# Patient Record
Sex: FEMALE | Race: WHITE | Employment: OTHER | URBAN - NONMETROPOLITAN AREA
[De-identification: names, ages, dates, MRNs, and addresses within clinical notes are randomized per-mention and may not be internally consistent; named-entity substitution may affect disease eponyms.]

---

## 2017-01-03 ENCOUNTER — OFFICE VISIT (OUTPATIENT)
Dept: PRIMARY CARE CLINIC | Age: 62
End: 2017-01-03
Payer: COMMERCIAL

## 2017-01-03 VITALS
OXYGEN SATURATION: 98 % | HEART RATE: 98 BPM | SYSTOLIC BLOOD PRESSURE: 122 MMHG | WEIGHT: 170 LBS | HEIGHT: 65 IN | BODY MASS INDEX: 28.32 KG/M2 | DIASTOLIC BLOOD PRESSURE: 68 MMHG | RESPIRATION RATE: 20 BRPM

## 2017-01-03 DIAGNOSIS — G62.9 PERIPHERAL POLYNEUROPATHY: ICD-10-CM

## 2017-01-03 DIAGNOSIS — I10 ESSENTIAL HYPERTENSION: Primary | ICD-10-CM

## 2017-01-03 PROCEDURE — 99214 OFFICE O/P EST MOD 30 MIN: CPT | Performed by: INTERNAL MEDICINE

## 2017-01-03 RX ORDER — AMITRIPTYLINE HYDROCHLORIDE 10 MG/1
10 TABLET, FILM COATED ORAL NIGHTLY PRN
Qty: 30 TABLET | Refills: 3 | Status: SHIPPED | OUTPATIENT
Start: 2017-01-03 | End: 2017-05-31 | Stop reason: SDUPTHER

## 2017-01-03 RX ORDER — ROPINIROLE 0.5 MG/1
0.5 TABLET, FILM COATED ORAL 2 TIMES DAILY
Qty: 60 TABLET | Refills: 3 | Status: SHIPPED | OUTPATIENT
Start: 2017-01-03 | End: 2017-08-01 | Stop reason: SDUPTHER

## 2017-01-03 ASSESSMENT — ENCOUNTER SYMPTOMS
CONSTIPATION: 0
SHORTNESS OF BREATH: 0
BACK PAIN: 0
NAUSEA: 0
DIARRHEA: 0
VOMITING: 0
COUGH: 0

## 2017-01-12 DIAGNOSIS — Z76.0 MEDICATION REFILL: ICD-10-CM

## 2017-01-12 RX ORDER — ALPRAZOLAM 0.5 MG/1
0.5 TABLET ORAL 3 TIMES DAILY PRN
Qty: 90 TABLET | Refills: 2 | OUTPATIENT
Start: 2017-01-12 | End: 2017-04-10 | Stop reason: SDUPTHER

## 2017-01-12 RX ORDER — ZOLPIDEM TARTRATE 12.5 MG/1
12.5 TABLET, FILM COATED, EXTENDED RELEASE ORAL NIGHTLY PRN
Qty: 30 TABLET | Refills: 2 | OUTPATIENT
Start: 2017-01-12 | End: 2017-04-10 | Stop reason: SDUPTHER

## 2017-01-31 ENCOUNTER — HOSPITAL ENCOUNTER (OUTPATIENT)
Dept: WOMENS IMAGING | Age: 62
Discharge: HOME | End: 2017-01-31
Payer: COMMERCIAL

## 2017-01-31 DIAGNOSIS — Z12.31 ENCOUNTER FOR SCREENING MAMMOGRAM FOR BREAST CANCER: ICD-10-CM

## 2017-01-31 PROCEDURE — 77063 BREAST TOMOSYNTHESIS BI: CPT

## 2017-04-10 ENCOUNTER — OFFICE VISIT (OUTPATIENT)
Dept: PRIMARY CARE CLINIC | Age: 62
End: 2017-04-10
Payer: COMMERCIAL

## 2017-04-10 VITALS
WEIGHT: 170 LBS | BODY MASS INDEX: 28.32 KG/M2 | DIASTOLIC BLOOD PRESSURE: 76 MMHG | OXYGEN SATURATION: 98 % | SYSTOLIC BLOOD PRESSURE: 122 MMHG | HEIGHT: 65 IN | RESPIRATION RATE: 20 BRPM | HEART RATE: 89 BPM

## 2017-04-10 DIAGNOSIS — Z76.0 MEDICATION REFILL: ICD-10-CM

## 2017-04-10 DIAGNOSIS — I10 ESSENTIAL HYPERTENSION: Primary | ICD-10-CM

## 2017-04-10 DIAGNOSIS — E11.9 TYPE 2 DIABETES MELLITUS WITHOUT COMPLICATION, WITHOUT LONG-TERM CURRENT USE OF INSULIN (HCC): ICD-10-CM

## 2017-04-10 DIAGNOSIS — G25.81 RESTLESS LEGS SYNDROME (RLS): ICD-10-CM

## 2017-04-10 LAB — HBA1C MFR BLD: 8.6 %

## 2017-04-10 PROCEDURE — 99214 OFFICE O/P EST MOD 30 MIN: CPT | Performed by: INTERNAL MEDICINE

## 2017-04-10 PROCEDURE — 83036 HEMOGLOBIN GLYCOSYLATED A1C: CPT | Performed by: INTERNAL MEDICINE

## 2017-04-10 RX ORDER — ALPRAZOLAM 0.5 MG/1
0.5 TABLET ORAL 3 TIMES DAILY PRN
Qty: 90 TABLET | Refills: 2 | Status: SHIPPED | OUTPATIENT
Start: 2017-04-10 | End: 2017-07-13 | Stop reason: SDUPTHER

## 2017-04-10 RX ORDER — GLIMEPIRIDE 2 MG/1
2 TABLET ORAL
Qty: 30 TABLET | Refills: 3 | Status: SHIPPED | OUTPATIENT
Start: 2017-04-10 | End: 2017-08-10 | Stop reason: SDUPTHER

## 2017-04-10 RX ORDER — ZOLPIDEM TARTRATE 12.5 MG/1
12.5 TABLET, FILM COATED, EXTENDED RELEASE ORAL NIGHTLY PRN
Qty: 30 TABLET | Refills: 2 | Status: SHIPPED | OUTPATIENT
Start: 2017-04-10 | End: 2017-07-13 | Stop reason: SDUPTHER

## 2017-04-10 ASSESSMENT — ENCOUNTER SYMPTOMS
SHORTNESS OF BREATH: 0
VOMITING: 0
COUGH: 0
CONSTIPATION: 0
BACK PAIN: 0
DIARRHEA: 0
NAUSEA: 0

## 2017-04-24 ENCOUNTER — TELEPHONE (OUTPATIENT)
Dept: PRIMARY CARE CLINIC | Age: 62
End: 2017-04-24

## 2017-04-24 RX ORDER — AMOXICILLIN AND CLAVULANATE POTASSIUM 875; 125 MG/1; MG/1
1 TABLET, FILM COATED ORAL 2 TIMES DAILY
Qty: 20 TABLET | Refills: 0 | Status: SHIPPED | OUTPATIENT
Start: 2017-04-24 | End: 2017-05-04

## 2017-04-26 ENCOUNTER — TELEPHONE (OUTPATIENT)
Dept: PRIMARY CARE CLINIC | Age: 62
End: 2017-04-26

## 2017-04-26 RX ORDER — PROMETHAZINE HYDROCHLORIDE 25 MG/1
25 TABLET ORAL EVERY 6 HOURS PRN
Qty: 30 TABLET | Refills: 2 | Status: SHIPPED | OUTPATIENT
Start: 2017-04-26 | End: 2017-05-03

## 2017-05-31 RX ORDER — AMITRIPTYLINE HYDROCHLORIDE 10 MG/1
10 TABLET, FILM COATED ORAL NIGHTLY PRN
Qty: 30 TABLET | Refills: 3 | Status: SHIPPED | OUTPATIENT
Start: 2017-05-31 | End: 2017-10-02 | Stop reason: SDUPTHER

## 2017-05-31 RX ORDER — MONTELUKAST SODIUM 4 MG/1
TABLET, CHEWABLE ORAL
Qty: 60 TABLET | Refills: 11 | Status: SHIPPED | OUTPATIENT
Start: 2017-05-31 | End: 2018-07-06 | Stop reason: SDUPTHER

## 2017-06-22 ENCOUNTER — TELEPHONE (OUTPATIENT)
Dept: PRIMARY CARE CLINIC | Age: 62
End: 2017-06-22

## 2017-06-22 DIAGNOSIS — N39.0 URINARY TRACT INFECTION, SITE UNSPECIFIED: Primary | ICD-10-CM

## 2017-06-22 RX ORDER — PHENAZOPYRIDINE HYDROCHLORIDE 200 MG/1
200 TABLET, FILM COATED ORAL 3 TIMES DAILY PRN
Qty: 9 TABLET | Refills: 0 | Status: SHIPPED | OUTPATIENT
Start: 2017-06-22 | End: 2017-06-25

## 2017-06-22 RX ORDER — LEVOFLOXACIN 500 MG/1
500 TABLET, FILM COATED ORAL DAILY
Qty: 3 TABLET | Refills: 0 | Status: SHIPPED | OUTPATIENT
Start: 2017-06-22 | End: 2017-06-25

## 2017-07-13 DIAGNOSIS — Z76.0 MEDICATION REFILL: ICD-10-CM

## 2017-07-13 RX ORDER — ZOLPIDEM TARTRATE 12.5 MG/1
12.5 TABLET, FILM COATED, EXTENDED RELEASE ORAL NIGHTLY PRN
Qty: 30 TABLET | Refills: 2 | Status: SHIPPED | OUTPATIENT
Start: 2017-07-13 | End: 2017-08-10 | Stop reason: ALTCHOICE

## 2017-07-13 RX ORDER — ALPRAZOLAM 0.5 MG/1
0.5 TABLET ORAL 3 TIMES DAILY PRN
Qty: 90 TABLET | Refills: 2 | Status: CANCELLED | OUTPATIENT
Start: 2017-07-13

## 2017-07-13 RX ORDER — ZOLPIDEM TARTRATE 12.5 MG/1
12.5 TABLET, FILM COATED, EXTENDED RELEASE ORAL NIGHTLY PRN
Qty: 30 TABLET | Refills: 2 | Status: CANCELLED | OUTPATIENT
Start: 2017-07-13

## 2017-07-13 RX ORDER — ALPRAZOLAM 0.5 MG/1
0.5 TABLET ORAL 3 TIMES DAILY PRN
Qty: 90 TABLET | Refills: 2 | Status: SHIPPED | OUTPATIENT
Start: 2017-07-13 | End: 2017-11-28 | Stop reason: SDUPTHER

## 2017-08-10 ENCOUNTER — OFFICE VISIT (OUTPATIENT)
Dept: PRIMARY CARE CLINIC | Age: 62
End: 2017-08-10
Payer: COMMERCIAL

## 2017-08-10 VITALS
DIASTOLIC BLOOD PRESSURE: 78 MMHG | HEIGHT: 65 IN | WEIGHT: 170 LBS | SYSTOLIC BLOOD PRESSURE: 124 MMHG | TEMPERATURE: 97.6 F | HEART RATE: 95 BPM | OXYGEN SATURATION: 98 % | BODY MASS INDEX: 28.32 KG/M2

## 2017-08-10 DIAGNOSIS — E11.9 TYPE 2 DIABETES MELLITUS WITHOUT COMPLICATION, WITHOUT LONG-TERM CURRENT USE OF INSULIN (HCC): ICD-10-CM

## 2017-08-10 DIAGNOSIS — E78.00 HYPERCHOLESTEREMIA: ICD-10-CM

## 2017-08-10 DIAGNOSIS — F51.01 PRIMARY INSOMNIA: ICD-10-CM

## 2017-08-10 DIAGNOSIS — I10 ESSENTIAL HYPERTENSION: Primary | ICD-10-CM

## 2017-08-10 DIAGNOSIS — G25.81 RESTLESS LEGS SYNDROME (RLS): ICD-10-CM

## 2017-08-10 PROCEDURE — 99214 OFFICE O/P EST MOD 30 MIN: CPT | Performed by: INTERNAL MEDICINE

## 2017-08-10 RX ORDER — ROPINIROLE 0.5 MG/1
TABLET, FILM COATED ORAL
Qty: 60 TABLET | Refills: 5 | Status: SHIPPED | OUTPATIENT
Start: 2017-08-10 | End: 2018-03-21 | Stop reason: SDUPTHER

## 2017-08-10 RX ORDER — ZOLPIDEM TARTRATE 10 MG/1
10 TABLET ORAL NIGHTLY PRN
Qty: 30 TABLET | Refills: 3 | Status: SHIPPED | OUTPATIENT
Start: 2017-08-10 | End: 2017-12-08 | Stop reason: SDUPTHER

## 2017-08-10 RX ORDER — GLIMEPIRIDE 2 MG/1
2 TABLET ORAL
Qty: 30 TABLET | Refills: 3 | Status: SHIPPED | OUTPATIENT
Start: 2017-08-10 | End: 2018-01-10 | Stop reason: SDUPTHER

## 2017-08-10 ASSESSMENT — ENCOUNTER SYMPTOMS
VOMITING: 0
NAUSEA: 0
SHORTNESS OF BREATH: 0
COUGH: 0
BACK PAIN: 0
CONSTIPATION: 0
DIARRHEA: 0

## 2017-08-11 DIAGNOSIS — F51.01 PRIMARY INSOMNIA: ICD-10-CM

## 2017-08-11 DIAGNOSIS — G25.81 RESTLESS LEGS SYNDROME (RLS): ICD-10-CM

## 2017-08-11 DIAGNOSIS — E78.00 HYPERCHOLESTEREMIA: ICD-10-CM

## 2017-08-11 DIAGNOSIS — E11.9 TYPE 2 DIABETES MELLITUS WITHOUT COMPLICATION, WITHOUT LONG-TERM CURRENT USE OF INSULIN (HCC): ICD-10-CM

## 2017-08-11 DIAGNOSIS — I10 ESSENTIAL HYPERTENSION: ICD-10-CM

## 2017-08-11 LAB
ALBUMIN SERPL-MCNC: 4.1 G/DL (ref 3.5–5.2)
ALP BLD-CCNC: 115 U/L (ref 35–104)
ALT SERPL-CCNC: 25 U/L (ref 5–33)
ANION GAP SERPL CALCULATED.3IONS-SCNC: 18 MMOL/L (ref 7–19)
AST SERPL-CCNC: 30 U/L (ref 5–32)
BILIRUB SERPL-MCNC: 0.4 MG/DL (ref 0.2–1.2)
BUN BLDV-MCNC: 7 MG/DL (ref 8–23)
CALCIUM SERPL-MCNC: 9.6 MG/DL (ref 8.8–10.2)
CHLORIDE BLD-SCNC: 98 MMOL/L (ref 98–111)
CHOLESTEROL, TOTAL: 259 MG/DL (ref 160–199)
CO2: 25 MMOL/L (ref 22–29)
CREAT SERPL-MCNC: 0.6 MG/DL (ref 0.5–0.9)
GFR NON-AFRICAN AMERICAN: >60
GLUCOSE BLD-MCNC: 200 MG/DL (ref 74–109)
HBA1C MFR BLD: 9.2 %
HCT VFR BLD CALC: 36.7 % (ref 37–47)
HDLC SERPL-MCNC: 65 MG/DL (ref 65–121)
HEMOGLOBIN: 11.6 G/DL (ref 12–16)
LDL CHOLESTEROL CALCULATED: 150 MG/DL
MCH RBC QN AUTO: 28 PG (ref 27–31)
MCHC RBC AUTO-ENTMCNC: 31.6 G/DL (ref 33–37)
MCV RBC AUTO: 88.6 FL (ref 81–99)
PDW BLD-RTO: 13.8 % (ref 11.5–14.5)
PLATELET # BLD: 349 K/UL (ref 130–400)
PMV BLD AUTO: 10.9 FL (ref 9.4–12.3)
POTASSIUM SERPL-SCNC: 3.8 MMOL/L (ref 3.5–5)
RBC # BLD: 4.14 M/UL (ref 4.2–5.4)
SODIUM BLD-SCNC: 141 MMOL/L (ref 136–145)
TOTAL PROTEIN: 7.6 G/DL (ref 6.6–8.7)
TRIGL SERPL-MCNC: 221 MG/DL (ref 150–199)
WBC # BLD: 6.1 K/UL (ref 4.8–10.8)

## 2017-08-21 ENCOUNTER — TELEPHONE (OUTPATIENT)
Dept: PRIMARY CARE CLINIC | Age: 62
End: 2017-08-21

## 2017-08-21 DIAGNOSIS — R73.09 HEMOGLOBIN A1C 8.0% OR GREATER: Primary | ICD-10-CM

## 2017-09-07 ENCOUNTER — TELEPHONE (OUTPATIENT)
Dept: PRIMARY CARE CLINIC | Age: 62
End: 2017-09-07

## 2017-09-28 ENCOUNTER — HOSPITAL ENCOUNTER (OUTPATIENT)
Dept: GENERAL RADIOLOGY | Age: 62
Discharge: HOME | End: 2017-09-28
Payer: COMMERCIAL

## 2017-09-28 ENCOUNTER — OFFICE VISIT (OUTPATIENT)
Dept: PRIMARY CARE CLINIC | Age: 62
End: 2017-09-28
Payer: COMMERCIAL

## 2017-09-28 ENCOUNTER — TELEPHONE (OUTPATIENT)
Dept: PRIMARY CARE CLINIC | Age: 62
End: 2017-09-28

## 2017-09-28 VITALS
HEIGHT: 65 IN | HEART RATE: 86 BPM | SYSTOLIC BLOOD PRESSURE: 112 MMHG | WEIGHT: 167 LBS | OXYGEN SATURATION: 98 % | DIASTOLIC BLOOD PRESSURE: 68 MMHG | TEMPERATURE: 98 F | BODY MASS INDEX: 27.82 KG/M2 | RESPIRATION RATE: 18 BRPM

## 2017-09-28 DIAGNOSIS — R10.13 EPIGASTRIC PAIN: ICD-10-CM

## 2017-09-28 DIAGNOSIS — R19.7 DIARRHEA, UNSPECIFIED TYPE: ICD-10-CM

## 2017-09-28 DIAGNOSIS — R11.0 NAUSEA: ICD-10-CM

## 2017-09-28 DIAGNOSIS — R73.9 HYPERGLYCEMIA: ICD-10-CM

## 2017-09-28 DIAGNOSIS — K29.00 OTHER ACUTE GASTRITIS, PRESENCE OF BLEEDING UNSPECIFIED: ICD-10-CM

## 2017-09-28 LAB
ALBUMIN SERPL-MCNC: 4.3 G/DL (ref 3.5–5.2)
ALP BLD-CCNC: 114 U/L (ref 35–104)
ALT SERPL-CCNC: 30 U/L (ref 5–33)
AMYLASE: 66 U/L (ref 28–100)
ANION GAP SERPL CALCULATED.3IONS-SCNC: 23 MMOL/L (ref 7–19)
AST SERPL-CCNC: 29 U/L (ref 5–32)
BILIRUB SERPL-MCNC: <0.2 MG/DL (ref 0.2–1.2)
BUN BLDV-MCNC: 11 MG/DL (ref 8–23)
CALCIUM SERPL-MCNC: 9.7 MG/DL (ref 8.8–10.2)
CHLORIDE BLD-SCNC: 99 MMOL/L (ref 98–111)
CO2: 21 MMOL/L (ref 22–29)
CREAT SERPL-MCNC: 0.9 MG/DL (ref 0.5–0.9)
GFR NON-AFRICAN AMERICAN: >60
GLUCOSE BLD-MCNC: 306 MG/DL (ref 74–109)
HCT VFR BLD CALC: 37.4 % (ref 37–47)
HEMOGLOBIN: 11.6 G/DL (ref 12–16)
LIPASE: 28 U/L (ref 13–60)
MCH RBC QN AUTO: 27.6 PG (ref 27–31)
MCHC RBC AUTO-ENTMCNC: 31 G/DL (ref 33–37)
MCV RBC AUTO: 89 FL (ref 81–99)
PDW BLD-RTO: 13.5 % (ref 11.5–14.5)
PLATELET # BLD: 305 K/UL (ref 130–400)
PMV BLD AUTO: 10.2 FL (ref 9.4–12.3)
POTASSIUM SERPL-SCNC: 4.4 MMOL/L (ref 3.5–5)
RBC # BLD: 4.2 M/UL (ref 4.2–5.4)
SODIUM BLD-SCNC: 143 MMOL/L (ref 136–145)
TOTAL PROTEIN: 7.3 G/DL (ref 6.6–8.7)
WBC # BLD: 6.4 K/UL (ref 4.8–10.8)

## 2017-09-28 PROCEDURE — 74000 XR ABDOMEN LIMITED (KUB): CPT

## 2017-09-28 PROCEDURE — 99214 OFFICE O/P EST MOD 30 MIN: CPT | Performed by: NURSE PRACTITIONER

## 2017-09-28 RX ORDER — PANTOPRAZOLE SODIUM 40 MG/1
40 TABLET, DELAYED RELEASE ORAL DAILY
Qty: 30 TABLET | Refills: 3 | Status: SHIPPED | OUTPATIENT
Start: 2017-09-28 | End: 2017-12-11

## 2017-09-28 RX ORDER — PEN NEEDLE, DIABETIC 31 GX5/16"
NEEDLE, DISPOSABLE MISCELLANEOUS
Qty: 100 EACH | Refills: 0 | Status: SHIPPED | OUTPATIENT
Start: 2017-09-28 | End: 2018-07-18 | Stop reason: SDUPTHER

## 2017-09-28 RX ORDER — SUCRALFATE 1 G/1
1 TABLET ORAL 4 TIMES DAILY
Qty: 120 TABLET | Refills: 3 | Status: SHIPPED | OUTPATIENT
Start: 2017-09-28 | End: 2018-09-28

## 2017-09-28 RX ORDER — ONDANSETRON 4 MG/1
4 TABLET, FILM COATED ORAL EVERY 8 HOURS PRN
Qty: 30 TABLET | Refills: 0 | Status: SHIPPED | OUTPATIENT
Start: 2017-09-28 | End: 2017-10-08

## 2017-09-28 NOTE — PATIENT INSTRUCTIONS
Stop Jardiance. Begin Victoza 0.6mg daily x 7 days then increase to 1.2 daily for the rest of the month. Start protonix 40mg. Take in the morning on an empty stomach with water 2 -3 hours before food. Take carafate 30 min before meals with water. Make sure to separate protonix and carafate by 2 hours. Eat a bland diet. Avoid soda, acidic foods, spicy foods. Follow up in one month. I will refer you to DM educator on the 2nd floor.

## 2017-09-28 NOTE — MR AVS SNAPSHOT
After Visit Summary             Donna Urena   2017 9:15 AM   Office Visit    Description:  Female : 1955   Provider:  FLOR Allen   Department:  [address] [place] [address] and Future Appointments         Below is a list of your follow-up and future appointments. This may not be a complete list as you may have made appointments directly with providers that we are not aware of or your providers may have made some for you. Please call your providers to confirm appointments. It is important to keep your appointments. Please bring your current insurance card, photo ID, co-pay, and all medication bottles to your appointment. If self-pay, payment is expected at the time of service. Your To-Do List     Future Appointments Provider Department Dept Phone    2017 1:15 PM FLOR Allen [facility] [phone]    Please arrive 15 minutes prior to appointment, bring photo ID and insurance card. 2017 9:00 AM Deshaun Burgess DO [facility] [phone]    Please arrive 15 minutes prior to appointment, bring photo ID and insurance card. Future Orders Complete By Expires    Amylase [LAB48 Custom]  2017    CBC [TVV754 Custom]  2017    Comprehensive Metabolic Panel [HTB35 Custom]  2017    H. Pylori Antigen, Stool [CUZ589 Custom]  2017    Lipase [LAB99 Custom]  2017    XR ABDOMEN (KUB) (SINGLE AP VIEW) [51889 Custom]  2017    Follow-Up    Return in about 1 month (around 10/28/2017).          Information from Your Visit        Department     Name Address Phone Fax    [facility] [address] [facility] [phone]      You Were Seen for:         Comments    DM (diabetes mellitus), secondary uncontrolled ([facility]   [967853]         Vital Signs Instructions: Take 1 tablet by mouth every 8 hours as needed for Nausea or Vomiting   Quantity:  30 tablet   Refills:  0   Started by:  FLOR Mckinnon       pantoprazole 40 MG tablet   Commonly known as:  PROTONIX   Instructions: Take 1 tablet by mouth daily Take on an empty stomach with water 2 hours before food. Quantity:  30 tablet   Refills:  3   Started by:  FLOR Mckinnon       sucralfate 1 GM tablet   Commonly known as:  CARAFATE   Instructions: Take 1 tablet by mouth 4 times daily Take 30 min before food with water. Quantity:  120 tablet   Refills:  3   Started by:  FLOR Mckinnon         STOP taking these medications           empagliflozin 25 MG tablet   Commonly known as:  Maddy Turney by:  FLOR Mckinnon            Where to Get Your Medications      These medications were sent to [facility] Yulisa Alvarado Eddi [phone] [address], [address] [place] [place] [address]     Phone:  [phone]     Liraglutide 18 MG/3ML Sopn SC injection    ondansetron 4 MG tablet    pantoprazole 40 MG tablet    sucralfate 1 GM tablet               Your Current Medications Are              Liraglutide (VICTOZA) 18 MG/3ML SOPN SC injection 0.6mg daily x 7 days, then increase to 1.2mg daily. sucralfate (CARAFATE) 1 GM tablet Take 1 tablet by mouth 4 times daily Take 30 min before food with water. pantoprazole (PROTONIX) 40 MG tablet Take 1 tablet by mouth daily Take on an empty stomach with water 2 hours before food. ondansetron (ZOFRAN) 4 MG tablet Take 1 tablet by mouth every 8 hours as needed for Nausea or Vomiting    rOPINIRole (REQUIP) 0.5 MG tablet TAKE (1) TABLET BY MOUTH TWICE A DAY.     glimepiride (AMARYL) 2 MG tablet Take 1 tablet by mouth every morning (before breakfast)    ALPRAZolam (XANAX) 0.5 MG tablet Take 1 tablet by mouth 3 times daily as needed for Anxiety colestipol (COLESTID) 1 G tablet TAKE (1) TABLET BY MOUTH TWICE A DAY. amitriptyline (ELAVIL) 10 MG tablet Take 1 tablet by mouth nightly as needed for Sleep    celecoxib (CELEBREX) 200 MG capsule TAKE 1 CAPSULE BY MOUTH DAILY IN THE MORNING    lisinopril (PRINIVIL;ZESTRIL) 20 MG tablet TAKE 1 TABLET BY MOUTH DAILY IN THE MORNING    metFORMIN (GLUCOPHAGE-XR) 500 MG extended release tablet TAKE (2) TABLETS BY MOUTH TWICE DAILY. Diabetic Shoe MISC by Does not apply route    cilostazol (PLETAL) 100 MG tablet Take 1 tablet by mouth 2 times daily    carisoprodol (SOMA) 350 MG tablet Take 350 mg by mouth 2 times daily     mupirocin (BACTROBAN) 2 % ointment     oxyCODONE-acetaminophen (PERCOCET)  MG per tablet Take 1 tablet by mouth every 8 hours as needed       Allergies              Codeine     Sulfa Antibiotics          Additional Information        Basic Information     Date Of Birth Sex Race Ethnicity Preferred Language Preferred Written Language    1955 Female White Non-/Non  English English      Problem List as of 9/28/2017  Date Reviewed: 10/17/2014                Chest discomfort    Diabetes mellitus ([facility]    Hypercholesteremia      Immunizations as of 9/28/2017     Name Date    Influenza, Randall Echols, 3 Years and older, IM 9/22/2016    Pneumococcal Polysaccharide (Perquuldn32) 3/1/2016    Tdap (Boostrix, Adacel) 1/3/2013      Preventive Care        Date Due    Urine Check For Kidney Problems 3/1/2017    Yearly Flu Vaccine (1) 9/1/2017    Diabetic Foot Exam 9/22/2017    Eye Exam By An Eye Doctor 2/22/2018 (Originally 8/22/1965)    Zoster Vaccine 2/22/2018 (Originally 8/22/2014)    Pap Smear 2/22/2018 (Originally 8/22/1975)    Hepatitis C screening is recommended for all adults regardless of risk factors born between [facility] at least once (lifetime) who have never been tested.  2/22/2018 (Originally 1955)    HIV screening is recommended for all people regardless of risk factors aged 15-65 years at least once (lifetime) who have never been HIV tested. 2/22/2018 (Originally [birth year]22[birth year]1969)    Hemoglobin A1C (Test For Long-Term Glucose Control) 11/11/2017    Cholesterol Screening 8/11/2018    Mammograms are recommended every 2 years for low/average risk patients aged 48 - 69, and every year for high risk patients per updated national guidelines. However these guidelines can be individualized by your provider. 1/31/2019    Colonoscopy 5/4/2020    Tetanus Combination Vaccine (2 - Td) 1/3/2023            MyChart Signup           Plan Me Up allows you to send messages to your doctor, view your test results, renew your prescriptions, schedule appointments, view visit notes, and more. How Do I Sign Up? 1. In your Internet browser, go to [email or web address]  2. Click on the Sign Up Now link in the Sign In box. You will see the New Member Sign Up page. 3. Enter your Plan Me Up Access Code exactly as it appears below. You will not need to use this code after youve completed the sign-up process. If you do not sign up before the expiration date, you must request a new code. Plan Me Up Access Code: [email or web address]  Expires: 10/9/2017 10:51 AM    4. Enter your Social Security Number ([id number]) and Date of Birth (mm/dd/yyyy) as indicated and click Submit. You will be taken to the next sign-up page. 5. Create a Plan Me Up ID. This will be your Plan Me Up login ID and cannot be changed, so think of one that is secure and easy to remember. 6. Create a Plan Me Up password. You can change your password at any time. 7. Enter your Password Reset Question and Answer. This can be used at a later time if you forget your password. 8. Enter your e-mail address. You will receive e-mail notification when new information is available in [address] 9. Click Sign Up. You can now view your medical record.      Additional Information  If you have questions, please contact the physician practice where you receive care. Remember, [employer] is NOT to be used for urgent needs. For medical emergencies, dial 911. For questions regarding your [employer] account call [phone]. If you have a clinical question, please call your doctor's office.

## 2017-10-03 ASSESSMENT — ENCOUNTER SYMPTOMS
SHORTNESS OF BREATH: 0
ABDOMINAL PAIN: 0
WHEEZING: 0
VOMITING: 0
DIARRHEA: 0
NAUSEA: 0
VOICE CHANGE: 0
CHEST TIGHTNESS: 0
CONSTIPATION: 0
EYE REDNESS: 0
RHINORRHEA: 0
COUGH: 0
TROUBLE SWALLOWING: 0
BLOOD IN STOOL: 0
SORE THROAT: 0

## 2017-10-03 NOTE — PROGRESS NOTES
[facility]  [address] [facility]  [address] [place] [address]  Dept: [phone]  Dept Fax: [phone]  Loc: [phone]    Mona Mcintyre is a 60 y.o. female who presents today for her medical conditions/complaints as noted below. Mona Mcintyre is c/o of Nausea and Blood Sugar Problem      Chief Complaint   Patient presents with    Nausea    Blood Sugar Problem       HPI:       HPI    Pt here too follow up on issues with blood sugar. Pt states that she has had elevated blood sugar and nausea. Past Medical History:   Diagnosis Date    Anxiety     Chest discomfort 10/17/2014    H/o negative stress tests AUC indication 15, AUC score 4, [facility] 2012;[id number] 10/17/2014  Cath  Mild cad,normal LVFX      Diabetes mellitus ([address] 10/17/2014    Insomnia     Osteoarthritis         Past Surgical History:   Procedure Laterality Date    CARDIAC CATHETERIZATION  10/17/14  JDT    EF 50%    CHOLECYSTECTOMY      HYSTERECTOMY      KNEE SURGERY      NECK SURGERY         Social History   Substance Use Topics    Smoking status: Former Smoker     Packs/day: 1.00     Years: 30.00     Quit date: 3/1/1991    Smokeless tobacco: Not on file    Alcohol use No        Current Outpatient Prescriptions   Medication Sig Dispense Refill    Liraglutide (VICTOZA) 18 MG/3ML SOPN SC injection 0.6mg daily x 7 days, then increase to 1.2mg daily. 1 Pen 0    sucralfate (CARAFATE) 1 GM tablet Take 1 tablet by mouth 4 times daily Take 30 min before food with water. 120 tablet 3    pantoprazole (PROTONIX) 40 MG tablet Take 1 tablet by mouth daily Take on an empty stomach with water 2 hours before food. 30 tablet 3    ondansetron (ZOFRAN) 4 MG tablet Take 1 tablet by mouth every 8 hours as needed for Nausea or Vomiting 30 tablet 0    rOPINIRole (REQUIP) 0.5 MG tablet TAKE (1) TABLET BY MOUTH TWICE A DAY.  60 tablet 5    glimepiride (AMARYL) 2 MG tablet Take 1 tablet by mouth every dysuria, frequency and urgency. Musculoskeletal: Negative for myalgias. Skin: Negative for rash and wound. Neurological: Negative for dizziness, speech difficulty, light-headedness and headaches. Psychiatric/Behavioral: Negative for agitation, confusion, self-injury and suicidal ideas. The patient is not nervous/anxious. Objective:     Physical Exam   Constitutional: She is oriented to person, place, and time. She appears well-developed and well-nourished. No distress. HENT:   Head: Normocephalic and atraumatic. Right Ear: External ear normal.   Left Ear: External ear normal.   Nose: Nose normal.   Mouth/Throat: Oropharynx is clear and moist. No oropharyngeal exudate. Eyes: Conjunctivae are normal. Pupils are equal, round, and reactive to light. Right eye exhibits no discharge. Left eye exhibits no discharge. Neck: Normal range of motion. Neck supple. Cardiovascular: Normal rate, regular rhythm, normal heart sounds and intact distal pulses. No murmur heard. Pulmonary/Chest: Effort normal and breath sounds normal. No stridor. No respiratory distress. She has no wheezes. She has no rales. She exhibits no tenderness. Abdominal: Soft. Bowel sounds are normal. She exhibits no distension. There is tenderness (epigastric). Musculoskeletal: Normal range of motion. She exhibits no edema, tenderness or deformity. Neurological: She is alert and oriented to person, place, and time. She has normal reflexes. No cranial nerve deficit. Coordination normal.   Skin: Skin is warm and dry. No rash noted. She is not diaphoretic. No erythema. Psychiatric: She has a normal mood and affect. Her behavior is normal. Thought content normal.       /68  Pulse 86  Temp 98 °F (36.7 °C) (Temporal)   Resp 18  Ht 5' 5\" (1.651 m)  Wt 167 lb (75.8 kg)  LMP 03/01/1976 (LMP Unknown)  SpO2 98%  BMI 27.79 kg/m2      Assessment:     1.  DM (diabetes mellitus), secondary uncontrolled (HCC)  Liraglutide ([place]) 18 MG/3ML SOPN SC injection    Internal Referral to Diabetic Education   2. Hyperglycemia  Liraglutide (VICTOZA) 18 MG/3ML SOPN SC injection   3. Epigastric pain  XR ABDOMEN (KUB) (SINGLE AP VIEW)    H. Pylori Antigen, Stool    CBC    Comprehensive Metabolic Panel    Amylase    Lipase    sucralfate (CARAFATE) 1 GM tablet    pantoprazole (PROTONIX) 40 MG tablet   4. Diarrhea, unspecified type     5. Other acute gastritis, presence of bleeding unspecified  sucralfate (CARAFATE) 1 GM tablet    pantoprazole (PROTONIX) 40 MG tablet   6. Nausea         No results found for this visit on 09/28/17. Plan:     1. DM (diabetes mellitus), secondary uncontrolled ([address] Will begin pt on victoza. Will follow up in one month. Pt given instruction on use in office by provider. 2. Hyperglycemia    3. Epigastric pain    4. Diarrhea, unspecified type    5. Other acute gastritis, presence of bleeding unspecified - Will start pt on combo of protonix and carafate. Will follow up in one month. 6. Nausea        Return in about 1 month (around 10/28/2017). Orders Placed This Encounter   Procedures    XR ABDOMEN (KUB) (SINGLE AP VIEW)     Standing Status:   Future     Number of Occurrences:   1     Standing Expiration Date:   9/28/2018    H.  Pylori Antigen, Stool     Standing Status:   Future     Standing Expiration Date:   9/28/2018    CBC     Standing Status:   Future     Number of Occurrences:   1     Standing Expiration Date:   9/28/2018    Comprehensive Metabolic Panel     Standing Status:   Future     Number of Occurrences:   1     Standing Expiration Date:   9/28/2018    Amylase     Standing Status:   Future     Number of Occurrences:   1     Standing Expiration Date:   9/28/2018    Lipase     Standing Status:   Future     Number of Occurrences:   1     Standing Expiration Date:   9/28/2018    Internal Referral to Diabetic Education     Referral Priority:   Routine     Referral Type:   Consult for Advice and Opinion     Referral Reason:   Specialty Services Required     Requested Specialty:   Dietitian     Number of Visits Requested:   1       Orders Placed This Encounter   Medications    Liraglutide (VICTOZA) 18 MG/3ML SOPN SC injection     Si.6mg daily x 7 days, then increase to 1.2mg daily. Dispense:  1 Pen     Refill:  0    sucralfate (CARAFATE) 1 GM tablet     Sig: Take 1 tablet by mouth 4 times daily Take 30 min before food with water. Dispense:  120 tablet     Refill:  3    pantoprazole (PROTONIX) 40 MG tablet     Sig: Take 1 tablet by mouth daily Take on an empty stomach with water 2 hours before food. Dispense:  30 tablet     Refill:  3    ondansetron (ZOFRAN) 4 MG tablet     Sig: Take 1 tablet by mouth every 8 hours as needed for Nausea or Vomiting     Dispense:  30 tablet     Refill:  0        Patient given educational materials - see patient instructions. Discussed use, benefit, and side effects of prescribed medications. All patient questions answered. Pt voiced understanding. Reviewed health maintenance. Instructed to continue current medications, diet and exercise. Patient agreed with treatment plan. Follow up as directed. Pt instructed that is symptoms worsen or persist they are to contact office or report to nearest ER. Pt voices understanding and agreement with this plan.      Electronically signed by FLOR Collins on 10/3/2017 at 4:59 PM

## 2017-11-03 RX ORDER — METFORMIN HYDROCHLORIDE 500 MG/1
TABLET, EXTENDED RELEASE ORAL
Qty: 120 TABLET | Refills: 0 | Status: SHIPPED | OUTPATIENT
Start: 2017-11-03 | End: 2017-12-11 | Stop reason: SDUPTHER

## 2017-11-16 RX ORDER — ONDANSETRON 4 MG/1
4 TABLET, FILM COATED ORAL EVERY 8 HOURS PRN
Qty: 30 TABLET | Refills: 0 | Status: SHIPPED | OUTPATIENT
Start: 2017-11-16 | End: 2018-01-15 | Stop reason: SDUPTHER

## 2017-11-28 DIAGNOSIS — Z76.0 MEDICATION REFILL: ICD-10-CM

## 2017-11-28 RX ORDER — ALPRAZOLAM 0.5 MG/1
0.5 TABLET ORAL 3 TIMES DAILY PRN
Qty: 90 TABLET | Refills: 2 | Status: SHIPPED | OUTPATIENT
Start: 2017-11-28 | End: 2018-04-06 | Stop reason: SDUPTHER

## 2017-12-11 ENCOUNTER — OFFICE VISIT (OUTPATIENT)
Dept: PRIMARY CARE CLINIC | Age: 62
End: 2017-12-11
Payer: COMMERCIAL

## 2017-12-11 VITALS
SYSTOLIC BLOOD PRESSURE: 126 MMHG | OXYGEN SATURATION: 97 % | DIASTOLIC BLOOD PRESSURE: 64 MMHG | BODY MASS INDEX: 27.99 KG/M2 | HEIGHT: 65 IN | WEIGHT: 168 LBS | HEART RATE: 114 BPM | TEMPERATURE: 98.2 F

## 2017-12-11 DIAGNOSIS — I10 ESSENTIAL HYPERTENSION: ICD-10-CM

## 2017-12-11 DIAGNOSIS — G25.81 RESTLESS LEGS SYNDROME (RLS): ICD-10-CM

## 2017-12-11 DIAGNOSIS — F33.1 MODERATE EPISODE OF RECURRENT MAJOR DEPRESSIVE DISORDER (HCC): ICD-10-CM

## 2017-12-11 DIAGNOSIS — E11.9 TYPE 2 DIABETES MELLITUS WITHOUT COMPLICATION, WITHOUT LONG-TERM CURRENT USE OF INSULIN (HCC): Primary | ICD-10-CM

## 2017-12-11 LAB — HBA1C MFR BLD: 7.9 %

## 2017-12-11 PROCEDURE — 99214 OFFICE O/P EST MOD 30 MIN: CPT | Performed by: INTERNAL MEDICINE

## 2017-12-11 PROCEDURE — 83036 HEMOGLOBIN GLYCOSYLATED A1C: CPT | Performed by: INTERNAL MEDICINE

## 2017-12-11 RX ORDER — METFORMIN HYDROCHLORIDE 1000 MG/1
1000 TABLET, FILM COATED, EXTENDED RELEASE ORAL 2 TIMES DAILY
Qty: 60 TABLET | Refills: 3 | Status: SHIPPED | OUTPATIENT
Start: 2017-12-11 | End: 2017-12-11 | Stop reason: CLARIF

## 2017-12-11 RX ORDER — LISINOPRIL 20 MG/1
20 TABLET ORAL DAILY
COMMUNITY
End: 2018-02-08 | Stop reason: SDUPTHER

## 2017-12-11 RX ORDER — ZOLPIDEM TARTRATE 10 MG/1
10 TABLET ORAL NIGHTLY PRN
Qty: 30 TABLET | Refills: 3 | Status: SHIPPED | OUTPATIENT
Start: 2017-12-11 | End: 2018-03-21 | Stop reason: SDUPTHER

## 2017-12-11 RX ORDER — ZOLPIDEM TARTRATE 10 MG/1
10 TABLET ORAL NIGHTLY PRN
Qty: 30 TABLET | Refills: 3 | Status: SHIPPED | OUTPATIENT
Start: 2017-12-11 | End: 2017-12-11 | Stop reason: SDUPTHER

## 2017-12-11 ASSESSMENT — ENCOUNTER SYMPTOMS
BACK PAIN: 0
CONSTIPATION: 0
NAUSEA: 0
COUGH: 0
DIARRHEA: 0
VOMITING: 0
SHORTNESS OF BREATH: 0

## 2017-12-11 NOTE — PROGRESS NOTES
CHOLECYSTECTOMY      HYSTERECTOMY      KNEE SURGERY      NECK SURGERY         Family History   Problem Relation Age of Onset    Heart Disease Mother     Cancer Father     Heart Disease Sister        Social History   Substance Use Topics    Smoking status: Former Smoker     Packs/day: 1.00     Years: 30.00     Quit date: 3/1/1991    Smokeless tobacco: Never Used    Alcohol use No      Current Outpatient Prescriptions   Medication Sig Dispense Refill    lisinopril (PRINIVIL;ZESTRIL) 20 MG tablet Take 20 mg by mouth daily      zolpidem (AMBIEN) 10 MG tablet Take 1 tablet by mouth nightly as needed for Sleep . 30 tablet 3    Diabetic Shoe MISC by Does not apply route With insert Dx E11.9 1 each 0    ALPRAZolam (XANAX) 0.5 MG tablet Take 1 tablet by mouth 3 times daily as needed for Anxiety . 90 tablet 2    amitriptyline (ELAVIL) 10 MG tablet TAKE 1 TABLET BY MOUTH AT BEDTIME AS NEEDED FOR SLEEP 30 tablet 11    sucralfate (CARAFATE) 1 GM tablet Take 1 tablet by mouth 4 times daily Take 30 min before food with water. 120 tablet 3    B-D UF III MINI PEN NEEDLES 31G X 5 MM MISC FOR DAILY USE WITH LEVEMIR 100 each 0    rOPINIRole (REQUIP) 0.5 MG tablet TAKE (1) TABLET BY MOUTH TWICE A DAY. 60 tablet 5    glimepiride (AMARYL) 2 MG tablet Take 1 tablet by mouth every morning (before breakfast) 30 tablet 3    colestipol (COLESTID) 1 G tablet TAKE (1) TABLET BY MOUTH TWICE A DAY.  60 tablet 11    celecoxib (CELEBREX) 200 MG capsule TAKE 1 CAPSULE BY MOUTH DAILY IN THE MORNING 30 capsule 11    carisoprodol (SOMA) 350 MG tablet Take 350 mg by mouth 2 times daily       oxyCODONE-acetaminophen (PERCOCET)  MG per tablet Take 1 tablet by mouth every 8 hours as needed       metFORMIN (GLUCOPHAGE) 500 MG tablet Take 2 tablets by mouth 2 times daily (with meals) 120 tablet 3    ondansetron (ZOFRAN) 4 MG tablet Take 1 tablet by mouth every 8 hours as needed for Nausea or Vomiting 30 tablet 0     No current Electronically signed by Isabela Kelly DO on 12/11/2017 at 11:04 AM

## 2017-12-11 NOTE — TELEPHONE ENCOUNTER
Oliva Calderon from [facility] called and metformin ER 1000 twice daily is not covered by insurance.  Escribed 500 2 twice daily

## 2017-12-11 NOTE — PATIENT INSTRUCTIONS
Start Invokana 100 mg once a day. Increase metformin to 1000 mg twice a day. We will refer you to [facility] psychiatry.

## 2018-01-15 DIAGNOSIS — E08.00 DIABETES MELLITUS DUE TO UNDERLYING CONDITION WITH HYPEROSMOLARITY WITHOUT COMA, UNSPECIFIED LONG TERM INSULIN USE STATUS: Primary | ICD-10-CM

## 2018-01-15 RX ORDER — ONDANSETRON 4 MG/1
TABLET, FILM COATED ORAL
Qty: 30 TABLET | Refills: 0 | Status: SHIPPED | OUTPATIENT
Start: 2018-01-15 | End: 2018-03-21 | Stop reason: SDUPTHER

## 2018-01-15 RX ORDER — METFORMIN HYDROCHLORIDE 500 MG/1
TABLET, EXTENDED RELEASE ORAL
Qty: 120 TABLET | Refills: 3 | Status: SHIPPED | OUTPATIENT
Start: 2018-01-15 | End: 2018-06-25 | Stop reason: SDUPTHER

## 2018-01-15 NOTE — TELEPHONE ENCOUNTER
[phone] called called regarding problem with metformin needs to be ER not plain as patient has previously taken ER.   Escribed

## 2018-02-08 RX ORDER — LISINOPRIL 20 MG/1
TABLET ORAL
Qty: 30 TABLET | Refills: 11 | Status: SHIPPED | OUTPATIENT
Start: 2018-02-08 | End: 2019-01-30 | Stop reason: SDUPTHER

## 2018-02-19 ENCOUNTER — HOSPITAL ENCOUNTER (OUTPATIENT)
Dept: NON INVASIVE DIAGNOSTICS | Age: 63
Discharge: HOME | End: 2018-02-19
Payer: COMMERCIAL

## 2018-02-19 ENCOUNTER — OFFICE VISIT (OUTPATIENT)
Dept: PRIMARY CARE CLINIC | Age: 63
End: 2018-02-19
Payer: COMMERCIAL

## 2018-02-19 VITALS
SYSTOLIC BLOOD PRESSURE: 122 MMHG | BODY MASS INDEX: 27.49 KG/M2 | TEMPERATURE: 97.5 F | OXYGEN SATURATION: 96 % | RESPIRATION RATE: 20 BRPM | WEIGHT: 165 LBS | HEIGHT: 65 IN | HEART RATE: 106 BPM | DIASTOLIC BLOOD PRESSURE: 80 MMHG

## 2018-02-19 DIAGNOSIS — R07.89 CHEST PRESSURE: ICD-10-CM

## 2018-02-19 DIAGNOSIS — E11.9 TYPE 2 DIABETES MELLITUS WITHOUT COMPLICATION, WITHOUT LONG-TERM CURRENT USE OF INSULIN (HCC): ICD-10-CM

## 2018-02-19 DIAGNOSIS — N30.00 ACUTE CYSTITIS WITHOUT HEMATURIA: Primary | ICD-10-CM

## 2018-02-19 DIAGNOSIS — E78.00 HYPERCHOLESTEREMIA: ICD-10-CM

## 2018-02-19 LAB
BILIRUBIN, POC: ABNORMAL
BLOOD URINE, POC: ABNORMAL
CLARITY, POC: CLEAR
COLOR, POC: YELLOW
GLUCOSE URINE, POC: 100
KETONES, POC: ABNORMAL
LEUKOCYTE EST, POC: ABNORMAL
NITRITE, POC: ABNORMAL
PH, POC: 5
PROTEIN, POC: ABNORMAL
SPECIFIC GRAVITY, POC: 1.02
UROBILINOGEN, POC: 0.2

## 2018-02-19 PROCEDURE — 99214 OFFICE O/P EST MOD 30 MIN: CPT | Performed by: FAMILY MEDICINE

## 2018-02-19 PROCEDURE — 93000 ELECTROCARDIOGRAM COMPLETE: CPT | Performed by: FAMILY MEDICINE

## 2018-02-19 PROCEDURE — 93005 ELECTROCARDIOGRAM TRACING: CPT

## 2018-02-19 PROCEDURE — 81002 URINALYSIS NONAUTO W/O SCOPE: CPT | Performed by: FAMILY MEDICINE

## 2018-02-19 RX ORDER — PROMETHAZINE HYDROCHLORIDE 25 MG/1
25 TABLET ORAL EVERY 6 HOURS PRN
Qty: 20 TABLET | Refills: 0 | Status: SHIPPED | OUTPATIENT
Start: 2018-02-19 | End: 2018-02-26

## 2018-02-19 RX ORDER — CIPROFLOXACIN 500 MG/1
500 TABLET, FILM COATED ORAL 2 TIMES DAILY
Qty: 6 TABLET | Refills: 0 | Status: SHIPPED | OUTPATIENT
Start: 2018-02-19 | End: 2018-02-22

## 2018-02-19 RX ORDER — SIMVASTATIN 20 MG
20 TABLET ORAL NIGHTLY
Qty: 90 TABLET | Refills: 3 | Status: SHIPPED | OUTPATIENT
Start: 2018-02-19 | End: 2019-09-23

## 2018-02-19 RX ORDER — ASPIRIN 81 MG/1
81 TABLET ORAL DAILY
Qty: 90 TABLET | Refills: 5 | Status: SHIPPED | OUTPATIENT
Start: 2018-02-19

## 2018-02-19 RX ORDER — PHENAZOPYRIDINE HYDROCHLORIDE 100 MG/1
100 TABLET, FILM COATED ORAL 3 TIMES DAILY PRN
Qty: 9 TABLET | Refills: 0 | Status: SHIPPED | OUTPATIENT
Start: 2018-02-19 | End: 2018-02-22

## 2018-02-19 RX ORDER — FLUCONAZOLE 100 MG/1
100 TABLET ORAL
Qty: 2 TABLET | Refills: 0 | Status: SHIPPED | OUTPATIENT
Start: 2018-02-19 | End: 2018-02-23

## 2018-02-19 ASSESSMENT — PATIENT HEALTH QUESTIONNAIRE - PHQ9
2. FEELING DOWN, DEPRESSED OR HOPELESS: 0
SUM OF ALL RESPONSES TO PHQ9 QUESTIONS 1 & 2: 0
1. LITTLE INTEREST OR PLEASURE IN DOING THINGS: 0
SUM OF ALL RESPONSES TO PHQ QUESTIONS 1-9: 0

## 2018-02-19 ASSESSMENT — ENCOUNTER SYMPTOMS
ABDOMINAL PAIN: 0
BACK PAIN: 0
NAUSEA: 1

## 2018-02-19 NOTE — PATIENT INSTRUCTIONS
Start taking daily 81 mg aspirin. Start 1/2 tab of simvastatin daily for 2 weeks, then increase to full tab daily. Use pyridium as needed up to 3 times a day and 9 doses maximum. Give it at least 1 week between refilling the pyridium if a refill was prescribed. Start cipro twice a day for 3 days. Start fluconazole, 1 pill today and another in 3 days.

## 2018-02-19 NOTE — PROGRESS NOTES
David Kilgore is a 60 y.o. female who presents today for   Chief Complaint   Patient presents with    Diabetes    Urinary Tract Infection       HPI  Patient is here for Concern of frequency, urgency, and dysuria. She states she's had other UTIs but none that required treatment recently. She denies any history of nephrolithiasis but she has some current nausea without vomiting or diarrhea. She also notes that last week she did have a bout of chest pressure, cold sweat; they came on without warning on Thursday. She does have a strong family history of coronary artery disease and is concerned about this. She is currently diabetic as well with an A1c is 7.9 but is not due for A1c rechecked today. No change in PMH, family, social, or surgical history unless mentioned above. Review of Systems   Constitutional: Negative for fever. Gastrointestinal: Positive for nausea. Negative for abdominal pain. Genitourinary: Positive for dysuria, frequency and urgency. Negative for vaginal bleeding, vaginal discharge and vaginal pain. Musculoskeletal: Negative for back pain.        Past Medical History:   Diagnosis Date    Anxiety     Chest discomfort 10/17/2014    H/o negative stress tests AUC indication 15, AUC score 4, [facility] 2012;[id number] 10/17/2014  Cath  Mild cad,normal LVFX      Diabetes mellitus ([facility] [address] [address] 10/17/2014    Insomnia     Osteoarthritis        Current Outpatient Prescriptions   Medication Sig Dispense Refill    ciprofloxacin (CIPRO) 500 MG tablet Take 1 tablet by mouth 2 times daily for 3 days 6 tablet 0    fluconazole (DIFLUCAN) 100 MG tablet Take 1 tablet by mouth every 72 hours for 2 doses 2 tablet 0    phenazopyridine (PYRIDIUM) 100 MG tablet Take 1 tablet by mouth 3 times daily as needed for Pain 9 tablet 0    aspirin EC 81 MG EC tablet Take 1 tablet by mouth daily 90 tablet 5    simvastatin (ZOCOR) 20 MG tablet Take 1 tablet by mouth nightly 90 tablet 3    promethazine (PHENERGAN) 25

## 2018-03-21 ENCOUNTER — OFFICE VISIT (OUTPATIENT)
Dept: PRIMARY CARE CLINIC | Age: 63
End: 2018-03-21
Payer: COMMERCIAL

## 2018-03-21 VITALS
SYSTOLIC BLOOD PRESSURE: 110 MMHG | TEMPERATURE: 97.8 F | DIASTOLIC BLOOD PRESSURE: 60 MMHG | HEART RATE: 129 BPM | OXYGEN SATURATION: 98 % | WEIGHT: 166.4 LBS | BODY MASS INDEX: 27.69 KG/M2

## 2018-03-21 DIAGNOSIS — E78.00 HYPERCHOLESTEREMIA: ICD-10-CM

## 2018-03-21 DIAGNOSIS — E11.9 TYPE 2 DIABETES MELLITUS WITHOUT COMPLICATION, WITHOUT LONG-TERM CURRENT USE OF INSULIN (HCC): Primary | ICD-10-CM

## 2018-03-21 DIAGNOSIS — F33.1 MODERATE EPISODE OF RECURRENT MAJOR DEPRESSIVE DISORDER (HCC): ICD-10-CM

## 2018-03-21 DIAGNOSIS — I10 ESSENTIAL HYPERTENSION: ICD-10-CM

## 2018-03-21 LAB — HBA1C MFR BLD: 6.9 %

## 2018-03-21 PROCEDURE — 83036 HEMOGLOBIN GLYCOSYLATED A1C: CPT | Performed by: INTERNAL MEDICINE

## 2018-03-21 PROCEDURE — 99214 OFFICE O/P EST MOD 30 MIN: CPT | Performed by: INTERNAL MEDICINE

## 2018-03-21 RX ORDER — CELECOXIB 200 MG/1
CAPSULE ORAL
Qty: 30 CAPSULE | Refills: 11 | Status: SHIPPED | OUTPATIENT
Start: 2018-03-21 | End: 2019-03-28 | Stop reason: SDUPTHER

## 2018-03-21 RX ORDER — ONDANSETRON 4 MG/1
TABLET, FILM COATED ORAL
Qty: 30 TABLET | Refills: 5 | Status: SHIPPED | OUTPATIENT
Start: 2018-03-21 | End: 2018-09-28

## 2018-03-21 RX ORDER — ESCITALOPRAM OXALATE 10 MG/1
10 TABLET ORAL DAILY
Qty: 30 TABLET | Refills: 3 | Status: SHIPPED | OUTPATIENT
Start: 2018-03-21 | End: 2018-06-25 | Stop reason: SDUPTHER

## 2018-03-21 RX ORDER — ROPINIROLE 0.5 MG/1
TABLET, FILM COATED ORAL
Qty: 60 TABLET | Refills: 11 | Status: SHIPPED | OUTPATIENT
Start: 2018-03-21 | End: 2019-03-28 | Stop reason: SDUPTHER

## 2018-03-21 RX ORDER — ZOLPIDEM TARTRATE 10 MG/1
10 TABLET ORAL NIGHTLY PRN
Qty: 30 TABLET | Refills: 3 | Status: SHIPPED | OUTPATIENT
Start: 2018-03-21 | End: 2018-04-20

## 2018-03-21 ASSESSMENT — ENCOUNTER SYMPTOMS
COUGH: 0
VOMITING: 0
BACK PAIN: 0
SHORTNESS OF BREATH: 0
CONSTIPATION: 0
NAUSEA: 0
DIARRHEA: 0

## 2018-03-21 NOTE — PROGRESS NOTES
[facility]  [address] [facility]  [address] [facility] [address]  Dept: [phone]  Dept Fax: [phone]  Loc: [phone]    David Kilgore is a 60 y.o. female who presents today for her medical conditions/complaints as noted below. David Kilgore is c/o of   Chief Complaint   Patient presents with    3 Month Follow-Up    Blood Sugar Problem     running 300-400    Depression         HPI:     HPI  Ms. Nasreen Sauceda returns for follow-up of diabetes. I had referred her to psychiatry because she was dealing with the death of her son. However she was unable to afford the psychiatric care. She is requesting something for depression. She also notes that her blood sugars recently have been running in the 3-400 range. I had tried her on Invokana but she developed nausea related to the medication.     Hemoglobin A1C (%)   Date Value   03/21/2018 6.9   12/11/2017 7.9   08/11/2017 9.2 (H)             ( goal A1C is < 7)   Microalbumin, Random Urine (mg/G)   Date Value   03/01/2016 1.50     LDL Calculated (mg/dL)   Date Value   08/11/2017 150   05/26/2016 173       (goal LDL is <100)   AST (U/L)   Date Value   09/28/2017 29     ALT (U/L)   Date Value   09/28/2017 30     BUN (mg/dL)   Date Value   09/28/2017 11     BP Readings from Last 3 Encounters:   03/21/18 110/60   02/19/18 122/80   12/11/17 126/64          (goal 120/80)    Past Medical History:   Diagnosis Date    Anxiety     Chest discomfort 10/17/2014    H/o negative stress tests AUC indication 15, AUC score 4, [facility] [id number] 10/17/2014  Cath  Mild cad,normal LVFX      Diabetes mellitus ([address] 10/17/2014    Insomnia     Osteoarthritis       Past Surgical History:   Procedure Laterality Date    CARDIAC CATHETERIZATION  10/17/14  JDT    EF 50%    CHOLECYSTECTOMY      HYSTERECTOMY      KNEE SURGERY      NECK SURGERY         Family History   Problem Relation Age of Onset    Heart Disease Mother     Cancer mouth 2 times daily       oxyCODONE-acetaminophen (PERCOCET)  MG per tablet Take 1 tablet by mouth every 8 hours as needed        No current facility-administered medications for this visit. Allergies   Allergen Reactions    Codeine     Sulfa Antibiotics        Health Maintenance   Topic Date Due    Hepatitis C screen  1955    Diabetic retinal exam  08/22/1964    HIV screen  08/22/1969    Cervical cancer screen  08/22/1975    Shingles Vaccine (1 of 2 - 2 Dose Series) 08/22/2004    Diabetic microalbuminuria test  03/01/2017    Flu vaccine (1) 09/01/2017    Diabetic foot exam  09/22/2017    Lipid screen  08/11/2018    Potassium monitoring  09/28/2018    Creatinine monitoring  09/28/2018    A1C test (Diabetic or Prediabetic)  12/11/2018    Breast cancer screen  01/31/2019    Colon cancer screen colonoscopy  05/04/2020    DTaP/Tdap/Td vaccine (2 - Td) 01/03/2023    Pneumococcal med risk  Completed       Subjective:      Review of Systems   Constitutional: Negative for activity change and fatigue. Respiratory: Negative for cough and shortness of breath. Cardiovascular: Negative for chest pain and leg swelling. Gastrointestinal: Negative for constipation, diarrhea, nausea and vomiting. Genitourinary: Negative for difficulty urinating and dysuria. Musculoskeletal: Negative for arthralgias and back pain. Neurological: Negative for dizziness and headaches. Psychiatric/Behavioral: Positive for dysphoric mood. Objective:     Physical Exam   Constitutional: She is oriented to person, place, and time. She appears well-developed and well-nourished. HENT:   Head: Normocephalic and atraumatic. Mouth/Throat: Oropharynx is clear and moist.   Eyes: Conjunctivae are normal. Pupils are equal, round, and reactive to light. Cardiovascular: Normal rate, regular rhythm and normal heart sounds. Pulmonary/Chest: Effort normal and breath sounds normal.   Abdominal: Soft. Musculoskeletal: Normal range of motion. Neurological: She is alert and oriented to person, place, and time. Skin: Skin is warm and dry. Vitals reviewed. /60 (Site: Right Arm, Position: Sitting)   Pulse 129   Temp 97.8 °F (36.6 °C)   Wt 166 lb 6.4 oz (75.5 kg)   LMP 03/01/1976 (LMP Unknown)   SpO2 98%   BMI 27.69 kg/m²     Assessment:      1. Type 2 diabetes mellitus without complication, without long-term current use of insulin ([facility])  POCT glycosylated hemoglobin (Hb A1C)   2. Hypercholesteremia     3. Essential hypertension     4. Moderate episode of recurrent major depressive disorder ([facility]               Plan:      Return in about 3 months (around 6/21/2018). Patient Instructions   Start Tradjenta 5 mg once a day. Start Lexapro 10 mg once a day. Continue the remainder of your medications. Follow up again in 3 months. Orders Placed This Encounter   Procedures    POCT glycosylated hemoglobin (Hb A1C)     Orders Placed This Encounter   Medications    ondansetron (ZOFRAN) 4 MG tablet     Sig: Take 4mg by mouth. Dispense:  30 tablet     Refill:  5    celecoxib (CELEBREX) 200 MG capsule     Sig: TAKE 1 CAPSULE BY MOUTH DAILY IN THE MORNING     Dispense:  30 capsule     Refill:  11    rOPINIRole (REQUIP) 0.5 MG tablet     Sig: TAKE (1) TABLET BY MOUTH TWICE A DAY. Dispense:  60 tablet     Refill:  11    zolpidem (AMBIEN) 10 MG tablet     Sig: Take 1 tablet by mouth nightly as needed for Sleep for up to 30 days. Dispense:  30 tablet     Refill:  3    escitalopram (LEXAPRO) 10 MG tablet     Sig: Take 1 tablet by mouth daily     Dispense:  30 tablet     Refill:  3    linagliptin (TRADJENTA) 5 MG tablet     Sig: Take 1 tablet by mouth daily     Dispense:  30 tablet     Refill:  5       Patient given educational materials - see patient instructions. Discussed use, benefit, and side effects of prescribed medications. All patient questions answered. Pt voiced understanding. Reviewed health maintenance. Instructed to continue current medications, diet and exercise. Patient agreed with treatment plan. Follow up as directed.      Electronically signed by Geovanna Lanier DO on 3/21/2018 at 3:22 PM

## 2018-04-06 DIAGNOSIS — Z76.0 MEDICATION REFILL: ICD-10-CM

## 2018-04-06 RX ORDER — ALPRAZOLAM 0.5 MG/1
TABLET ORAL
Qty: 90 TABLET | Refills: 0 | Status: SHIPPED | OUTPATIENT
Start: 2018-04-06 | End: 2018-05-05 | Stop reason: SDUPTHER

## 2018-04-25 ENCOUNTER — TELEPHONE (OUTPATIENT)
Dept: PRIMARY CARE CLINIC | Age: 63
End: 2018-04-25

## 2018-05-05 DIAGNOSIS — Z76.0 MEDICATION REFILL: ICD-10-CM

## 2018-05-06 RX ORDER — ALPRAZOLAM 0.5 MG/1
TABLET ORAL
Qty: 90 TABLET | Refills: 0 | OUTPATIENT
Start: 2018-05-06 | End: 2018-06-25

## 2018-05-08 ENCOUNTER — TELEPHONE (OUTPATIENT)
Dept: PRIMARY CARE CLINIC | Age: 63
End: 2018-05-08

## 2018-05-11 ENCOUNTER — OFFICE VISIT (OUTPATIENT)
Dept: PRIMARY CARE CLINIC | Age: 63
End: 2018-05-11
Payer: COMMERCIAL

## 2018-05-11 VITALS
OXYGEN SATURATION: 98 % | TEMPERATURE: 98.6 F | HEART RATE: 113 BPM | DIASTOLIC BLOOD PRESSURE: 68 MMHG | SYSTOLIC BLOOD PRESSURE: 132 MMHG | WEIGHT: 166 LBS | BODY MASS INDEX: 27.66 KG/M2 | HEIGHT: 65 IN

## 2018-05-11 DIAGNOSIS — R11.0 NAUSEA: ICD-10-CM

## 2018-05-11 DIAGNOSIS — E11.42 DIABETIC POLYNEUROPATHY ASSOCIATED WITH TYPE 2 DIABETES MELLITUS (HCC): Primary | ICD-10-CM

## 2018-05-11 PROCEDURE — 99213 OFFICE O/P EST LOW 20 MIN: CPT | Performed by: NURSE PRACTITIONER

## 2018-05-11 RX ORDER — ONDANSETRON 4 MG/1
4 TABLET, FILM COATED ORAL DAILY PRN
Qty: 30 TABLET | Refills: 0 | Status: SHIPPED | OUTPATIENT
Start: 2018-05-11 | End: 2018-10-03 | Stop reason: SDUPTHER

## 2018-05-11 RX ORDER — GABAPENTIN 100 MG/1
100 CAPSULE ORAL 3 TIMES DAILY
Qty: 90 CAPSULE | Refills: 0 | Status: SHIPPED | OUTPATIENT
Start: 2018-05-11 | End: 2018-06-25 | Stop reason: SDUPTHER

## 2018-05-11 ASSESSMENT — ENCOUNTER SYMPTOMS
DIARRHEA: 0
VOMITING: 0
SHORTNESS OF BREATH: 0
ABDOMINAL PAIN: 0
NAUSEA: 1

## 2018-06-11 ENCOUNTER — HOSPITAL ENCOUNTER (EMERGENCY)
Age: 63
Discharge: LEFT BEFORE DISCHARGE | End: 2018-06-11
Payer: COMMERCIAL

## 2018-06-11 VITALS
BODY MASS INDEX: 27.82 KG/M2 | SYSTOLIC BLOOD PRESSURE: 117 MMHG | WEIGHT: 167 LBS | RESPIRATION RATE: 17 BRPM | HEART RATE: 126 BPM | TEMPERATURE: 98.8 F | HEIGHT: 65 IN | DIASTOLIC BLOOD PRESSURE: 78 MMHG | OXYGEN SATURATION: 96 %

## 2018-06-11 LAB
GLUCOSE BLD-MCNC: 255 MG/DL
GLUCOSE BLD-MCNC: 255 MG/DL (ref 70–99)
PERFORMED ON: ABNORMAL

## 2018-06-11 PROCEDURE — 4500000002 HC ER NO CHARGE

## 2018-06-11 PROCEDURE — 82948 REAGENT STRIP/BLOOD GLUCOSE: CPT

## 2018-06-11 ASSESSMENT — PAIN SCALES - GENERAL: PAINLEVEL_OUTOF10: 8

## 2018-06-25 ENCOUNTER — HOSPITAL ENCOUNTER (OUTPATIENT)
Dept: GENERAL RADIOLOGY | Age: 63
Discharge: HOME | End: 2018-06-25
Payer: COMMERCIAL

## 2018-06-25 ENCOUNTER — OFFICE VISIT (OUTPATIENT)
Dept: PRIMARY CARE CLINIC | Age: 63
End: 2018-06-25
Payer: COMMERCIAL

## 2018-06-25 VITALS
RESPIRATION RATE: 20 BRPM | OXYGEN SATURATION: 95 % | TEMPERATURE: 97.8 F | DIASTOLIC BLOOD PRESSURE: 64 MMHG | HEART RATE: 95 BPM | HEIGHT: 65 IN | SYSTOLIC BLOOD PRESSURE: 120 MMHG

## 2018-06-25 DIAGNOSIS — G56.91 NEUROPATHY, UPPER EXTREMITY, RIGHT: ICD-10-CM

## 2018-06-25 DIAGNOSIS — E11.42 DIABETIC POLYNEUROPATHY ASSOCIATED WITH TYPE 2 DIABETES MELLITUS (HCC): ICD-10-CM

## 2018-06-25 DIAGNOSIS — F41.0 PANIC DISORDER: ICD-10-CM

## 2018-06-25 DIAGNOSIS — E11.9 TYPE 2 DIABETES MELLITUS WITHOUT COMPLICATION, WITHOUT LONG-TERM CURRENT USE OF INSULIN (HCC): Primary | ICD-10-CM

## 2018-06-25 DIAGNOSIS — Z76.0 MEDICATION REFILL: ICD-10-CM

## 2018-06-25 DIAGNOSIS — G56.92 NEUROPATHY, UPPER EXTREMITY, LEFT: ICD-10-CM

## 2018-06-25 DIAGNOSIS — S97.82XA CRUSHING INJURY OF LEFT FOOT, INITIAL ENCOUNTER: ICD-10-CM

## 2018-06-25 LAB
AMPHETAMINE SCREEN, URINE: NORMAL
BARBITURATE SCREEN, URINE: NORMAL
BENZODIAZEPINE SCREEN, URINE: POSITIVE
COCAINE METABOLITE SCREEN URINE: NORMAL
MDMA URINE: NORMAL
METHADONE SCREEN, URINE: NORMAL
METHAMPHETAMINE, URINE: NORMAL
OPIATE SCREEN URINE: NORMAL
OXYCODONE SCREEN URINE: POSITIVE
PHENCYCLIDINE SCREEN URINE: NORMAL
PROPOXYPHENE SCREEN, URINE: NORMAL
THC: NORMAL
TRICYCLIC ANTIDEPRESSANTS, UR: POSITIVE

## 2018-06-25 PROCEDURE — 99214 OFFICE O/P EST MOD 30 MIN: CPT | Performed by: FAMILY MEDICINE

## 2018-06-25 PROCEDURE — 80305 DRUG TEST PRSMV DIR OPT OBS: CPT | Performed by: FAMILY MEDICINE

## 2018-06-25 PROCEDURE — 73620 X-RAY EXAM OF FOOT: CPT

## 2018-06-25 RX ORDER — ESCITALOPRAM OXALATE 10 MG/1
10 TABLET ORAL DAILY
Qty: 30 TABLET | Refills: 3 | Status: SHIPPED | OUTPATIENT
Start: 2018-06-25 | End: 2018-07-24 | Stop reason: SDUPTHER

## 2018-06-25 RX ORDER — METFORMIN HYDROCHLORIDE 500 MG/1
TABLET, EXTENDED RELEASE ORAL
Qty: 120 TABLET | Refills: 3 | Status: SHIPPED | OUTPATIENT
Start: 2018-06-25 | End: 2019-01-07 | Stop reason: SDUPTHER

## 2018-06-25 RX ORDER — CLONAZEPAM 0.5 MG/1
0.5 TABLET ORAL 2 TIMES DAILY PRN
Qty: 60 TABLET | Refills: 3 | Status: SHIPPED | OUTPATIENT
Start: 2018-06-25 | End: 2018-09-28

## 2018-06-25 RX ORDER — ALPRAZOLAM 0.5 MG/1
TABLET ORAL
Qty: 90 TABLET | Refills: 0 | Status: CANCELLED | OUTPATIENT
Start: 2018-06-25 | End: 2018-07-25

## 2018-06-25 RX ORDER — GABAPENTIN 100 MG/1
200 CAPSULE ORAL 3 TIMES DAILY
Qty: 180 CAPSULE | Refills: 0 | Status: SHIPPED | OUTPATIENT
Start: 2018-06-25 | End: 2018-07-24 | Stop reason: SDUPTHER

## 2018-06-25 ASSESSMENT — ENCOUNTER SYMPTOMS
SHORTNESS OF BREATH: 0
NAUSEA: 0
WHEEZING: 0
DIARRHEA: 0
CHEST TIGHTNESS: 0
VOMITING: 0
COUGH: 0
CONSTIPATION: 0
ABDOMINAL PAIN: 0

## 2018-06-29 DIAGNOSIS — E11.9 TYPE 2 DIABETES MELLITUS WITHOUT COMPLICATION, WITHOUT LONG-TERM CURRENT USE OF INSULIN (HCC): ICD-10-CM

## 2018-06-29 LAB
ALBUMIN SERPL-MCNC: 4.3 G/DL (ref 3.5–5.2)
ALP BLD-CCNC: 87 U/L (ref 35–104)
ALT SERPL-CCNC: 14 U/L (ref 5–33)
ANION GAP SERPL CALCULATED.3IONS-SCNC: 15 MMOL/L (ref 7–19)
AST SERPL-CCNC: 19 U/L (ref 5–32)
BILIRUB SERPL-MCNC: 0.3 MG/DL (ref 0.2–1.2)
BUN BLDV-MCNC: 10 MG/DL (ref 8–23)
CALCIUM SERPL-MCNC: 9.5 MG/DL (ref 8.8–10.2)
CHLORIDE BLD-SCNC: 94 MMOL/L (ref 98–111)
CHOLESTEROL, TOTAL: 180 MG/DL (ref 160–199)
CO2: 24 MMOL/L (ref 22–29)
CREAT SERPL-MCNC: 0.8 MG/DL (ref 0.5–0.9)
CREATININE URINE: 72.8 MG/DL (ref 4.2–622)
GFR NON-AFRICAN AMERICAN: >60
GLUCOSE BLD-MCNC: 154 MG/DL (ref 74–109)
HBA1C MFR BLD: 7.2 %
HDLC SERPL-MCNC: 68 MG/DL (ref 65–121)
LDL CHOLESTEROL CALCULATED: 80 MG/DL
MICROALBUMIN UR-MCNC: <1.2 MG/DL (ref 0–19)
MICROALBUMIN/CREAT UR-RTO: NORMAL MG/G
POTASSIUM SERPL-SCNC: 4.4 MMOL/L (ref 3.5–5)
SODIUM BLD-SCNC: 133 MMOL/L (ref 136–145)
TOTAL PROTEIN: 7.2 G/DL (ref 6.6–8.7)
TRIGL SERPL-MCNC: 161 MG/DL (ref 0–149)

## 2018-07-06 RX ORDER — MONTELUKAST SODIUM 4 MG/1
TABLET, CHEWABLE ORAL
Qty: 60 TABLET | Refills: 0 | Status: SHIPPED | OUTPATIENT
Start: 2018-07-06 | End: 2018-09-07 | Stop reason: SDUPTHER

## 2018-07-18 RX ORDER — AMITRIPTYLINE HYDROCHLORIDE 10 MG/1
10 TABLET, FILM COATED ORAL NIGHTLY PRN
Qty: 30 TABLET | Refills: 11 | Status: SHIPPED | OUTPATIENT
Start: 2018-07-18 | End: 2018-09-28

## 2018-07-18 NOTE — TELEPHONE ENCOUNTER
From: Sridevi Rios  Sent: 7/17/2018 10:30 PM CDT  Subject: Medication Renewal Request    Cassandra Franks would like a refill of the following medications:     B-D UF III MINI PEN NEEDLES 31G X 5 MM MISC Astrid Horton MD]     amitriptyline (ELAVIL) 10 MG tablet Astrid Horton MD]    Preferred pharmacy: [address] [place] [address] [facility] , [address] [place] [address] [phone] - F [phone]    Comment:

## 2018-07-24 DIAGNOSIS — G56.92 NEUROPATHY, UPPER EXTREMITY, LEFT: ICD-10-CM

## 2018-07-24 DIAGNOSIS — E11.42 DIABETIC POLYNEUROPATHY ASSOCIATED WITH TYPE 2 DIABETES MELLITUS (HCC): ICD-10-CM

## 2018-07-24 DIAGNOSIS — G56.91 NEUROPATHY, UPPER EXTREMITY, RIGHT: ICD-10-CM

## 2018-07-24 RX ORDER — ESCITALOPRAM OXALATE 10 MG/1
TABLET ORAL
Qty: 30 TABLET | Refills: 5 | Status: SHIPPED | OUTPATIENT
Start: 2018-07-24 | End: 2018-12-05 | Stop reason: SDUPTHER

## 2018-07-24 RX ORDER — GABAPENTIN 100 MG/1
CAPSULE ORAL
Qty: 180 CAPSULE | Refills: 2 | OUTPATIENT
Start: 2018-07-24 | End: 2018-09-28

## 2018-08-03 ENCOUNTER — PATIENT MESSAGE (OUTPATIENT)
Dept: PRIMARY CARE CLINIC | Age: 63
End: 2018-08-03

## 2018-08-06 NOTE — TELEPHONE ENCOUNTER
From: Deshaun Files  To:  Jc Gilbert MD  Sent: 8/3/2018 10:08 AM CDT  Subject: Prescription Question    Dr Kiser Ginny I need a refill on my Ambien please

## 2018-09-07 RX ORDER — MONTELUKAST SODIUM 4 MG/1
TABLET, CHEWABLE ORAL
Qty: 60 TABLET | Refills: 2 | Status: SHIPPED | OUTPATIENT
Start: 2018-09-07 | End: 2019-01-07 | Stop reason: SDUPTHER

## 2018-09-12 ENCOUNTER — TELEPHONE (OUTPATIENT)
Dept: PRIMARY CARE CLINIC | Age: 63
End: 2018-09-12

## 2018-09-18 ENCOUNTER — TELEPHONE (OUTPATIENT)
Dept: NEUROLOGY | Age: 63
End: 2018-09-18

## 2018-09-28 ENCOUNTER — OFFICE VISIT (OUTPATIENT)
Dept: NEUROLOGY | Age: 63
End: 2018-09-28
Payer: COMMERCIAL

## 2018-09-28 ENCOUNTER — TELEPHONE (OUTPATIENT)
Dept: NEUROLOGY | Age: 63
End: 2018-09-28

## 2018-09-28 VITALS
DIASTOLIC BLOOD PRESSURE: 83 MMHG | OXYGEN SATURATION: 93 % | HEART RATE: 112 BPM | BODY MASS INDEX: 28.49 KG/M2 | WEIGHT: 171 LBS | SYSTOLIC BLOOD PRESSURE: 121 MMHG | HEIGHT: 65 IN

## 2018-09-28 DIAGNOSIS — R29.2 HOFFMAN SIGN PRESENT: ICD-10-CM

## 2018-09-28 DIAGNOSIS — R26.81 GAIT INSTABILITY: ICD-10-CM

## 2018-09-28 DIAGNOSIS — E11.42 DIABETIC POLYNEUROPATHY ASSOCIATED WITH TYPE 2 DIABETES MELLITUS (HCC): Primary | ICD-10-CM

## 2018-09-28 DIAGNOSIS — G56.03 BILATERAL CARPAL TUNNEL SYNDROME: ICD-10-CM

## 2018-09-28 DIAGNOSIS — R20.0 NUMBNESS AND TINGLING: ICD-10-CM

## 2018-09-28 DIAGNOSIS — R20.2 NUMBNESS AND TINGLING: ICD-10-CM

## 2018-09-28 DIAGNOSIS — R26.89 LOSS OF BALANCE: ICD-10-CM

## 2018-09-28 PROBLEM — E11.40 DIABETIC NEUROPATHY (HCC): Status: ACTIVE | Noted: 2018-09-28

## 2018-09-28 PROCEDURE — 99204 OFFICE O/P NEW MOD 45 MIN: CPT | Performed by: PHYSICIAN ASSISTANT

## 2018-09-28 RX ORDER — AMITRIPTYLINE HYDROCHLORIDE 10 MG/1
10 TABLET, FILM COATED ORAL NIGHTLY
COMMUNITY
End: 2019-08-12 | Stop reason: SDUPTHER

## 2018-09-28 RX ORDER — CLONAZEPAM 0.5 MG/1
0.5 TABLET ORAL 2 TIMES DAILY PRN
COMMUNITY
End: 2018-12-05 | Stop reason: SDUPTHER

## 2018-09-28 RX ORDER — ESOMEPRAZOLE MAGNESIUM 20 MG/1
20 FOR SUSPENSION ORAL DAILY
COMMUNITY
End: 2020-01-03

## 2018-09-28 RX ORDER — GABAPENTIN 100 MG/1
100 CAPSULE ORAL 3 TIMES DAILY
COMMUNITY
End: 2019-01-30 | Stop reason: SDUPTHER

## 2018-09-28 RX ORDER — ZOLPIDEM TARTRATE 10 MG/1
TABLET ORAL NIGHTLY PRN
COMMUNITY
End: 2018-12-26 | Stop reason: SDUPTHER

## 2018-09-28 NOTE — PATIENT INSTRUCTIONS
Patient education: Diabetic neuropathy (Beyond the Basics)   Author:  Romulo Sadler MD, PhD  Section :  Asha Renner MD, PhD  [place] :  Soco Linares MD, MPH  Contributor Disclosures  All topics are updated as new evidence becomes available and our peer review process is complete. Literature review current through: Aug 2018. This topic last updated: Sep 21, 2017. DIABETIC NEUROPATHY OVERVIEW  Neuropathy is the medical term for nerve damage. Neuropathy is a common complication of type 1 and type 2 diabetes; up to 26 percent of people with type 2 diabetes have evidence of nerve damage at the time that diabetes is diagnosed [1]. A generalized type of neuropathy, known as polyneuropathy, is the most common type of diabetic neuropathy. Other types of neuropathy can also affect people with diabetes, but will not be discussed here. Signs and symptoms of diabetic neuropathy include loss of sensation and/or burning pain in the feet. Early detection of diabetes and tight control of blood sugar levels may reduce the risk of developing diabetic neuropathy. Treatments for diabetic neuropathy are available, and include several elements: control of blood glucose levels, prevention of injury, and control of painful symptoms. DIABETIC NEUROPATHY RISK FACTORS  In people with type 1 or type 2 diabetes, the biggest risk factor for developing diabetic neuropathy is having high blood sugar levels over time. Other factors can further increase the risk of developing diabetic neuropathy, including:  ? Coronary artery disease  ? Increased triglyceride levels  ? Being overweight (a body mass index >24) (calculator 1 and calculator 2)  ? Smoking  ? High blood pressure  DIABETIC NEUROPATHY SYMPTOMS  The most common symptoms of diabetic neuropathy include pain, burning, tingling, or numbness in the toes or feet, and extreme sensitivity to light touch.  The pain may be worst at rest and improve with activity, such as between and underneath the toes where damage may be hidden. Use a mirror or ask a family member or caregiver to help if it is difficult to see the entire foot. Choose socks and shoes carefully  Select cotton socks that fit loosely, and change the socks every day. Wear shoes that are fit correctly and are not tight, and break new shoes in slowly to prevent blisters. Ask about customized shoes if your feet are misshapen or have ulcers; specialized shoes can reduce the chances of developing foot ulcers in the future. Shoe inserts may also help cushion the step and decrease pressure on the soles of the feet. Ask for foot exams  Screening for foot complications should be a routine part of most medical visits, but is sometimes overlooked. At each visit, the shoes and socks should be removed and the clinician should visually examine the feet. Do not hesitate to ask the healthcare provider for a complete foot check at least once a year, and more frequently if there are problems. Control pain  Neuropathic pain can be difficult to control and can seriously affect your quality of life. Neuropathic pain is often worse at night, seriously disrupting sleep. Fortunately, only a small percentage of people with diabetic neuropathy experience pain. Pain resolves without treatment in some people over a period of weeks to months, especially if the episode of pain developed after a sudden change in health (eg, an episode of diabetic ketoacidosis, a significant weight loss, or a significant change in blood glucose control). There are several medications that are useful for the treatment of diabetic neuropathy and have been approved by the FDA, including duloxetine and pregabalin. Other medications are also useful, including tricyclic medications (eg, amitriptyline), gabapentin, tramadol, and alpha-lipoic acid.   Tricyclic antidepressants  There are several tricyclic antidepressants available for the treatment of chronic pain,

## 2018-10-01 ENCOUNTER — TELEPHONE (OUTPATIENT)
Dept: NEUROLOGY | Age: 63
End: 2018-10-01

## 2018-10-02 DIAGNOSIS — E11.42 DIABETIC POLYNEUROPATHY ASSOCIATED WITH TYPE 2 DIABETES MELLITUS (HCC): ICD-10-CM

## 2018-10-02 DIAGNOSIS — R26.81 GAIT INSTABILITY: ICD-10-CM

## 2018-10-02 DIAGNOSIS — R20.0 NUMBNESS AND TINGLING: ICD-10-CM

## 2018-10-02 DIAGNOSIS — R29.2 HOFFMAN SIGN PRESENT: ICD-10-CM

## 2018-10-02 DIAGNOSIS — R26.89 LOSS OF BALANCE: ICD-10-CM

## 2018-10-02 DIAGNOSIS — R20.2 NUMBNESS AND TINGLING: ICD-10-CM

## 2018-10-02 LAB
BASOPHILS ABSOLUTE: 0.1 K/UL (ref 0–0.2)
BASOPHILS RELATIVE PERCENT: 1 % (ref 0–1)
EOSINOPHILS ABSOLUTE: 0.1 K/UL (ref 0–0.6)
EOSINOPHILS RELATIVE PERCENT: 1 % (ref 0–5)
FOLATE: 16.7 NG/ML (ref 4.8–37.3)
HCT VFR BLD CALC: 38.2 % (ref 37–47)
HEMOGLOBIN: 11.8 G/DL (ref 12–16)
LYMPHOCYTES ABSOLUTE: 2.4 K/UL (ref 1.1–4.5)
LYMPHOCYTES RELATIVE PERCENT: 25.8 % (ref 20–40)
MCH RBC QN AUTO: 26.9 PG (ref 27–31)
MCHC RBC AUTO-ENTMCNC: 30.9 G/DL (ref 33–37)
MCV RBC AUTO: 87.2 FL (ref 81–99)
MONOCYTES ABSOLUTE: 0.7 K/UL (ref 0–0.9)
MONOCYTES RELATIVE PERCENT: 7.9 % (ref 0–10)
NEUTROPHILS ABSOLUTE: 5.8 K/UL (ref 1.5–7.5)
NEUTROPHILS RELATIVE PERCENT: 64 % (ref 50–65)
PDW BLD-RTO: 14.1 % (ref 11.5–14.5)
PLATELET # BLD: 388 K/UL (ref 130–400)
PMV BLD AUTO: 10 FL (ref 9.4–12.3)
RBC # BLD: 4.38 M/UL (ref 4.2–5.4)
T4 FREE: 1.4 NG/DL (ref 0.9–1.7)
TSH SERPL DL<=0.05 MIU/L-ACNC: 0.62 UIU/ML (ref 0.27–4.2)
VITAMIN B-12: 300 PG/ML (ref 211–946)
WBC # BLD: 9.1 K/UL (ref 4.8–10.8)

## 2018-10-03 ENCOUNTER — OFFICE VISIT (OUTPATIENT)
Dept: PRIMARY CARE CLINIC | Age: 63
End: 2018-10-03
Payer: COMMERCIAL

## 2018-10-03 VITALS
BODY MASS INDEX: 27.82 KG/M2 | SYSTOLIC BLOOD PRESSURE: 148 MMHG | OXYGEN SATURATION: 98 % | RESPIRATION RATE: 20 BRPM | TEMPERATURE: 98 F | DIASTOLIC BLOOD PRESSURE: 80 MMHG | WEIGHT: 167 LBS | HEART RATE: 112 BPM | HEIGHT: 65 IN

## 2018-10-03 DIAGNOSIS — R29.898 WEAKNESS OF FOOT, UNSPECIFIED LATERALITY: ICD-10-CM

## 2018-10-03 DIAGNOSIS — R29.2 BABINSKI SIGN: ICD-10-CM

## 2018-10-03 DIAGNOSIS — R11.0 NAUSEA: ICD-10-CM

## 2018-10-03 DIAGNOSIS — R41.3 MEMORY LOSS: ICD-10-CM

## 2018-10-03 DIAGNOSIS — Z12.11 COLON CANCER SCREENING: ICD-10-CM

## 2018-10-03 DIAGNOSIS — R29.898 WEAKNESS OF BOTH HANDS: ICD-10-CM

## 2018-10-03 DIAGNOSIS — Z23 NEED FOR PROPHYLACTIC VACCINATION AND INOCULATION AGAINST VARICELLA: ICD-10-CM

## 2018-10-03 DIAGNOSIS — E11.42 TYPE 2 DIABETES MELLITUS WITH DIABETIC POLYNEUROPATHY, WITHOUT LONG-TERM CURRENT USE OF INSULIN (HCC): Primary | ICD-10-CM

## 2018-10-03 PROCEDURE — 99214 OFFICE O/P EST MOD 30 MIN: CPT | Performed by: FAMILY MEDICINE

## 2018-10-03 RX ORDER — PREGABALIN 50 MG/1
50 CAPSULE ORAL 3 TIMES DAILY
Qty: 42 CAPSULE | Refills: 0 | Status: SHIPPED | OUTPATIENT
Start: 2018-10-03 | End: 2018-10-30

## 2018-10-03 RX ORDER — ONDANSETRON 4 MG/1
4 TABLET, FILM COATED ORAL DAILY PRN
Qty: 30 TABLET | Refills: 0 | Status: SHIPPED | OUTPATIENT
Start: 2018-10-03 | End: 2019-11-19 | Stop reason: SDUPTHER

## 2018-10-03 ASSESSMENT — ENCOUNTER SYMPTOMS
VOMITING: 0
WHEEZING: 0
ABDOMINAL PAIN: 0
CONSTIPATION: 0
CHEST TIGHTNESS: 0
DIARRHEA: 0
COUGH: 0
SHORTNESS OF BREATH: 0
NAUSEA: 0
BACK PAIN: 0

## 2018-10-03 NOTE — PROGRESS NOTES
250.60  DIABETES FOOT EXAM     357.2 pregabalin (LYRICA) 50 MG capsule   2. Nausea R11.0 787.02 ondansetron (ZOFRAN) 4 MG tablet   3. Need for prophylactic vaccination and inoculation against varicella Z23 V05.4 zoster recombinant adjuvanted vaccine (SHINGRIX) 50 MCG SUSR injection   4. Babinski sign R29.2 796.1 MRI BRAIN W WO CONTRAST   5. Weakness of both hands R29.898 729.89 MRI BRAIN W WO CONTRAST   6. Weakness of foot, unspecified laterality M21.40 [id number]   7. Colon cancer screening Z12.11 V76.51 POCT Fecal Immunochemical Test (FIT)   8. Memory loss R41.3 780.93        Plan:   She does appear to have positive of flaring of the toes upon Babinski reflex check. I have message neurology to consider ALS as a possible differential.  I do suspect that this is severe polyneuropathy as there appears to be a similar history with her brother. I'm fine with prescribing Lyrica to see if this does help with symptoms but I expect that most things will not help. She needs to continue to see neurology as uncontrolled. I'll prescribe nausea medications for her for recurrent nausea and I will order an MRI of the brain for further workup since she also has some memory loss.   Orders Placed This Encounter   Procedures    MRI BRAIN W WO CONTRAST     Standing Status:   Future     Standing Expiration Date:   10/3/2019     Order Specific Question:   Reason for exam:     Answer:   stocking glove distribution sensation and strength loss, r/u white matter lesion and pre and post central gyrus changes    POCT Fecal Immunochemical Test (FIT)     Standing Status:   Future     Standing Expiration Date:   10/3/2019     DIABETES FOOT EXAM     Orders Placed This Encounter   Medications    ondansetron (ZOFRAN) 4 MG tablet     Sig: Take 1 tablet by mouth daily as needed for Nausea or Vomiting     Dispense:  30 tablet     Refill:  0    zoster recombinant adjuvanted vaccine (SHINGRIX) 50 MCG SUSR injection     Sig: 50 MCG IM then repeat 2-6 months. Dispense:  0.5 mL     Refill:  1    pregabalin (LYRICA) 50 MG capsule     Sig: Take 1 capsule by mouth 3 times daily for 14 days. .     Dispense:  42 capsule     Refill:  0     Medications Discontinued During This Encounter   Medication Reason    ondansetron (ZOFRAN) 4 MG tablet REORDER     Patient Instructions   Please call if you do not hear about your referal to MRI within 5 days. Patient given educational handouts and has had all questions answered. Patient voices understanding and agrees to plans along with risks and benefits of plan. Patient is instructed to continue prior meds, diet, and exercise plans unless instructed otherwise. Patient agrees to follow up as instructed and sooner if needed. Patient agrees to go to ER if condition becomes emergent. Notes may be completed with dictation device and spelling errors may occur. Return in about 6 weeks (around 11/14/2018) for POCT A1C, f/u lyrica.

## 2018-10-09 ENCOUNTER — HOSPITAL ENCOUNTER (INPATIENT)
Facility: HOSPITAL | Age: 63
LOS: 3 days | Discharge: HOME HEALTH | End: 2018-10-12
Attending: FAMILY MEDICINE | Admitting: ORTHOPAEDIC SURGERY

## 2018-10-09 ENCOUNTER — APPOINTMENT (OUTPATIENT)
Dept: GENERAL RADIOLOGY | Facility: HOSPITAL | Age: 63
End: 2018-10-09

## 2018-10-09 ENCOUNTER — APPOINTMENT (OUTPATIENT)
Dept: CT IMAGING | Facility: HOSPITAL | Age: 63
End: 2018-10-09

## 2018-10-09 ENCOUNTER — HOSPITAL ENCOUNTER (OUTPATIENT)
Facility: HOSPITAL | Age: 63
Setting detail: SURGERY ADMIT
End: 2018-10-09
Attending: ORTHOPAEDIC SURGERY | Admitting: ORTHOPAEDIC SURGERY

## 2018-10-09 DIAGNOSIS — S82.841D CLOSED BIMALLEOLAR FRACTURE OF RIGHT ANKLE WITH ROUTINE HEALING, SUBSEQUENT ENCOUNTER: ICD-10-CM

## 2018-10-09 DIAGNOSIS — S09.90XA INJURY OF HEAD, INITIAL ENCOUNTER: ICD-10-CM
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
## 2018-10-09 DIAGNOSIS — Z74.09 IMPAIRED FUNCTIONAL MOBILITY, BALANCE, GAIT, AND ENDURANCE: ICD-10-CM

## 2018-10-09 DIAGNOSIS — Z78.9 DECREASED ACTIVITIES OF DAILY LIVING (ADL): ICD-10-CM

## 2018-10-09 DIAGNOSIS — S82.841A CLOSED BIMALLEOLAR FRACTURE OF RIGHT ANKLE, INITIAL ENCOUNTER: Primary | ICD-10-CM

## 2018-10-09 PROBLEM — S82.891A CLOSED FRACTURE DISLOCATION OF RIGHT ANKLE: Status: ACTIVE | Noted: 2018-10-09

## 2018-10-09 LAB
ABO GROUP BLD: NORMAL
ALBUMIN SERPL-MCNC: 4.3 G/DL (ref 3.5–5)
ALBUMIN/GLOB SERPL: 1.6 G/DL (ref 1.1–2.5)
ALP SERPL-CCNC: 88 U/L (ref 24–120)
ALT SERPL W P-5'-P-CCNC: 36 U/L (ref 0–54)
ANION GAP SERPL CALCULATED.3IONS-SCNC: 12 MMOL/L (ref 4–13)
APTT PPP: 21.9 SECONDS (ref 24.1–34.8)
AST SERPL-CCNC: 30 U/L (ref 7–45)
BASOPHILS # BLD AUTO: 0.04 10*3/MM3 (ref 0–0.2)
BASOPHILS NFR BLD AUTO: 0.4 % (ref 0–2)
BILIRUB SERPL-MCNC: 0.4 MG/DL (ref 0.1–1)
BLD GP AB SCN SERPL QL: NEGATIVE
BUN BLD-MCNC: 8 MG/DL (ref 5–21)
BUN/CREAT SERPL: 11.8 (ref 7–25)
CALCIUM SPEC-SCNC: 9.1 MG/DL (ref 8.4–10.4)
CHLORIDE SERPL-SCNC: 95 MMOL/L (ref 98–110)
CO2 SERPL-SCNC: 29 MMOL/L (ref 24–31)
CREAT BLD-MCNC: 0.68 MG/DL (ref 0.5–1.4)
DEPRECATED RDW RBC AUTO: 43.2 FL (ref 40–54)
EOSINOPHIL # BLD AUTO: 0.04 10*3/MM3 (ref 0–0.7)
EOSINOPHIL NFR BLD AUTO: 0.4 % (ref 0–4)
ERYTHROCYTE [DISTWIDTH] IN BLOOD BY AUTOMATED COUNT: 14.1 % (ref 12–15)
GFR SERPL CREATININE-BSD FRML MDRD: 87 ML/MIN/1.73
GLOBULIN UR ELPH-MCNC: 2.7 GM/DL
GLUCOSE BLD-MCNC: 206 MG/DL (ref 70–100)
GLUCOSE BLDC GLUCOMTR-MCNC: 286 MG/DL (ref 70–130)
HBA1C MFR BLD: 7.8 %
HCT VFR BLD AUTO: 32.9 % (ref 37–47)
HGB BLD-MCNC: 10.7 G/DL (ref 12–16)
IMM GRANULOCYTES # BLD: 0.04 10*3/MM3 (ref 0–0.03)
IMM GRANULOCYTES NFR BLD: 0.4 % (ref 0–5)
INR PPP: 0.93 (ref 0.91–1.09)
LYMPHOCYTES # BLD AUTO: 1.56 10*3/MM3 (ref 0.72–4.86)
LYMPHOCYTES NFR BLD AUTO: 16.2 % (ref 15–45)
MCH RBC QN AUTO: 27.4 PG (ref 28–32)
MCHC RBC AUTO-ENTMCNC: 32.5 G/DL (ref 33–36)
MCV RBC AUTO: 84.1 FL (ref 82–98)
MONOCYTES # BLD AUTO: 0.76 10*3/MM3 (ref 0.19–1.3)
MONOCYTES NFR BLD AUTO: 7.9 % (ref 4–12)
NEUTROPHILS # BLD AUTO: 7.21 10*3/MM3 (ref 1.87–8.4)
NEUTROPHILS NFR BLD AUTO: 74.7 % (ref 39–78)
NRBC BLD MANUAL-RTO: 0 /100 WBC (ref 0–0)
PLATELET # BLD AUTO: 297 10*3/MM3 (ref 130–400)
PMV BLD AUTO: 10.2 FL (ref 6–12)
POTASSIUM BLD-SCNC: 4.3 MMOL/L (ref 3.5–5.3)
PROT SERPL-MCNC: 7 G/DL (ref 6.3–8.7)
PROTHROMBIN TIME: 12.7 SECONDS (ref 11.9–14.6)
RBC # BLD AUTO: 3.91 10*6/MM3 (ref 4.2–5.4)
RH BLD: POSITIVE
SODIUM BLD-SCNC: 136 MMOL/L (ref 135–145)
T&S EXPIRATION DATE: NORMAL
WBC NRBC COR # BLD: 9.65 10*3/MM3 (ref 4.8–10.8)

## 2018-10-09 PROCEDURE — 73610 X-RAY EXAM OF ANKLE: CPT

## 2018-10-09 PROCEDURE — 86850 RBC ANTIBODY SCREEN: CPT | Performed by: NURSE PRACTITIONER

## 2018-10-09 PROCEDURE — 25010000002 ONDANSETRON PER 1 MG: Performed by: NURSE PRACTITIONER

## 2018-10-09 PROCEDURE — 93010 ELECTROCARDIOGRAM REPORT: CPT | Performed by: INTERNAL MEDICINE

## 2018-10-09 PROCEDURE — 99285 EMERGENCY DEPT VISIT HI MDM: CPT

## 2018-10-09 PROCEDURE — 85610 PROTHROMBIN TIME: CPT | Performed by: NURSE PRACTITIONER

## 2018-10-09 PROCEDURE — 86900 BLOOD TYPING SEROLOGIC ABO: CPT | Performed by: NURSE PRACTITIONER

## 2018-10-09 PROCEDURE — 94799 UNLISTED PULMONARY SVC/PX: CPT

## 2018-10-09 PROCEDURE — 82962 GLUCOSE BLOOD TEST: CPT

## 2018-10-09 PROCEDURE — 86901 BLOOD TYPING SEROLOGIC RH(D): CPT | Performed by: NURSE PRACTITIONER

## 2018-10-09 PROCEDURE — 85730 THROMBOPLASTIN TIME PARTIAL: CPT | Performed by: NURSE PRACTITIONER

## 2018-10-09 PROCEDURE — 73630 X-RAY EXAM OF FOOT: CPT

## 2018-10-09 PROCEDURE — 73600 X-RAY EXAM OF ANKLE: CPT

## 2018-10-09 PROCEDURE — 71045 X-RAY EXAM CHEST 1 VIEW: CPT

## 2018-10-09 PROCEDURE — 72125 CT NECK SPINE W/O DYE: CPT

## 2018-10-09 PROCEDURE — 94770: CPT

## 2018-10-09 PROCEDURE — 85025 COMPLETE CBC W/AUTO DIFF WBC: CPT | Performed by: NURSE PRACTITIONER

## 2018-10-09 PROCEDURE — 80053 COMPREHEN METABOLIC PANEL: CPT | Performed by: NURSE PRACTITIONER

## 2018-10-09 PROCEDURE — 63710000001 INSULIN LISPRO (HUMAN) PER 5 UNITS: Performed by: NURSE PRACTITIONER

## 2018-10-09 PROCEDURE — 70450 CT HEAD/BRAIN W/O DYE: CPT

## 2018-10-09 PROCEDURE — 73080 X-RAY EXAM OF ELBOW: CPT

## 2018-10-09 PROCEDURE — 25010000002 PROPOFOL 10 MG/ML EMULSION: Performed by: NURSE PRACTITIONER

## 2018-10-09 PROCEDURE — 83036 HEMOGLOBIN GLYCOSYLATED A1C: CPT | Performed by: NURSE PRACTITIONER

## 2018-10-09 PROCEDURE — 93005 ELECTROCARDIOGRAM TRACING: CPT | Performed by: NURSE PRACTITIONER

## 2018-10-09 RX ORDER — CELECOXIB 200 MG/1
200 CAPSULE ORAL
COMMUNITY
End: 2019-01-23 | Stop reason: HOSPADM

## 2018-10-09 RX ORDER — SIMVASTATIN 20 MG
20 TABLET ORAL NIGHTLY
COMMUNITY
End: 2019-01-17

## 2018-10-09 RX ORDER — GABAPENTIN 100 MG/1
100 CAPSULE ORAL 3 TIMES DAILY
COMMUNITY

## 2018-10-09 RX ORDER — METFORMIN HYDROCHLORIDE 500 MG/1
1000 TABLET, EXTENDED RELEASE ORAL EVERY 12 HOURS
Status: ON HOLD | COMMUNITY
End: 2018-10-10

## 2018-10-09 RX ORDER — MEPERIDINE HYDROCHLORIDE 25 MG/ML
25 INJECTION INTRAMUSCULAR; INTRAVENOUS; SUBCUTANEOUS EVERY 6 HOURS PRN
Status: DISCONTINUED | OUTPATIENT
Start: 2018-10-09 | End: 2018-10-12 | Stop reason: HOSPADM

## 2018-10-09 RX ORDER — DEXTROSE MONOHYDRATE 25 G/50ML
25 INJECTION, SOLUTION INTRAVENOUS
Status: DISCONTINUED | OUTPATIENT
Start: 2018-10-09 | End: 2018-10-12 | Stop reason: HOSPADM

## 2018-10-09 RX ORDER — SODIUM CHLORIDE 0.9 % (FLUSH) 0.9 %
3-10 SYRINGE (ML) INJECTION AS NEEDED
Status: DISCONTINUED | OUTPATIENT
Start: 2018-10-09 | End: 2018-10-12 | Stop reason: HOSPADM

## 2018-10-09 RX ORDER — GLIMEPIRIDE 2 MG/1
2 TABLET ORAL
COMMUNITY

## 2018-10-09 RX ORDER — CLONAZEPAM 0.5 MG/1
0.5 TABLET ORAL 2 TIMES DAILY PRN
COMMUNITY

## 2018-10-09 RX ORDER — MONTELUKAST SODIUM 4 MG/1
1 TABLET, CHEWABLE ORAL 2 TIMES DAILY
COMMUNITY
End: 2019-01-17

## 2018-10-09 RX ORDER — NICOTINE POLACRILEX 4 MG
15 LOZENGE BUCCAL
Status: DISCONTINUED | OUTPATIENT
Start: 2018-10-09 | End: 2018-10-12 | Stop reason: HOSPADM

## 2018-10-09 RX ORDER — PREGABALIN 50 MG/1
50 CAPSULE ORAL 3 TIMES DAILY
COMMUNITY
End: 2018-10-12 | Stop reason: HOSPADM

## 2018-10-09 RX ORDER — ASPIRIN 81 MG/1
81 TABLET, CHEWABLE ORAL DAILY
COMMUNITY

## 2018-10-09 RX ORDER — PROPOFOL 10 MG/ML
40 VIAL (ML) INTRAVENOUS ONCE
Status: COMPLETED | OUTPATIENT
Start: 2018-10-09 | End: 2018-10-09

## 2018-10-09 RX ORDER — ONDANSETRON 2 MG/ML
4 INJECTION INTRAMUSCULAR; INTRAVENOUS ONCE
Status: COMPLETED | OUTPATIENT
Start: 2018-10-09 | End: 2018-10-09

## 2018-10-09 RX ORDER — ESCITALOPRAM OXALATE 10 MG/1
10 TABLET ORAL DAILY
COMMUNITY

## 2018-10-09 RX ORDER — ONDANSETRON 2 MG/ML
4 INJECTION INTRAMUSCULAR; INTRAVENOUS EVERY 6 HOURS PRN
Status: DISCONTINUED | OUTPATIENT
Start: 2018-10-09 | End: 2018-10-12 | Stop reason: HOSPADM

## 2018-10-09 RX ORDER — PANTOPRAZOLE SODIUM 40 MG/1
40 TABLET, DELAYED RELEASE ORAL
Status: DISCONTINUED | OUTPATIENT
Start: 2018-10-10 | End: 2018-10-10

## 2018-10-09 RX ORDER — FENTANYL CITRATE 50 UG/ML
75 INJECTION, SOLUTION INTRAMUSCULAR; INTRAVENOUS
Status: DISCONTINUED | OUTPATIENT
Start: 2018-10-09 | End: 2018-10-09

## 2018-10-09 RX ORDER — LISINOPRIL 10 MG/1
20 TABLET ORAL
COMMUNITY

## 2018-10-09 RX ORDER — OXYCODONE AND ACETAMINOPHEN 10; 325 MG/1; MG/1
1 TABLET ORAL EVERY 4 HOURS PRN
Status: DISCONTINUED | OUTPATIENT
Start: 2018-10-09 | End: 2018-10-12 | Stop reason: HOSPADM

## 2018-10-09 RX ORDER — ZOLPIDEM TARTRATE 10 MG/1
10 TABLET ORAL NIGHTLY PRN
COMMUNITY

## 2018-10-09 RX ORDER — OXYCODONE AND ACETAMINOPHEN 10; 325 MG/1; MG/1
1 TABLET ORAL EVERY 8 HOURS PRN
Status: DISCONTINUED | OUTPATIENT
Start: 2018-10-09 | End: 2018-10-09

## 2018-10-09 RX ORDER — ROPINIROLE 0.5 MG/1
0.5 TABLET, FILM COATED ORAL 2 TIMES DAILY
COMMUNITY

## 2018-10-09 RX ORDER — SODIUM CHLORIDE 9 MG/ML
50 INJECTION, SOLUTION INTRAVENOUS CONTINUOUS
Status: DISCONTINUED | OUTPATIENT
Start: 2018-10-09 | End: 2018-10-11

## 2018-10-09 RX ORDER — MEPERIDINE HYDROCHLORIDE 25 MG/ML
25 INJECTION INTRAMUSCULAR; INTRAVENOUS; SUBCUTANEOUS ONCE
Status: COMPLETED | OUTPATIENT
Start: 2018-10-09 | End: 2018-10-09

## 2018-10-09 RX ORDER — SODIUM CHLORIDE 0.9 % (FLUSH) 0.9 %
3 SYRINGE (ML) INJECTION EVERY 12 HOURS SCHEDULED
Status: DISCONTINUED | OUTPATIENT
Start: 2018-10-09 | End: 2018-10-12 | Stop reason: HOSPADM

## 2018-10-09 RX ORDER — OXYCODONE AND ACETAMINOPHEN 10; 325 MG/1; MG/1
1 TABLET ORAL EVERY 8 HOURS PRN
COMMUNITY
End: 2019-01-23 | Stop reason: HOSPADM

## 2018-10-09 RX ORDER — AMITRIPTYLINE HYDROCHLORIDE 10 MG/1
10 TABLET, FILM COATED ORAL NIGHTLY
COMMUNITY

## 2018-10-09 RX ADMIN — OXYCODONE HYDROCHLORIDE AND ACETAMINOPHEN 1 TABLET: 10; 325 TABLET ORAL at 19:45

## 2018-10-09 RX ADMIN — ONDANSETRON HYDROCHLORIDE 4 MG: 2 INJECTION, SOLUTION INTRAMUSCULAR; INTRAVENOUS at 15:56

## 2018-10-09 RX ADMIN — MEPERIDINE HYDROCHLORIDE 25 MG: 25 INJECTION INTRAMUSCULAR; INTRAVENOUS; SUBCUTANEOUS at 19:39

## 2018-10-09 RX ADMIN — SODIUM CHLORIDE 50 ML/HR: 9 INJECTION, SOLUTION INTRAVENOUS at 23:27

## 2018-10-09 RX ADMIN — INSULIN LISPRO 6 UNITS: 100 INJECTION, SOLUTION INTRAVENOUS; SUBCUTANEOUS at 20:01

## 2018-10-09 RX ADMIN — ONDANSETRON HYDROCHLORIDE 4 MG: 2 INJECTION, SOLUTION INTRAMUSCULAR; INTRAVENOUS at 23:25

## 2018-10-09 RX ADMIN — PROPOFOL 40 MG: 10 INJECTION, EMULSION INTRAVENOUS at 16:02

## 2018-10-09 RX ADMIN — SODIUM CHLORIDE 50 ML/HR: 9 INJECTION, SOLUTION INTRAVENOUS at 18:13

## 2018-10-09 RX ADMIN — MEPERIDINE HYDROCHLORIDE 25 MG: 25 INJECTION INTRAMUSCULAR; INTRAVENOUS; SUBCUTANEOUS at 14:37

## 2018-10-09 RX ADMIN — OXYCODONE HYDROCHLORIDE AND ACETAMINOPHEN 1 TABLET: 10; 325 TABLET ORAL at 23:25

## 2018-10-09 NOTE — CONSULTS
Consult  [facility] [place] [facility]      America Garcia (1955)  10/9/2018    Reason for Consult: Right ankle pain  Requesting Physician: No ref. provider found      CHIEF COMPLAINT:  Right ankle pain    History Obtained From: patient chart     HISTORY OF PRESENT ILLNESS:                The patient is a 63 y.o. female who presents with above chief complaint. The patient got out of the bed when she first woke up this morning and her legs gave out causing her to fall.  She twisted her right ankle.  She immediately had right ankle pain and was unable to ambulate.  She was brought to [facility] ER and was found to have a fracture of the right ankle.  She continues with severe right ankle pain.      Orthopedics was consulted on this patient.    Past Medical History:    Past Medical History:   Diagnosis Date   • Diabetes mellitus (CMS/[facility])    • Hyperlipidemia    • Hypertension        Past Surgical History:    Past Surgical History:   Procedure Laterality Date   • APPENDECTOMY     • CHOLECYSTECTOMY     • HYSTERECTOMY     • NECK SURGERY      x3       Current Medications:   No current facility-administered medications for this encounter.   No current outpatient prescriptions on file.    Allergies:  Codeine and Morphine    Social History:   Social History     Social History   • Marital status:      Social History Main Topics   • Smoking status: Never Smoker   • Smokeless tobacco: Never Used   • Alcohol use No   • Drug use: No   • Sexual activity: Defer     Other Topics Concern   • Not on file       Family History:   Family History   Problem Relation Age of Onset   • Heart disease Mother    • Heart disease Father    • Leukemia Father        REVIEW OF SYSTEMS:    All systems were reviewed and negative except for:  Musculoskeletal: positive for  bone pain, joint pain, joint stiffness, joint swelling and See HPI    PHYSICAL EXAM:        General Appearance:    Alert, cooperative, in no acute  distress   Head:    Normocephalic, without obvious abnormality, atraumatic   Eyes:            Vision intact, EOM intact     Ears:    Ears appear intact with no abnormalities noted   Neck:   No adenopathy, supple, trachea midline, no thyromegaly   Back:     No kyphosis present, no scoliosis present, no skin lesions,      erythema or scars, no tenderness to palpation,   range of motion normal   Lungs:     Clear, respirations regular, even and unlabored    Heart:    Regular rhythm and normal rate, no edema   Chest Wall:    No abnormalities observed   Abdomen:     Normal bowel sounds, no masses, no organomegaly, soft        non-tender, non-distended, no guarding   Rectal:     Deferred   Extremities:   Severe tenderness, swelling, deformity consistent with fracture dislocation right ankle.  Bruising noted right ankle and pain with any ROM of ankle.  NV intact right lower extremity.  Otherwise Moves all extremities well, no edema, no cyanosis, no redness   Pulses:   Pulses palpable and equal bilaterally   Skin:   No bleeding, bruising or rash   Lymph nodes:   No palpable adenopathy   Neurologic:   Cranial nerves 2 - 12 grossly intact, alert and oriented times 3.         DATA:    Lab Results (last 24 hours)     Procedure Component Value Units Date/Time    Comprehensive Metabolic Panel [[id number]]  (Abnormal) Collected:  10/09/18 1429    Specimen:  Blood Updated:  10/09/18 1450     Glucose 206 (H) mg/dL      BUN 8 mg/dL      Creatinine 0.68 mg/dL      Sodium 136 mmol/L      Potassium 4.3 mmol/L      Chloride 95 (L) mmol/L      CO2 29.0 mmol/L      Calcium 9.1 mg/dL      Total Protein 7.0 g/dL      Albumin 4.30 g/dL      ALT (SGPT) 36 U/L      AST (SGOT) 30 U/L      Alkaline Phosphatase 88 U/L      Total Bilirubin 0.4 mg/dL      eGFR Non African Amer 87 mL/min/1.73      Globulin 2.7 gm/dL      A/G Ratio 1.6 g/dL      BUN/Creatinine Ratio 11.8     Anion Gap 12.0 mmol/L     aPTT [[id number]]  (Abnormal) Collected:  10/09/18  1429    Specimen:  Blood Updated:  10/09/18 1447     PTT 21.9 (L) seconds     Protime-INR [[id number]]  (Normal) Collected:  10/09/18 1429    Specimen:  Blood Updated:  10/09/18 1447     Protime 12.7 Seconds      INR 0.93    CBC & Differential [[id number]] Collected:  10/09/18 1429    Specimen:  Blood Updated:  10/09/18 1438    Narrative:       The following orders were created for panel order CBC & Differential.  Procedure                               Abnormality         Status                     ---------                               -----------         ------                     CBC Auto Differential[[id number]]        Abnormal            Final result                 Please view results for these tests on the individual orders.    CBC Auto Differential [[id number]]  (Abnormal) Collected:  10/09/18 1429    Specimen:  Blood Updated:  10/09/18 1438     WBC 9.65 10*3/mm3      RBC 3.91 (L) 10*6/mm3      Hemoglobin 10.7 (L) g/dL      Hematocrit 32.9 (L) %      MCV 84.1 fL      MCH 27.4 (L) pg      MCHC 32.5 (L) g/dL      RDW 14.1 %      RDW-SD 43.2 fl      MPV 10.2 fL      Platelets 297 10*3/mm3      Neutrophil % 74.7 %      Lymphocyte % 16.2 %      Monocyte % 7.9 %      Eosinophil % 0.4 %      Basophil % 0.4 %      Immature Grans % 0.4 %      Neutrophils, Absolute 7.21 10*3/mm3      Lymphocytes, Absolute 1.56 10*3/mm3      Monocytes, Absolute 0.76 10*3/mm3      Eosinophils, Absolute 0.04 10*3/mm3      Basophils, Absolute 0.04 10*3/mm3      Immature Grans, Absolute 0.04 (H) 10*3/mm3      nRBC 0.0 /100 WBC           Radiology:   Imaging Results (last 7 days)     Procedure Component Value Units Date/Time    XR Foot 3+ View Right [[id number]] Collected:  10/09/18 1433     Updated:  10/09/18 1437    Narrative:       EXAMINATION: Right foot 3 views 10/9/2018     HISTORY: Fall     FINDINGS: Three-view exam of the right foot demonstrates a bimalleolar  fracture dislocation of the ankle. The foot is unremarkable with  no  evidence of localized soft tissue swelling or discrete fracture line.       Impression:       1.. Normal exam of the right foot.  2. Bimalleolar fracture dislocation of the ankle.  This report was finalized on 10/09/2018 14:34 by Dr. South Uriostegui MD.    XR Ankle 3+ View Right [[id number]] Collected:  10/09/18 1530     Updated:  10/09/18 1436    Narrative:       EXAMINATION: Right ankle 3 views 10/9/2018     HISTORY: Right ankle pain after fall     FINDINGS: Three-view exam of the right ankle demonstrates diffuse soft  tissue swelling about the distal tibia and ankle with a bimalleolar  fracture/ dislocation of the ankle. The distal tibial plafond is  medially subluxed upon the talar dome. There is displacement of the  medial and lateral malleolar fracture fragments. No other fractures are  present.       Impression:       1.. Fracture dislocation of the ankle with a bimalleolar fracture. There  is medial subluxation of the distal tibia upon the talar dome.  2. Associated diffuse soft tissue swelling.  This report was finalized on 10/09/2018 14:33 by Dr. South Uriostegui MD.    XR Elbow 3+ View Left [[id number]] Collected:  10/09/18 1529     Updated:  10/09/18 1433    Narrative:       LEFT ELBOW, 2 views 10/9/2018 1:39 PM CDT     HISTORY: left elbow pain, fall     COMPARISON: None      FINDINGS:   Frontal and lateral views of the left elbow were obtained.      There is no fracture or dislocation. No significant joint space  narrowing is noted. There is no significant joint effusion.       No gross soft tissue abnormality is visualized.        Impression:       1. Unremarkable radiographs of the left elbow.        This report was finalized on 10/09/2018 14:30 by Dr. South Uriostegui MD.    CT Cervical Spine Without Contrast [[id number]] Collected:  10/09/18 1407     Updated:  10/09/18 1416    Narrative:       CT CERVICAL SPINE WO CONTRAST- 10/9/2018 1:35 PM CDT     HISTORY: fall, hit head     COMPARISON: None       DOSE LENGTH PRODUCT: 186 mGy cm. Automated exposure control was utilized  to diminish patient radiation dose.     TECHNIQUE: Serial helical tomographic images of the cervical spine were  obtained without the use of intravenous contrast. Additionally, sagittal  and coronal reformatted images were also provided for review.      FINDINGS:   Alignment: There is mild loss of normal cervical lordosis.     Bones: There is no evidence of fracture. The patient's undergone prior  fusion at the C4-C7 levels with interbody grafts. There is degenerative  disc disease at C3-C4 with narrowing of the space height and spondylosis  as well as at the C7-T1 level.     Disc spaces: There is degenerative disc disease at C3-C4 and C7-T1.     Canal and neuroforamina: Neural foraminal narrowing is noted at multiple  levels related to facet overgrowth and uncinate spurring.     Soft tissues: Unremarkable.     Lung apices: Clear. No pneumothorax.       Impression:       1. The patient's undergone prior fusion at the C4-C7 levels.  Degenerative disc disease is noted at C3-C4 and C7-T1.  2. No evidence of fracture or malalignment.  3. Bony neural foraminal encroachment at multiple levels related to  facet arthropathy and uncinate spurring.        This report was finalized on 10/09/2018 14:13 by Dr. South Uriostegui MD.    CT Head Without Contrast [[id number]] Collected:  10/09/18 1403     Updated:  10/09/18 1408    Narrative:       EXAMINATION: CT HEAD WO CONTRAST- 10/9/2018 2:03 PM CDT     HISTORY: Fall, hit head, head injury with pain     COMPARISON: None     DOSE: 601 mGy-cm     TECHNIQUE: Sequential imaging was performed from the vertex through the  base of the skull without the use of IV contrast.  Sagittal and coronal  reformations were made from the original source data and reviewed.  Automated exposure control was also utilized to decrease patient  radiation dose.     FINDINGS:   There is mild diffuse cerebral atrophy. There is no  evidence of acute  intracranial hemorrhage, mass, or midline shift. There is no evidence of  acute territorial infarct. The ventricles appear normal in  configuration. The basilar cisterns are patent. Posterior fossa appears  grossly normal. The calvarium is intact. The visualized paranasal  sinuses and mastoid air cells appear clear. Calcifications are seen in  the cavernous carotid arteries. There is a mostly empty sella  configuration.          Impression:       No acute intracranial findings.     This report was finalized on 10/09/2018 14:05 by Dr. Hector Holbrook MD.          Imaging was brought up and reviewed and I agree with the radiology findings.    IMPRESSION/RECOMMENDATIONS:      Closed bimalleolar fracture of right ankle    Closed fracture dislocation of right ankle      Assessment: bimalleolar fracture with a dislocation of the right ankle    Plan:  1) The patients dislocation is going to be reduced in the Emergency Department.  Our plan is for open reduction internal fixation of the right ankle tomorrow.  The risks and benefits of the procedure were discussed with the patient and she would like to proceed.        Electronically signed by ZBIGNIEW López3:35 PM10/9/2018

## 2018-10-09 NOTE — ED PROVIDER NOTES
"Subjective   Patient is a 63-year-old  female who presents ER today with complaint of fall.  The patient reports that she was at home trying to go to the bathroom when her legs went out from underneath her.  She has a history of neuropathy and this does occur from time to time.  She is supposed to be following up for nerve conduction test.  The patient states that her legs went out from underneath her and she fell landing on her right foot and ankle.  She reports pain and swelling to the right ankle.  She states she heard a \"pop\".  Patient states she also hit her elbow on something.  She also states that she hit her head on a metal safe.  The patient did not have loss of consciousness.  The patient was able to crawl to the phone and called 911.  The patient was splinted with a blanket to the right ankle per EMS.  She was also given pain medication in route.  The patient at this time is awake alert and oriented.  She is able to answer all questions appropriately.  Patient reports that she last ate and drank at 7 PM last evening.  She is not currently on any blood thinners.  She presents ER today for further evaluation.        History provided by:  Patient   used: No    Fall   Mechanism of injury: fall    Injury location:  Head/neck and leg  Head/neck injury location:  Head  Leg injury location:  R ankle and R foot  Incident location:  Home  Time since incident:  1 hour  Arrived directly from scene: yes    Fall:     Fall occurred:  Standing    Impact surface:  Hard floor    Point of impact:  Head and feet    Entrapped after fall: no    Suspicion of alcohol use: no    Suspicion of drug use: no    Prior to arrival data:     Bystander interventions:  None    Patient ambulatory at scene: no      Blood loss:  None    Responsiveness at scene:  Alert    Orientation at scene:  Person, place, situation and time    Loss of consciousness: no      Amnesic to event: no      Airway interventions:  " None    Breathing interventions:  None    IV access status:  Established    IO access:  None    Fluids administered:  None    Cardiac interventions:  None    Immobilization:  RLE splint    Airway condition since incident:  Stable    Breathing condition since incident:  Stable    Circulation condition since incident:  Stable  Associated symptoms: no abdominal pain, no back pain, no blindness, no chest pain, no difficulty breathing, no headaches, no hearing loss, no loss of consciousness, no nausea, no neck pain, no seizures and no vomiting    Risk factors: no AICD, no anticoagulation therapy, no asthma, no beta blocker therapy, no CABG, no CAD, no CHF, no COPD, no diabetes, no dialysis, no hemophilia, no kidney disease, no pacemaker, no past MI, not pregnant and no steroid use        Review of Systems   HENT: Negative for hearing loss.    Eyes: Negative for blindness.   Cardiovascular: Negative for chest pain.   Gastrointestinal: Negative for abdominal pain, nausea and vomiting.   Musculoskeletal: Negative for back pain and neck pain.   Neurological: Negative for seizures, loss of consciousness and headaches.   All other systems reviewed and are negative.      Past Medical History:   Diagnosis Date   • Diabetes mellitus (CMS/HCC)    • Hyperlipidemia    • Hypertension        Allergies   Allergen Reactions   • Codeine Shortness Of Breath   • Morphine Shortness Of Breath       Past Surgical History:   Procedure Laterality Date   • APPENDECTOMY     • CHOLECYSTECTOMY     • HYSTERECTOMY     • NECK SURGERY      x3       Family History   Problem Relation Age of Onset   • Heart disease Mother    • Heart disease Father    • Leukemia Father        Social History     Social History   • Marital status:      Social History Main Topics   • Smoking status: Never Smoker   • Smokeless tobacco: Never Used   • Alcohol use No   • Drug use: No   • Sexual activity: Defer     Other Topics Concern   • Not on file           Objective    Physical Exam   Constitutional: She is oriented to person, place, and time. She appears well-developed and well-nourished.   HENT:   Head: Normocephalic and atraumatic.   Neck:       Cardiovascular: Normal rate, regular rhythm and normal heart sounds.    Pulmonary/Chest: Effort normal and breath sounds normal.   Abdominal: Soft. Bowel sounds are normal.   Musculoskeletal:        Arms:       Legs:  Neurological: She is alert and oriented to person, place, and time.   Skin: Skin is warm and dry. Capillary refill takes less than 2 seconds.   Psychiatric: She has a normal mood and affect.   Nursing note and vitals reviewed.      FX Dislocation  Date/Time: 10/9/2018 4:15 PM  Performed by: WHITNEY FISHER  Authorized by: WHITNEY FISHER     Consent:     Consent obtained:  Written    Consent given by:  Patient    Risks discussed:  Nerve damage, pain and vascular damage    Alternatives discussed:  No treatment  Injury:     Injury location:  Ankle    Ankle injury location:  R ankle    Ankle fracture type: bimalleolar    Pre-procedure assessment:     Neurological function: normal      Distal perfusion: normal      Range of motion: reduced    Sedation:     Sedation type:  Moderate (conscious) sedation  Anesthesia (see MAR for exact dosages):     Anesthesia method:  None  Procedure details:     Manipulation performed: yes      Reduction successful: yes      X-ray confirmed reduction: yes      Immobilization:  Splint    Splint type:  Short leg    Supplies used:  Cotton padding, elastic bandage and Ortho-Glass  Post-procedure assessment:     Neurological function: normal      Distal perfusion: normal      Range of motion: improved      Patient tolerance of procedure:  Tolerated well, no immediate complications  Procedural Sedation  Date/Time: 10/9/2018 4:16 PM  Performed by: WHITNEY FISHER  Authorized by: WHITNEY FISHER     Consent:     Consent obtained:  Written    Consent given by:  Patient    Risks  discussed:  Allergic reaction, dysrhythmia, inadequate sedation, prolonged hypoxia resulting in organ damage, prolonged sedation necessitating reversal and respiratory compromise necessitating ventilatory assistance and intubation    Alternatives discussed:  Analgesia without sedation  Universal protocol:     Immediately prior to procedure a time out was called: yes      Patient identity confirmation method:  Verbally with patient and arm band  Indications:     Procedure performed:  Fracture reduction    Procedure necessitating sedation performed by:  Physician performing sedation    Intended level of sedation:  Moderate (conscious sedation)  Pre-sedation assessment:     NPO status caution: urgency dictates proceeding with non-ideal NPO status      ASA classification: class 2 - patient with mild systemic disease      Neck mobility: normal      Mouth opening:  3 or more finger widths    Thyromental distance:  4 finger widths    Mallampati score:  II - soft palate, uvula, fauces visible    Pre-sedation assessments completed and reviewed: airway patency, cardiovascular function, hydration status, mental status, nausea/vomiting, pain level, respiratory function and temperature      History of difficult intubation: no    Immediate pre-procedure details:     Reassessment: Patient reassessed immediately prior to procedure      Reviewed: vital signs, relevant labs/tests and NPO status      Verified: bag valve mask available, emergency equipment available, intubation equipment available, IV patency confirmed, oxygen available, reversal medications available and suction available    Procedure details (see MAR for exact dosages):     Preoxygenation:  Nasal cannula    Sedation:  Etomidate    Intra-procedure monitoring:  Blood pressure monitoring, cardiac monitor, continuous capnometry, continuous pulse oximetry, frequent LOC assessments and frequent vital sign checks    Intra-procedure events: none    Post-procedure details:      Attendance: Constant attendance by certified staff until patient recovered      Recovery: Patient returned to pre-procedure baseline      Post-sedation assessments completed and reviewed: airway patency, cardiovascular function, hydration status, mental status, nausea/vomiting, pain level, respiratory function and temperature      Patient is stable for discharge or admission: yes      Patient tolerance:  Tolerated well, no immediate complications  Splint - Cast - Strapping  Date/Time: 10/9/2018 5:57 PM  Performed by: THUAN JAVIER  Authorized by: JODIE HUYNH     Consent:     Consent obtained:  Verbal    Consent given by:  Patient    Risks discussed:  Discoloration, pain, numbness and swelling    Alternatives discussed:  No treatment, delayed treatment, alternative treatment, observation and referral  Pre-procedure details:     Sensation:  Normal    Skin color:  Pink, warm and dry  Procedure details:     Laterality:  Right    Location:  Ankle    Ankle:  R ankle    Splint type: posterior splint.    Supplies:  Ortho-Glass, cotton padding and elastic bandage  Post-procedure details:     Pain:  Improved    Sensation:  Normal    Skin color:  Pink, warm and dry    Patient tolerance of procedure:  Tolerated well, no immediate complications               ED Course  ED Course as of Oct 09 1759   Tue Oct 09, 2018   1439 Call placed to Dr. Oglesby with orthopedics at this time.   [LF]   1450 Discussed pt case with Dr. Oglesby; he has asked that rt ankle be reduced; posterior splint placed and pt be admitted to hospitalist. He will place pt on OR schedule for tomorrow. Pt updated on status. Discussed with Dr. Parish; pt will be moved to room to reduce fracture.   [LF]   6538 Pt moved to room #1 for reduction; consent signed by nursing staff and pt for reduction of rt ankle; pt advised of risks vs benefits of procedure. Agreeable with procedure to reduce ankle at this time and for concious sedation. Pt also seen by  ZBIGNIEW Hernandez with orthopedics. Recheck pedal pulses 2+, +CMS RLE prior to procedure.   [LF]   1616 Reduction of rt ankle performed per Dr. Parish. Please see his procedure note. Pt awake and alert at this time after procedure. Post reduction film obtained. Call placed to hospitalist at this time to discuss admission.   [LF]   1619 Discussed pt case with Dr. Roque, hospitalist on call. Will admit to Dr. Ramsay. Pt will be admitted at this time in stable cond.   [LF]      ED Course User Index  [LF] Anastasiia Rapp, APRN        XR Ankle 2 View Right   Final Result   Successful reduction for a bimalleolar fracture dislocation   of the right ankle with good restoration of anatomic alignment.   This report was finalized on 10/09/2018 16:32 by Dr. South Uriostegui MD.      XR Chest 1 View   Final Result           No acute cardiopulmonary abnormality.                                                                           This report was finalized on [id number] by Dr. Caleb Chase MD.      XR Foot 3+ View Right   Final Result   1.. Normal exam of the right foot.   2. Bimalleolar fracture dislocation of the ankle.   This report was finalized on 10/09/2018 14:34 by Dr. South Uriostegui MD.      XR Ankle 3+ View Right   Final Result   1.. Fracture dislocation of the ankle with a bimalleolar fracture. There   is medial subluxation of the distal tibia upon the talar dome.   2. Associated diffuse soft tissue swelling.   This report was finalized on 10/09/2018 14:33 by Dr. South Uriostegui MD.      XR Elbow 3+ View Left   Final Result   1. Unremarkable radiographs of the left elbow.           This report was finalized on 10/09/2018 14:30 by Dr. South Uriostegui MD.      CT Cervical Spine Without Contrast   Final Result   1. The patient's undergone prior fusion at the C4-C7 levels.   Degenerative disc disease is noted at C3-C4 and C7-T1.   2. No evidence of fracture or malalignment.   3. Bony neural foraminal  encroachment at multiple levels related to   facet arthropathy and uncinate spurring.           This report was finalized on 10/09/2018 14:13 by Dr. South Uriostegui MD.      CT Head Without Contrast   Final Result   No acute intracranial findings.       This report was finalized on 10/09/2018 14:05 by Dr. Hector Holbrook MD.        Labs Reviewed   COMPREHENSIVE METABOLIC PANEL - Abnormal; Notable for the following:        Result Value    Glucose 206 (*)     Chloride 95 (*)     All other components within normal limits   APTT - Abnormal; Notable for the following:     PTT 21.9 (*)     All other components within normal limits   CBC WITH AUTO DIFFERENTIAL - Abnormal; Notable for the following:     RBC 3.91 (*)     Hemoglobin 10.7 (*)     Hematocrit 32.9 (*)     MCH 27.4 (*)     MCHC 32.5 (*)     Immature Grans, Absolute 0.04 (*)     All other components within normal limits   PROTIME-INR - Normal   TYPE AND SCREEN   CBC AND DIFFERENTIAL    Narrative:     The following orders were created for panel order CBC & Differential.  Procedure                               Abnormality         Status                     ---------                               -----------         ------                     CBC Auto Differential[[id number]]        Abnormal            Final result                 Please view results for these tests on the individual orders.     XR Ankle 2 View Right   Final Result   Successful reduction for a bimalleolar fracture dislocation   of the right ankle with good restoration of anatomic alignment.   This report was finalized on 10/09/2018 16:32 by Dr. South Uriostegui MD.      XR Chest 1 View   Final Result           No acute cardiopulmonary abnormality.                                                                           This report was finalized on [id number] by Dr. Caleb Chase MD.      XR Foot 3+ View Right   Final Result   1.. Normal exam of the right foot.   2. Bimalleolar fracture  dislocation of the ankle.   This report was finalized on 10/09/2018 14:34 by Dr. South Uriostegui MD.      XR Ankle 3+ View Right   Final Result   1.. Fracture dislocation of the ankle with a bimalleolar fracture. There   is medial subluxation of the distal tibia upon the talar dome.   2. Associated diffuse soft tissue swelling.   This report was finalized on 10/09/2018 14:33 by Dr. South Uriostegui MD.      XR Elbow 3+ View Left   Final Result   1. Unremarkable radiographs of the left elbow.           This report was finalized on 10/09/2018 14:30 by Dr. South Uriostegui MD.      CT Cervical Spine Without Contrast   Final Result   1. The patient's undergone prior fusion at the C4-C7 levels.   Degenerative disc disease is noted at C3-C4 and C7-T1.   2. No evidence of fracture or malalignment.   3. Bony neural foraminal encroachment at multiple levels related to   facet arthropathy and uncinate spurring.           This report was finalized on 10/09/2018 14:13 by Dr. South Uriostegui MD.      CT Head Without Contrast   Final Result   No acute intracranial findings.       This report was finalized on 10/09/2018 14:05 by Dr. Hector Holbrook MD.        Labs Reviewed   COMPREHENSIVE METABOLIC PANEL - Abnormal; Notable for the following:        Result Value    Glucose 206 (*)     Chloride 95 (*)     All other components within normal limits   APTT - Abnormal; Notable for the following:     PTT 21.9 (*)     All other components within normal limits   CBC WITH AUTO DIFFERENTIAL - Abnormal; Notable for the following:     RBC 3.91 (*)     Hemoglobin 10.7 (*)     Hematocrit 32.9 (*)     MCH 27.4 (*)     MCHC 32.5 (*)     Immature Grans, Absolute 0.04 (*)     All other components within normal limits   PROTIME-INR - Normal   TYPE AND SCREEN   CBC AND DIFFERENTIAL    Narrative:     The following orders were created for panel order CBC & Differential.  Procedure                               Abnormality         Status                      ---------                               -----------         ------                     CBC Auto Differential[[id number]]        Abnormal            Final result                 Please view results for these tests on the individual orders.               MDM  Number of Diagnoses or Management Options  Closed bimalleolar fracture of right ankle, initial encounter: new and requires workup  Fall, initial encounter: new and requires workup  Injury of head, initial encounter: new and requires workup     Amount and/or Complexity of Data Reviewed  Clinical lab tests: ordered and reviewed  Tests in the radiology section of CPT®: ordered and reviewed  Tests in the medicine section of CPT®: ordered and reviewed  Discuss the patient with other providers: yes    Patient Progress  Patient progress: stable        Final diagnoses:   Closed bimalleolar fracture of right ankle, initial encounter   Fall, initial encounter   Injury of head, initial encounter            Anastasiia Rapp, APRN  10/09/18 1623       Anastasiia Rapp, APRN  10/09/18 1757

## 2018-10-09 NOTE — ED NOTES
"PT WAS GETTING OUT OF BED TO GO TO BATHROOM, PT STATES \"I JUST FELL, MY LEGS JUST GAVE OUT\".  PT DENIES DIZZINESS OR WEAKNESS PRIOR TO FALL, STATES SHE HAS BEEN HAVING NEUROPATHY WORSENING.  PT STATES SHE HAS A NERVE CONDUCTION TEST SCHEDULED IN A FEW WEEKS.     Leslee Montgomery, RN  10/09/18 7641    "

## 2018-10-09 NOTE — H&P
"    [facility] Medicine Services  HISTORY AND PHYSICAL    Date of Admission: 10/9/2018  Primary Care Physician: Balaji Cornelius MD    Subjective     Chief Complaint: Right ankle pain.    History of Present Illness  Fell this morning after getting up.  [place] a pop and was unable to walk on right foot.  Has been having issues with neuropathy since at least June.  Currently being worked up by [facility]y.  This has been causing her problems walking.          Review of Systems   Constitutional: Negative.  Negative for fever.   HENT: Negative for congestion and mouth sores.    Eyes: Negative for visual disturbance.   Respiratory: Negative for cough and shortness of breath.    Cardiovascular: Negative for chest pain and palpitations.   Gastrointestinal: Negative for diarrhea, nausea and vomiting.   Endocrine: Negative.    Genitourinary: Negative.    Musculoskeletal: Positive for arthralgias and myalgias.   Allergic/Immunologic: Negative.    Neurological: Positive for numbness (feet and hands bilaterally). Negative for dizziness.   Hematological: Negative.    Psychiatric/Behavioral: Negative.             Past Medical History:   Past Medical History:   Diagnosis Date   • Diabetes mellitus (CMS/HCC)    • Hyperlipidemia    • Hypertension    Polyneuropathy    Past Surgical History:  Past Surgical History:   Procedure Laterality Date   • APPENDECTOMY     • CHOLECYSTECTOMY     • HYSTERECTOMY     • NECK SURGERY      x3     Social History:     Retired  No alcohol, drugs or tobacco  DPOA, use   Living will none  Code full  PCP  Balaji Cornelius  Neurology \"[facility]\"    Family History: family history includes Heart disease in her father and mother; Leukemia in her father.       Allergies:  Allergies   Allergen Reactions   • Codeine Shortness Of Breath   • Morphine Shortness Of Breath     Medications:  Per her recollection as she does not have a list with " "her.    Clonipin  Soma  Percocet  Metformin  Celebrex  Ambien  Lisinopril  Cholestid    Her Records in Care Everywhere show a more extensive list, will have family bring in correct list of medications.     Objective     Vital Signs: /95   Pulse 87   Temp 98.5 °F (36.9 °C) (Temporal Artery )   Resp 16   Ht 165.1 cm (65\")   Wt (S) 76.2 kg (168 lb) Comment: recently weighed at PCP's   SpO2 93%   BMI 27.96 kg/m²   Physical Exam   Constitutional: She is oriented to person, place, and time. She appears well-developed and well-nourished.   HENT:   Head: Normocephalic and atraumatic.   Eyes: Pupils are equal, round, and reactive to light. Conjunctivae and EOM are normal.   Neck: Neck supple. No JVD present.   Cardiovascular: Normal rate, regular rhythm and normal heart sounds.  Exam reveals no gallop and no friction rub.    No murmur heard.  Pulmonary/Chest: Effort normal and breath sounds normal.   Abdominal: Soft. Bowel sounds are normal. There is no hepatosplenomegaly. There is no tenderness.   Musculoskeletal: Normal range of motion. She exhibits no edema.   Right lower leg in cast   Neurological: She is alert and oriented to person, place, and time. No cranial nerve deficit.   Toes right foot warm to touch, mobile and can tell when they are being touched.   Skin: Skin is warm and dry.   Psychiatric: She has a normal mood and affect. Her behavior is normal.   Nursing note and vitals reviewed.          Results Reviewed:  Lab Results (last 24 hours)     Procedure Component Value Units Date/Time    Comprehensive Metabolic Panel [[id number]]  (Abnormal) Collected:  10/09/18 1429    Specimen:  Blood Updated:  10/09/18 1450     Glucose 206 (H) mg/dL      BUN 8 mg/dL      Creatinine 0.68 mg/dL      Sodium 136 mmol/L      Potassium 4.3 mmol/L      Chloride 95 (L) mmol/L      CO2 29.0 mmol/L      Calcium 9.1 mg/dL      Total Protein 7.0 g/dL      Albumin 4.30 g/dL      ALT (SGPT) 36 U/L      AST (SGOT) 30 U/L      " Alkaline Phosphatase 88 U/L      Total Bilirubin 0.4 mg/dL      eGFR Non African Amer 87 mL/min/1.73      Globulin 2.7 gm/dL      A/G Ratio 1.6 g/dL      BUN/Creatinine Ratio 11.8     Anion Gap 12.0 mmol/L     aPTT [[id number]]  (Abnormal) Collected:  10/09/18 1429    Specimen:  Blood Updated:  10/09/18 1447     PTT 21.9 (L) seconds     Protime-INR [[id number]]  (Normal) Collected:  10/09/18 1429    Specimen:  Blood Updated:  10/09/18 1447     Protime 12.7 Seconds      INR 0.93    CBC & Differential [[id number]] Collected:  10/09/18 1429    Specimen:  Blood Updated:  10/09/18 1438    Narrative:       The following orders were created for panel order CBC & Differential.  Procedure                               Abnormality         Status                     ---------                               -----------         ------                     CBC Auto Differential[[id number]]        Abnormal            Final result                 Please view results for these tests on the individual orders.    CBC Auto Differential [[id number]]  (Abnormal) Collected:  10/09/18 1429    Specimen:  Blood Updated:  10/09/18 1438     WBC 9.65 10*3/mm3      RBC 3.91 (L) 10*6/mm3      Hemoglobin 10.7 (L) g/dL      Hematocrit 32.9 (L) %      MCV 84.1 fL      MCH 27.4 (L) pg      MCHC 32.5 (L) g/dL      RDW 14.1 %      RDW-SD 43.2 fl      MPV 10.2 fL      Platelets 297 10*3/mm3      Neutrophil % 74.7 %      Lymphocyte % 16.2 %      Monocyte % 7.9 %      Eosinophil % 0.4 %      Basophil % 0.4 %      Immature Grans % 0.4 %      Neutrophils, Absolute 7.21 10*3/mm3      Lymphocytes, Absolute 1.56 10*3/mm3      Monocytes, Absolute 0.76 10*3/mm3      Eosinophils, Absolute 0.04 10*3/mm3      Basophils, Absolute 0.04 10*3/mm3      Immature Grans, Absolute 0.04 (H) 10*3/mm3      nRBC 0.0 /100 WBC         Imaging Results (last 24 hours)     Procedure Component Value Units Date/Time    XR Ankle 2 View Right [[id number]] Collected:  10/09/18 1631     Updated:   10/09/18 1635    Narrative:       EXAMINATION: Right ankle 2 views 10/9/2018     HISTORY: Post reduction     FINDINGS: Two-view exam of the right ankle reveals the patient's  undergone successful closed reduction for a bimalleolar fracture  dislocation of the right ankle. There is good restoration of anatomic  alignment.       Impression:       Successful reduction for a bimalleolar fracture dislocation  of the right ankle with good restoration of anatomic alignment.  This report was finalized on 10/09/2018 16:32 by Dr. South Uriostegui MD.    XR Chest 1 View [[id number]] Collected:  10/09/18 1539     Updated:  10/09/18 1543    Narrative:       EXAMINATION: XR CHEST 1 VW- 10/9/2018 3:39 PM CDT     HISTORY: pre-op.     REPORT: Comparison is made with study from 4/9/2012.        One-view chest: Frontal view of the chest was obtained. The lungs are  clear and normally expanded. There is mild stable elevation of the right  hemidiaphragm. The cardiac and mediastinal silhouettes are within normal  limits. The visualized bony thorax and upper abdomen are unremarkable  except for evidence of previous cervical fusion surgery and multiple  surgical clips are present in the right upper abdomen..                                                                                                                                                       Impression:              No acute cardiopulmonary abnormality.                                                                      This report was finalized on [id number] by Dr. Caleb Chase MD.    XR Foot 3+ View Right [[id number]] Collected:  10/09/18 1433     Updated:  10/09/18 1437    Narrative:       EXAMINATION: Right foot 3 views 10/9/2018     HISTORY: Fall     FINDINGS: Three-view exam of the right foot demonstrates a bimalleolar  fracture dislocation of the ankle. The foot is unremarkable with no  evidence of localized soft tissue swelling or discrete fracture line.        Impression:       1.. Normal exam of the right foot.  2. Bimalleolar fracture dislocation of the ankle.  This report was finalized on 10/09/2018 14:34 by Dr. South Uriostegui MD.    XR Ankle 3+ View Right [[id number]] Collected:  10/09/18 1530     Updated:  10/09/18 1436    Narrative:       EXAMINATION: Right ankle 3 views 10/9/2018     HISTORY: Right ankle pain after fall     FINDINGS: Three-view exam of the right ankle demonstrates diffuse soft  tissue swelling about the distal tibia and ankle with a bimalleolar  fracture/ dislocation of the ankle. The distal tibial plafond is  medially subluxed upon the talar dome. There is displacement of the  medial and lateral malleolar fracture fragments. No other fractures are  present.       Impression:       1.. Fracture dislocation of the ankle with a bimalleolar fracture. There  is medial subluxation of the distal tibia upon the talar dome.  2. Associated diffuse soft tissue swelling.  This report was finalized on 10/09/2018 14:33 by Dr. South Uriostegui MD.    XR Elbow 3+ View Left [[id number]] Collected:  10/09/18 1529     Updated:  10/09/18 1433    Narrative:       LEFT ELBOW, 2 views 10/9/2018 1:39 PM CDT     HISTORY: left elbow pain, fall     COMPARISON: None      FINDINGS:   Frontal and lateral views of the left elbow were obtained.      There is no fracture or dislocation. No significant joint space  narrowing is noted. There is no significant joint effusion.       No gross soft tissue abnormality is visualized.        Impression:       1. Unremarkable radiographs of the left elbow.        This report was finalized on 10/09/2018 14:30 by Dr. South Uriostegui MD.    CT Cervical Spine Without Contrast [[id number]] Collected:  10/09/18 1407     Updated:  10/09/18 1416    Narrative:       CT CERVICAL SPINE WO CONTRAST- 10/9/2018 1:35 PM CDT     HISTORY: fall, hit head     COMPARISON: None      DOSE LENGTH PRODUCT: 186 mGy cm. Automated exposure control was  utilized  to diminish patient radiation dose.     TECHNIQUE: Serial helical tomographic images of the cervical spine were  obtained without the use of intravenous contrast. Additionally, sagittal  and coronal reformatted images were also provided for review.      FINDINGS:   Alignment: There is mild loss of normal cervical lordosis.     Bones: There is no evidence of fracture. The patient's undergone prior  fusion at the C4-C7 levels with interbody grafts. There is degenerative  disc disease at C3-C4 with narrowing of the space height and spondylosis  as well as at the C7-T1 level.     Disc spaces: There is degenerative disc disease at C3-C4 and C7-T1.     Canal and neuroforamina: Neural foraminal narrowing is noted at multiple  levels related to facet overgrowth and uncinate spurring.     Soft tissues: Unremarkable.     Lung apices: Clear. No pneumothorax.       Impression:       1. The patient's undergone prior fusion at the C4-C7 levels.  Degenerative disc disease is noted at C3-C4 and C7-T1.  2. No evidence of fracture or malalignment.  3. Bony neural foraminal encroachment at multiple levels related to  facet arthropathy and uncinate spurring.        This report was finalized on 10/09/2018 14:13 by Dr. South Uriostegui MD.    CT Head Without Contrast [[id number]] Collected:  10/09/18 1403     Updated:  10/09/18 1408    Narrative:       EXAMINATION: CT HEAD WO CONTRAST- 10/9/2018 2:03 PM CDT     HISTORY: Fall, hit head, head injury with pain     COMPARISON: None     DOSE: 601 mGy-cm     TECHNIQUE: Sequential imaging was performed from the vertex through the  base of the skull without the use of IV contrast.  Sagittal and coronal  reformations were made from the original source data and reviewed.  Automated exposure control was also utilized to decrease patient  radiation dose.     FINDINGS:   There is mild diffuse cerebral atrophy. There is no evidence of acute  intracranial hemorrhage, mass, or midline shift.  There is no evidence of  acute territorial infarct. The ventricles appear normal in  configuration. The basilar cisterns are patent. Posterior fossa appears  grossly normal. The calvarium is intact. The visualized paranasal  sinuses and mastoid air cells appear clear. Calcifications are seen in  the cavernous carotid arteries. There is a mostly empty sella  configuration.          Impression:       No acute intracranial findings.     This report was finalized on 10/09/2018 14:05 by Dr. Hector Holbrook MD.        I have personally reviewed and interpreted the radiology studies and ECG obtained at time of admission.     Assessment / Plan     Assessment:     Fracture right ankle, bimalleolar   DM II uncontrolled  Hyperglycemia  Hypertension  Chronic diarrhea  Poly neuropthy      Plan:      Admit   Consult ortho  Glucometer ac/hs  Insulin  Check A1d  Resume home medications with accurate list obtained.  Elevate right leg.   AM lab  BMP CBC        Code Status: full     I discussed the patient's findings and my recommendations with the patient and family    Estimated length of stay 3-5 days    Dottie Ramsay DO   10/09/18   4:44 PM

## 2018-10-09 NOTE — PLAN OF CARE
Problem: Patient Care Overview  Goal: Plan of Care Review  Outcome: Ongoing (interventions implemented as appropriate)   10/09/18 8772   Coping/Psychosocial   Plan of Care Reviewed With patient   Plan of Care Review   Progress no change   OTHER   Outcome Summary Pt Alert , pt c/o R ankle pain. R ankle ace wrap splint in place. toes mobile. Pt NPO after midnight for OR tomorrow. Pt family at bedside. Safety maintained.       Problem: Fall Risk (Adult)  Goal: Identify Related Risk Factors and Signs and Symptoms  Outcome: Outcome(s) achieved Date Met: 10/09/18    Goal: Absence of Fall  Outcome: Ongoing (interventions implemented as appropriate)      Problem: Fracture Orthopaedic (Adult)  Goal: Signs and Symptoms of Listed Potential Problems Will be Absent, Minimized or Managed (Fracture Orthopaedic)  Outcome: Ongoing (interventions implemented as appropriate)

## 2018-10-10 ENCOUNTER — TELEPHONE (OUTPATIENT)
Dept: NEUROLOGY | Age: 63
End: 2018-10-10

## 2018-10-10 ENCOUNTER — ANESTHESIA EVENT (OUTPATIENT)
Dept: PERIOP | Facility: HOSPITAL | Age: 63
End: 2018-10-10

## 2018-10-10 ENCOUNTER — APPOINTMENT (OUTPATIENT)
Dept: GENERAL RADIOLOGY | Facility: HOSPITAL | Age: 63
End: 2018-10-10

## 2018-10-10 ENCOUNTER — ANESTHESIA (OUTPATIENT)
Dept: PERIOP | Facility: HOSPITAL | Age: 63
End: 2018-10-10

## 2018-10-10 LAB
ANION GAP SERPL CALCULATED.3IONS-SCNC: 10 MMOL/L (ref 4–13)
BUN BLD-MCNC: 8 MG/DL (ref 5–21)
BUN/CREAT SERPL: 11.9 (ref 7–25)
CALCIUM SPEC-SCNC: 8.9 MG/DL (ref 8.4–10.4)
CHLORIDE SERPL-SCNC: 98 MMOL/L (ref 98–110)
CO2 SERPL-SCNC: 31 MMOL/L (ref 24–31)
CREAT BLD-MCNC: 0.67 MG/DL (ref 0.5–1.4)
DEPRECATED RDW RBC AUTO: 43.9 FL (ref 40–54)
ERYTHROCYTE [DISTWIDTH] IN BLOOD BY AUTOMATED COUNT: 14.1 % (ref 12–15)
GFR SERPL CREATININE-BSD FRML MDRD: 89 ML/MIN/1.73
GLUCOSE BLD-MCNC: 170 MG/DL (ref 70–100)
GLUCOSE BLDC GLUCOMTR-MCNC: 193 MG/DL (ref 70–130)
GLUCOSE BLDC GLUCOMTR-MCNC: 208 MG/DL (ref 70–130)
GLUCOSE BLDC GLUCOMTR-MCNC: 210 MG/DL (ref 70–130)
GLUCOSE BLDC GLUCOMTR-MCNC: 229 MG/DL (ref 70–130)
GLUCOSE BLDC GLUCOMTR-MCNC: 338 MG/DL (ref 70–130)
HCT VFR BLD AUTO: 29.9 % (ref 37–47)
HGB BLD-MCNC: 9.7 G/DL (ref 12–16)
MCH RBC QN AUTO: 27.6 PG (ref 28–32)
MCHC RBC AUTO-ENTMCNC: 32.4 G/DL (ref 33–36)
MCV RBC AUTO: 84.9 FL (ref 82–98)
PLATELET # BLD AUTO: 275 10*3/MM3 (ref 130–400)
PMV BLD AUTO: 10.1 FL (ref 6–12)
POTASSIUM BLD-SCNC: 4 MMOL/L (ref 3.5–5.3)
RBC # BLD AUTO: 3.52 10*6/MM3 (ref 4.2–5.4)
SODIUM BLD-SCNC: 139 MMOL/L (ref 135–145)
WBC NRBC COR # BLD: 7.69 10*3/MM3 (ref 4.8–10.8)

## 2018-10-10 PROCEDURE — 73600 X-RAY EXAM OF ANKLE: CPT

## 2018-10-10 PROCEDURE — 25010000002 ONDANSETRON PER 1 MG: Performed by: NURSE ANESTHETIST, CERTIFIED REGISTERED

## 2018-10-10 PROCEDURE — C1713 ANCHOR/SCREW BN/BN,TIS/BN: HCPCS | Performed by: ORTHOPAEDIC SURGERY

## 2018-10-10 PROCEDURE — 25010000002 FENTANYL CITRATE (PF) 250 MCG/5ML SOLUTION: Performed by: NURSE ANESTHETIST, CERTIFIED REGISTERED

## 2018-10-10 PROCEDURE — 25010000002 DEXAMETHASONE PER 1 MG: Performed by: NURSE ANESTHETIST, CERTIFIED REGISTERED

## 2018-10-10 PROCEDURE — 94799 UNLISTED PULMONARY SVC/PX: CPT

## 2018-10-10 PROCEDURE — 25010000002 MIDAZOLAM PER 1 MG: Performed by: ANESTHESIOLOGY

## 2018-10-10 PROCEDURE — 25010000003 CEFAZOLIN PER 500 MG: Performed by: NURSE ANESTHETIST, CERTIFIED REGISTERED

## 2018-10-10 PROCEDURE — 25010000002 ONDANSETRON PER 1 MG: Performed by: NURSE PRACTITIONER

## 2018-10-10 PROCEDURE — 0QSG04Z REPOSITION RIGHT TIBIA WITH INTERNAL FIXATION DEVICE, OPEN APPROACH: ICD-10-PCS | Performed by: ORTHOPAEDIC SURGERY

## 2018-10-10 PROCEDURE — 63710000001 INSULIN LISPRO (HUMAN) PER 5 UNITS: Performed by: NURSE PRACTITIONER

## 2018-10-10 PROCEDURE — 25010000002 CEFAZOLIN PER 500 MG: Performed by: ORTHOPAEDIC SURGERY

## 2018-10-10 PROCEDURE — 82962 GLUCOSE BLOOD TEST: CPT

## 2018-10-10 PROCEDURE — 76000 FLUOROSCOPY <1 HR PHYS/QHP: CPT

## 2018-10-10 PROCEDURE — 80048 BASIC METABOLIC PNL TOTAL CA: CPT | Performed by: NURSE PRACTITIONER

## 2018-10-10 PROCEDURE — 25010000002 DEXAMETHASONE PER 1 MG: Performed by: ANESTHESIOLOGY

## 2018-10-10 PROCEDURE — 25010000002 PROPOFOL 10 MG/ML EMULSION: Performed by: NURSE ANESTHETIST, CERTIFIED REGISTERED

## 2018-10-10 PROCEDURE — 25010000002 KETOROLAC TROMETHAMINE PER 15 MG: Performed by: NURSE ANESTHETIST, CERTIFIED REGISTERED

## 2018-10-10 PROCEDURE — 25010000002 METOCLOPRAMIDE PER 10 MG: Performed by: ANESTHESIOLOGY

## 2018-10-10 PROCEDURE — 85027 COMPLETE CBC AUTOMATED: CPT | Performed by: NURSE PRACTITIONER

## 2018-10-10 PROCEDURE — 25010000002 FENTANYL CITRATE (PF) 100 MCG/2ML SOLUTION: Performed by: ANESTHESIOLOGY

## 2018-10-10 DEVICE — SCRW CANC LCP PT SS 4X40MM: Type: IMPLANTABLE DEVICE | Status: FUNCTIONAL

## 2018-10-10 DEVICE — PLT FIB LCP L/D 3H 2.7/3.5X73RT: Type: IMPLANTABLE DEVICE | Status: FUNCTIONAL

## 2018-10-10 DEVICE — SCRW LK ST STRDRV 2.7X10MM: Type: IMPLANTABLE DEVICE | Status: FUNCTIONAL

## 2018-10-10 DEVICE — SCRW LK ST STRDRV 2.7X12MM: Type: IMPLANTABLE DEVICE | Status: FUNCTIONAL

## 2018-10-10 DEVICE — KWIRE THRD TROC/TP 1.6X5X150MM NS: Type: IMPLANTABLE DEVICE | Status: FUNCTIONAL

## 2018-10-10 DEVICE — SCRW CORT S/TAP 3.5X14MM: Type: IMPLANTABLE DEVICE | Status: FUNCTIONAL

## 2018-10-10 DEVICE — SCRW LK ST STRDRV 2.7X14MM: Type: IMPLANTABLE DEVICE | Status: FUNCTIONAL

## 2018-10-10 DEVICE — SCRW CORT S/TAP 3.5X16MM: Type: IMPLANTABLE DEVICE | Status: FUNCTIONAL

## 2018-10-10 RX ORDER — SCOLOPAMINE TRANSDERMAL SYSTEM 1 MG/1
1 PATCH, EXTENDED RELEASE TRANSDERMAL CONTINUOUS
Status: DISPENSED | OUTPATIENT
Start: 2018-10-10 | End: 2018-10-11

## 2018-10-10 RX ORDER — PREGABALIN 50 MG/1
50 CAPSULE ORAL 3 TIMES DAILY
Status: DISCONTINUED | OUTPATIENT
Start: 2018-10-10 | End: 2018-10-10

## 2018-10-10 RX ORDER — CARISOPRODOL 350 MG/1
350 TABLET ORAL 4 TIMES DAILY PRN
COMMUNITY

## 2018-10-10 RX ORDER — ESCITALOPRAM OXALATE 10 MG/1
10 TABLET ORAL DAILY
Status: DISCONTINUED | OUTPATIENT
Start: 2018-10-10 | End: 2018-10-12 | Stop reason: HOSPADM

## 2018-10-10 RX ORDER — ATORVASTATIN CALCIUM 10 MG/1
10 TABLET, FILM COATED ORAL NIGHTLY
Status: DISCONTINUED | OUTPATIENT
Start: 2018-10-10 | End: 2018-10-12 | Stop reason: HOSPADM

## 2018-10-10 RX ORDER — ZOLPIDEM TARTRATE 5 MG/1
5 TABLET ORAL ONCE
Status: COMPLETED | OUTPATIENT
Start: 2018-10-10 | End: 2018-10-10

## 2018-10-10 RX ORDER — KETOROLAC TROMETHAMINE 30 MG/ML
INJECTION, SOLUTION INTRAMUSCULAR; INTRAVENOUS AS NEEDED
Status: DISCONTINUED | OUTPATIENT
Start: 2018-10-10 | End: 2018-10-10 | Stop reason: SURG

## 2018-10-10 RX ORDER — ROPINIROLE 0.25 MG/1
0.5 TABLET, FILM COATED ORAL 2 TIMES DAILY
Status: DISCONTINUED | OUTPATIENT
Start: 2018-10-10 | End: 2018-10-12 | Stop reason: HOSPADM

## 2018-10-10 RX ORDER — METOCLOPRAMIDE HYDROCHLORIDE 5 MG/ML
10 INJECTION INTRAMUSCULAR; INTRAVENOUS ONCE AS NEEDED
Status: COMPLETED | OUTPATIENT
Start: 2018-10-10 | End: 2018-10-10

## 2018-10-10 RX ORDER — NALOXONE HCL 0.4 MG/ML
0.4 VIAL (ML) INJECTION AS NEEDED
Status: DISCONTINUED | OUTPATIENT
Start: 2018-10-10 | End: 2018-10-10 | Stop reason: HOSPADM

## 2018-10-10 RX ORDER — METFORMIN HYDROCHLORIDE 500 MG/1
1000 TABLET, EXTENDED RELEASE ORAL EVERY 12 HOURS
Status: DISCONTINUED | OUTPATIENT
Start: 2018-10-10 | End: 2018-10-10

## 2018-10-10 RX ORDER — CEFAZOLIN SODIUM 1 G/3ML
INJECTION, POWDER, FOR SOLUTION INTRAMUSCULAR; INTRAVENOUS AS NEEDED
Status: DISCONTINUED | OUTPATIENT
Start: 2018-10-10 | End: 2018-10-10 | Stop reason: SURG

## 2018-10-10 RX ORDER — AMITRIPTYLINE HYDROCHLORIDE 10 MG/1
10 TABLET, FILM COATED ORAL NIGHTLY
Status: DISCONTINUED | OUTPATIENT
Start: 2018-10-10 | End: 2018-10-12 | Stop reason: HOSPADM

## 2018-10-10 RX ORDER — ACETAMINOPHEN 500 MG
1000 TABLET ORAL ONCE
Status: COMPLETED | OUTPATIENT
Start: 2018-10-10 | End: 2018-10-10

## 2018-10-10 RX ORDER — LIDOCAINE HYDROCHLORIDE 20 MG/ML
INJECTION, SOLUTION INFILTRATION; PERINEURAL AS NEEDED
Status: DISCONTINUED | OUTPATIENT
Start: 2018-10-10 | End: 2018-10-10 | Stop reason: SURG

## 2018-10-10 RX ORDER — SODIUM CHLORIDE 0.9 % (FLUSH) 0.9 %
1-10 SYRINGE (ML) INJECTION AS NEEDED
Status: DISCONTINUED | OUTPATIENT
Start: 2018-10-10 | End: 2018-10-10 | Stop reason: HOSPADM

## 2018-10-10 RX ORDER — ASPIRIN 81 MG/1
81 TABLET, CHEWABLE ORAL DAILY
Status: DISCONTINUED | OUTPATIENT
Start: 2018-10-10 | End: 2018-10-12 | Stop reason: HOSPADM

## 2018-10-10 RX ORDER — FENTANYL CITRATE 50 UG/ML
INJECTION, SOLUTION INTRAMUSCULAR; INTRAVENOUS AS NEEDED
Status: DISCONTINUED | OUTPATIENT
Start: 2018-10-10 | End: 2018-10-10 | Stop reason: SURG

## 2018-10-10 RX ORDER — LISINOPRIL 20 MG/1
20 TABLET ORAL
Status: DISCONTINUED | OUTPATIENT
Start: 2018-10-10 | End: 2018-10-12 | Stop reason: HOSPADM

## 2018-10-10 RX ORDER — SODIUM CHLORIDE, SODIUM LACTATE, POTASSIUM CHLORIDE, CALCIUM CHLORIDE 600; 310; 30; 20 MG/100ML; MG/100ML; MG/100ML; MG/100ML
100 INJECTION, SOLUTION INTRAVENOUS CONTINUOUS
Status: DISCONTINUED | OUTPATIENT
Start: 2018-10-10 | End: 2018-10-10

## 2018-10-10 RX ORDER — IPRATROPIUM BROMIDE AND ALBUTEROL SULFATE 2.5; .5 MG/3ML; MG/3ML
3 SOLUTION RESPIRATORY (INHALATION) ONCE AS NEEDED
Status: DISCONTINUED | OUTPATIENT
Start: 2018-10-10 | End: 2018-10-10 | Stop reason: HOSPADM

## 2018-10-10 RX ORDER — OXYCODONE AND ACETAMINOPHEN 7.5; 325 MG/1; MG/1
2 TABLET ORAL ONCE AS NEEDED
Status: COMPLETED | OUTPATIENT
Start: 2018-10-10 | End: 2018-10-10

## 2018-10-10 RX ORDER — ONDANSETRON 2 MG/ML
INJECTION INTRAMUSCULAR; INTRAVENOUS AS NEEDED
Status: DISCONTINUED | OUTPATIENT
Start: 2018-10-10 | End: 2018-10-10 | Stop reason: SURG

## 2018-10-10 RX ORDER — ONDANSETRON 2 MG/ML
4 INJECTION INTRAMUSCULAR; INTRAVENOUS ONCE AS NEEDED
Status: DISCONTINUED | OUTPATIENT
Start: 2018-10-10 | End: 2018-10-10 | Stop reason: HOSPADM

## 2018-10-10 RX ORDER — SODIUM CHLORIDE, SODIUM LACTATE, POTASSIUM CHLORIDE, CALCIUM CHLORIDE 600; 310; 30; 20 MG/100ML; MG/100ML; MG/100ML; MG/100ML
50 INJECTION, SOLUTION INTRAVENOUS CONTINUOUS
Status: DISCONTINUED | OUTPATIENT
Start: 2018-10-10 | End: 2018-10-11

## 2018-10-10 RX ORDER — SODIUM CHLORIDE 0.9 % (FLUSH) 0.9 %
3 SYRINGE (ML) INJECTION EVERY 12 HOURS SCHEDULED
Status: DISCONTINUED | OUTPATIENT
Start: 2018-10-10 | End: 2018-10-10 | Stop reason: HOSPADM

## 2018-10-10 RX ORDER — MAGNESIUM HYDROXIDE 1200 MG/15ML
LIQUID ORAL AS NEEDED
Status: DISCONTINUED | OUTPATIENT
Start: 2018-10-10 | End: 2018-10-10 | Stop reason: HOSPADM

## 2018-10-10 RX ORDER — CHOLESTYRAMINE LIGHT 4 G/5.7G
1 POWDER, FOR SUSPENSION ORAL DAILY
Status: DISCONTINUED | OUTPATIENT
Start: 2018-10-10 | End: 2018-10-12 | Stop reason: HOSPADM

## 2018-10-10 RX ORDER — IBUPROFEN 600 MG/1
600 TABLET ORAL ONCE AS NEEDED
Status: DISCONTINUED | OUTPATIENT
Start: 2018-10-10 | End: 2018-10-10 | Stop reason: HOSPADM

## 2018-10-10 RX ORDER — METOCLOPRAMIDE HYDROCHLORIDE 5 MG/ML
5 INJECTION INTRAMUSCULAR; INTRAVENOUS
Status: DISCONTINUED | OUTPATIENT
Start: 2018-10-10 | End: 2018-10-10 | Stop reason: HOSPADM

## 2018-10-10 RX ORDER — ZOLPIDEM TARTRATE 5 MG/1
10 TABLET ORAL NIGHTLY PRN
Status: DISCONTINUED | OUTPATIENT
Start: 2018-10-10 | End: 2018-10-12 | Stop reason: HOSPADM

## 2018-10-10 RX ORDER — PROPOFOL 10 MG/ML
VIAL (ML) INTRAVENOUS AS NEEDED
Status: DISCONTINUED | OUTPATIENT
Start: 2018-10-10 | End: 2018-10-10 | Stop reason: SURG

## 2018-10-10 RX ORDER — OXYCODONE AND ACETAMINOPHEN 10; 325 MG/1; MG/1
1 TABLET ORAL EVERY 8 HOURS PRN
Status: DISCONTINUED | OUTPATIENT
Start: 2018-10-10 | End: 2018-10-10 | Stop reason: SDUPTHER

## 2018-10-10 RX ORDER — MIDAZOLAM HYDROCHLORIDE 1 MG/ML
1 INJECTION INTRAMUSCULAR; INTRAVENOUS
Status: DISCONTINUED | OUTPATIENT
Start: 2018-10-10 | End: 2018-10-10 | Stop reason: HOSPADM

## 2018-10-10 RX ORDER — CELECOXIB 200 MG/1
200 CAPSULE ORAL
Status: DISCONTINUED | OUTPATIENT
Start: 2018-10-10 | End: 2018-10-12 | Stop reason: HOSPADM

## 2018-10-10 RX ORDER — FAMOTIDINE 10 MG/ML
20 INJECTION, SOLUTION INTRAVENOUS
Status: DISCONTINUED | OUTPATIENT
Start: 2018-10-10 | End: 2018-10-10 | Stop reason: HOSPADM

## 2018-10-10 RX ORDER — PANTOPRAZOLE SODIUM 40 MG/1
40 TABLET, DELAYED RELEASE ORAL EVERY MORNING
Status: DISCONTINUED | OUTPATIENT
Start: 2018-10-10 | End: 2018-10-12 | Stop reason: HOSPADM

## 2018-10-10 RX ORDER — MEPERIDINE HYDROCHLORIDE 25 MG/ML
12.5 INJECTION INTRAMUSCULAR; INTRAVENOUS; SUBCUTANEOUS
Status: DISCONTINUED | OUTPATIENT
Start: 2018-10-10 | End: 2018-10-10 | Stop reason: HOSPADM

## 2018-10-10 RX ORDER — CLONAZEPAM 0.5 MG/1
0.5 TABLET ORAL 2 TIMES DAILY PRN
Status: DISCONTINUED | OUTPATIENT
Start: 2018-10-10 | End: 2018-10-12 | Stop reason: HOSPADM

## 2018-10-10 RX ORDER — GABAPENTIN 100 MG/1
100 CAPSULE ORAL EVERY 8 HOURS SCHEDULED
Status: DISCONTINUED | OUTPATIENT
Start: 2018-10-10 | End: 2018-10-10

## 2018-10-10 RX ORDER — OXYCODONE AND ACETAMINOPHEN 10; 325 MG/1; MG/1
1 TABLET ORAL ONCE AS NEEDED
Status: DISCONTINUED | OUTPATIENT
Start: 2018-10-10 | End: 2018-10-10 | Stop reason: HOSPADM

## 2018-10-10 RX ORDER — FENTANYL CITRATE 50 UG/ML
25 INJECTION, SOLUTION INTRAMUSCULAR; INTRAVENOUS AS NEEDED
Status: COMPLETED | OUTPATIENT
Start: 2018-10-10 | End: 2018-10-10

## 2018-10-10 RX ORDER — GABAPENTIN 300 MG/1
300 CAPSULE ORAL EVERY 8 HOURS SCHEDULED
Status: DISCONTINUED | OUTPATIENT
Start: 2018-10-10 | End: 2018-10-12 | Stop reason: HOSPADM

## 2018-10-10 RX ORDER — DEXAMETHASONE SODIUM PHOSPHATE 4 MG/ML
4 INJECTION, SOLUTION INTRA-ARTICULAR; INTRALESIONAL; INTRAMUSCULAR; INTRAVENOUS; SOFT TISSUE ONCE AS NEEDED
Status: COMPLETED | OUTPATIENT
Start: 2018-10-10 | End: 2018-10-10

## 2018-10-10 RX ORDER — MIDAZOLAM HYDROCHLORIDE 1 MG/ML
2 INJECTION INTRAMUSCULAR; INTRAVENOUS
Status: DISCONTINUED | OUTPATIENT
Start: 2018-10-10 | End: 2018-10-10 | Stop reason: HOSPADM

## 2018-10-10 RX ORDER — LABETALOL HYDROCHLORIDE 5 MG/ML
5 INJECTION, SOLUTION INTRAVENOUS
Status: DISCONTINUED | OUTPATIENT
Start: 2018-10-10 | End: 2018-10-10 | Stop reason: HOSPADM

## 2018-10-10 RX ORDER — BUPIVACAINE HCL/0.9 % NACL/PF 0.1 %
2 PLASTIC BAG, INJECTION (ML) EPIDURAL EVERY 8 HOURS
Status: COMPLETED | OUTPATIENT
Start: 2018-10-10 | End: 2018-10-11

## 2018-10-10 RX ORDER — METFORMIN HYDROCHLORIDE 500 MG/1
1000 TABLET, EXTENDED RELEASE ORAL 2 TIMES DAILY
COMMUNITY

## 2018-10-10 RX ORDER — GLIPIZIDE 5 MG/1
5 TABLET ORAL
Status: DISCONTINUED | OUTPATIENT
Start: 2018-10-10 | End: 2018-10-12 | Stop reason: HOSPADM

## 2018-10-10 RX ORDER — DEXAMETHASONE SODIUM PHOSPHATE 4 MG/ML
INJECTION, SOLUTION INTRA-ARTICULAR; INTRALESIONAL; INTRAMUSCULAR; INTRAVENOUS; SOFT TISSUE AS NEEDED
Status: DISCONTINUED | OUTPATIENT
Start: 2018-10-10 | End: 2018-10-10 | Stop reason: SURG

## 2018-10-10 RX ADMIN — ZOLPIDEM TARTRATE 10 MG: 5 TABLET, FILM COATED ORAL at 20:51

## 2018-10-10 RX ADMIN — ONDANSETRON HYDROCHLORIDE 4 MG: 2 SOLUTION INTRAMUSCULAR; INTRAVENOUS at 10:54

## 2018-10-10 RX ADMIN — FENTANYL CITRATE 50 MCG: 50 INJECTION INTRAMUSCULAR; INTRAVENOUS at 11:23

## 2018-10-10 RX ADMIN — METFORMIN HYDROCHLORIDE 1000 MG: 500 TABLET ORAL at 17:37

## 2018-10-10 RX ADMIN — SODIUM CHLORIDE, POTASSIUM CHLORIDE, SODIUM LACTATE AND CALCIUM CHLORIDE 100 ML/HR: 600; 310; 30; 20 INJECTION, SOLUTION INTRAVENOUS at 10:13

## 2018-10-10 RX ADMIN — FENTANYL CITRATE 25 MCG: 50 INJECTION, SOLUTION INTRAMUSCULAR; INTRAVENOUS at 11:57

## 2018-10-10 RX ADMIN — FENTANYL CITRATE 25 MCG: 50 INJECTION, SOLUTION INTRAMUSCULAR; INTRAVENOUS at 11:53

## 2018-10-10 RX ADMIN — CEFAZOLIN 2 G: 10 INJECTION, POWDER, FOR SOLUTION INTRAVENOUS; PARENTERAL at 20:36

## 2018-10-10 RX ADMIN — SCOPOLAMINE 1 PATCH: 1 PATCH, EXTENDED RELEASE TRANSDERMAL at 10:08

## 2018-10-10 RX ADMIN — GABAPENTIN 300 MG: 300 CAPSULE ORAL at 20:55

## 2018-10-10 RX ADMIN — OXYCODONE HYDROCHLORIDE AND ACETAMINOPHEN 1 TABLET: 10; 325 TABLET ORAL at 08:03

## 2018-10-10 RX ADMIN — ACETAMINOPHEN 1000 MG: 500 TABLET, FILM COATED ORAL at 10:09

## 2018-10-10 RX ADMIN — OXYCODONE HYDROCHLORIDE AND ACETAMINOPHEN 1 TABLET: 10; 325 TABLET ORAL at 20:36

## 2018-10-10 RX ADMIN — METOCLOPRAMIDE 10 MG: 5 INJECTION, SOLUTION INTRAMUSCULAR; INTRAVENOUS at 10:09

## 2018-10-10 RX ADMIN — AMITRIPTYLINE HYDROCHLORIDE 10 MG: 10 TABLET, FILM COATED ORAL at 20:55

## 2018-10-10 RX ADMIN — GABAPENTIN 100 MG: 100 CAPSULE ORAL at 15:11

## 2018-10-10 RX ADMIN — PROPOFOL 160 MG: 10 INJECTION, EMULSION INTRAVENOUS at 10:20

## 2018-10-10 RX ADMIN — ONDANSETRON HYDROCHLORIDE 4 MG: 2 INJECTION, SOLUTION INTRAMUSCULAR; INTRAVENOUS at 20:46

## 2018-10-10 RX ADMIN — LIDOCAINE HYDROCHLORIDE 100 MG: 20 INJECTION, SOLUTION INFILTRATION; PERINEURAL at 10:20

## 2018-10-10 RX ADMIN — FAMOTIDINE 20 MG: 10 INJECTION, SOLUTION INTRAVENOUS at 10:09

## 2018-10-10 RX ADMIN — FENTANYL CITRATE 50 MCG: 50 INJECTION INTRAMUSCULAR; INTRAVENOUS at 11:01

## 2018-10-10 RX ADMIN — INSULIN LISPRO 7 UNITS: 100 INJECTION, SOLUTION INTRAVENOUS; SUBCUTANEOUS at 17:37

## 2018-10-10 RX ADMIN — FENTANYL CITRATE 25 MCG: 50 INJECTION, SOLUTION INTRAMUSCULAR; INTRAVENOUS at 12:13

## 2018-10-10 RX ADMIN — ROPINIROLE HYDROCHLORIDE 0.5 MG: 0.25 TABLET, FILM COATED ORAL at 20:52

## 2018-10-10 RX ADMIN — INSULIN LISPRO 4 UNITS: 100 INJECTION, SOLUTION INTRAVENOUS; SUBCUTANEOUS at 08:03

## 2018-10-10 RX ADMIN — INSULIN LISPRO 4 UNITS: 100 INJECTION, SOLUTION INTRAVENOUS; SUBCUTANEOUS at 20:51

## 2018-10-10 RX ADMIN — ATORVASTATIN CALCIUM 10 MG: 10 TABLET, FILM COATED ORAL at 20:55

## 2018-10-10 RX ADMIN — ZOLPIDEM TARTRATE 5 MG: 5 TABLET, FILM COATED ORAL at 00:18

## 2018-10-10 RX ADMIN — OXYCODONE HYDROCHLORIDE AND ACETAMINOPHEN 2 TABLET: 7.5; 325 TABLET ORAL at 12:00

## 2018-10-10 RX ADMIN — KETOROLAC TROMETHAMINE 10 MG: 30 INJECTION, SOLUTION INTRAMUSCULAR at 10:54

## 2018-10-10 RX ADMIN — OXYCODONE HYDROCHLORIDE AND ACETAMINOPHEN 1 TABLET: 10; 325 TABLET ORAL at 03:47

## 2018-10-10 RX ADMIN — DEXAMETHASONE SODIUM PHOSPHATE 4 MG: 4 INJECTION, SOLUTION INTRA-ARTICULAR; INTRALESIONAL; INTRAMUSCULAR; INTRAVENOUS; SOFT TISSUE at 10:09

## 2018-10-10 RX ADMIN — FENTANYL CITRATE 100 MCG: 50 INJECTION INTRAMUSCULAR; INTRAVENOUS at 10:31

## 2018-10-10 RX ADMIN — CHOLESTYRAMINE 4 G: 4 POWDER, FOR SUSPENSION ORAL at 15:11

## 2018-10-10 RX ADMIN — DEXAMETHASONE SODIUM PHOSPHATE 4 MG: 4 INJECTION, SOLUTION INTRAMUSCULAR; INTRAVENOUS at 10:42

## 2018-10-10 RX ADMIN — CEFAZOLIN 1 G: 1 INJECTION, POWDER, FOR SOLUTION INTRAVENOUS at 10:30

## 2018-10-10 RX ADMIN — SODIUM CHLORIDE, POTASSIUM CHLORIDE, SODIUM LACTATE AND CALCIUM CHLORIDE 100 ML/HR: 600; 310; 30; 20 INJECTION, SOLUTION INTRAVENOUS at 12:30

## 2018-10-10 RX ADMIN — LISINOPRIL 20 MG: 20 TABLET ORAL at 15:11

## 2018-10-10 RX ADMIN — MEPERIDINE HYDROCHLORIDE 25 MG: 25 INJECTION INTRAMUSCULAR; INTRAVENOUS; SUBCUTANEOUS at 01:54

## 2018-10-10 RX ADMIN — GLIPIZIDE 5 MG: 5 TABLET ORAL at 15:12

## 2018-10-10 RX ADMIN — ESCITALOPRAM 10 MG: 10 TABLET, FILM COATED ORAL at 15:12

## 2018-10-10 RX ADMIN — FENTANYL CITRATE 50 MCG: 50 INJECTION INTRAMUSCULAR; INTRAVENOUS at 10:52

## 2018-10-10 RX ADMIN — FENTANYL CITRATE 25 MCG: 50 INJECTION, SOLUTION INTRAMUSCULAR; INTRAVENOUS at 11:48

## 2018-10-10 RX ADMIN — MIDAZOLAM HYDROCHLORIDE 2 MG: 1 INJECTION, SOLUTION INTRAMUSCULAR; INTRAVENOUS at 10:09

## 2018-10-10 NOTE — ANESTHESIA POSTPROCEDURE EVALUATION
"Patient: America Garcia    Procedure Summary     Date:  10/10/18 Room / Location:  [facility] OR  /  [facility] OR    Anesthesia Start:  1015 Anesthesia Stop:  1136    Procedure:  ANKLE OPEN REDUCTION INTERNAL FIXATION - RIGHT (Right Ankle) Diagnosis:  (S82.841A)    Surgeon:  Bart Oglesby MD Provider:  Sohail Logan CRNA    Anesthesia Type:  general ASA Status:  3          Anesthesia Type: general  Last vitals  BP   124/72 (10/10/18 1256)   Temp   96.8 °F (36 °C) (10/10/18 1256)   Pulse   103 (10/10/18 1256)   Resp   16 (10/10/18 1256)     SpO2   96 % (10/10/18 1256)     Post Anesthesia Care and Evaluation    Patient location during evaluation: PACU  Patient participation: complete - patient participated  Level of consciousness: awake and alert  Pain management: adequate  Airway patency: patent  Anesthetic complications: No anesthetic complications  PONV Status: none  Cardiovascular status: acceptable and hemodynamically stable  Respiratory status: acceptable  Hydration status: acceptable    Comments: Blood pressure 124/72, pulse 103, temperature 96.8 °F (36 °C), temperature source Temporal Artery , resp. rate 16, height 165.1 cm (65\"), weight 77.7 kg (171 lb 3 oz), SpO2 96 %.    Patient discharged from PACU based upon Alessandra score. Please see RN notes for further details      "

## 2018-10-10 NOTE — TELEPHONE ENCOUNTER
----- Message from Josselyn Flannery, [id number] sent at 10/9/2018  3:09 PM CDT -----  The labs are not significant.

## 2018-10-10 NOTE — PLAN OF CARE
Problem: Patient Care Overview  Goal: Plan of Care Review  Outcome: Ongoing (interventions implemented as appropriate)   10/10/18 0301   Coping/Psychosocial   Plan of Care Reviewed With patient   Plan of Care Review   Progress no change   OTHER   Outcome Summary pt has been c/o severe pain,  notified, pain medications adjusted. Vss, NPO for ORIF today. Cap refillt to right foot ,3 sec. safety maintained.        Problem: Fall Risk (Adult)  Goal: Absence of Fall  Outcome: Ongoing (interventions implemented as appropriate)      Problem: Fracture Orthopaedic (Adult)  Goal: Signs and Symptoms of Listed Potential Problems Will be Absent, Minimized or Managed (Fracture Orthopaedic)  Outcome: Ongoing (interventions implemented as appropriate)

## 2018-10-10 NOTE — ANESTHESIA PROCEDURE NOTES
Airway  Urgency: elective    Airway not difficult    General Information and Staff    Patient location during procedure: OR  CRNA: MARIAN LEWIS    Indications and Patient Condition  Indications for airway management: airway protection    Preoxygenated: yes  Mask difficulty assessment: 0 - not attempted    Final Airway Details  Final airway type: supraglottic airway      Successful airway: classic  Size 4  Airway Seal Pressure (cm H2O): 20    Number of attempts at approach: 1

## 2018-10-10 NOTE — ANESTHESIA PREPROCEDURE EVALUATION
Anesthesia Evaluation     Patient summary reviewed and Nursing notes reviewed   NPO Solid Status: > 8 hours  NPO Liquid Status: > 8 hours           Airway   Mallampati: I  TM distance: >3 FB  Neck ROM: full  Dental      Pulmonary - negative pulmonary ROS   (-) asthma, sleep apnea  Cardiovascular   Exercise tolerance: good (4-7 METS)    ECG reviewed    (+) hypertension, hyperlipidemia,   (-) CAD, angina      Neuro/Psych- negative ROS  GI/Hepatic/Renal/Endo    (+)   diabetes mellitus type 2,     Musculoskeletal (-) negative ROS    Abdominal    Substance History - negative use     OB/GYN negative ob/gyn ROS         Other          Other Comment: anemia                Anesthesia Plan    ASA 3     general   (Recent visit to neurology for numbness in hands and right foot. Presumed diabetic neuropathy but pt had mri cspine and brain ordered for tomorrow as outpatient. Dr MEGAN Oglesby discussed with neurology office, agreed to proceed with hardware placement and they will follow up with her.     Will forego nerve block given numbness currently being worked up by neurology. )  intravenous induction   Anesthetic plan, all risks, benefits, and alternatives have been provided, discussed and informed consent has been obtained with: patient.

## 2018-10-10 NOTE — PROGRESS NOTES
Continued Stay Note  SHELLI Dutta     Patient Name: America Garcia  MRN: [id number]  Today's Date: 10/10/2018    Admit Date: 10/9/2018          Discharge Plan     Row Name 10/10/18 1043       Plan    Plan Comments Pt is currently off floor in surgery.  Will need to assess pt for d/c needs post OP and therapy evals. Will follow.               Discharge Codes    No documentation.           YESY Servin

## 2018-10-10 NOTE — OP NOTE
[facility]  OP NOTE    Patient Name: America Garcia     Date of Procedure: 10/10/2018     PREOPERATIVE DIAGNOSIS: Closed displaced trimalleolar ankle fracture right     POSTOPERATIVE DIAGNOSIS: Same.     PROCEDURE PERFORMED: Open reduction internal fixation right ankle fracture     SURGEON: Bart Oglesby MD      ANESTHESIA: General.    PREPARATION: Routine.    STAFF: Circulator: Dean De Santiago RN; Laura Ricketts RN  Scrub Person: Paul Oviedo; Alyson Chvaez; Susy Ann  Assistant: Payton Felton  Other: Dipika Copeland    ESTIMATED BLOOD LOSS: None    SPECIMENS: None    COMPLICATIONS: None    INDICATIONS: America Garcia is a 63 y.o. female who sustained the above-noted fracture.  It was felt that internal fixation was indicated. The indications, risks, and possible complications of the procedure were explained to the patient, who voiced understanding and wished to proceed with surgery.     PROCEDURE IN DETAIL: The patient was taken to the operating room and placed on the operating table in a supine position. After general anesthesia was obtained, the right lower extremity was prepped and draped in a sterile manner.  The right lower extremity was then exsanguinated with an Esmarch bandage and a tourniquet was inflated to 300 mmHg lateral incision made and sharp dissection was carried down to the fracture site.  A short Synthes right distal fibular locking plate was then placed and secured in the usual fashion.  Attention was then placed to the medial malleolus.  Sharp dissection was carried out of the medial malleolar fracture was identified.  The ankle was distracted and irrigated to remove any debris.  The medial malleolus fracture was then reduced and fixated with a single 40 mm partially threaded 4.0 cancellus screw.  X-rays revealed the fixation was anatomic and stable.  The wounds were then irrigated and closure was accomplished with nylon suture.  Dressings were  applied followed by a posterior plaster splint. The patient tolerated the procedure well. Sponge and needle counts were correct. The patient was then awakened and extubated in the operating room and taken to the recovery room in good condition.    Bart Oglesby MD  Date: 10/10/2018 Time: 11:31 AM

## 2018-10-10 NOTE — PROGRESS NOTES
[facility] Medicine Services  INPATIENT PROGRESS NOTE    Length of Stay: 1  Date of Admission: 10/9/2018  Primary Care Physician: Balaji Cornelius MD    Subjective   Chief Complaint: Follow-up right ankle pain  HPI   The patient is resting in bed.  She did not did not sleep well last night, her pain was not very well controlled.  She denies any shortness of breath or chest pain.  She denies any abdominal pain, nausea, vomiting, or diarrhea.  She is slightly anxious regarding surgery today.  She tells me that she feels that her main issue in relation to her fall is her neuropathy.  She does take gabapentin, and Lyrica was also added to her regimen approximately 7-10 days ago by her primary care provider.  She has not noticed any improvement or difference in her neuropathy since starting this medication.  She has been referred to a neurologist to have an MRI of her brain as well as nerve conduction studies, and was supposed to have those completed later this week.    Review of Systems   All pertinent negatives and positives are as above. All other systems have been reviewed and are negative unless otherwise stated.     Objective    Temp:  [98.2 °F (36.8 °C)-98.5 °F (36.9 °C)] 98.2 °F (36.8 °C)  Heart Rate:  [81-96] 86  Resp:  [12-22] 18  BP: (110-150)/() 125/78  Physical Exam   Constitutional: She is oriented to person, place, and time. She appears well-developed and well-nourished. No distress.   HENT:   Head: Normocephalic and atraumatic.   Neck: Normal range of motion. Neck supple. No JVD present. No tracheal deviation present. No thyromegaly present.   Cardiovascular: Normal rate, regular rhythm, normal heart sounds and intact distal pulses.  Exam reveals no gallop and no friction rub.    No murmur heard.  Pulmonary/Chest: Effort normal and breath sounds normal. She has no wheezes. She has no rales.   Abdominal: Soft. Bowel sounds are normal. She exhibits no distension.  There is no tenderness. There is no guarding.   Musculoskeletal: She exhibits tenderness (Right ankle). She exhibits no edema.   Lymphadenopathy:     She has no cervical adenopathy.   Neurological: She is alert and oriented to person, place, and time.   Peripheral neuropathy to bilateral feet and hands   Skin: Skin is warm and dry. No rash noted. No erythema.   Splint to right ankle   Psychiatric: She has a normal mood and affect. Her behavior is normal. Judgment and thought content normal.   Vitals reviewed.    Results Review:  I have reviewed the labs, radiology results, and diagnostic studies.    Laboratory Data:     Results from last 7 days  Lab Units 10/10/18  0533 10/09/18  1429   WBC 10*3/mm3 7.69 9.65   HEMOGLOBIN g/dL 9.7* 10.7*   HEMATOCRIT % 29.9* 32.9*   PLATELETS 10*3/mm3 275 297       Results from last 7 days  Lab Units 10/10/18  0533 10/09/18  1429   SODIUM mmol/L 139 136   POTASSIUM mmol/L 4.0 4.3   CHLORIDE mmol/L 98 95*   CO2 mmol/L 31.0 29.0   BUN mg/dL 8 8   CREATININE mg/dL 0.67 0.68   CALCIUM mg/dL 8.9 9.1   BILIRUBIN mg/dL  --  0.4   ALK PHOS U/L  --  88   ALT (SGPT) U/L  --  36   AST (SGOT) U/L  --  30   GLUCOSE mg/dL 170* 206*     Radiology Data:   Imaging Results (last 24 hours)     Procedure Component Value Units Date/Time    XR Ankle 2 View Right [[id number]] Collected:  10/09/18 1631     Updated:  10/09/18 1635    Narrative:       EXAMINATION: Right ankle 2 views 10/9/2018     HISTORY: Post reduction     FINDINGS: Two-view exam of the right ankle reveals the patient's  undergone successful closed reduction for a bimalleolar fracture  dislocation of the right ankle. There is good restoration of anatomic  alignment.       Impression:       Successful reduction for a bimalleolar fracture dislocation  of the right ankle with good restoration of anatomic alignment.  This report was finalized on 10/09/2018 16:32 by Dr. South Uriostegui MD.    XR Chest 1 View [[id number]] Collected:  10/09/18  1539     Updated:  10/09/18 1543    Narrative:       EXAMINATION: XR CHEST 1 VW- 10/9/2018 3:39 PM CDT     HISTORY: pre-op.     REPORT: Comparison is made with study from 4/9/2012.        One-view chest: Frontal view of the chest was obtained. The lungs are  clear and normally expanded. There is mild stable elevation of the right  hemidiaphragm. The cardiac and mediastinal silhouettes are within normal  limits. The visualized bony thorax and upper abdomen are unremarkable  except for evidence of previous cervical fusion surgery and multiple  surgical clips are present in the right upper abdomen..                                                                                                                                                       Impression:              No acute cardiopulmonary abnormality.                                                                      This report was finalized on [id number] by Dr. Caleb Chase MD.    XR Foot 3+ View Right [[id number]] Collected:  10/09/18 1433     Updated:  10/09/18 1437    Narrative:       EXAMINATION: Right foot 3 views 10/9/2018     HISTORY: Fall     FINDINGS: Three-view exam of the right foot demonstrates a bimalleolar  fracture dislocation of the ankle. The foot is unremarkable with no  evidence of localized soft tissue swelling or discrete fracture line.       Impression:       1.. Normal exam of the right foot.  2. Bimalleolar fracture dislocation of the ankle.  This report was finalized on 10/09/2018 14:34 by Dr. South Uriostegui MD.    XR Ankle 3+ View Right [[id number]] Collected:  10/09/18 1530     Updated:  10/09/18 1436    Narrative:       EXAMINATION: Right ankle 3 views 10/9/2018     HISTORY: Right ankle pain after fall     FINDINGS: Three-view exam of the right ankle demonstrates diffuse soft  tissue swelling about the distal tibia and ankle with a bimalleolar  fracture/ dislocation of the ankle. The distal tibial plafond is  medially  subluxed upon the talar dome. There is displacement of the  medial and lateral malleolar fracture fragments. No other fractures are  present.       Impression:       1.. Fracture dislocation of the ankle with a bimalleolar fracture. There  is medial subluxation of the distal tibia upon the talar dome.  2. Associated diffuse soft tissue swelling.  This report was finalized on 10/09/2018 14:33 by Dr. South Uriostegui MD.    XR Elbow 3+ View Left [[id number]] Collected:  10/09/18 1529     Updated:  10/09/18 1433    Narrative:       LEFT ELBOW, 2 views 10/9/2018 1:39 PM CDT     HISTORY: left elbow pain, fall     COMPARISON: None      FINDINGS:   Frontal and lateral views of the left elbow were obtained.      There is no fracture or dislocation. No significant joint space  narrowing is noted. There is no significant joint effusion.       No gross soft tissue abnormality is visualized.        Impression:       1. Unremarkable radiographs of the left elbow.        This report was finalized on 10/09/2018 14:30 by Dr. oSuth Uriostegui MD.    CT Cervical Spine Without Contrast [[id number]] Collected:  10/09/18 1407     Updated:  10/09/18 1416    Narrative:       CT CERVICAL SPINE WO CONTRAST- 10/9/2018 1:35 PM CDT     HISTORY: fall, hit head     COMPARISON: None      DOSE LENGTH PRODUCT: 186 mGy cm. Automated exposure control was utilized  to diminish patient radiation dose.     TECHNIQUE: Serial helical tomographic images of the cervical spine were  obtained without the use of intravenous contrast. Additionally, sagittal  and coronal reformatted images were also provided for review.      FINDINGS:   Alignment: There is mild loss of normal cervical lordosis.     Bones: There is no evidence of fracture. The patient's undergone prior  fusion at the C4-C7 levels with interbody grafts. There is degenerative  disc disease at C3-C4 with narrowing of the space height and spondylosis  as well as at the C7-T1 level.     Disc spaces:  There is degenerative disc disease at C3-C4 and C7-T1.     Canal and neuroforamina: Neural foraminal narrowing is noted at multiple  levels related to facet overgrowth and uncinate spurring.     Soft tissues: Unremarkable.     Lung apices: Clear. No pneumothorax.       Impression:       1. The patient's undergone prior fusion at the C4-C7 levels.  Degenerative disc disease is noted at C3-C4 and C7-T1.  2. No evidence of fracture or malalignment.  3. Bony neural foraminal encroachment at multiple levels related to  facet arthropathy and uncinate spurring.        This report was finalized on 10/09/2018 14:13 by Dr. South Uriostegui MD.    CT Head Without Contrast [[id number]] Collected:  10/09/18 1403     Updated:  10/09/18 1408    Narrative:       EXAMINATION: CT HEAD WO CONTRAST- 10/9/2018 2:03 PM CDT     HISTORY: Fall, hit head, head injury with pain     COMPARISON: None     DOSE: 601 mGy-cm     TECHNIQUE: Sequential imaging was performed from the vertex through the  base of the skull without the use of IV contrast.  Sagittal and coronal  reformations were made from the original source data and reviewed.  Automated exposure control was also utilized to decrease patient  radiation dose.     FINDINGS:   There is mild diffuse cerebral atrophy. There is no evidence of acute  intracranial hemorrhage, mass, or midline shift. There is no evidence of  acute territorial infarct. The ventricles appear normal in  configuration. The basilar cisterns are patent. Posterior fossa appears  grossly normal. The calvarium is intact. The visualized paranasal  sinuses and mastoid air cells appear clear. Calcifications are seen in  the cavernous carotid arteries. There is a mostly empty sella  configuration.          Impression:       No acute intracranial findings.     This report was finalized on 10/09/2018 14:05 by Dr. Hector Holbrook MD.        I have reviewed the patient current medications.     Assessment/Plan   Assessment:  1.   Acute Bimalleolar fracture dislocation of the right ankle  2.  Fall  3.  Non-insulin-dependent diabetes mellitus type II with hyperglycemia, hemoglobin A1c 7.8%  4.  Polyneuropathy likely secondary to diabetes mellitus  5.  Essential hypertension  6.  Hyperlipidemia  7.  Normocytic anemia    Plan:  1.  Appreciate orthopedic assistance, plans for ORIF today per Dr. Bart Oglesby. Will need PT/OT following surgery  2.   for discharge disposition- will depend on progress with therapy, weight bearing status, etc.  3.  Will resume home medications as appropriate following surgery today  4.  Last blood glucoses- 286, 170. Continue sliding scale insulin for now and will resume oral medications following surgery.  5.  Will likely increase Gabapentin and discontinue Lyrica following surgery  6.  BMP, CBC without differential in AM, monitor hgb/hct closely following surgery    Discharge Planning: I expect the patient to be discharged to ? in ? days.    ARNOL Garcia   10/10/18   6:53 AM     Chart reviewed.  Patient examined.  Agree with assessment and plan.   Discussed with patient and CARLA Ramsay DO  10/10/18  1:52 PM

## 2018-10-10 NOTE — PAYOR COMM NOTE
"FROM: JERI MARQUEZ  PHONE: [phone]  FAX: [phone]    PENDING: [id number]    Dacia Garcia (63 y.o. Female)     Date of Birth Social Security Number Address Home Phone MRN    1955  [id number] [facility] [address] [phone] [id number]    [employer] Marital Status          Other        Admission Date Admission Type Admitting Provider Attending Provider Department, Room/Bed    10/9/18 Emergency Dottie Ramsay DO Horn, Frances Marie, DO [facility] OR, PAD OR/NONE    Discharge Date Discharge Disposition Discharge Destination                       Attending Provider:  Dottie Ramsay DO    Allergies:  Codeine, Morphine    Isolation:  None   Infection:  None   Code Status:  CPR    Ht:  165.1 cm (65\")   Wt:  77.7 kg (171 lb 3 oz)    Admission Cmt:  None   Principal Problem:  None                Active Insurance as of 10/9/2018     Primary Coverage     Payor Plan Insurance Group Employer/Plan Group    [facility] [employer] EMPLOYEE [id number]     Payor Plan Address Payor Plan Phone Number Effective From Effective To    [address] [phone] 1/1/2015     [facility] [id number]       Subscriber Name Subscriber Birth Date Member ID       DACIA GARCIA 1955 [id number]                 Emergency Contacts      (Rel.) Home Phone Work Phone Mobile Phone    Demarcus Garcia (Spouse) [phone] -- --               History & Physical      Dottie Ramsay DO at 10/9/2018  4:44 PM              [facility]  HISTORY AND PHYSICAL    Date of Admission: 10/9/2018  Primary Care Physician: Balaji Cornelius MD    Subjective     Chief Complaint: Right ankle pain.    History of Present Illness  Fell this morning after getting up.  [place] a pop and was unable to walk on right foot.  Has been having issues with neuropathy since at least June.  Currently being worked up by [facility].  This has " "been causing her problems walking.          Review of Systems   Constitutional: Negative.  Negative for fever.   HENT: Negative for congestion and mouth sores.    Eyes: Negative for visual disturbance.   Respiratory: Negative for cough and shortness of breath.    Cardiovascular: Negative for chest pain and palpitations.   Gastrointestinal: Negative for diarrhea, nausea and vomiting.   Endocrine: Negative.    Genitourinary: Negative.    Musculoskeletal: Positive for arthralgias and myalgias.   Allergic/Immunologic: Negative.    Neurological: Positive for numbness (feet and hands bilaterally). Negative for dizziness.   Hematological: Negative.    Psychiatric/Behavioral: Negative.             Past Medical History:   Past Medical History:   Diagnosis Date   • Diabetes mellitus (CMS/HCC)    • Hyperlipidemia    • Hypertension    Polyneuropathy    Past Surgical History:  Past Surgical History:   Procedure Laterality Date   • APPENDECTOMY     • CHOLECYSTECTOMY     • HYSTERECTOMY     • NECK SURGERY      x3     Social History:     Retired  No alcohol, drugs or tobacco  DPOA, use   Living will none  Code full  PCP  Balaji Cornelius  Neurology \"[facility]\"    Family History: family history includes Heart disease in her father and mother; Leukemia in her father.       Allergies:  Allergies   Allergen Reactions   • Codeine Shortness Of Breath   • Morphine Shortness Of Breath     Medications:  Per her recollection as she does not have a list with her.    Clonipin  Soma  Percocet  Metformin  Celebrex  Ambien  Lisinopril  Cholestid    Her Records in Care Everywhere show a more extensive list, will have family bring in correct list of medications.     Objective     Vital Signs: /95   Pulse 87   Temp 98.5 °F (36.9 °C) (Temporal Artery )   Resp 16   Ht 165.1 cm (65\")   Wt (S) 76.2 kg (168 lb) Comment: recently weighed at PCP's   SpO2 93%   BMI 27.96 kg/m²    Physical Exam   Constitutional: She is oriented to " person, place, and time. She appears well-developed and well-nourished.   HENT:   Head: Normocephalic and atraumatic.   Eyes: Pupils are equal, round, and reactive to light. Conjunctivae and EOM are normal.   Neck: Neck supple. No JVD present.   Cardiovascular: Normal rate, regular rhythm and normal heart sounds.  Exam reveals no gallop and no friction rub.    No murmur heard.  Pulmonary/Chest: Effort normal and breath sounds normal.   Abdominal: Soft. Bowel sounds are normal. There is no hepatosplenomegaly. There is no tenderness.   Musculoskeletal: Normal range of motion. She exhibits no edema.   Right lower leg in cast   Neurological: She is alert and oriented to person, place, and time. No cranial nerve deficit.   Toes right foot warm to touch, mobile and can tell when they are being touched.   Skin: Skin is warm and dry.   Psychiatric: She has a normal mood and affect. Her behavior is normal.   Nursing note and vitals reviewed.          Results Reviewed:  Lab Results (last 24 hours)     Procedure Component Value Units Date/Time    Comprehensive Metabolic Panel [[id number]]  (Abnormal) Collected:  10/09/18 1429    Specimen:  Blood Updated:  10/09/18 1450     Glucose 206 (H) mg/dL      BUN 8 mg/dL      Creatinine 0.68 mg/dL      Sodium 136 mmol/L      Potassium 4.3 mmol/L      Chloride 95 (L) mmol/L      CO2 29.0 mmol/L      Calcium 9.1 mg/dL      Total Protein 7.0 g/dL      Albumin 4.30 g/dL      ALT (SGPT) 36 U/L      AST (SGOT) 30 U/L      Alkaline Phosphatase 88 U/L      Total Bilirubin 0.4 mg/dL      eGFR Non African Amer 87 mL/min/1.73      Globulin 2.7 gm/dL      A/G Ratio 1.6 g/dL      BUN/Creatinine Ratio 11.8     Anion Gap 12.0 mmol/L     aPTT [[id number]]  (Abnormal) Collected:  10/09/18 1429    Specimen:  Blood Updated:  10/09/18 1447     PTT 21.9 (L) seconds     Protime-INR [[id number]]  (Normal) Collected:  10/09/18 1429    Specimen:  Blood Updated:  10/09/18 1447     Protime 12.7 Seconds      INR 0.93     CBC & Differential [[id number]] Collected:  10/09/18 1429    Specimen:  Blood Updated:  10/09/18 1438    Narrative:       The following orders were created for panel order CBC & Differential.  Procedure                               Abnormality         Status                     ---------                               -----------         ------                     CBC Auto Differential[[id number]]        Abnormal            Final result                 Please view results for these tests on the individual orders.    CBC Auto Differential [[id number]]  (Abnormal) Collected:  10/09/18 1429    Specimen:  Blood Updated:  10/09/18 1438     WBC 9.65 10*3/mm3      RBC 3.91 (L) 10*6/mm3      Hemoglobin 10.7 (L) g/dL      Hematocrit 32.9 (L) %      MCV 84.1 fL      MCH 27.4 (L) pg      MCHC 32.5 (L) g/dL      RDW 14.1 %      RDW-SD 43.2 fl      MPV 10.2 fL      Platelets 297 10*3/mm3      Neutrophil % 74.7 %      Lymphocyte % 16.2 %      Monocyte % 7.9 %      Eosinophil % 0.4 %      Basophil % 0.4 %      Immature Grans % 0.4 %      Neutrophils, Absolute 7.21 10*3/mm3      Lymphocytes, Absolute 1.56 10*3/mm3      Monocytes, Absolute 0.76 10*3/mm3      Eosinophils, Absolute 0.04 10*3/mm3      Basophils, Absolute 0.04 10*3/mm3      Immature Grans, Absolute 0.04 (H) 10*3/mm3      nRBC 0.0 /100 WBC         Imaging Results (last 24 hours)     Procedure Component Value Units Date/Time    XR Ankle 2 View Right [[id number]] Collected:  10/09/18 1631     Updated:  10/09/18 1635    Narrative:       EXAMINATION: Right ankle 2 views 10/9/2018     HISTORY: Post reduction     FINDINGS: Two-view exam of the right ankle reveals the patient's  undergone successful closed reduction for a bimalleolar fracture  dislocation of the right ankle. There is good restoration of anatomic  alignment.       Impression:       Successful reduction for a bimalleolar fracture dislocation  of the right ankle with good restoration of anatomic alignment.  This  report was finalized on 10/09/2018 16:32 by Dr. South Uriostegui MD.    XR Chest 1 View [[id number]] Collected:  10/09/18 1539     Updated:  10/09/18 1543    Narrative:       EXAMINATION: XR CHEST 1 VW- 10/9/2018 3:39 PM CDT     HISTORY: pre-op.     REPORT: Comparison is made with study from 4/9/2012.        One-view chest: Frontal view of the chest was obtained. The lungs are  clear and normally expanded. There is mild stable elevation of the right  hemidiaphragm. The cardiac and mediastinal silhouettes are within normal  limits. The visualized bony thorax and upper abdomen are unremarkable  except for evidence of previous cervical fusion surgery and multiple  surgical clips are present in the right upper abdomen..                                                                                                                                                       Impression:              No acute cardiopulmonary abnormality.                                                                      This report was finalized on [id number] by Dr. Caleb Chase MD.    XR Foot 3+ View Right [[id number]] Collected:  10/09/18 1433     Updated:  10/09/18 1437    Narrative:       EXAMINATION: Right foot 3 views 10/9/2018     HISTORY: Fall     FINDINGS: Three-view exam of the right foot demonstrates a bimalleolar  fracture dislocation of the ankle. The foot is unremarkable with no  evidence of localized soft tissue swelling or discrete fracture line.       Impression:       1.. Normal exam of the right foot.  2. Bimalleolar fracture dislocation of the ankle.  This report was finalized on 10/09/2018 14:34 by Dr. South Uriostegui MD.    XR Ankle 3+ View Right [[id number]] Collected:  10/09/18 1530     Updated:  10/09/18 1436    Narrative:       EXAMINATION: Right ankle 3 views 10/9/2018     HISTORY: Right ankle pain after fall     FINDINGS: Three-view exam of the right ankle demonstrates diffuse soft  tissue swelling about the  distal tibia and ankle with a bimalleolar  fracture/ dislocation of the ankle. The distal tibial plafond is  medially subluxed upon the talar dome. There is displacement of the  medial and lateral malleolar fracture fragments. No other fractures are  present.       Impression:       1.. Fracture dislocation of the ankle with a bimalleolar fracture. There  is medial subluxation of the distal tibia upon the talar dome.  2. Associated diffuse soft tissue swelling.  This report was finalized on 10/09/2018 14:33 by Dr. South Uriostegui MD.    XR Elbow 3+ View Left [[id number]] Collected:  10/09/18 1529     Updated:  10/09/18 1433    Narrative:       LEFT ELBOW, 2 views 10/9/2018 1:39 PM CDT     HISTORY: left elbow pain, fall     COMPARISON: None      FINDINGS:   Frontal and lateral views of the left elbow were obtained.      There is no fracture or dislocation. No significant joint space  narrowing is noted. There is no significant joint effusion.       No gross soft tissue abnormality is visualized.        Impression:       1. Unremarkable radiographs of the left elbow.        This report was finalized on 10/09/2018 14:30 by Dr. South Uriostegui MD.    CT Cervical Spine Without Contrast [[id number]] Collected:  10/09/18 1407     Updated:  10/09/18 1416    Narrative:       CT CERVICAL SPINE WO CONTRAST- 10/9/2018 1:35 PM CDT     HISTORY: fall, hit head     COMPARISON: None      DOSE LENGTH PRODUCT: 186 mGy cm. Automated exposure control was utilized  to diminish patient radiation dose.     TECHNIQUE: Serial helical tomographic images of the cervical spine were  obtained without the use of intravenous contrast. Additionally, sagittal  and coronal reformatted images were also provided for review.      FINDINGS:   Alignment: There is mild loss of normal cervical lordosis.     Bones: There is no evidence of fracture. The patient's undergone prior  fusion at the C4-C7 levels with interbody grafts. There is degenerative  disc  disease at C3-C4 with narrowing of the space height and spondylosis  as well as at the C7-T1 level.     Disc spaces: There is degenerative disc disease at C3-C4 and C7-T1.     Canal and neuroforamina: Neural foraminal narrowing is noted at multiple  levels related to facet overgrowth and uncinate spurring.     Soft tissues: Unremarkable.     Lung apices: Clear. No pneumothorax.       Impression:       1. The patient's undergone prior fusion at the C4-C7 levels.  Degenerative disc disease is noted at C3-C4 and C7-T1.  2. No evidence of fracture or malalignment.  3. Bony neural foraminal encroachment at multiple levels related to  facet arthropathy and uncinate spurring.        This report was finalized on 10/09/2018 14:13 by Dr. South Uriostegui MD.    CT Head Without Contrast [[id number]] Collected:  10/09/18 1403     Updated:  10/09/18 1408    Narrative:       EXAMINATION: CT HEAD WO CONTRAST- 10/9/2018 2:03 PM CDT     HISTORY: Fall, hit head, head injury with pain     COMPARISON: None     DOSE: 601 mGy-cm     TECHNIQUE: Sequential imaging was performed from the vertex through the  base of the skull without the use of IV contrast.  Sagittal and coronal  reformations were made from the original source data and reviewed.  Automated exposure control was also utilized to decrease patient  radiation dose.     FINDINGS:   There is mild diffuse cerebral atrophy. There is no evidence of acute  intracranial hemorrhage, mass, or midline shift. There is no evidence of  acute territorial infarct. The ventricles appear normal in  configuration. The basilar cisterns are patent. Posterior fossa appears  grossly normal. The calvarium is intact. The visualized paranasal  sinuses and mastoid air cells appear clear. Calcifications are seen in  the cavernous carotid arteries. There is a mostly empty sella  configuration.          Impression:       No acute intracranial findings.     This report was finalized on 10/09/2018 14:05 by   "Hector Holbrook MD.        I have personally reviewed and interpreted the radiology studies and ECG obtained at time of admission.     Assessment / Plan     Assessment:     Fracture right ankle, bimalleolar   DM II uncontrolled  Hyperglycemia  Hypertension  Chronic diarrhea  Poly neuropthy      Plan:      Admit   Consult ortho  Glucometer ac/hs  Insulin  Check A1d  Resume home medications with accurate list obtained.  Elevate right leg.   AM lab  BMP CBC        Code Status: full     I discussed the patient's findings and my recommendations with the patient and family    Estimated length of stay 3-5 days    Dottie Ramsay DO   10/09/18   4:44 PM              Electronically signed by Dottie Ramsay DO at 10/9/2018  4:55 PM          Emergency Department Notes      Leslee Montgomery RN at 10/9/2018 12:58 PM        PT WAS GETTING OUT OF BED TO GO TO BATHROOM, PT STATES \"I JUST FELL, MY LEGS JUST GAVE OUT\".  PT DENIES DIZZINESS OR WEAKNESS PRIOR TO FALL, STATES SHE HAS BEEN HAVING NEUROPATHY WORSENING.  PT STATES SHE HAS A NERVE CONDUCTION TEST SCHEDULED IN A FEW WEEKS.     Leslee Montgomery RN  10/09/18 1259      Electronically signed by Leslee Montgomery RN at 10/9/2018 12:59 PM     Anastasiia Rapp APRN at 10/9/2018  1:33 PM      Procedure Orders:    1. Procedural Sedation [[id number]] ordered by Leo Parish MD at 10/09/18 1616     2. FX Dislocation Initial [[id number]] ordered by Leo Parish MD at 10/09/18 1615     3. Splint Cast Strapping [[id number]] ordered by Anastasiia Rapp APRN at 10/09/18 1757           Attestation signed by Leo Parish MD at 10/9/2018 10:34 PM          For this patient encounter, I reviewed the NP or PA documentation, treatment plan, and medical decision making. Leo Parish MD 10/9/2018 10:34 PM                  Subjective   Patient is a 63-year-old  female who presents ER today with complaint of fall.  The patient reports that she was at home " "trying to go to the bathroom when her legs went out from underneath her.  She has a history of neuropathy and this does occur from time to time.  She is supposed to be following up for nerve conduction test.  The patient states that her legs went out from underneath her and she fell landing on her right foot and ankle.  She reports pain and swelling to the right ankle.  She states she heard a \"pop\".  Patient states she also hit her elbow on something.  She also states that she hit her head on a metal safe.  The patient did not have loss of consciousness.  The patient was able to crawl to the phone and called 911.  The patient was splinted with a blanket to the right ankle per EMS.  She was also given pain medication in route.  The patient at this time is awake alert and oriented.  She is able to answer all questions appropriately.  Patient reports that she last ate and drank at 7 PM last evening.  She is not currently on any blood thinners.  She presents ER today for further evaluation.        History provided by:  Patient   used: No    Fall   Mechanism of injury: fall    Injury location:  Head/neck and leg  Head/neck injury location:  Head  Leg injury location:  R ankle and R foot  Incident location:  Home  Time since incident:  1 hour  Arrived directly from scene: yes    Fall:     Fall occurred:  Standing    Impact surface:  Hard floor    Point of impact:  Head and feet    Entrapped after fall: no    Suspicion of alcohol use: no    Suspicion of drug use: no    Prior to arrival data:     Bystander interventions:  None    Patient ambulatory at scene: no      Blood loss:  None    Responsiveness at scene:  Alert    Orientation at scene:  Person, place, situation and time    Loss of consciousness: no      Amnesic to event: no      Airway interventions:  None    Breathing interventions:  None    IV access status:  Established    IO access:  None    Fluids administered:  None    Cardiac interventions: "  None    Immobilization:  RLE splint    Airway condition since incident:  Stable    Breathing condition since incident:  Stable    Circulation condition since incident:  Stable  Associated symptoms: no abdominal pain, no back pain, no blindness, no chest pain, no difficulty breathing, no headaches, no hearing loss, no loss of consciousness, no nausea, no neck pain, no seizures and no vomiting    Risk factors: no AICD, no anticoagulation therapy, no asthma, no beta blocker therapy, no CABG, no CAD, no CHF, no COPD, no diabetes, no dialysis, no hemophilia, no kidney disease, no pacemaker, no past MI, not pregnant and no steroid use        Review of Systems   HENT: Negative for hearing loss.    Eyes: Negative for blindness.   Cardiovascular: Negative for chest pain.   Gastrointestinal: Negative for abdominal pain, nausea and vomiting.   Musculoskeletal: Negative for back pain and neck pain.   Neurological: Negative for seizures, loss of consciousness and headaches.   All other systems reviewed and are negative.      Past Medical History:   Diagnosis Date   • Diabetes mellitus (CMS/HCC)    • Hyperlipidemia    • Hypertension        Allergies   Allergen Reactions   • Codeine Shortness Of Breath   • Morphine Shortness Of Breath       Past Surgical History:   Procedure Laterality Date   • APPENDECTOMY     • CHOLECYSTECTOMY     • HYSTERECTOMY     • NECK SURGERY      x3       Family History   Problem Relation Age of Onset   • Heart disease Mother    • Heart disease Father    • Leukemia Father        Social History     Social History   • Marital status:      Social History Main Topics   • Smoking status: Never Smoker   • Smokeless tobacco: Never Used   • Alcohol use No   • Drug use: No   • Sexual activity: Defer     Other Topics Concern   • Not on file           Objective   Physical Exam   Constitutional: She is oriented to person, place, and time. She appears well-developed and well-nourished.   HENT:   Head:  Normocephalic and atraumatic.   Neck:       Cardiovascular: Normal rate, regular rhythm and normal heart sounds.    Pulmonary/Chest: Effort normal and breath sounds normal.   Abdominal: Soft. Bowel sounds are normal.   Musculoskeletal:        Arms:       Legs:  Neurological: She is alert and oriented to person, place, and time.   Skin: Skin is warm and dry. Capillary refill takes less than 2 seconds.   Psychiatric: She has a normal mood and affect.   Nursing note and vitals reviewed.      FX Dislocation  Date/Time: 10/9/2018 4:15 PM  Performed by: WHITNEY FISHER  Authorized by: WHITNEY FISHER     Consent:     Consent obtained:  Written    Consent given by:  Patient    Risks discussed:  Nerve damage, pain and vascular damage    Alternatives discussed:  No treatment  Injury:     Injury location:  Ankle    Ankle injury location:  R ankle    Ankle fracture type: bimalleolar    Pre-procedure assessment:     Neurological function: normal      Distal perfusion: normal      Range of motion: reduced    Sedation:     Sedation type:  Moderate (conscious) sedation  Anesthesia (see MAR for exact dosages):     Anesthesia method:  None  Procedure details:     Manipulation performed: yes      Reduction successful: yes      X-ray confirmed reduction: yes      Immobilization:  Splint    Splint type:  Short leg    Supplies used:  Cotton padding, elastic bandage and Ortho-Glass  Post-procedure assessment:     Neurological function: normal      Distal perfusion: normal      Range of motion: improved      Patient tolerance of procedure:  Tolerated well, no immediate complications  Procedural Sedation  Date/Time: 10/9/2018 4:16 PM  Performed by: WHITNEY FISHER  Authorized by: WHITNEY FISHER     Consent:     Consent obtained:  Written    Consent given by:  Patient    Risks discussed:  Allergic reaction, dysrhythmia, inadequate sedation, prolonged hypoxia resulting in organ damage, prolonged sedation  necessitating reversal and respiratory compromise necessitating ventilatory assistance and intubation    Alternatives discussed:  Analgesia without sedation  Universal protocol:     Immediately prior to procedure a time out was called: yes      Patient identity confirmation method:  Verbally with patient and arm band  Indications:     Procedure performed:  Fracture reduction    Procedure necessitating sedation performed by:  Physician performing sedation    Intended level of sedation:  Moderate (conscious sedation)  Pre-sedation assessment:     NPO status caution: urgency dictates proceeding with non-ideal NPO status      ASA classification: class 2 - patient with mild systemic disease      Neck mobility: normal      Mouth opening:  3 or more finger widths    Thyromental distance:  4 finger widths    Mallampati score:  II - soft palate, uvula, fauces visible    Pre-sedation assessments completed and reviewed: airway patency, cardiovascular function, hydration status, mental status, nausea/vomiting, pain level, respiratory function and temperature      History of difficult intubation: no    Immediate pre-procedure details:     Reassessment: Patient reassessed immediately prior to procedure      Reviewed: vital signs, relevant labs/tests and NPO status      Verified: bag valve mask available, emergency equipment available, intubation equipment available, IV patency confirmed, oxygen available, reversal medications available and suction available    Procedure details (see MAR for exact dosages):     Preoxygenation:  Nasal cannula    Sedation:  Etomidate    Intra-procedure monitoring:  Blood pressure monitoring, cardiac monitor, continuous capnometry, continuous pulse oximetry, frequent LOC assessments and frequent vital sign checks    Intra-procedure events: none    Post-procedure details:     Attendance: Constant attendance by certified staff until patient recovered      Recovery: Patient returned to pre-procedure  baseline      Post-sedation assessments completed and reviewed: airway patency, cardiovascular function, hydration status, mental status, nausea/vomiting, pain level, respiratory function and temperature      Patient is stable for discharge or admission: yes      Patient tolerance:  Tolerated well, no immediate complications  Splint - Cast - Strapping  Date/Time: 10/9/2018 5:57 PM  Performed by: THUAN JAVIER  Authorized by: JODIE HUYNH     Consent:     Consent obtained:  Verbal    Consent given by:  Patient    Risks discussed:  Discoloration, pain, numbness and swelling    Alternatives discussed:  No treatment, delayed treatment, alternative treatment, observation and referral  Pre-procedure details:     Sensation:  Normal    Skin color:  Pink, warm and dry  Procedure details:     Laterality:  Right    Location:  Ankle    Ankle:  R ankle    Splint type: posterior splint.    Supplies:  Ortho-Glass, cotton padding and elastic bandage  Post-procedure details:     Pain:  Improved    Sensation:  Normal    Skin color:  Pink, warm and dry    Patient tolerance of procedure:  Tolerated well, no immediate complications              ED Course  ED Course as of Oct 09 1759   Tue Oct 09, 2018   1439 Call placed to Dr. Oglesby with orthopedics at this time.   [LF]   1450 Discussed pt case with Dr. Oglesby; he has asked that rt ankle be reduced; posterior splint placed and pt be admitted to hospitalist. He will place pt on OR schedule for tomorrow. Pt updated on status. Discussed with Dr. Parish; pt will be moved to room to reduce fracture.   [LF]   1545 Pt moved to room #1 for reduction; consent signed by nursing staff and pt for reduction of rt ankle; pt advised of risks vs benefits of procedure. Agreeable with procedure to reduce ankle at this time and for concious sedation. Pt also seen by ZBIGNIEW Hernandez with orthopedics. Recheck pedal pulses 2+, +CMS RLE prior to procedure.   [LF]   1616 Reduction of rt ankle performed  per Dr. Parish. Please see his procedure note. Pt awake and alert at this time after procedure. Post reduction film obtained. Call placed to hospitalist at this time to discuss admission.   [LF]   1619 Discussed pt case with Dr. Roque, hospitalist on call. Will admit to Dr. Ramsay. Pt will be admitted at this time in stable cond.   [LF]      ED Course User Index  [LF] Anastasiia Rapp EDILMA, APRN        XR Ankle 2 View Right   Final Result   Successful reduction for a bimalleolar fracture dislocation   of the right ankle with good restoration of anatomic alignment.   This report was finalized on 10/09/2018 16:32 by Dr. South Uriostegui MD.      XR Chest 1 View   Final Result           No acute cardiopulmonary abnormality.                                                                           This report was finalized on [id number] by Dr. Caleb Chase MD.      XR Foot 3+ View Right   Final Result   1.. Normal exam of the right foot.   2. Bimalleolar fracture dislocation of the ankle.   This report was finalized on 10/09/2018 14:34 by Dr. South Uriostegui MD.      XR Ankle 3+ View Right   Final Result   1.. Fracture dislocation of the ankle with a bimalleolar fracture. There   is medial subluxation of the distal tibia upon the talar dome.   2. Associated diffuse soft tissue swelling.   This report was finalized on 10/09/2018 14:33 by Dr. South Uriostegui MD.      XR Elbow 3+ View Left   Final Result   1. Unremarkable radiographs of the left elbow.           This report was finalized on 10/09/2018 14:30 by Dr. South Uriostegui MD.      CT Cervical Spine Without Contrast   Final Result   1. The patient's undergone prior fusion at the C4-C7 levels.   Degenerative disc disease is noted at C3-C4 and C7-T1.   2. No evidence of fracture or malalignment.   3. Bony neural foraminal encroachment at multiple levels related to   facet arthropathy and uncinate spurring.           This report was finalized on  10/09/2018 14:13 by Dr. South Uriostegui MD.      CT Head Without Contrast   Final Result   No acute intracranial findings.       This report was finalized on 10/09/2018 14:05 by Dr. Hector Holbrook MD.        Labs Reviewed   COMPREHENSIVE METABOLIC PANEL - Abnormal; Notable for the following:        Result Value    Glucose 206 (*)     Chloride 95 (*)     All other components within normal limits   APTT - Abnormal; Notable for the following:     PTT 21.9 (*)     All other components within normal limits   CBC WITH AUTO DIFFERENTIAL - Abnormal; Notable for the following:     RBC 3.91 (*)     Hemoglobin 10.7 (*)     Hematocrit 32.9 (*)     MCH 27.4 (*)     MCHC 32.5 (*)     Immature Grans, Absolute 0.04 (*)     All other components within normal limits   PROTIME-INR - Normal   TYPE AND SCREEN   CBC AND DIFFERENTIAL    Narrative:     The following orders were created for panel order CBC & Differential.  Procedure                               Abnormality         Status                     ---------                               -----------         ------                     CBC Auto Differential[[id number]]        Abnormal            Final result                 Please view results for these tests on the individual orders.     XR Ankle 2 View Right   Final Result   Successful reduction for a bimalleolar fracture dislocation   of the right ankle with good restoration of anatomic alignment.   This report was finalized on 10/09/2018 16:32 by Dr. South Uriostegui MD.      XR Chest 1 View   Final Result           No acute cardiopulmonary abnormality.                                                                           This report was finalized on [id number] by Dr. Caleb Chase MD.      XR Foot 3+ View Right   Final Result   1.. Normal exam of the right foot.   2. Bimalleolar fracture dislocation of the ankle.   This report was finalized on 10/09/2018 14:34 by Dr. South Uriostegui MD.      XR Ankle 3+ View  Right   Final Result   1.. Fracture dislocation of the ankle with a bimalleolar fracture. There   is medial subluxation of the distal tibia upon the talar dome.   2. Associated diffuse soft tissue swelling.   This report was finalized on 10/09/2018 14:33 by Dr. South Uriostegui MD.      XR Elbow 3+ View Left   Final Result   1. Unremarkable radiographs of the left elbow.           This report was finalized on 10/09/2018 14:30 by Dr. South Uriostegui MD.      CT Cervical Spine Without Contrast   Final Result   1. The patient's undergone prior fusion at the C4-C7 levels.   Degenerative disc disease is noted at C3-C4 and C7-T1.   2. No evidence of fracture or malalignment.   3. Bony neural foraminal encroachment at multiple levels related to   facet arthropathy and uncinate spurring.           This report was finalized on 10/09/2018 14:13 by Dr. South Uriostegui MD.      CT Head Without Contrast   Final Result   No acute intracranial findings.       This report was finalized on 10/09/2018 14:05 by Dr. Hector Holbrook MD.        Labs Reviewed   COMPREHENSIVE METABOLIC PANEL - Abnormal; Notable for the following:        Result Value    Glucose 206 (*)     Chloride 95 (*)     All other components within normal limits   APTT - Abnormal; Notable for the following:     PTT 21.9 (*)     All other components within normal limits   CBC WITH AUTO DIFFERENTIAL - Abnormal; Notable for the following:     RBC 3.91 (*)     Hemoglobin 10.7 (*)     Hematocrit 32.9 (*)     MCH 27.4 (*)     MCHC 32.5 (*)     Immature Grans, Absolute 0.04 (*)     All other components within normal limits   PROTIME-INR - Normal   TYPE AND SCREEN   CBC AND DIFFERENTIAL    Narrative:     The following orders were created for panel order CBC & Differential.  Procedure                               Abnormality         Status                     ---------                               -----------         ------                     CBC Auto  Differential[[id number]]        Abnormal            Final result                 Please view results for these tests on the individual orders.               MDM  Number of Diagnoses or Management Options  Closed bimalleolar fracture of right ankle, initial encounter: new and requires workup  Fall, initial encounter: new and requires workup  Injury of head, initial encounter: new and requires workup     Amount and/or Complexity of Data Reviewed  Clinical lab tests: ordered and reviewed  Tests in the radiology section of CPT®:  ordered and reviewed  Tests in the medicine section of CPT®:  ordered and reviewed  Discuss the patient with other providers: yes    Patient Progress  Patient progress: stable        Final diagnoses:   Closed bimalleolar fracture of right ankle, initial encounter   Fall, initial encounter   Injury of head, initial encounter            Anastasiia Rapp, APRN  10/09/18 1623       Anastasiia Rapp, APRN  10/09/18 1759      Electronically signed by Leo Parish MD at 10/9/2018 10:34 PM     Alessia Vanegas, RN at 10/9/2018  4:35 PM        DR. HUYNH AT BEDSIDE     Alessia Vanegas, RN  10/09/18 1635      Electronically signed by Alessia Vanegas, RN at 10/9/2018  4:35 PM             ICU Vital Signs     Row Name 10/10/18 0927 10/10/18 0800 10/10/18 0735 10/09/18 2150 10/09/18 2038       Vitals    Temp  -- 97.9 °F (36.6 °C)  --  -- 98.2 °F (36.8 °C)    Temp src  -- Oral  --  -- Oral    Pulse 86 85  --  -- 86    Heart Rate Source Monitor Monitor  --  -- Monitor    Resp  -- 20  --  -- 18    Resp Rate Source  -- Visual  --  -- Visual    /69 147/64  --  -- 125/78    Noninvasive MAP (mmHg) 86  --  --  --  --    BP Location Left arm Left arm  --  -- Left arm    BP Method Automatic Automatic  --  -- Automatic    Patient Position Lying Sitting  --  -- Lying       Oxygen Therapy    SpO2 92 % 95 %  -- 96 % 95 %    Pulse Oximetry Type Continuous  --  -- Intermittent Intermittent    Device (Oxygen  "Therapy) room air room air room air room air  --    Row Name 10/09/18 2015 10/09/18 1800 10/09/18 1725 10/09/18 1646 10/09/18 1631       Height and Weight    Height  --  -- 165.1 cm (65\")  --  --    Height Method  --  -- Actual  --  --    Weight  --  -- 77.7 kg (171 lb 3 oz)  --  --    Weight Method  --  -- Bed scale  --  --    Ideal Body Weight (IBW) (kg)  --  -- 57.29  --  --    BSA (Calculated - sq m)  --  -- 1.85 sq meters  --  --    BMI (Calculated)  --  -- 28.5  --  --    Weight in (lb) to have BMI = 25  --  -- 149.9  --  --       Vitals    Temp  --  -- 98.4 °F (36.9 °C)  --  --    Temp src  --  -- Oral  --  --    Pulse  --  -- 81 96 87    Heart Rate Source  --  -- Monitor  --  --    Resp  --  -- 18  --  --    Resp Rate Source  --  -- Visual  --  --    BP  --  -- 130/68 110/81 117/95    BP Location  --  -- Left arm  --  --    BP Method  --  -- Automatic  --  --    Patient Position  --  -- Lying  --  --       Oxygen Therapy    SpO2  --  -- 94 % 96 % 93 %    Pulse Oximetry Type  --  -- Intermittent  --  --    Device (Oxygen Therapy) room air room air room air  --  --    Row Name 10/09/18 1624 10/09/18 16:18:15 10/09/18 16:17:22 10/09/18 16:17:15 10/09/18 16:15:16       Vitals    Pulse 86 84 85  -- 82    Resp  --  -- 16  --  --    /57  -- 123/77 123/77  --    Noninvasive MAP (mmHg)  --  --  -- 88  --       Oxygen Therapy    SpO2 96 %  -- 100 %  --  --    ETCO2 (mmHg)  --  -- 39 mmHg  --  --    Row Name 10/09/18 16:12:18 10/09/18 16:09:19 10/09/18 16:08:05 10/09/18 16:06:21 10/09/18 1606       Vitals    Pulse 85 81 89 87 86    Resp  --  -- 19  -- 12    BP (!)  150/134  -- (!)  134/106 137/74 137/74    Noninvasive MAP (mmHg) 138  --  -- 86  --       Oxygen Therapy    SpO2  --  -- 100 %  -- 100 %    ETCO2 (mmHg)  --  -- 42 mmHg  -- 38 mmHg    Row Name 10/09/18 16:03:22 10/09/18 16:02:54 10/09/18 16:01:23 10/09/18 16:00:24 10/09/18 15:57:26       Vitals    Pulse 84 89  -- 88 86    Resp  -- 13  --  --  --    BP  " "-- 123/88 123/88  -- 116/82    Noninvasive MAP (mmHg)  --  -- 91  -- 85       Oxygen Therapy    SpO2  -- 99 %  --  --  --    ETCO2 (mmHg)  -- 28 mmHg  --  --  --    Row Name 10/09/18 1530 10/09/18 1303                Height and Weight    Height  -- 165.1 cm (65\")       Height Method  -- Stated       Weight  -- 76.2 kg (168 lb)   recently weighed at PCP's        Weight Method  -- Stated       Ideal Body Weight (IBW) (kg)  -- 57.29       BSA (Calculated - sq m)  -- 1.84 sq meters       BMI (Calculated)  -- 28       Weight in (lb) to have BMI = 25  -- 149.9          Vitals    Temp  -- 98.5 °F (36.9 °C)       Temp src  -- Temporal Artery        Pulse 90 88       Heart Rate Source Monitor Monitor       Resp 18 22       Resp Rate Source Monitor Visual       BP  -- 124/60       BP Location  -- Right arm       BP Method  -- Automatic       Patient Position  -- Lying          Oxygen Therapy    SpO2 97 % 97 %       Pulse Oximetry Type Continuous  --       Device (Oxygen Therapy) nasal cannula room air       Flow (L/min) 2  --       ETCO2 (mmHg) 34 mmHg  --       $ ETCO2 Daily Assessment (RT Only) yes  --           Hospital Medications (all)       Dose Frequency Start End    acetaminophen (TYLENOL) tablet 1,000 mg 1,000 mg Once 10/10/2018     Sig - Route: Take 2 tablets by mouth 1 (One) Time. - Oral    buffered lidocaine syringe 0.5 mL 0.5 mL Once As Needed 10/10/2018     Sig - Route: Inject 0.5 mL as directed 1 (One) Time As Needed (IV Start). - Injection    dexamethasone (DECADRON) injection 4 mg 4 mg Once As Needed 10/10/2018     Sig - Route: Infuse 1 mL into a venous catheter 1 (One) Time As Needed for Nausea or Vomiting. - Intravenous    dextrose (D50W) 25 g/ 50mL Intravenous Solution 25 g (MAR Hold) ((MAR Hold) since 10/10/2018  9:22 AM) 25 g Every 15 Minutes PRN 10/9/2018     Sig - Route: Infuse 50 mL into a venous catheter Every 15 (Fifteen) Minutes As Needed for Low Blood Sugar (Blood Sugar Less Than 70). - " Intravenous    dextrose (GLUTOSE) oral gel 15 g (MAR Hold) ((MAR Hold) since 10/10/2018  9:22 AM) 15 g Every 15 Minutes PRN 10/9/2018     Sig - Route: Take 15 g by mouth Every 15 (Fifteen) Minutes As Needed for Low Blood Sugar (Blood sugar less than 70). - Oral    famotidine (PEPCID) injection 20 mg 20 mg 60 Minutes Pre-Op 10/10/2018     Sig - Route: Infuse 2 mL into a venous catheter 60 Minutes Prior to Surgery. - Intravenous    glucagon (human recombinant) (GLUCAGEN DIAGNOSTIC) injection 1 mg (MAR Hold) ((MAR Hold) since 10/10/2018  9:22 AM) 1 mg As Needed 10/9/2018     Sig - Route: Inject 1 mg under the skin into the appropriate area as directed As Needed (Blood Glucose Less Than 70). - Subcutaneous    Influenza Vac Subunit Quad (FLUCELVAX) injection 0.5 mL (MAR Hold) ((MAR Hold) since 10/10/2018  9:22 AM) 0.5 mL During Hospitalization 10/9/2018     Sig - Route: Inject 0.5 mL into the appropriate muscle as directed by prescriber During Hospitalization for Immunization. - Intramuscular    insulin lispro (humaLOG) injection 0-9 Units (MAR Hold) ((MAR Hold) since 10/10/2018  9:22 AM) 0-9 Units 4 Times Daily With Meals & Nightly 10/9/2018     Sig - Route: Inject 0-9 Units under the skin into the appropriate area as directed 4 (Four) Times a Day With Meals & at Bedtime. - Subcutaneous    lactated ringers infusion 100 mL/hr Continuous 10/10/2018     Sig - Route: Infuse 100 mL/hr into a venous catheter Continuous. - Intravenous    meperidine (DEMEROL) injection 25 mg 25 mg Once 10/9/2018 10/9/2018    Sig - Route: Infuse 1 mL into a venous catheter 1 (One) Time. - Intravenous    Cosign for Ordering: Accepted by Mak Sauceda DO on 10/9/2018  3:34 PM    meperidine (DEMEROL) injection 25 mg (MAR Hold) ((MAR Hold) since 10/10/2018  9:22 AM) 25 mg Every 6 Hours PRN 10/9/2018 10/12/2018    Sig - Route: Infuse 1 mL into a venous catheter Every 6 (Six) Hours As Needed for Severe Pain . - Intravenous    metoclopramide  "(REGLAN) injection 10 mg 10 mg Once As Needed 10/10/2018     Sig - Route: Infuse 2 mL into a venous catheter 1 (One) Time As Needed for Nausea or Vomiting. - Intravenous    midazolam (VERSED) injection 1 mg 1 mg Every 5 Minutes PRN 10/10/2018     Sig - Route: Infuse 1 mL into a venous catheter Every 5 (Five) Minutes As Needed for Anxiety (Max 4mg midazolam TOTAL). - Intravenous    Linked Group 1:  \"Or\" Linked Group Details        midazolam (VERSED) injection 2 mg 2 mg Every 5 Minutes PRN 10/10/2018     Sig - Route: Infuse 2 mL into a venous catheter Every 5 (Five) Minutes As Needed for Anxiety (Max 4mg midazolam TOTAL). - Intravenous    Linked Group 1:  \"Or\" Linked Group Details        ondansetron (ZOFRAN) injection 4 mg 4 mg Once 10/9/2018 10/9/2018    Sig - Route: Infuse 2 mL into a venous catheter 1 (One) Time. - Intravenous    Cosign for Ordering: Required by [facility]NALY[facility]    ondansetron (ZOFRAN) injection 4 mg (MAR Hold) ((MAR Hold) since 10/10/2018  9:22 AM) 4 mg Every 6 Hours PRN 10/9/2018     Sig - Route: Infuse 2 mL into a venous catheter Every 6 (Six) Hours As Needed for Nausea or Vomiting. - Intravenous    oxyCODONE-acetaminophen (PERCOCET)  MG per tablet 1 tablet (MAR Hold) ((MAR Hold) since 10/10/2018  9:22 AM) 1 tablet Every 4 Hours PRN 10/9/2018     Sig - Route: Take 1 tablet by mouth Every 4 (Four) Hours As Needed for Moderate Pain . - Oral    pantoprazole (PROTONIX) EC tablet 40 mg 40 mg Every Early Morning 10/10/2018     Sig - Route: Take 1 tablet by mouth Every Morning. - Oral    Propofol (DIPRIVAN) injection 40 mg 40 mg Once 10/9/2018 10/9/2018    Sig - Route: Infuse 4 mL into a venous catheter 1 (One) Time. - Intravenous    Cosign for Ordering: Accepted by Leo Parish MD on 10/9/2018 11:27 PM    Scopolamine (TRANSDERM-SCOP) 1.5 MG/3DAYS patch 1 patch 1 patch Continuous 10/10/2018 10/11/2018    Sig - Route: Place 1 patch on the skin as directed by provider Continuous. - " Transdermal    sodium chloride 0.9 % flush 1-10 mL 1-10 mL As Needed 10/10/2018     Sig - Route: Infuse 1-10 mL into a venous catheter As Needed for Line Care. - Intravenous    sodium chloride 0.9 % flush 3 mL (MAR Hold) ((MAR Hold) since 10/10/2018  9:22 AM) 3 mL Every 12 Hours Scheduled 10/9/2018     Sig - Route: Infuse 3 mL into a venous catheter Every 12 (Twelve) Hours. - Intravenous    sodium chloride 0.9 % flush 3 mL 3 mL Every 12 Hours Scheduled 10/10/2018     Sig - Route: Infuse 3 mL into a venous catheter Every 12 (Twelve) Hours. - Intravenous    sodium chloride 0.9 % flush 3-10 mL (MAR Hold) ((MAR Hold) since 10/10/2018  9:22 AM) 3-10 mL As Needed 10/9/2018     Sig - Route: Infuse 3-10 mL into a venous catheter As Needed for Line Care. - Intravenous    sodium chloride 0.9 % infusion 50 mL/hr Continuous 10/9/2018     Sig - Route: Infuse 50 mL/hr into a venous catheter Continuous. - Intravenous    zolpidem (AMBIEN) tablet 5 mg 5 mg Once 10/10/2018 10/10/2018    Sig - Route: Take 1 tablet by mouth 1 (One) Time. - Oral    fentaNYL citrate (PF) (SUBLIMAZE) injection 75 mcg (Discontinued) 75 mcg Every 1 Hour PRN 10/9/2018 10/9/2018    Sig - Route: Infuse 1.5 mL into a venous catheter Every 1 (One) Hour As Needed for Severe Pain . - Intravenous    Cosign for Ordering: Accepted by Leo Parish MD on 10/9/2018 11:27 PM    oxyCODONE-acetaminophen (PERCOCET)  MG per tablet 1 tablet (Discontinued) 1 tablet Every 8 Hours PRN 10/9/2018 10/9/2018    Sig - Route: Take 1 tablet by mouth Every 8 (Eight) Hours As Needed for Moderate Pain . - Oral            Lab Results (last 24 hours)     Procedure Component Value Units Date/Time    POC Glucose Once [[id number]]  (Abnormal) Collected:  10/10/18 0936    Specimen:  Blood Updated:  10/10/18 0948     Glucose 193 (H) mg/dL      Comment: : [id number] Refugio Elliott ID: [id number]       POC Glucose Once [[id number]]  (Abnormal) Collected:  10/10/18 0756     Specimen:  Blood Updated:  10/10/18 0739     Glucose 208 (H) mg/dL      Comment: : [id number] [employer] NathanMeter ID: [id number]       Basic Metabolic Panel [[id number]]  (Abnormal) Collected:  10/10/18 0533    Specimen:  Blood Updated:  10/10/18 0556     Glucose 170 (H) mg/dL      BUN 8 mg/dL      Creatinine 0.67 mg/dL      Sodium 139 mmol/L      Potassium 4.0 mmol/L      Chloride 98 mmol/L      CO2 31.0 mmol/L      Calcium 8.9 mg/dL      eGFR Non African Amer 89 mL/min/1.73      BUN/Creatinine Ratio 11.9     Anion Gap 10.0 mmol/L     Narrative:       GFR Normal >60  Chronic Kidney Disease <60  Kidney Failure <15    CBC (No Diff) [[id number]]  (Abnormal) Collected:  10/10/18 0533    Specimen:  Blood Updated:  10/10/18 0543     WBC 7.69 10*3/mm3      RBC 3.52 (L) 10*6/mm3      Hemoglobin 9.7 (L) g/dL      Hematocrit 29.9 (L) %      MCV 84.9 fL      MCH 27.6 (L) pg      MCHC 32.4 (L) g/dL      RDW 14.1 %      RDW-SD 43.9 fl      MPV 10.1 fL      Platelets 275 10*3/mm3     POC Glucose Once [[id number]]  (Abnormal) Collected:  10/09/18 1948    Specimen:  Blood Updated:  10/09/18 1959     Glucose 286 (H) mg/dL      Comment: : [id number] [facility] [id number]       Hemoglobin A1c [[id number]] Collected:  10/09/18 1429    Specimen:  Blood Updated:  10/09/18 1816     Hemoglobin A1C 7.8 %     Narrative:       Less than 6.0           Non-Diabetic Range  6.0-7.0                 ADA Therapeutic Target  Greater than 7.0        Action Suggested    Comprehensive Metabolic Panel [[id number]]  (Abnormal) Collected:  10/09/18 1429    Specimen:  Blood Updated:  10/09/18 1450     Glucose 206 (H) mg/dL      BUN 8 mg/dL      Creatinine 0.68 mg/dL      Sodium 136 mmol/L      Potassium 4.3 mmol/L      Chloride 95 (L) mmol/L      CO2 29.0 mmol/L      Calcium 9.1 mg/dL      Total Protein 7.0 g/dL      Albumin 4.30 g/dL      ALT (SGPT) 36 U/L      AST (SGOT) 30 U/L      Alkaline Phosphatase 88 U/L      Total Bilirubin 0.4  mg/dL      eGFR Non African Amer 87 mL/min/1.73      Globulin 2.7 gm/dL      A/G Ratio 1.6 g/dL      BUN/Creatinine Ratio 11.8     Anion Gap 12.0 mmol/L     aPTT [[id number]]  (Abnormal) Collected:  10/09/18 1429    Specimen:  Blood Updated:  10/09/18 1447     PTT 21.9 (L) seconds     Protime-INR [[id number]]  (Normal) Collected:  10/09/18 1429    Specimen:  Blood Updated:  10/09/18 1447     Protime 12.7 Seconds      INR 0.93    CBC & Differential [[id number]] Collected:  10/09/18 1429    Specimen:  Blood Updated:  10/09/18 1438    Narrative:       The following orders were created for panel order CBC & Differential.  Procedure                               Abnormality         Status                     ---------                               -----------         ------                     CBC Auto Differential[[id number]]        Abnormal            Final result                 Please view results for these tests on the individual orders.    CBC Auto Differential [[id number]]  (Abnormal) Collected:  10/09/18 1429    Specimen:  Blood Updated:  10/09/18 1438     WBC 9.65 10*3/mm3      RBC 3.91 (L) 10*6/mm3      Hemoglobin 10.7 (L) g/dL      Hematocrit 32.9 (L) %      MCV 84.1 fL      MCH 27.4 (L) pg      MCHC 32.5 (L) g/dL      RDW 14.1 %      RDW-SD 43.2 fl      MPV 10.2 fL      Platelets 297 10*3/mm3      Neutrophil % 74.7 %      Lymphocyte % 16.2 %      Monocyte % 7.9 %      Eosinophil % 0.4 %      Basophil % 0.4 %      Immature Grans % 0.4 %      Neutrophils, Absolute 7.21 10*3/mm3      Lymphocytes, Absolute 1.56 10*3/mm3      Monocytes, Absolute 0.76 10*3/mm3      Eosinophils, Absolute 0.04 10*3/mm3      Basophils, Absolute 0.04 10*3/mm3      Immature Grans, Absolute 0.04 (H) 10*3/mm3      nRBC 0.0 /100 WBC         Imaging Results (last 24 hours)     Procedure Component Value Units Date/Time    XR Ankle 2 View Right [[id number]] Collected:  10/09/18 1631     Updated:  10/09/18 1635    Narrative:       EXAMINATION: Right  ankle 2 views 10/9/2018     HISTORY: Post reduction     FINDINGS: Two-view exam of the right ankle reveals the patient's  undergone successful closed reduction for a bimalleolar fracture  dislocation of the right ankle. There is good restoration of anatomic  alignment.       Impression:       Successful reduction for a bimalleolar fracture dislocation  of the right ankle with good restoration of anatomic alignment.  This report was finalized on 10/09/2018 16:32 by Dr. South Uriostegui MD.    XR Chest 1 View [[id number]] Collected:  10/09/18 1539     Updated:  10/09/18 1543    Narrative:       EXAMINATION: XR CHEST 1 VW- 10/9/2018 3:39 PM CDT     HISTORY: pre-op.     REPORT: Comparison is made with study from 4/9/2012.        One-view chest: Frontal view of the chest was obtained. The lungs are  clear and normally expanded. There is mild stable elevation of the right  hemidiaphragm. The cardiac and mediastinal silhouettes are within normal  limits. The visualized bony thorax and upper abdomen are unremarkable  except for evidence of previous cervical fusion surgery and multiple  surgical clips are present in the right upper abdomen..                                                                                                                                                       Impression:              No acute cardiopulmonary abnormality.                                                                      This report was finalized on [id number] by Dr. Caleb Chase MD.    XR Foot 3+ View Right [[id number]] Collected:  10/09/18 1433     Updated:  10/09/18 1437    Narrative:       EXAMINATION: Right foot 3 views 10/9/2018     HISTORY: Fall     FINDINGS: Three-view exam of the right foot demonstrates a bimalleolar  fracture dislocation of the ankle. The foot is unremarkable with no  evidence of localized soft tissue swelling or discrete fracture line.       Impression:       1.. Normal exam of the right  foot.  2. Bimalleolar fracture dislocation of the ankle.  This report was finalized on 10/09/2018 14:34 by Dr. South Uriostegui MD.    XR Ankle 3+ View Right [[id number]] Collected:  10/09/18 1530     Updated:  10/09/18 1436    Narrative:       EXAMINATION: Right ankle 3 views 10/9/2018     HISTORY: Right ankle pain after fall     FINDINGS: Three-view exam of the right ankle demonstrates diffuse soft  tissue swelling about the distal tibia and ankle with a bimalleolar  fracture/ dislocation of the ankle. The distal tibial plafond is  medially subluxed upon the talar dome. There is displacement of the  medial and lateral malleolar fracture fragments. No other fractures are  present.       Impression:       1.. Fracture dislocation of the ankle with a bimalleolar fracture. There  is medial subluxation of the distal tibia upon the talar dome.  2. Associated diffuse soft tissue swelling.  This report was finalized on 10/09/2018 14:33 by Dr. South Uriostegui MD.    XR Elbow 3+ View Left [[id number]] Collected:  10/09/18 1529     Updated:  10/09/18 1433    Narrative:       LEFT ELBOW, 2 views 10/9/2018 1:39 PM CDT     HISTORY: left elbow pain, fall     COMPARISON: None      FINDINGS:   Frontal and lateral views of the left elbow were obtained.      There is no fracture or dislocation. No significant joint space  narrowing is noted. There is no significant joint effusion.       No gross soft tissue abnormality is visualized.        Impression:       1. Unremarkable radiographs of the left elbow.        This report was finalized on 10/09/2018 14:30 by Dr. South Uriostegui MD.    CT Cervical Spine Without Contrast [[id number]] Collected:  10/09/18 1407     Updated:  10/09/18 1416    Narrative:       CT CERVICAL SPINE WO CONTRAST- 10/9/2018 1:35 PM CDT     HISTORY: fall, hit head     COMPARISON: None      DOSE LENGTH PRODUCT: 186 mGy cm. Automated exposure control was utilized  to diminish patient radiation dose.      TECHNIQUE: Serial helical tomographic images of the cervical spine were  obtained without the use of intravenous contrast. Additionally, sagittal  and coronal reformatted images were also provided for review.      FINDINGS:   Alignment: There is mild loss of normal cervical lordosis.     Bones: There is no evidence of fracture. The patient's undergone prior  fusion at the C4-C7 levels with interbody grafts. There is degenerative  disc disease at C3-C4 with narrowing of the space height and spondylosis  as well as at the C7-T1 level.     Disc spaces: There is degenerative disc disease at C3-C4 and C7-T1.     Canal and neuroforamina: Neural foraminal narrowing is noted at multiple  levels related to facet overgrowth and uncinate spurring.     Soft tissues: Unremarkable.     Lung apices: Clear. No pneumothorax.       Impression:       1. The patient's undergone prior fusion at the C4-C7 levels.  Degenerative disc disease is noted at C3-C4 and C7-T1.  2. No evidence of fracture or malalignment.  3. Bony neural foraminal encroachment at multiple levels related to  facet arthropathy and uncinate spurring.        This report was finalized on 10/09/2018 14:13 by Dr. South Uriostegui MD.    CT Head Without Contrast [[id number]] Collected:  10/09/18 1403     Updated:  10/09/18 1408    Narrative:       EXAMINATION: CT HEAD WO CONTRAST- 10/9/2018 2:03 PM CDT     HISTORY: Fall, hit head, head injury with pain     COMPARISON: None     DOSE: 601 mGy-cm     TECHNIQUE: Sequential imaging was performed from the vertex through the  base of the skull without the use of IV contrast.  Sagittal and coronal  reformations were made from the original source data and reviewed.  Automated exposure control was also utilized to decrease patient  radiation dose.     FINDINGS:   There is mild diffuse cerebral atrophy. There is no evidence of acute  intracranial hemorrhage, mass, or midline shift. There is no evidence of  acute territorial  infarct. The ventricles appear normal in  configuration. The basilar cisterns are patent. Posterior fossa appears  grossly normal. The calvarium is intact. The visualized paranasal  sinuses and mastoid air cells appear clear. Calcifications are seen in  the cavernous carotid arteries. There is a mostly empty sella  configuration.          Impression:       No acute intracranial findings.     This report was finalized on 10/09/2018 14:05 by Dr. Hector Holbrook MD.        ECG/EMG Results (last 24 hours)     Procedure Component Value Units Date/Time    ECG 12 Lead [[id number]] Collected:  10/09/18 1545     Updated:  10/09/18 1546          Orders (last 24 hrs)     Start     Ordered    10/11/18 0600  Basic Metabolic Panel  Morning Draw      10/10/18 0727    10/11/18 0600  CBC (No Diff)  Morning Draw      10/10/18 0727    10/10/18 1004  sodium chloride 0.9 % flush 3 mL  Every 12 Hours Scheduled      10/10/18 1002    10/10/18 1004  lactated ringers infusion  Continuous      10/10/18 1002    10/10/18 1004  acetaminophen (TYLENOL) tablet 1,000 mg  Once      10/10/18 1002    10/10/18 1004  Scopolamine (TRANSDERM-SCOP) 1.5 MG/3DAYS patch 1 patch  Continuous      10/10/18 1002    10/10/18 1002  metoclopramide (REGLAN) injection 10 mg  Once As Needed      10/10/18 1002    10/10/18 1000  Oxygen Therapy- Nasal Cannula; Titrate for SPO2: equal to or greater than, 90%  Continuous      10/10/18 1002    10/10/18 1000  Pulse Oximetry, Continuous  Continuous      10/10/18 1002    10/10/18 1000  Insert Peripheral IV  Once      10/10/18 1002    10/10/18 1000  Saline Lock & Maintain IV Access  Continuous      10/10/18 1002    10/10/18 1000  dexamethasone (DECADRON) injection 4 mg  Once As Needed      10/10/18 1002    10/10/18 0959  famotidine (PEPCID) injection 20 mg  60 Minutes Pre-Op      10/10/18 1002    10/10/18 0959  sodium chloride 0.9 % flush 1-10 mL  As Needed      10/10/18 1002    10/10/18 0959  buffered lidocaine syringe 0.5 mL   Once As Needed      10/10/18 1002    10/10/18 0959  midazolam (VERSED) injection 1 mg  Every 5 Minutes PRN      10/10/18 1002    10/10/18 0959  midazolam (VERSED) injection 2 mg  Every 5 Minutes PRN      10/10/18 1002    10/10/18 0956  XR Ankle 2 View Right  1 Time Imaging      10/10/18 0955    10/10/18 0956  FL C Arm During Surgery  1 Time Imaging      10/10/18 0955    10/10/18 0949  POC Glucose Once  Once      10/10/18 0948    10/10/18 0800  Incentive Spirometry  Every 2 Hours While Awake      10/10/18 0728    10/10/18 0740  POC Glucose Once  Once      10/10/18 0739    10/10/18 0730  Inpatient Case Management  Consult  Once     Provider:  (Not yet assigned)    10/10/18 0729    10/10/18 0600  Basic Metabolic Panel  Morning Draw      10/09/18 1803    10/10/18 0600  CBC (No Diff)  Morning Draw      10/09/18 1803    10/10/18 0600  [MAR Hold]  pantoprazole (PROTONIX) EC tablet 40 mg  Every Early Morning     (MAR Hold since 10/10/18 0922)    10/09/18 1803    10/10/18 0100  zolpidem (AMBIEN) tablet 5 mg  Once      10/10/18 0006    10/10/18 0001  NPO Diet  Diet Effective Midnight      10/09/18 1528    10/10/18 0000  Obtain Informed Consent  Once      10/09/18 1528    10/09/18 2300  [MAR Hold]  oxyCODONE-acetaminophen (PERCOCET)  MG per tablet 1 tablet  Every 4 Hours PRN     (MAR Hold since 10/10/18 0922)    10/09/18 2253    10/09/18 2200  POC Glucose 4x Daily AC & at Bedtime  4 Times Daily Before Meals & at Bedtime      10/09/18 1803    10/09/18 2200  Incentive Spirometry  Every 4 Hours While Awake,   Status:  Canceled      10/09/18 1803    10/09/18 2100  [MAR Hold]  sodium chloride 0.9 % flush 3 mL  Every 12 Hours Scheduled     (MAR Hold since 10/10/18 0922)    10/09/18 1803    10/09/18 2100  [MAR Hold]  insulin lispro (humaLOG) injection 0-9 Units  4 Times Daily With Meals & Nightly     (MAR Hold since 10/10/18 0922)    10/09/18 1803    10/09/18 2000  Vital Signs  Every 4 Hours      10/09/18 1803     10/09/18 2000  POC Glucose Once  Once      10/09/18 1959    10/09/18 1900  sodium chloride 0.9 % infusion  Continuous      10/09/18 1803    10/09/18 1829  [MAR Hold]  Influenza Vac Subunit Quad (FLUCELVAX) injection 0.5 mL  During Hospitalization     (MAR Hold since 10/10/18 0922)    10/09/18 1830    10/09/18 1802  Intake & Output  Every Shift      10/09/18 1803    10/09/18 1802  Weigh Patient  Once      10/09/18 1803    10/09/18 1802  Oxygen Therapy- Nasal Cannula; Titrate for SPO2: 90%  Continuous      10/09/18 1803    10/09/18 1802  Insert Peripheral IV  Once      10/09/18 1803    10/09/18 1802  Saline Lock & Maintain IV Access  Continuous,   Status:  Canceled      10/09/18 1803    10/09/18 1802  Do NOT Hold Basal Insulin When Patient is NPO, Hold Bolus Dose if NPO  Continuous      10/09/18 1803    10/09/18 1802  Follow [facility] Hypoglycemia Standing Orders For Blood Glucose Less Than 70 mg/dL  Until Discontinued      10/09/18 1803    10/09/18 1802  Hypoglycemia Treatment - Alert Patient That is Not NPO and Can Safely Swallow  Until Discontinued     Comments:  Administer 4 oz Fruit Juice OR 4 oz Regular Soda OR 8 oz Milk OR 15-30 grams (1 tube) of Glucose Gel.  Recheck Blood Glucose 15 Minutes After Ingestion, Repeat Treatment & Continue to Recheck Blood Sugar Every 15 Minutes Until Blood Glucose is 70 mg/dL or Higher.  Once Blood Glucose is 70 mg/dL or Higher and if It Will Be more than 60 Minutes Until the Next Meal, Provide Appropriate Snack (Including Carbohydrate Food) Based on Meal Plan Order. Give Meal Tray As Soon As Possible.    10/09/18 1803    10/09/18 1802  Hypoglycemia Treatment - Patient Has IV Access - Unresponsive, NPO or Unable To Safely Swallow  Until Discontinued     Comments:  Administer 25g (50ml) D50W IV Push.  Recheck Blood Glucose 15 Minutes After Administration, if Blood Glucose Remains Less Than 70 mg/dl, Repeat Treatment   Recheck Blood Glucose 15 Minutes After 2nd Administration, if Blood  Glucose Remains Less Than 70 mg/dL After 2nd Dose of D50W, Contact Provider for Further Treatment Orders & Consider Adding IVF With D5W for Maintenance    10/09/18 1803    10/09/18 1802  Hypoglycemia Treatment - Patient Without IV Access - Unresponsive, NPO or Unable To Safely Swallow  Until Discontinued     Comments:  Administer 1mg Glucagon SQ & Establish IV Access.  Turn Patient on Side - Nausea / Vomiting May Occur.  Recheck Blood Glucose 15 Minutes After Administration.  If Blood Glucose Remains Less Than 70, Administer 25g D50W IV Push (50ml).  Recheck Blood Glucose 15 Minutes After Administration of D50W, if Blood Glucose Remains Less Than 70 mg/dl, Contact Provider for Further Treatment Orders & Consider Adding IVF With D5 for Maintenance    10/09/18 1803    10/09/18 1802  Notify Provider of event. Communicate needs and document in EMR.  Once      10/09/18 1803    10/09/18 1802  Document event and patients response to treatment in EMR, any medications given to be documented on MAR.  Once      10/09/18 1803    10/09/18 1802  Place Sequential Compression Device  Once      10/09/18 1803    10/09/18 1802  Maintain Sequential Compression Device  Continuous      10/09/18 1803    10/09/18 1802  Pulse Oximetry, Continuous  Continuous,   Status:  Canceled      10/09/18 1803    10/09/18 1802  Activity - Strict Bed Rest  Until Discontinued      10/09/18 1803    10/09/18 1802  Hemoglobin A1c  Once      10/09/18 1803    10/09/18 1801  [MAR Hold]  sodium chloride 0.9 % flush 3-10 mL  As Needed     (MAR Hold since 10/10/18 0922)    10/09/18 1803    10/09/18 1801  [MAR Hold]  dextrose (GLUTOSE) oral gel 15 g  Every 15 Minutes PRN     (MAR Hold since 10/10/18 0922)    10/09/18 1803    10/09/18 1801  [MAR Hold]  dextrose (D50W) 25 g/ 50mL Intravenous Solution 25 g  Every 15 Minutes PRN     (MAR Hold since 10/10/18 0922)    10/09/18 1803    10/09/18 1801  [MAR Hold]  glucagon (human recombinant) (GLUCAGEN DIAGNOSTIC) injection  1 mg  As Needed     (MAR Hold since 10/10/18 0922)    10/09/18 1803    10/09/18 1801  [MAR Hold]  ondansetron (ZOFRAN) injection 4 mg  Every 6 Hours PRN     (MAR Hold since 10/10/18 0922)    10/09/18 1803    10/09/18 1801  oxyCODONE-acetaminophen (PERCOCET)  MG per tablet 1 tablet  Every 8 Hours PRN,   Status:  Discontinued      10/09/18 1803    10/09/18 1801  [MAR Hold]  meperidine (DEMEROL) injection 25 mg  Every 6 Hours PRN     (MAR Hold since 10/10/18 0922)    10/09/18 1803    10/09/18 1758  Splint Cast Strapping  Once     Comments:  This order was created via procedure documentation    10/09/18 1757    10/09/18 1646  Code Status and Medical Interventions:  Continuous      10/09/18 1648    10/09/18 1623  Inpatient Admission  Once      10/09/18 1623    10/09/18 1617  Procedural Sedation  Once     Comments:  This order was created via procedure documentation    10/09/18 1616    10/09/18 1616  FX Dislocation Initial  Once     Comments:  This order was created via procedure documentation    10/09/18 1615    10/09/18 1610  XR Ankle 2 View Right  1 Time Imaging      10/09/18 1545    10/09/18 1559  ondansetron (ZOFRAN) injection 4 mg  Once      10/09/18 1557    10/09/18 1551  Propofol (DIPRIVAN) injection 40 mg  Once      10/09/18 1549    10/09/18 1546  XR Ankle 3+ View Right  1 Time Imaging,   Status:  Canceled      10/09/18 1545    10/09/18 1546  Obtain & Apply The Following-  Once     Comments:  Posterior splint after reduction    10/09/18 1545    10/09/18 1543  fentaNYL citrate (PF) (SUBLIMAZE) injection 75 mcg  Every 1 Hour PRN,   Status:  Discontinued      10/09/18 1545    10/09/18 1529  Diet Regular  Diet Effective Now,   Status:  Canceled      10/09/18 1528    10/09/18 1500  XR Chest 1 View  1 Time Imaging      10/09/18 1459    10/09/18 1500  Type & Screen  STAT      10/09/18 1459    10/09/18 1459  ECG 12 Lead  Once      10/09/18 1459    10/09/18 1436  Obtain & Apply The Following-  Once     Comments:   Arm sling    10/09/18 1435    10/09/18 1423  meperidine (DEMEROL) injection 25 mg  Once      10/09/18 1421    10/09/18 1334  Doppler pulse  Once      10/09/18 1333    10/09/18 1312  Apply collar  Once      10/09/18 1312    10/09/18 1312  CT Head Without Contrast  1 Time Imaging      10/09/18 1312    10/09/18 1312  CT Cervical Spine Without Contrast  1 Time Imaging      10/09/18 1312    10/09/18 1312  XR Ankle 3+ View Right  1 Time Imaging      10/09/18 1312    10/09/18 1312  XR Foot 3+ View Right  1 Time Imaging      10/09/18 1312    10/09/18 1312  XR Elbow 3+ View Left  1 Time Imaging      10/09/18 1312    10/09/18 1311  CBC & Differential  Once      10/09/18 1312    10/09/18 1311  Comprehensive Metabolic Panel  Once      10/09/18 1312    10/09/18 1311  Protime-INR  Once      10/09/18 1312    10/09/18 1311  aPTT  Once      10/09/18 1312    10/09/18 1311  CBC Auto Differential  PROCEDURE ONCE      10/09/18 1312    Unscheduled  Vital Signs - Per Anesthesia Protocol  As Needed      10/10/18 1002    --  amitriptyline (ELAVIL) 10 MG tablet  Nightly      10/09/18 1829    --  aspirin 81 MG chewable tablet  Daily      10/09/18 1829    --  celecoxib (CeleBREX) 200 MG capsule  Daily With Breakfast      10/09/18 1829    --  clonazePAM (KlonoPIN) 0.5 MG tablet  2 Times Daily PRN      10/09/18 1829    --  colestipol (COLESTID) 1 g tablet  2 Times Daily      10/09/18 1829    --  escitalopram (LEXAPRO) 10 MG tablet  Daily      10/09/18 1829    --  esomeprazole (nexIUM) 20 MG capsule  Every Morning Before Breakfast      10/09/18 1829    --  gabapentin (NEURONTIN) 100 MG capsule  3 Times Daily      10/09/18 1829    --  glimepiride (AMARYL) 2 MG tablet  Every Morning Before Breakfast      10/09/18 1829    --  lisinopril (PRINIVIL,ZESTRIL) 10 MG tablet  Daily With Breakfast      10/09/18 1829    --  metFORMIN (GLUCOPHAGE) 500 MG tablet  2 Times Daily With Meals      10/09/18 1829    --  metFORMIN ER (GLUCOPHAGE-XR) 500 MG 24 hr  tablet  Every 12 Hours      10/09/18 1829    --  oxyCODONE-acetaminophen (PERCOCET)  MG per tablet  Every 8 Hours PRN      10/09/18 1829    --  pregabalin (LYRICA) 50 MG capsule  3 Times Daily      10/09/18 1829    --  rOPINIRole (REQUIP) 0.5 MG tablet  2 Times Daily      10/09/18 1829    --  simvastatin (ZOCOR) 20 MG tablet  Nightly      10/09/18 1829    --  zolpidem (AMBIEN) 10 MG tablet  Nightly PRN      10/09/18 1829    Signed and Held  Vital Signs Every 5 Minutes for 15 Minutes, Every 15 Minutes Thereafter.  Continuous      Signed and Held    Signed and Held  Apply Warming [place]  As Needed     Comments:  For a recorded temp of  <36.9 C, Recovery Only    Signed and Held    Signed and Held  Call Anesthesiologist For Additional IV Fluid Bolus For Hypotension / Tachycardia  Continuous      Signed and Held    Signed and Held  Notify Anesthesia of Any Acute Changes in Patient Condition  Until Discontinued      Signed and Held    Signed and Held  Notify Anesthesia for Unrelieved Pain  Until Discontinued      Signed and Held    Signed and Held  oxyCODONE-acetaminophen (PERCOCET) 7.5-325 MG per tablet 2 tablet  Once As Needed      Signed and Held    Signed and Held  ibuprofen (ADVIL,MOTRIN) tablet 600 mg  Once As Needed      Signed and Held    Signed and Held  oxyCODONE-acetaminophen (PERCOCET)  MG per tablet 1 tablet  Once As Needed      Signed and Held    Signed and Held  fentaNYL citrate (PF) (SUBLIMAZE) injection 25 mcg  As Needed      Signed and Held    Signed and Held  labetalol (NORMODYNE,TRANDATE) injection 5 mg  Every 5 Minutes PRN      Signed and Held    Signed and Held  atropine sulfate injection 0.5 mg  Once As Needed      Signed and Held    Signed and Held  ipratropium-albuterol (DUO-NEB) nebulizer solution 3 mL  Once As Needed      Signed and Held    Signed and Held  naloxone (NARCAN) injection 0.4 mg  As Needed      Signed and Held    Signed and Held  ondansetron (ZOFRAN) injection 4 mg   Once As Needed      Signed and Held    Signed and Held  metoclopramide (REGLAN) injection 5 mg  Every 5 Minutes PRN      Signed and Held    Signed and Held  meperidine (DEMEROL) injection 12.5 mg  Every 5 Minutes PRN      Signed and Held    Signed and Held  Once Discharge Criteria to Floor Met, Follow Surgeon Orders  Continuous      Signed and Held    Signed and Held  Discharge Patient From PACU When Discharge Criteria Met  Continuous      Signed and Held          Operative/Procedure Notes (last 24 hours) (Notes from 10/9/2018 10:09 AM through 10/10/2018 10:09 AM)     No notes of this type exist for this encounter.           Physician Progress Notes (last 24 hours) (Notes from 10/9/2018 10:10 AM through 10/10/2018 10:10 AM)      Brii Ring APRN at 10/10/2018  6:53 AM              [facility] Medicine Services  INPATIENT PROGRESS NOTE    Length of Stay: 1  Date of Admission: 10/9/2018  Primary Care Physician: Balaji Cornelius MD    Subjective   Chief Complaint: Follow-up right ankle pain  HPI   The patient is resting in bed.  She did not did not sleep well last night, her pain was not very well controlled.  She denies any shortness of breath or chest pain.  She denies any abdominal pain, nausea, vomiting, or diarrhea.  She is slightly anxious regarding surgery today.  She tells me that she feels that her main issue in relation to her fall is her neuropathy.  She does take gabapentin, and Lyrica was also added to her regimen approximately 7-10 days ago by her primary care provider.  She has not noticed any improvement or difference in her neuropathy since starting this medication.  She has been referred to a neurologist to have an MRI of her brain as well as nerve conduction studies, and was supposed to have those completed later this week.    Review of Systems   All pertinent negatives and positives are as above. All other systems have been reviewed and are negative unless  otherwise stated.     Objective    Temp:  [98.2 °F (36.8 °C)-98.5 °F (36.9 °C)] 98.2 °F (36.8 °C)  Heart Rate:  [81-96] 86  Resp:  [12-22] 18  BP: (110-150)/() 125/78  Physical Exam   Constitutional: She is oriented to person, place, and time. She appears well-developed and well-nourished. No distress.   HENT:   Head: Normocephalic and atraumatic.   Neck: Normal range of motion. Neck supple. No JVD present. No tracheal deviation present. No thyromegaly present.   Cardiovascular: Normal rate, regular rhythm, normal heart sounds and intact distal pulses.  Exam reveals no gallop and no friction rub.    No murmur heard.  Pulmonary/Chest: Effort normal and breath sounds normal. She has no wheezes. She has no rales.   Abdominal: Soft. Bowel sounds are normal. She exhibits no distension. There is no tenderness. There is no guarding.   Musculoskeletal: She exhibits tenderness (Right ankle). She exhibits no edema.   Lymphadenopathy:     She has no cervical adenopathy.   Neurological: She is alert and oriented to person, place, and time.   Peripheral neuropathy to bilateral feet and hands   Skin: Skin is warm and dry. No rash noted. No erythema.   Splint to right ankle   Psychiatric: She has a normal mood and affect. Her behavior is normal. Judgment and thought content normal.   Vitals reviewed.    Results Review:  I have reviewed the labs, radiology results, and diagnostic studies.    Laboratory Data:     Results from last 7 days  Lab Units 10/10/18  0533 10/09/18  1429   WBC 10*3/mm3 7.69 9.65   HEMOGLOBIN g/dL 9.7* 10.7*   HEMATOCRIT % 29.9* 32.9*   PLATELETS 10*3/mm3 275 297       Results from last 7 days  Lab Units 10/10/18  0533 10/09/18  1429   SODIUM mmol/L 139 136   POTASSIUM mmol/L 4.0 4.3   CHLORIDE mmol/L 98 95*   CO2 mmol/L 31.0 29.0   BUN mg/dL 8 8   CREATININE mg/dL 0.67 0.68   CALCIUM mg/dL 8.9 9.1   BILIRUBIN mg/dL  --  0.4   ALK PHOS U/L  --  88   ALT (SGPT) U/L  --  36   AST (SGOT) U/L  --  30    GLUCOSE mg/dL 170* 206*     Radiology Data:   Imaging Results (last 24 hours)     Procedure Component Value Units Date/Time    XR Ankle 2 View Right [[id number]] Collected:  10/09/18 1631     Updated:  10/09/18 1635    Narrative:       EXAMINATION: Right ankle 2 views 10/9/2018     HISTORY: Post reduction     FINDINGS: Two-view exam of the right ankle reveals the patient's  undergone successful closed reduction for a bimalleolar fracture  dislocation of the right ankle. There is good restoration of anatomic  alignment.       Impression:       Successful reduction for a bimalleolar fracture dislocation  of the right ankle with good restoration of anatomic alignment.  This report was finalized on 10/09/2018 16:32 by Dr. South Uriostegui MD.    XR Chest 1 View [[id number]] Collected:  10/09/18 1539     Updated:  10/09/18 1543    Narrative:       EXAMINATION: XR CHEST 1 VW- 10/9/2018 3:39 PM CDT     HISTORY: pre-op.     REPORT: Comparison is made with study from 4/9/2012.        One-view chest: Frontal view of the chest was obtained. The lungs are  clear and normally expanded. There is mild stable elevation of the right  hemidiaphragm. The cardiac and mediastinal silhouettes are within normal  limits. The visualized bony thorax and upper abdomen are unremarkable  except for evidence of previous cervical fusion surgery and multiple  surgical clips are present in the right upper abdomen..                                                                                                                                                       Impression:              No acute cardiopulmonary abnormality.                                                                      This report was finalized on [id number] by Dr. Caleb Chase MD.    XR Foot 3+ View Right [[id number]] Collected:  10/09/18 1433     Updated:  10/09/18 1437    Narrative:       EXAMINATION: Right foot 3 views 10/9/2018     HISTORY: Fall     FINDINGS:  Three-view exam of the right foot demonstrates a bimalleolar  fracture dislocation of the ankle. The foot is unremarkable with no  evidence of localized soft tissue swelling or discrete fracture line.       Impression:       1.. Normal exam of the right foot.  2. Bimalleolar fracture dislocation of the ankle.  This report was finalized on 10/09/2018 14:34 by Dr. South Uriostegui MD.    XR Ankle 3+ View Right [[id number]] Collected:  10/09/18 1530     Updated:  10/09/18 1436    Narrative:       EXAMINATION: Right ankle 3 views 10/9/2018     HISTORY: Right ankle pain after fall     FINDINGS: Three-view exam of the right ankle demonstrates diffuse soft  tissue swelling about the distal tibia and ankle with a bimalleolar  fracture/ dislocation of the ankle. The distal tibial plafond is  medially subluxed upon the talar dome. There is displacement of the  medial and lateral malleolar fracture fragments. No other fractures are  present.       Impression:       1.. Fracture dislocation of the ankle with a bimalleolar fracture. There  is medial subluxation of the distal tibia upon the talar dome.  2. Associated diffuse soft tissue swelling.  This report was finalized on 10/09/2018 14:33 by Dr. South Uriostegui MD.    XR Elbow 3+ View Left [[id number]] Collected:  10/09/18 1529     Updated:  10/09/18 1433    Narrative:       LEFT ELBOW, 2 views 10/9/2018 1:39 PM CDT     HISTORY: left elbow pain, fall     COMPARISON: None      FINDINGS:   Frontal and lateral views of the left elbow were obtained.      There is no fracture or dislocation. No significant joint space  narrowing is noted. There is no significant joint effusion.       No gross soft tissue abnormality is visualized.        Impression:       1. Unremarkable radiographs of the left elbow.        This report was finalized on 10/09/2018 14:30 by Dr. South Uriostegui MD.    CT Cervical Spine Without Contrast [[id number]] Collected:  10/09/18 1407     Updated:  10/09/18  1416    Narrative:       CT CERVICAL SPINE WO CONTRAST- 10/9/2018 1:35 PM CDT     HISTORY: fall, hit head     COMPARISON: None      DOSE LENGTH PRODUCT: 186 mGy cm. Automated exposure control was utilized  to diminish patient radiation dose.     TECHNIQUE: Serial helical tomographic images of the cervical spine were  obtained without the use of intravenous contrast. Additionally, sagittal  and coronal reformatted images were also provided for review.      FINDINGS:   Alignment: There is mild loss of normal cervical lordosis.     Bones: There is no evidence of fracture. The patient's undergone prior  fusion at the C4-C7 levels with interbody grafts. There is degenerative  disc disease at C3-C4 with narrowing of the space height and spondylosis  as well as at the C7-T1 level.     Disc spaces: There is degenerative disc disease at C3-C4 and C7-T1.     Canal and neuroforamina: Neural foraminal narrowing is noted at multiple  levels related to facet overgrowth and uncinate spurring.     Soft tissues: Unremarkable.     Lung apices: Clear. No pneumothorax.       Impression:       1. The patient's undergone prior fusion at the C4-C7 levels.  Degenerative disc disease is noted at C3-C4 and C7-T1.  2. No evidence of fracture or malalignment.  3. Bony neural foraminal encroachment at multiple levels related to  facet arthropathy and uncinate spurring.        This report was finalized on 10/09/2018 14:13 by Dr. South Uriostegui MD.    CT Head Without Contrast [[id number]] Collected:  10/09/18 1403     Updated:  10/09/18 1408    Narrative:       EXAMINATION: CT HEAD WO CONTRAST- 10/9/2018 2:03 PM CDT     HISTORY: Fall, hit head, head injury with pain     COMPARISON: None     DOSE: 601 mGy-cm     TECHNIQUE: Sequential imaging was performed from the vertex through the  base of the skull without the use of IV contrast.  Sagittal and coronal  reformations were made from the original source data and reviewed.  Automated exposure  control was also utilized to decrease patient  radiation dose.     FINDINGS:   There is mild diffuse cerebral atrophy. There is no evidence of acute  intracranial hemorrhage, mass, or midline shift. There is no evidence of  acute territorial infarct. The ventricles appear normal in  configuration. The basilar cisterns are patent. Posterior fossa appears  grossly normal. The calvarium is intact. The visualized paranasal  sinuses and mastoid air cells appear clear. Calcifications are seen in  the cavernous carotid arteries. There is a mostly empty sella  configuration.          Impression:       No acute intracranial findings.     This report was finalized on 10/09/2018 14:05 by Dr. Hector Holbrook MD.        I have reviewed the patient current medications.     Assessment/Plan   Assessment:  1.  Acute Bimalleolar fracture dislocation of the right ankle  2.  Fall  3.  Non-insulin-dependent diabetes mellitus type II with hyperglycemia, hemoglobin A1c 7.8%  4.  Polyneuropathy likely secondary to diabetes mellitus  5.  Essential hypertension  6.  Hyperlipidemia  7.  Normocytic anemia    Plan:  1.  Appreciate orthopedic assistance, plans for ORIF today per Dr. Bart Oglesby. Will need PT/OT following surgery  2.   for discharge disposition- will depend on progress with therapy, weight bearing status, etc.  3.  Will resume home medications as appropriate following surgery today  4.  Last blood glucoses- 286, 170. Continue sliding scale insulin for now and will resume oral medications following surgery.  5.  Will likely increase Gabapentin and discontinue Lyrica following surgery  6.  BMP, CBC without differential in AM, monitor hgb/hct closely following surgery    Discharge Planning: I expect the patient to be discharged to ? in ? days.    ARNOL Garcia   10/10/18   6:53 AM      Electronically signed by Brii Ring APRN at 10/10/2018  7:29 AM          Consult Notes (last 24 hours) (Notes  from 10/9/2018 10:10 AM through 10/10/2018 10:10 AM)      Mary Bills PA at 10/9/2018  3:28 PM     Attestation signed by Bart Oglesby MD at 10/10/2018  7:00 AM    I have reviewed the documentation above and agree.                      Consult  [facility] [place] [facility]      America Garcia (1955)  10/9/2018    Reason for Consult: Right ankle pain  Requesting Physician: No ref. provider found      CHIEF COMPLAINT:  Right ankle pain    History Obtained From: patient chart     HISTORY OF PRESENT ILLNESS:                The patient is a 63 y.o. female who presents with above chief complaint. The patient got out of the bed when she first woke up this morning and her legs gave out causing her to fall.  She twisted her right ankle.  She immediately had right ankle pain and was unable to ambulate.  She was brought to [facility] ER and was found to have a fracture of the right ankle.  She continues with severe right ankle pain.      Orthopedics was consulted on this patient.    Past Medical History:    Past Medical History:   Diagnosis Date   • Diabetes mellitus (CMS/HCC)    • Hyperlipidemia    • Hypertension        Past Surgical History:    Past Surgical History:   Procedure Laterality Date   • APPENDECTOMY     • CHOLECYSTECTOMY     • HYSTERECTOMY     • NECK SURGERY      x3       Current Medications:   No current facility-administered medications for this encounter.   No current outpatient prescriptions on file.    Allergies:  Codeine and Morphine    Social History:   Social History     Social History   • Marital status:      Social History Main Topics   • Smoking status: Never Smoker   • Smokeless tobacco: Never Used   • Alcohol use No   • Drug use: No   • Sexual activity: Defer     Other Topics Concern   • Not on file       Family History:   Family History   Problem Relation Age of Onset   • Heart disease Mother    • Heart disease Father    • Leukemia Father         REVIEW OF SYSTEMS:    All systems were reviewed and negative except for:  Musculoskeletal: positive for  bone pain, joint pain, joint stiffness, joint swelling and See HPI    PHYSICAL EXAM:        General Appearance:    Alert, cooperative, in no acute distress   Head:    Normocephalic, without obvious abnormality, atraumatic   Eyes:            Vision intact, EOM intact     Ears:    Ears appear intact with no abnormalities noted   Neck:   No adenopathy, supple, trachea midline, no thyromegaly   Back:     No kyphosis present, no scoliosis present, no skin lesions,      erythema or scars, no tenderness to palpation,   range of motion normal   Lungs:     Clear, respirations regular, even and unlabored    Heart:    Regular rhythm and normal rate, no edema   Chest Wall:    No abnormalities observed   Abdomen:     Normal bowel sounds, no masses, no organomegaly, soft        non-tender, non-distended, no guarding   Rectal:     Deferred   Extremities:   Severe tenderness, swelling, deformity consistent with fracture dislocation right ankle.  Bruising noted right ankle and pain with any ROM of ankle.  NV intact right lower extremity.  Otherwise Moves all extremities well, no edema, no cyanosis, no redness   Pulses:   Pulses palpable and equal bilaterally   Skin:   No bleeding, bruising or rash   Lymph nodes:   No palpable adenopathy   Neurologic:   Cranial nerves 2 - 12 grossly intact, alert and oriented times 3.         DATA:    Lab Results (last 24 hours)     Procedure Component Value Units Date/Time    Comprehensive Metabolic Panel [[id number]]  (Abnormal) Collected:  10/09/18 1429    Specimen:  Blood Updated:  10/09/18 1450     Glucose 206 (H) mg/dL      BUN 8 mg/dL      Creatinine 0.68 mg/dL      Sodium 136 mmol/L      Potassium 4.3 mmol/L      Chloride 95 (L) mmol/L      CO2 29.0 mmol/L      Calcium 9.1 mg/dL      Total Protein 7.0 g/dL      Albumin 4.30 g/dL      ALT (SGPT) 36 U/L      AST (SGOT) 30 U/L       Alkaline Phosphatase 88 U/L      Total Bilirubin 0.4 mg/dL      eGFR Non African Amer 87 mL/min/1.73      Globulin 2.7 gm/dL      A/G Ratio 1.6 g/dL      BUN/Creatinine Ratio 11.8     Anion Gap 12.0 mmol/L     aPTT [[id number]]  (Abnormal) Collected:  10/09/18 1429    Specimen:  Blood Updated:  10/09/18 1447     PTT 21.9 (L) seconds     Protime-INR [[id number]]  (Normal) Collected:  10/09/18 1429    Specimen:  Blood Updated:  10/09/18 1447     Protime 12.7 Seconds      INR 0.93    CBC & Differential [[id number]] Collected:  10/09/18 1429    Specimen:  Blood Updated:  10/09/18 1438    Narrative:       The following orders were created for panel order CBC & Differential.  Procedure                               Abnormality         Status                     ---------                               -----------         ------                     CBC Auto Differential[[id number]]        Abnormal            Final result                 Please view results for these tests on the individual orders.    CBC Auto Differential [[id number]]  (Abnormal) Collected:  10/09/18 1429    Specimen:  Blood Updated:  10/09/18 1438     WBC 9.65 10*3/mm3      RBC 3.91 (L) 10*6/mm3      Hemoglobin 10.7 (L) g/dL      Hematocrit 32.9 (L) %      MCV 84.1 fL      MCH 27.4 (L) pg      MCHC 32.5 (L) g/dL      RDW 14.1 %      RDW-SD 43.2 fl      MPV 10.2 fL      Platelets 297 10*3/mm3      Neutrophil % 74.7 %      Lymphocyte % 16.2 %      Monocyte % 7.9 %      Eosinophil % 0.4 %      Basophil % 0.4 %      Immature Grans % 0.4 %      Neutrophils, Absolute 7.21 10*3/mm3      Lymphocytes, Absolute 1.56 10*3/mm3      Monocytes, Absolute 0.76 10*3/mm3      Eosinophils, Absolute 0.04 10*3/mm3      Basophils, Absolute 0.04 10*3/mm3      Immature Grans, Absolute 0.04 (H) 10*3/mm3      nRBC 0.0 /100 WBC           Radiology:   Imaging Results (last 7 days)     Procedure Component Value Units Date/Time    XR Foot 3+ View Right [[id number]] Collected:  10/09/18 1438      Updated:  10/09/18 1437    Narrative:       EXAMINATION: Right foot 3 views 10/9/2018     HISTORY: Fall     FINDINGS: Three-view exam of the right foot demonstrates a bimalleolar  fracture dislocation of the ankle. The foot is unremarkable with no  evidence of localized soft tissue swelling or discrete fracture line.       Impression:       1.. Normal exam of the right foot.  2. Bimalleolar fracture dislocation of the ankle.  This report was finalized on 10/09/2018 14:34 by Dr. South Uriostegui MD.    XR Ankle 3+ View Right [[id number]] Collected:  10/09/18 1530     Updated:  10/09/18 1436    Narrative:       EXAMINATION: Right ankle 3 views 10/9/2018     HISTORY: Right ankle pain after fall     FINDINGS: Three-view exam of the right ankle demonstrates diffuse soft  tissue swelling about the distal tibia and ankle with a bimalleolar  fracture/ dislocation of the ankle. The distal tibial plafond is  medially subluxed upon the talar dome. There is displacement of the  medial and lateral malleolar fracture fragments. No other fractures are  present.       Impression:       1.. Fracture dislocation of the ankle with a bimalleolar fracture. There  is medial subluxation of the distal tibia upon the talar dome.  2. Associated diffuse soft tissue swelling.  This report was finalized on 10/09/2018 14:33 by Dr. South Uriostegui MD.    XR Elbow 3+ View Left [[id number]] Collected:  10/09/18 1529     Updated:  10/09/18 1433    Narrative:       LEFT ELBOW, 2 views 10/9/2018 1:39 PM CDT     HISTORY: left elbow pain, fall     COMPARISON: None      FINDINGS:   Frontal and lateral views of the left elbow were obtained.      There is no fracture or dislocation. No significant joint space  narrowing is noted. There is no significant joint effusion.       No gross soft tissue abnormality is visualized.        Impression:       1. Unremarkable radiographs of the left elbow.        This report was finalized on 10/09/2018 14:30 by   South Uriostegui MD.    CT Cervical Spine Without Contrast [[id number]] Collected:  10/09/18 1407     Updated:  10/09/18 1416    Narrative:       CT CERVICAL SPINE WO CONTRAST- 10/9/2018 1:35 PM CDT     HISTORY: fall, hit head     COMPARISON: None      DOSE LENGTH PRODUCT: 186 mGy cm. Automated exposure control was utilized  to diminish patient radiation dose.     TECHNIQUE: Serial helical tomographic images of the cervical spine were  obtained without the use of intravenous contrast. Additionally, sagittal  and coronal reformatted images were also provided for review.      FINDINGS:   Alignment: There is mild loss of normal cervical lordosis.     Bones: There is no evidence of fracture. The patient's undergone prior  fusion at the C4-C7 levels with interbody grafts. There is degenerative  disc disease at C3-C4 with narrowing of the space height and spondylosis  as well as at the C7-T1 level.     Disc spaces: There is degenerative disc disease at C3-C4 and C7-T1.     Canal and neuroforamina: Neural foraminal narrowing is noted at multiple  levels related to facet overgrowth and uncinate spurring.     Soft tissues: Unremarkable.     Lung apices: Clear. No pneumothorax.       Impression:       1. The patient's undergone prior fusion at the C4-C7 levels.  Degenerative disc disease is noted at C3-C4 and C7-T1.  2. No evidence of fracture or malalignment.  3. Bony neural foraminal encroachment at multiple levels related to  facet arthropathy and uncinate spurring.        This report was finalized on 10/09/2018 14:13 by Dr. South Uriostegui MD.    CT Head Without Contrast [[id number]] Collected:  10/09/18 1403     Updated:  10/09/18 1408    Narrative:       EXAMINATION: CT HEAD WO CONTRAST- 10/9/2018 2:03 PM CDT     HISTORY: Fall, hit head, head injury with pain     COMPARISON: None     DOSE: 601 mGy-cm     TECHNIQUE: Sequential imaging was performed from the vertex through the  base of the skull without the use of IV  contrast.  Sagittal and coronal  reformations were made from the original source data and reviewed.  Automated exposure control was also utilized to decrease patient  radiation dose.     FINDINGS:   There is mild diffuse cerebral atrophy. There is no evidence of acute  intracranial hemorrhage, mass, or midline shift. There is no evidence of  acute territorial infarct. The ventricles appear normal in  configuration. The basilar cisterns are patent. Posterior fossa appears  grossly normal. The calvarium is intact. The visualized paranasal  sinuses and mastoid air cells appear clear. Calcifications are seen in  the cavernous carotid arteries. There is a mostly empty sella  configuration.          Impression:       No acute intracranial findings.     This report was finalized on 10/09/2018 14:05 by Dr. Hector Holbrook MD.          Imaging was brought up and reviewed and I agree with the radiology findings.    IMPRESSION/RECOMMENDATIONS:      Closed bimalleolar fracture of right ankle    Closed fracture dislocation of right ankle      Assessment: bimalleolar fracture with a dislocation of the right ankle    Plan:  1) The patients dislocation is going to be reduced in the Emergency Department.  Our plan is for open reduction internal fixation of the right ankle tomorrow.  The risks and benefits of the procedure were discussed with the patient and she would like to proceed.        Electronically signed by ZBIGNIEW López3:35 PM10/9/2018              Electronically signed by Bart Oglesby MD at 10/10/2018  7:00 AM

## 2018-10-10 NOTE — PLAN OF CARE
Problem: Patient Care Overview  Goal: Plan of Care Review  Outcome: Ongoing (interventions implemented as appropriate)   10/10/18 1602   Coping/Psychosocial   Plan of Care Reviewed With patient;family   Plan of Care Review   Progress improving   OTHER   Outcome Summary VSS. Moderate pain in right ankle throughout shift. ORIF performed near start of shift. Cap refill <3seconds. Patient has no complaints of numbness or tingling. Patient home medications being sorted out by pharmacy. Sugar levels slightly elevated.      Goal: Individualization and Mutuality  Outcome: Ongoing (interventions implemented as appropriate)    Goal: Discharge Needs Assessment  Outcome: Ongoing (interventions implemented as appropriate)    Goal: Interprofessional Rounds/Family Conf  Outcome: Ongoing (interventions implemented as appropriate)      Problem: Fall Risk (Adult)  Goal: Absence of Fall  Outcome: Ongoing (interventions implemented as appropriate)      Problem: Fracture Orthopaedic (Adult)  Goal: Signs and Symptoms of Listed Potential Problems Will be Absent, Minimized or Managed (Fracture Orthopaedic)  Outcome: Ongoing (interventions implemented as appropriate)

## 2018-10-11 LAB
ANION GAP SERPL CALCULATED.3IONS-SCNC: 10 MMOL/L (ref 4–13)
BUN BLD-MCNC: 8 MG/DL (ref 5–21)
BUN/CREAT SERPL: 11.4 (ref 7–25)
CALCIUM SPEC-SCNC: 9.3 MG/DL (ref 8.4–10.4)
CHLORIDE SERPL-SCNC: 99 MMOL/L (ref 98–110)
CO2 SERPL-SCNC: 29 MMOL/L (ref 24–31)
CREAT BLD-MCNC: 0.7 MG/DL (ref 0.5–1.4)
DEPRECATED RDW RBC AUTO: 44.7 FL (ref 40–54)
ERYTHROCYTE [DISTWIDTH] IN BLOOD BY AUTOMATED COUNT: 14.4 % (ref 12–15)
GFR SERPL CREATININE-BSD FRML MDRD: 84 ML/MIN/1.73
GLUCOSE BLD-MCNC: 212 MG/DL (ref 70–100)
GLUCOSE BLDC GLUCOMTR-MCNC: 139 MG/DL (ref 70–130)
GLUCOSE BLDC GLUCOMTR-MCNC: 161 MG/DL (ref 70–130)
GLUCOSE BLDC GLUCOMTR-MCNC: 165 MG/DL (ref 70–130)
GLUCOSE BLDC GLUCOMTR-MCNC: 188 MG/DL (ref 70–130)
HCT VFR BLD AUTO: 28.7 % (ref 37–47)
HGB BLD-MCNC: 9.3 G/DL (ref 12–16)
MCH RBC QN AUTO: 27.7 PG (ref 28–32)
MCHC RBC AUTO-ENTMCNC: 32.4 G/DL (ref 33–36)
MCV RBC AUTO: 85.4 FL (ref 82–98)
PLATELET # BLD AUTO: 266 10*3/MM3 (ref 130–400)
PMV BLD AUTO: 10.8 FL (ref 6–12)
POTASSIUM BLD-SCNC: 3.9 MMOL/L (ref 3.5–5.3)
RBC # BLD AUTO: 3.36 10*6/MM3 (ref 4.2–5.4)
SODIUM BLD-SCNC: 138 MMOL/L (ref 135–145)
WBC NRBC COR # BLD: 10.03 10*3/MM3 (ref 4.8–10.8)

## 2018-10-11 PROCEDURE — 63710000001 INSULIN LISPRO (HUMAN) PER 5 UNITS: Performed by: NURSE PRACTITIONER

## 2018-10-11 PROCEDURE — G8978 MOBILITY CURRENT STATUS: HCPCS | Performed by: PHYSICAL THERAPIST

## 2018-10-11 PROCEDURE — 25010000002 CEFAZOLIN PER 500 MG: Performed by: ORTHOPAEDIC SURGERY

## 2018-10-11 PROCEDURE — G8979 MOBILITY GOAL STATUS: HCPCS | Performed by: PHYSICAL THERAPIST

## 2018-10-11 PROCEDURE — 94760 N-INVAS EAR/PLS OXIMETRY 1: CPT

## 2018-10-11 PROCEDURE — 94799 UNLISTED PULMONARY SVC/PX: CPT

## 2018-10-11 PROCEDURE — G8988 SELF CARE GOAL STATUS: HCPCS

## 2018-10-11 PROCEDURE — 97116 GAIT TRAINING THERAPY: CPT

## 2018-10-11 PROCEDURE — G8987 SELF CARE CURRENT STATUS: HCPCS

## 2018-10-11 PROCEDURE — 97166 OT EVAL MOD COMPLEX 45 MIN: CPT

## 2018-10-11 PROCEDURE — 80048 BASIC METABOLIC PNL TOTAL CA: CPT | Performed by: NURSE PRACTITIONER

## 2018-10-11 PROCEDURE — 82962 GLUCOSE BLOOD TEST: CPT

## 2018-10-11 PROCEDURE — 85027 COMPLETE CBC AUTOMATED: CPT | Performed by: NURSE PRACTITIONER

## 2018-10-11 PROCEDURE — 97530 THERAPEUTIC ACTIVITIES: CPT

## 2018-10-11 PROCEDURE — 97162 PT EVAL MOD COMPLEX 30 MIN: CPT

## 2018-10-11 PROCEDURE — 97110 THERAPEUTIC EXERCISES: CPT

## 2018-10-11 RX ORDER — DOCUSATE SODIUM 100 MG/1
100 CAPSULE, LIQUID FILLED ORAL 2 TIMES DAILY
Status: DISCONTINUED | OUTPATIENT
Start: 2018-10-11 | End: 2018-10-12 | Stop reason: HOSPADM

## 2018-10-11 RX ADMIN — PANTOPRAZOLE SODIUM 40 MG: 40 TABLET, DELAYED RELEASE ORAL at 04:56

## 2018-10-11 RX ADMIN — DOCUSATE SODIUM 100 MG: 100 CAPSULE ORAL at 20:46

## 2018-10-11 RX ADMIN — ASPIRIN 81 MG 81 MG: 81 TABLET ORAL at 08:38

## 2018-10-11 RX ADMIN — INSULIN LISPRO 2 UNITS: 100 INJECTION, SOLUTION INTRAVENOUS; SUBCUTANEOUS at 20:44

## 2018-10-11 RX ADMIN — METFORMIN HYDROCHLORIDE 1000 MG: 500 TABLET ORAL at 17:41

## 2018-10-11 RX ADMIN — INSULIN LISPRO 2 UNITS: 100 INJECTION, SOLUTION INTRAVENOUS; SUBCUTANEOUS at 08:38

## 2018-10-11 RX ADMIN — POLYETHYLENE GLYCOL 3350 17 G: 17 POWDER, FOR SOLUTION ORAL at 14:46

## 2018-10-11 RX ADMIN — GLIPIZIDE 5 MG: 5 TABLET ORAL at 08:38

## 2018-10-11 RX ADMIN — GABAPENTIN 300 MG: 300 CAPSULE ORAL at 21:32

## 2018-10-11 RX ADMIN — CHOLESTYRAMINE 4 G: 4 POWDER, FOR SUSPENSION ORAL at 08:38

## 2018-10-11 RX ADMIN — CELECOXIB 200 MG: 200 CAPSULE ORAL at 08:37

## 2018-10-11 RX ADMIN — CEFAZOLIN 2 G: 10 INJECTION, POWDER, FOR SOLUTION INTRAVENOUS; PARENTERAL at 04:53

## 2018-10-11 RX ADMIN — ROPINIROLE HYDROCHLORIDE 0.5 MG: 0.25 TABLET, FILM COATED ORAL at 20:44

## 2018-10-11 RX ADMIN — GABAPENTIN 300 MG: 300 CAPSULE ORAL at 13:53

## 2018-10-11 RX ADMIN — GABAPENTIN 300 MG: 300 CAPSULE ORAL at 04:56

## 2018-10-11 RX ADMIN — OXYCODONE HYDROCHLORIDE AND ACETAMINOPHEN 1 TABLET: 10; 325 TABLET ORAL at 10:54

## 2018-10-11 RX ADMIN — LISINOPRIL 20 MG: 20 TABLET ORAL at 08:37

## 2018-10-11 RX ADMIN — ZOLPIDEM TARTRATE 10 MG: 5 TABLET, FILM COATED ORAL at 21:32

## 2018-10-11 RX ADMIN — OXYCODONE HYDROCHLORIDE AND ACETAMINOPHEN 1 TABLET: 10; 325 TABLET ORAL at 20:44

## 2018-10-11 RX ADMIN — ROPINIROLE HYDROCHLORIDE 0.5 MG: 0.25 TABLET, FILM COATED ORAL at 08:37

## 2018-10-11 RX ADMIN — AMITRIPTYLINE HYDROCHLORIDE 10 MG: 10 TABLET, FILM COATED ORAL at 20:44

## 2018-10-11 RX ADMIN — OXYCODONE HYDROCHLORIDE AND ACETAMINOPHEN 1 TABLET: 10; 325 TABLET ORAL at 02:57

## 2018-10-11 RX ADMIN — OXYCODONE HYDROCHLORIDE AND ACETAMINOPHEN 1 TABLET: 10; 325 TABLET ORAL at 16:41

## 2018-10-11 RX ADMIN — ESCITALOPRAM 10 MG: 10 TABLET, FILM COATED ORAL at 08:38

## 2018-10-11 RX ADMIN — INSULIN LISPRO 2 UNITS: 100 INJECTION, SOLUTION INTRAVENOUS; SUBCUTANEOUS at 12:33

## 2018-10-11 RX ADMIN — ATORVASTATIN CALCIUM 10 MG: 10 TABLET, FILM COATED ORAL at 20:44

## 2018-10-11 RX ADMIN — METFORMIN HYDROCHLORIDE 1000 MG: 500 TABLET ORAL at 08:37

## 2018-10-11 NOTE — PROGRESS NOTES
[facility] Medicine Services  INPATIENT PROGRESS NOTE    Length of Stay: 2  Date of Admission: 10/9/2018  Primary Care Physician: Balaji Cornelius MD    Subjective   Chief Complaint: Follow-up right ankle pain  HPI   The patient is resting in bed with family at bedside. She tells me that she is feeling ok today, but her pain is not very well controlled. She has needed to take Demerol for breakthrough pain today.  She denies any chest pain or shortness of breath.  She denies abdominal, pain, nausea, or vomiting.     Review of Systems   All pertinent negatives and positives are as above. All other systems have been reviewed and are negative unless otherwise stated.     Objective    Temp:  [96.8 °F (36 °C)-98.8 °F (37.1 °C)] (P) 98.8 °F (37.1 °C)  Heart Rate:  [] (P) 85  Resp:  [11-20] (P) 20  BP: (124-153)/(53-78) (P) 150/66  Physical Exam  Constitutional: She is oriented to person, place, and time. She appears well-developed and well-nourished. No distress.   HENT:   Head: Normocephalic and atraumatic.   Neck: Normal range of motion. Neck supple. No JVD present. No tracheal deviation present. No thyromegaly present.   Cardiovascular: Normal rate, regular rhythm, normal heart sounds and intact distal pulses.  Exam reveals no gallop and no friction rub.    No murmur heard.  Pulmonary/Chest: Effort normal and breath sounds normal. She has no wheezes. She has no rales.   Abdominal: Soft. Bowel sounds are normal. She exhibits no distension. There is no tenderness. There is no guarding.   Musculoskeletal: She exhibits tenderness (Right ankle). She exhibits no edema.   Lymphadenopathy:     She has no cervical adenopathy.   Neurological: She is alert and oriented to person, place, and time.   Peripheral neuropathy to bilateral feet and hands   Skin: Skin is warm and dry. No rash noted. No erythema.   Dressing intact to right ankle   Psychiatric: She has a normal mood and affect. Her  behavior is normal. Judgment and thought content normal.   Vitals reviewed.    Results Review:  I have reviewed the labs, radiology results, and diagnostic studies.    Laboratory Data:     Results from last 7 days  Lab Units 10/11/18  0923 10/10/18  0533 10/09/18  1429   WBC 10*3/mm3 10.03 7.69 9.65   HEMOGLOBIN g/dL 9.3* 9.7* 10.7*   HEMATOCRIT % 28.7* 29.9* 32.9*   PLATELETS 10*3/mm3 266 275 297       Results from last 7 days  Lab Units 10/11/18  0923 10/10/18  0533 10/09/18  1429   SODIUM mmol/L 138 139 136   POTASSIUM mmol/L 3.9 4.0 4.3   CHLORIDE mmol/L 99 98 95*   CO2 mmol/L 29.0 31.0 29.0   BUN mg/dL 8 8 8   CREATININE mg/dL 0.70 0.67 0.68   CALCIUM mg/dL 9.3 8.9 9.1   BILIRUBIN mg/dL  --   --  0.4   ALK PHOS U/L  --   --  88   ALT (SGPT) U/L  --   --  36   AST (SGOT) U/L  --   --  30   GLUCOSE mg/dL 212* 170* 206*     Radiology Data:   Imaging Results (last 24 hours)     Procedure Component Value Units Date/Time    XR Ankle 2 View Right [[id number]] Collected:  10/10/18 1122     Updated:  10/11/18 0751    Narrative:       EXAMINATION: XR ANKLE 2 VW RIGHT- 10/10/2018 11:22 AM CDT     HISTORY: ORIF     Fluoroscopy time: 8 seconds     Number of images: 3     FINDINGS:  Multiple fluoroscopic images are submitted from the OR. A radiologist  was not present for the acquisition or intraoperative interpretation of  these images. Images demonstrate internal fixation of the medial  malleolus as well as the distal fibula. Please see the operative report  for details pertaining to this exam.  This report was finalized on 10/10/2018 11:23 by Dr. Hector Holbrook MD.    FL C Arm During Surgery [[id number]] Collected:  10/10/18 1122     Updated:  10/11/18 0751    Narrative:       EXAMINATION: XR ANKLE 2 VW RIGHT- 10/10/2018 11:22 AM CDT     HISTORY: ORIF     Fluoroscopy time: 8 seconds     Number of images: 3     FINDINGS:  Multiple fluoroscopic images are submitted from the OR. A radiologist  was not present for the  acquisition or intraoperative interpretation of  these images. Images demonstrate internal fixation of the medial  malleolus as well as the distal fibula. Please see the operative report  for details pertaining to this exam.  This report was finalized on 10/10/2018 11:23 by Dr. Hector Holbrook MD.        I have reviewed the patient current medications.     Assessment/Plan   Assessment:  1.  Acute Bimalleolar fracture dislocation of the right ankle- status post open reduction internal fixation 10/10  2.  Fall  3.  Non-insulin-dependent diabetes mellitus type II with hyperglycemia, hemoglobin A1c 7.8%  4.  Polyneuropathy likely secondary to diabetes mellitus  5.  Essential hypertension  6.  Hyperlipidemia  7.  Normocytic anemia    Plan:  1.  Appreciate orthopedic assistance.  The patient is on baby aspirin only for DVT prophylaxis [place] surgery.  2.  Have spoken with Cary with Physical therapy who is concerned about the patient's hyperreflexia and the possibility of cervical cord compression.  With this is in the differential, they feel that it is safe at this time to teach the patient transfers only as using crutches could cause more harm if this is indeed the case.The patient did have a CT of the C-spine without contrast on admission without evidence of fracture or malalignment.  There was degenerative disc disease at C3-C4 and C7-T1, and bony neural foraminal encroachment at multiple levels related to arthropathy and uncinate spurring.  The patient actually had an appointment this week with a neurologist to have an MRI done as she has been having ongoing bilateral leg weakness in the hyperreflexia was also noted by her primary care physician.  Do not feel that we need to perform a CT with contrast at this time, and will defer this to the neurologist during her evaluation, as she will now not be able to have an MRI due to hardware in her ankle.  3.  Continue physical and occupational therapy.   is  following for discharge disposition as therapy does not feel she is safe to discharge home at this time.  A referral has been made to [facility], however, after speaking with Cary again, she does state the patient may be safe to return home by tomorrow if she is only going to be performing transfers.  4.  Last blood glucoses-229, 165, 212, 188.  Increase glipizide to 10 mg (will increase her glimepiride to 4 mg at time of discharge), continue metformin and sliding scale.  5.  Lyrica discontinued and gabapentin increased to 300 mg 3 times daily  6.  Lab holiday in AM  7.  Continue Percocet for pain control, patient states Norco gives her headache  8.  Encouraged incentive spirometer use  9.  Colace/Miralax for bowel regimen    Discharge Planning: I expect the patient to be discharged to home with home health vs rehab in ? days.    ARNOL Garcia   10/11/18   12:14 PM    Chart reviewed.   Patient examined  Agree with assessment and plan.  Discussed with CARLA Ramsay DO  10/11/18  5:25 PM

## 2018-10-11 NOTE — THERAPY EVALUATION
Acute Care - Occupational Therapy Initial Evaluation  [facility]     Patient Name: America Garcia  : 1955  MRN: [id number]  Today's Date: 10/11/2018  Onset of Illness/Injury or Date of Surgery: 10/09/18  Date of Referral to OT: 10/10/18  Referring Physician: Latasha AMBROSE    Admit Date: 10/9/2018       ICD-10-CM ICD-9-CM   1. Closed bimalleolar fracture of right ankle, initial encounter S82.841A 824.4   2. Fall, initial encounter W19.XXXA E888.9   3. Injury of head, initial encounter S09.90XA 959.01   4. Closed bimalleolar fracture of right ankle with routine healing, subsequent encounter S82.841D V54.19   5. Decreased activities of daily living (ADL) R68.89 780.99     Patient Active Problem List   Diagnosis   • Closed bimalleolar fracture of right ankle   • Closed fracture dislocation of right ankle     Past Medical History:   Diagnosis Date   • Diabetes mellitus (CMS/HCC)    • Hyperlipidemia    • Hypertension      Past Surgical History:   Procedure Laterality Date   • APPENDECTOMY     • CHOLECYSTECTOMY     • HYSTERECTOMY     • NECK SURGERY      x3          OT ASSESSMENT FLOWSHEET (last 72 hours)      Occupational Therapy Evaluation     Row Name 10/11/18 1001                   OT Evaluation Time/Intention    Subjective Information complains of;pain;numbness   neueropathy in B hands  -AC        Document Type evaluation  -AC        Mode of Treatment occupational therapy  -AC        Patient Effort good  -AC           General Information    Patient Profile Reviewed? yes  -AC        Onset of Illness/Injury or Date of Surgery 10/09/18  -AC        Referring Physician Latasha AMBROSE  -AC        Patient Observations alert;cooperative;agree to therapy  -AC        Patient/Family Observations no family present  -AC        General Observations of Patient lying supine in bed, ace wrap cast to RLE/ankle  -AC        Prior Level of Function independent:;all household mobility;community  mobility;gait;transfer;feeding;grooming;bathing;min assist:;dressing   Fr[place] for dressing d/t chronic n/t in hands  -        Equipment Currently Used at Home grab bar;bath bench;cane, straight;wheelchair  -        Pertinent History of Current Functional Problem fall with R ankle pain; Dx: R bimaleolar fx dislocation s/p ORIF on 10/11/18  -        Existing Precautions/Restrictions fall;non-weight bearing   NWB RLE  -AC        Risks Reviewed patient:;LOB;nausea/vomiting;dizziness;increased discomfort;change in vital signs;increased drainage;lines disloged  -AC        Benefits Reviewed patient:;improve function;increase independence;increase strength;increase balance;decrease pain;decrease risk of DVT;improve skin integrity;increase knowledge  -        Barriers to Rehab none identified  -           Relationship/Environment    Lives With spouse  -           Resource/Environmental Concerns    Current Living Arrangements home/apartment/condo  -           Home Main Entrance    Number of Stairs, Main Entrance one  -AC           Cognitive Assessment/Interventions    Additional Documentation Cognitive Assessment/Intervention (Group)  -           Cognitive Assessment/Intervention- PT/OT    Affect/Mental Status (Cognitive) WFL  -AC        Orientation Status (Cognition) oriented x 4  -AC        Follows Commands (Cognition) WFL  -AC        Safety Deficit (Cognitive) impulsivity  -AC        Personal Safety Interventions fall prevention program maintained;gait belt;muscle strengthening facilitated;nonskid shoes/slippers when out of bed;supervised activity  -           Safety Issues, Functional Mobility    Impairments Affecting Function (Mobility) balance;strength;pain;sensation/sensory awareness  -           Mobility Assessment/Treatment    Extremity Weight-bearing Status right lower extremity  -AC        Right Lower Extremity (Weight-bearing Status) non weight-bearing (NWB)  -           Bed Mobility  Assessment/Treatment    Bed Mobility Assessment/Treatment scooting/bridging;supine-sit  -        Scooting/Bridging [place] (Bed Mobility) supervision  -        Supine-Sit [place] (Bed Mobility) supervision  -        Assistive Device (Bed Mobility) head of bed elevated  -           Functional Mobility    Functional Mobility- Ind. Level minimum assist (75% patient effort);2 person assist required;verbal cues required;nonverbal cues required (demo/gesture)  -        Functional Mobility- Device rolling walker  -        Functional Mobility- Comment to door twice, multiple LOB with each trial requiring max verbal cues and minAx2 to regain balance  -AC           Transfer Assessment/Treatment    Transfer Assessment/Treatment sit-stand transfer;stand-sit transfer  -        Maintains Weight-bearing Status (Transfers) nonverbal cues (demo/gesture) to maintain;verbal cues to maintain  -           Sit-Stand Transfer    Sit-Stand [place] (Transfers) minimum assist (75% patient effort);2 person assist;verbal cues  -        Assistive Device (Sit-Stand Transfers) walker, front-wheeled  -           Stand-Sit Transfer    Stand-Sit [place] (Transfers) minimum assist (75% patient effort);2 person assist;verbal cues  -        Assistive Device (Stand-Sit Transfers) walker, front-wheeled  -           ADL Assessment/Intervention    BADL Assessment/Intervention lower body dressing  -           Lower Body Dressing Assessment/Training    Lower Body Dressing [place] Level lower body dressing skills  -        Comment (Lower Body Dressing) expected level of performance: modA   -AC           BADL Safety/Performance    Impairments, BADL Safety/Performance balance;strength;sensory awareness  -           General ROM    GENERAL ROM COMMENTS WFL AROM BUE  -AC           MMT (Manual Muscle Testing)    General MMT Comments 4/5 grossly BUE,  strength weakness noted  -           Motor  Assessment/Interventions    Additional Documentation Balance (Group)  -AC           Balance    Balance static sitting balance;static standing balance;dynamic sitting balance;dynamic standing balance  -AC           Static Sitting Balance    Level of [place] (Unsupported Sitting, Static Balance) independent  -AC        Sitting Position (Unsupported Sitting, Static Balance) sitting on edge of bed  -AC           Dynamic Sitting Balance    Level of [place], Reaches Outside Midline (Sitting, Dynamic Balance) independent  -AC        Sitting Position, Reaches Outside Midline (Sitting, Dynamic Balance) sitting on edge of bed  -AC           Static Standing Balance    Level of [place] (Supported Standing, Static Balance) contact guard assist   x2  -AC        Assistive Device Utilized (Supported Standing, Static Balance) rolling walker  -AC           Dynamic Standing Balance    Level of [place], Reaches Outside Midline (Standing, Dynamic Balance) minimal assist, 75% patient effort   x2  -AC        Comment, Reaches Outside Midline (Standing, Dynamic Balance) LOB x3-4 while ambulating requiring minAx2 to correct  -AC           Sensory Assessment/Intervention    Sensory General Assessment --   n/t in B hands from neuropathy  -AC           Positioning and Restraints    Pre-Treatment Position in bed  -AC        Post Treatment Position bed  -AC        In Bed supine;call light within reach;encouraged to call for assist;side rails up x2;legs elevated  -AC           Pain Assessment    Additional Documentation Pain Scale: Numbers Pre/Post-Treatment (Group)  -AC           Pain Scale: Numbers Pre/Post-Treatment    Pain Scale: Numbers, Pretreatment 6/10  -AC        Pain Location - Side Right  -AC        Pain Location ankle  -AC        Pain Intervention(s) Repositioned;Ambulation/increased activity  -AC           Wound 10/10/18 1112 Right leg incision    Wound - Properties Group Date first assessed: 10/10/18  -AD Time  first assessed: 1112  -AD Side: Right  -AD Location: leg  -AD Type: incision  -AD       Plan of Care Review    Plan of Care Reviewed With patient  -AC           Clinical Impression (OT)    Date of Referral to OT 10/10/18  -AC        OT Diagnosis decreaed adl  -AC        Prognosis (OT Eval) good  -AC        Criteria for Skilled Therapeutic Interventions Met (OT Eval) yes;treatment indicated  -AC        Rehab Potential (OT Eval) good, to achieve stated therapy goals  -AC        Therapy Frequency (OT Eval) 5 times/wk  -AC        Predicted Duration of Therapy Intervention (Therapy Eval) 10 days  -AC        Care Plan Review (OT) evaluation/treatment results reviewed;care plan/treatment goals reviewed;risks/benefits reviewed;current/potential barriers reviewed;patient/other agree to care plan  -AC        Anticipated Discharge Disposition (OT) inpatient rehabilitation facility;skilled nursing facility;home with home health  -AC           Planned OT Interventions    Planned Therapy Interventions (OT Eval) activity tolerance training;BADL retraining;functional balance retraining;occupation/activity based interventions;patient/caregiver education/training;strengthening exercise;transfer/mobility retraining  -AC           OT Goals    Transfer Goal Selection (OT) transfer, OT goal 1  -AC        Bathing Goal Selection (OT) bathing, OT goal 1  -AC        Dressing Goal Selection (OT) dressing, OT goal 1  -AC           Transfer Goal 1 (OT)    Activity/Assistive Device (Transfer Goal 1, OT) bed-to-chair/chair-to-bed;toilet;tub;tub bench;walker, rolling  -AC        [place] Level/Cues Needed (Transfer Goal 1, OT) standby assist  -AC        Time Frame (Transfer Goal 1, OT) long term goal (LTG);10 days  -AC        Progress/Outcome (Transfer Goal 1, OT) goal ongoing  -AC           Bathing Goal 1 (OT)    Activity/Assistive Device (Bathing Goal 1, OT) bathing skills, all;tub bench  -AC        [place] Level/Cues Needed (Bathing  Goal 1, OT) contact guard assist  -AC        Time Frame (Bathing Goal 1, OT) long term goal (LTG);10 days  -AC        Progress/Outcomes (Bathing Goal 1, OT) goal ongoing  -AC           Dressing Goal 1 (OT)    Activity/Assistive Device (Dressing Goal 1, OT) lower body dressing  -AC        [place]/Cues Needed (Dressing Goal 1, OT) standby assist  -AC        Time Frame (Dressing Goal 1, OT) long term goal (LTG);10 days  -AC        Progress/Outcome (Dressing Goal 1, OT) goal ongoing  -AC           Living Environment    Home Accessibility stairs to enter home;tub/shower is not walk in  -AC          User Key  (r) = Recorded By, (t) = Taken By, (c) = Cosigned By    Initials Name Effective Dates    AC Dean Felix, OTR/L 06/22/15 -     AD Dean De Santiago, RN 08/02/17 -            Occupational Therapy Education     Title: PT OT SLP Therapies (Done)     Topic: Occupational Therapy (Done)     Point: ADL training (Done)     Description: Instruct learner(s) on proper safety adaptation and remediation techniques during self care or transfers.   Instruct in proper use of assistive devices.   Learning Progress Summary     Learner Status Readiness Method Response Comment Documented by    Patient Done Acceptance E,TB VU OT POC, benefits of activity, NWB status, DME needs at discharge, home safety  10/11/18 1039          Point: Precautions (Done)     Description: Instruct learner(s) on prescribed precautions during self-care and functional transfers.   Learning Progress Summary     Learner Status Readiness Method Response Comment Documented by    Patient Done Acceptance E,TB VU OT POC, benefits of activity, NWB status, DME needs at discharge, home safety  10/11/18 1039          Point: Body mechanics (Done)     Description: Instruct learner(s) on proper positioning and spine alignment during self-care, functional mobility activities and/or exercises.   Learning Progress Summary     Learner Status Readiness Method Response  Comment Documented by    Patient Done Acceptance E,TB VU OT POC, benefits of activity, NWB status, DME needs at discharge, home safety  10/11/18 1039                      User Key     Initials Effective Dates Name Provider Type Discipline     06/22/15 -  Dean Felix, OTR/L Occupational Therapist OT                  OT Recommendation and Plan  Outcome Summary/Treatment Plan (OT)  Anticipated Discharge Disposition (OT): inpatient rehabilitation facility, skilled nursing facility, home with home health  Planned Therapy Interventions (OT Eval): activity tolerance training, BADL retraining, functional balance retraining, occupation/activity based interventions, patient/caregiver education/training, strengthening exercise, transfer/mobility retraining  Therapy Frequency (OT Eval): 5 times/wk  Plan of Care Review  Plan of Care Reviewed With: patient  Plan of Care Reviewed With: patient  Outcome Summary: OT eval completed.  Pt alert and oriented, able to come to EOB with S with HOB elevated and use of bed rails.  Good sitting balance observed at EOB with no LOB.  Pt completed transfers and ambulation with minAx2.  After initial instruction/cueing, pt maintained NWB status well.  Very unsteady with all standing activities including ambulation.  At least 3-4 LOB observed during ambulation and transfers with minAx2 needed to correct.  Pt very impulsive once standing.  Pt expected to require min-modA for LB ADL.  She will require skilled OT to address decreased balance, safety and strength for ADL.  Pt is not safe for discharge home at this time and is at an extremely high risk of falls.  Would need additional therapy to increase safety prior to returning home.   OT is recommended at discharge.          Outcome Measures     Row Name 10/11/18 1038             How much help from another is currently needed...    Putting on and taking off regular lower body clothing? 2  -AC      Bathing (including washing, rinsing, and  drying) 2  -AC      Toileting (which includes using toilet bed pan or urinal) 2  -AC      Putting on and taking off regular upper body clothing 4  -AC      Taking care of personal grooming (such as brushing teeth) 3  -AC      Eating meals 4  -AC      Score 17  -         Functional Assessment    Outcome Measure Options AM-PAC 6 Clicks Daily Activity (OT)  -        User Key  (r) = Recorded By, (t) = Taken By, (c) = Cosigned By    Initials Name Provider Type     Dean Felix OTR/L Occupational Therapist          Time Calculation:         Time Calculation- OT     Row Name 10/11/18 1040             Time Calculation- OT    OT Start Time 1001  -      OT Stop Time 1040  -      OT Time Calculation (min) 39 min  -      OT Received On 10/11/18  -      OT Goal Re-Cert Due Date 10/21/18  -        User Key  (r) = Recorded By, (t) = Taken By, (c) = Cosigned By    Initials Name Provider Type    Dean Gatica OTR/L Occupational Therapist        Therapy Suggested Charges     Code   Minutes Charges    None           Therapy Charges for Today     Code Description Service Date Service Provider Modifiers Qty    [id number]  OT SELFCARE CURRENT 10/11/2018 Dean Felix OTR/L GO, CK 1    [id number]  OT SELFCARE PROJECTED 10/11/2018 Dean Felix OTR/L GO, CJ 1    [id number]  OT EVAL MOD COMPLEXITY 3 10/11/2018 Dean Felix OTR/L GO, KX 1          OT G-codes  OT Professional Judgement Used?: Yes  OT Functional Scales Options: AM-PAC 6 Clicks Daily Activity (OT)  Score: 17  Functional Limitation: Self care  Self Care Current Status (): At least 40 percent but less than 60 percent impaired, limited or restricted  Self Care Goal Status (): At least 20 percent but less than 40 percent impaired, limited or restricted    BEN Rodírguez/FIONA  10/11/2018

## 2018-10-11 NOTE — PROGRESS NOTES
Continued Stay Note   [place]     Patient Name: America Garcia  MRN: [id number]  Today's Date: 10/11/2018    Admit Date: 10/9/2018          Discharge Plan     Row Name 10/11/18 1534       Plan    Plan [facility] declined bed offer. Plan for Home with HH to follow. List of agencies given per CM.  [facility] of [facility]. Referral to be made if ordered per MD              Discharge Codes    No documentation.       Expected Discharge Date and Time     Expected Discharge Date Expected Discharge Time    Oct 11, 2018             Terra Craig RN

## 2018-10-11 NOTE — PLAN OF CARE
Problem: Patient Care Overview  Goal: Plan of Care Review  Outcome: Ongoing (interventions implemented as appropriate)   10/11/18 1322   Coping/Psychosocial   Plan of Care Reviewed With patient   Plan of Care Review   Progress no change   OTHER   Outcome Summary PT evaluation completed. The patient presents hyperreflexic in all extremities, myelopathic movements throughout, impaired to absent sensation in a stocking glove pattern in her hands and feet. After attempting to have the patient walk with a walker and a knee scooter, it was deemed unsafe due to it being too high a of fall risk. Upon further review with the patient, she reports that she is being worked up with neurology and neurosurgery or her recent gait problems and sensation deficits, she was scheduled for an MRI of her spine and head today. This information along with the deficits seen today, the patient is presenting with an upper motor neuron issue that needs to be further addressed. She is limiited in her work up do to her recent orthopedic surgery and cannot have an MRI anytime soon. Due to this, I feel that it best to keep the patient at pivot transfers only until she is able to follow up with further testing. She is too high of a fall risk with a unknown cause of upper motor neuron issues. PT will continue to work with the patient to improve her standing balance and her pivot transfers so she can safely go home until she can follow up with testing. At this time today, she is unsafe to go home. Hopefully by tomorrow we will be able to teach her how to slow down and safely pivot.

## 2018-10-11 NOTE — DISCHARGE SUMMARY
"[facility]  DISCHARGE SUMMARY       Date of Admission: 10/9/2018  Date of Discharge:  10/11/2018  Primary Care Physician: Balaji Cornelius MD    Presenting Problem/History of Present Illness:  Closed bimalleolar fracture of right ankle, initial encounter [G75.575L]     Final Discharge Diagnoses:  Active Hospital Problems    Diagnosis   • Closed bimalleolar fracture of right ankle   • Closed fracture dislocation of right ankle       Consults: None    Procedures Performed: Open reduction internal fixation right ankle    Pertinent Test Results: Negative    History of Present Illness on Day of Discharge: Stable on discharge      Hospital Course:  The patient is a 63 y.o. female who presented to [facility] with presented with a closed displaced trimalleolar ankle fracture on the right.  On the day after discharge patient was taken to surgery and open reduction and internal fixation was carried out.  Postoperatively she done well she is to follow-up in the office.        /58 (BP Location: Left arm, Patient Position: Lying)   Pulse 83   Temp 98.2 °F (36.8 °C) (Oral)   Resp 16   Ht 165.1 cm (65\")   Wt 77.7 kg (171 lb 3 oz)   LMP  (LMP Unknown)   SpO2 96%   BMI 28.49 kg/m²       Discharge Medications:     Discharge Medications      ASK your doctor about these medications      Instructions Start Date   AMBIEN 10 MG tablet  Generic drug:  zolpidem   10 mg, Oral, Nightly PRN      amitriptyline 10 MG tablet  Commonly known as:  ELAVIL   10 mg, Oral, Nightly      aspirin 81 MG chewable tablet   81 mg, Oral, Daily      carisoprodol 350 MG tablet  Commonly known as:  SOMA   350 mg, Oral, 4 Times Daily PRN      celecoxib 200 MG capsule  Commonly known as:  CeleBREX   200 mg, Oral, Daily With Breakfast      clonazePAM 0.5 MG tablet  Commonly known as:  KlonoPIN   0.5 mg, Oral, 2 Times Daily PRN      colestipol 1 g tablet  Commonly known as:  COLESTID   1 g, Oral, 2 Times Daily      escitalopram 10 " MG tablet  Commonly known as:  LEXAPRO   10 mg, Oral, Daily      esomeprazole 20 MG capsule  Commonly known as:  nexIUM   20 mg, Oral, Every Morning Before Breakfast      gabapentin 100 MG capsule  Commonly known as:  NEURONTIN   100 mg, Oral, 3 Times Daily      glimepiride 2 MG tablet  Commonly known as:  AMARYL   2 mg, Oral, Every Morning Before Breakfast      lisinopril 10 MG tablet  Commonly known as:  PRINIVIL,ZESTRIL   20 mg, Oral, Daily With Breakfast      metFORMIN  MG 24 hr tablet  Commonly known as:  GLUCOPHAGE-XR   1,000 mg, Oral, 2 Times Daily      oxyCODONE-acetaminophen  MG per tablet  Commonly known as:  PERCOCET   1 tablet, Oral, Every 8 Hours PRN      pregabalin 50 MG capsule  Commonly known as:  LYRICA   50 mg, Oral, 3 Times Daily      rOPINIRole 0.5 MG tablet  Commonly known as:  REQUIP   0.5 mg, Oral, 2 Times Daily, Take 1 hour before bedtime.      simvastatin 20 MG tablet  Commonly known as:  ZOCOR   20 mg, Oral, Nightly             Discharge Diet:  preoperative    Discharge Care Plan/Instructions: #1 she is use crutches nonweightbearing on the right #2 she is to follow-up in the office #3 she is to call for any problems    Follow-up Appointments:   Monday, October 22      Bart Oglesby MD  10/11/18  8:07 AM

## 2018-10-11 NOTE — PLAN OF CARE
Problem: Patient Care Overview  Goal: Plan of Care Review  Outcome: Ongoing (interventions implemented as appropriate)   10/11/18 1040   Coping/Psychosocial   Plan of Care Reviewed With patient   Plan of Care Review   Progress no change   OTHER   Outcome Summary OT eval completed. Pt alert and oriented, able to come to EOB with S with HOB elevated and use of bed rails. Good sitting balance observed at EOB with no LOB. Pt completed transfers and ambulation with minAx2. After initial instruction/cueing, pt maintained NWB status well. Very unsteady with all standing activities including ambulation. At least 3-4 LOB observed during ambulation and transfers with minAx2 needed to correct. Pt very impulsive once standing. Pt expected to require min-modA for LB ADL. She will require skilled OT to address decreased balance, safety and strength for ADL. Pt is not safe for discharge home at this time and is at an extremely high risk of falls. Would need additional therapy to increase safety prior to returning home.  OT is recommended at discharge.

## 2018-10-11 NOTE — PROGRESS NOTES
Continued Stay Note   Tra     Patient Name: America Garcia  MRN: [id number]  Today's Date: 10/11/2018    Admit Date: 10/9/2018          Discharge Plan     Row Name 10/11/18 1120       Plan    Plan Huseyin notified at [facility] for referral s/p fracture of ankle. PT/OT highly rec rehab before going home. Pending eval/bed offer. Insurance will require precert if bed is offered.     Patient/Family in Agreement with Plan yes              Discharge Codes    No documentation.       Expected Discharge Date and Time     Expected Discharge Date Expected Discharge Time    Oct 11, 2018             Terra Craig RN

## 2018-10-11 NOTE — PROGRESS NOTES
Discharge Planning Assessment  [facility]     Patient Name: America Garcia  MRN: [id number]  Today's Date: 10/11/2018    Admit Date: 10/9/2018          Discharge Needs Assessment     Row Name 10/11/18 1116       Living Environment    Lives With spouse    Name(s) of Who Lives With Patient Demarcus Garcia    Current Living Arrangements home/apartment/condo    Primary Care Provided by self    Provides Primary Care For no one, unable/limited ability to care for self    Family Caregiver if Needed spouse    Family Caregiver Names Demarcus    Quality of Family Relationships unable to assess    Able to Return to Prior Arrangements other (see comments)   PT/OT rec Acute Rehab. unsafe for home       Resource/Environmental Concerns    Resource/Environmental Concerns none    Transportation Concerns car, none       Transition Planning    Patient/Family Anticipates Transition to inpatient rehabilitation facility    Patient/Family Anticipated Services at Transition rehabilitation services   [facility] Acute rehab referral    Transportation Anticipated family or friend will provide       Discharge Needs Assessment    Readmission Within the Last 30 Days no previous admission in last 30 days    Concerns to be Addressed no discharge needs identified    Equipment Currently Used at Home none    Anticipated Changes Related to Illness inability to care for self    Equipment Needed After Discharge other (see comments);walker, rolling   scooter    Discharge Coordination/Progress Plan for [facility] to [facility] for possible admission for continued PT/OT therapy. Patient unstable when up and unsafe for home per therapies. Patient agreeable for [facility] Acute rehab referral. pending eval per Huseyin.             Discharge Plan    No documentation.       Destination     No service coordination in this encounter.      Durable Medical Equipment     No service coordination in this encounter.      Dialysis/Infusion     No service coordination in this  encounter.      Home Medical Care     No service coordination in this encounter.      Social Care     No service coordination in this encounter.        Expected Discharge Date and Time     Expected Discharge Date Expected Discharge Time    Oct 11, 2018               Demographic Summary    No documentation.           Functional Status    No documentation.           Psychosocial    No documentation.           Abuse/Neglect    No documentation.           Legal    No documentation.           Substance Abuse    No documentation.           Patient Forms    No documentation.         Terra Craig RN

## 2018-10-11 NOTE — PLAN OF CARE
Problem: Patient Care Overview  Goal: Plan of Care Review  Outcome: Ongoing (interventions implemented as appropriate)   10/11/18 7769   Coping/Psychosocial   Plan of Care Reviewed With patient   Plan of Care Review   Progress improving   OTHER   Outcome Summary pt has rested very well tonight, so far doing well with pain control, VSS, cap refill < 3 seconds to right foot, ace and splint intact, NWB right leg. safety maintained       Problem: Fall Risk (Adult)  Goal: Absence of Fall  Outcome: Ongoing (interventions implemented as appropriate)      Problem: Fracture Orthopaedic (Adult)  Goal: Signs and Symptoms of Listed Potential Problems Will be Absent, Minimized or Managed (Fracture Orthopaedic)  Outcome: Ongoing (interventions implemented as appropriate)

## 2018-10-11 NOTE — THERAPY EVALUATION
Acute Care - Physical Therapy Initial Evaluation  [facility]     Patient Name: America Garcia  : 1955  MRN: [id number]  Today's Date: 10/11/2018   Onset of Illness/Injury or Date of Surgery: 10/09/18  Date of Referral to PT: 10/10/18  Referring Physician: Latasha AMBROSE      Admit Date: 10/9/2018    Visit Dx:     ICD-10-CM ICD-9-CM   1. Closed bimalleolar fracture of right ankle, initial encounter S82.841A 824.4   2. Fall, initial encounter W19.XXXA E888.9   3. Injury of head, initial encounter S09.90XA 959.01   4. Closed bimalleolar fracture of right ankle with routine healing, subsequent encounter S82.841D V54.19   5. Decreased activities of daily living (ADL) R68.89 780.99   6. Impaired functional mobility, balance, gait, and endurance Z74.09 V49.89     Patient Active Problem List   Diagnosis   • Closed bimalleolar fracture of right ankle   • Closed fracture dislocation of right ankle     Past Medical History:   Diagnosis Date   • Diabetes mellitus (CMS/HCC)    • Hyperlipidemia    • Hypertension      Past Surgical History:   Procedure Laterality Date   • ANKLE OPEN REDUCTION INTERNAL FIXATION Right 10/10/2018    Procedure: ANKLE OPEN REDUCTION INTERNAL FIXATION - RIGHT;  Surgeon: Bart Oglesby MD;  Location: [facility];  Service: Orthopedics   • APPENDECTOMY     • CHOLECYSTECTOMY     • HYSTERECTOMY     • NECK SURGERY      x3        PT ASSESSMENT (last 12 hours)      Physical Therapy Evaluation     Row Name 10/11/18 0955          PT Evaluation Time/Intention    Subjective Information complains of;pain;numbness   numbness in hands.   -MS (r) TD (t) MS (c)     Document Type evaluation  -MS (r) TD (t) MS (c)     Mode of Treatment physical therapy  -MS (r) TD (t) MS (c)     Patient Effort good  -MS (r) TD (t) MS (c)     Row Name 10/11/18 8229          General Information    Patient Profile Reviewed? yes  -MS (r) TD (t) MS (c)     Onset of Illness/Injury or Date of Surgery 10/09/18  -MS (r) TD (t) MS  (c)     Referring Physician Latasha AMBROSE  -MS (r) TD (t) MS (c)     Patient Observations alert;cooperative;agree to therapy  -MS (r) TD (t) MS (c)     Patient/Family Observations no family present  -MS (r) TD (t) MS (c)     General Observations of Patient Sitting on EOB, ace wrap cast to RLE/ankle  -MS (r) TD (t) MS (c)     Prior Level of Function independent:;all household mobility;community mobility;gait;transfer;bed mobility;ADL's   min A for dressing due to n/t in hands  -MS (r) TD (t) MS (c)     Equipment Currently Used at Home grab bar;bath bench;cane, straight  -MS (r) TD (t) MS (c)     Pertinent History of Current Functional Problem Presented to ER with complaint of fall on R ankle. X-ray revealed bimalleolar fx dislocation. s/p open reduction internal fixation. PMH: neuropathy, diabetes, hyperlipidemia, HTN  -MS (r) TD (t) MS (c)     Existing Precautions/Restrictions fall;non-weight bearing   NWB RLE  -MS (r) TD (t) MS (c)     Limitations/Impairments sensory;safety/cognitive  -MS (r) TD (t) MS (c)     Equipment Issued to Patient walker, front wheeled  -MS (r) TD (t) MS (c)     Risks Reviewed patient:;LOB;nausea/vomiting;dizziness;increased discomfort;change in vital signs  -MS (r) TD (t) MS (c)     Benefits Reviewed patient:;improve function;increase independence;increase balance;increase strength;decrease pain;decrease risk of DVT  -MS (r) TD (t) MS (c)     Barriers to Rehab impaired sensation  -MS (r) TD (t) MS (c)     Row Name 10/11/18 1021          Relationship/Environment    Lives With spouse  -MS (r) TD (t) MS (c)     Family Caregiver if Needed spouse  -MS (r) TD (t) MS (c)     Row Name 10/11/18 1688          Resource/Environmental Concerns    Current Living Arrangements home/apartment/condo  -MS (r) TD (t) MS (c)     Row Name 10/11/18 5233          Home Main Entrance    Number of Stairs, Main Entrance one  -MS (r) TD (t) MS (c)     Stair Railings, Main Entrance none  -MS (r) TD (t) MS (c)      Row Name 10/11/18 0955          Cognitive Assessment/Interventions    Additional Documentation Cognitive Assessment/Intervention (Group)  -MS (r) TD (t) MS (c)     Row Name 10/11/18 0955          Cognitive Assessment/Intervention- PT/OT    Affect/Mental Status (Cognitive) WFL  -MS (r) TD (t) MS (c)     Orientation Status (Cognition) oriented x 4  -MS (r) TD (t) MS (c)     Follows Commands (Cognition) WFL  -MS (r) TD (t) MS (c)     Personal Safety Interventions fall prevention program maintained;gait belt;muscle strengthening facilitated;nonskid shoes/slippers when out of bed;supervised activity  -MS (r) TD (t) MS (c)     Row Name 10/11/18 0955          Safety Issues, Functional Mobility    Safety Issues Affecting Function (Mobility) impulsivity;judgment;positioning of assistive device;safety precaution awareness;steps too close to assistive device  -MS (r) TD (t) MS (c)     Impairments Affecting Function (Mobility) balance;strength  -MS (r) TD (t) MS (c)     Row Name 10/11/18 0955          Mobility Assessment/Treatment    Extremity Weight-bearing Status left lower extremity  -MS (r) TD (t) MS (c)     Right Lower Extremity (Weight-bearing Status) non weight-bearing (NWB)  -MS (r) TD (t) MS (c)     Row Name 10/11/18 0955          Bed Mobility Assessment/Treatment    Bed Mobility Assessment/Treatment scooting/bridging;supine-sit;sit-supine  -MS (r) TD (t) MS (c)     Scooting/Bridging [place] (Bed Mobility) supervision  -MS (r) TD (t) MS (c)     Supine-Sit [place] (Bed Mobility) supervision  -MS (r) TD (t) MS (c)     Sit-Supine [place] (Bed Mobility) supervision  -MS (r) TD (t) MS (c)     Assistive Device (Bed Mobility) head of bed elevated  -MS (r) TD (t) MS (c)     Row Name 10/11/18 0955          Transfer Assessment/Treatment    Transfer Assessment/Treatment sit-stand transfer;stand-sit transfer  -MS (r) TD (t) MS (c)     Maintains Weight-bearing Status (Transfers) nonverbal cues (demo/gesture) to  maintain;verbal cues to maintain  -MS (r) TD (t) MS (c)     Comment (Transfers) During transfer from bed<>knee scooter, pt's knee slipped off of knee walker and pt had LOB. For safety concern, pt was lowered down to ground safely on pt's buttock in order from preventing a hard fall to the ground. With assistance, pt was able to safely get back into bed while maintaining NWB status. Pt reported being okay and was in no pain following incident.   -MS (r) TD (t) MS (c)     Sit-Stand [place] (Transfers) minimum assist (75% patient effort);verbal cues;1 person assist  -MS (r) TD (t) MS (c)     Stand-Sit [place] (Transfers) minimum assist (75% patient effort);verbal cues;1 person assist  -MS (r) TD (t) MS (c)     Row Name 10/11/18 0955          Sit-Stand Transfer    Assistive Device (Sit-Stand Transfers) walker, front-wheeled  -MS (r) TD (t) MS (c)     Row Name 10/11/18 0955          Stand-Sit Transfer    Assistive Device (Stand-Sit Transfers) walker, front-wheeled  -MS (r) TD (t) MS (c)     Row Name 10/11/18 0955          Gait/Stairs Assessment/Training    Gait/Stairs Assessment/Training gait/ambulation assistive device  -MS (r) TD (t) MS (c)     [place] Level (Gait) minimum assist (75% patient effort)  -MS (r) TD (t) MS (c)     Assistive Device (Gait) walker, front-wheeled  -MS (r) TD (t) MS (c)     Distance in Feet (Gait) ~ 10 feet to door  -MS (r) TD (t) MS (c)     Pattern (Gait) swing-to  -MS (r) TD (t) MS (c)     Left Sided Gait Deviations Trendelenburg sign  -MS (r) TD (t) MS (c)     Comment (Gait/Stairs) Pt required Min A x2 w/ walker to door and back. Pt performed rolling knee scooter in lamar with CGA and vc for steering and pace. Pt required vc to slow down.   -MS (r) TD (t) MS (c)     Row Name 10/11/18 0955          General ROM    GENERAL ROM COMMENTS BUE AROM WFL. BLE AROM WFL  -MS (r) TD (t) MS (c)     Row Name 10/11/18 0955          MMT (Manual Muscle Testing)    General MMT Comments CASSIUS  grossly 4/5, LLE ankle strength 5/5, knee flexion/ext 4/5, hip flexion 4/5. L hip abduction 3/5 functionally. Demo trendelenburg sign with hopping. RLE strength not tested.   -MS (r) TD (t) MS (c)     Row Name 10/11/18 0955          Motor Assessment/Intervention    Additional Documentation Balance (Group);Gross Motor Coordination (Group)  -MS (r) TD (t) MS (c)     Row Name 10/11/18 0955          Gross Motor Coordination    Gross Motor Skill, Impairments Detail Hyperreflexia of brachioradialis, biceps, patellar, achilles bilaterally  -MS (r) TD (t) MS (c)     Row Name 10/11/18 0955          Balance    Balance static sitting balance;dynamic sitting balance;static standing balance;dynamic standing balance  -MS (r) TD (t) MS (c)     Row Name 10/11/18 0955          Static Sitting Balance    Level of [place] (Unsupported Sitting, Static Balance) independent  -MS (r) TD (t) MS (c)     Sitting Position (Unsupported Sitting, Static Balance) sitting on edge of bed  -MS (r) TD (t) MS (c)     Row Name 10/11/18 0955          Dynamic Sitting Balance    Level of [place], Reaches Outside Midline (Sitting, Dynamic Balance) independent  -MS (r) TD (t) MS (c)     Sitting Position, Reaches Outside Midline (Sitting, Dynamic Balance) sitting on edge of bed  -MS (r) TD (t) MS (c)     Row Name 10/11/18 0955          Static Standing Balance    Level of [place] (Supported Standing, Static Balance) minimal assist, 75% patient effort;contact guard assist   CGA x2, Min A x1  -MS (r) TD (t) MS (c)     Assistive Device Utilized (Supported Standing, Static Balance) rolling walker  -MS (r) TD (t) MS (c)     Row Name 10/11/18 0955          Dynamic Standing Balance    Level of [place], Reaches Outside Midline (Standing, Dynamic Balance) minimal assist, 75% patient effort   x2  -MS (r) TD (t) MS (c)     Comment, Reaches Outside Midline (Standing, Dynamic Balance) LOB ~ x5+ while ambulating w/ walker and LOB w/ transitioning from  bed to rolling knee walker.   -MS (r) TD (t) MS (c)     Row Name 10/11/18 0955          Sensory Assessment/Intervention    Sensory General Assessment light touch sensation deficits identified;sharp/dull sensation deficits identified;other (see comments)   kinaesthesia - absent in finger joints, toe joints.   -MS (r) TD (t) MS (c)     Row Name 10/11/18 0955          Light Touch Sensation Assessment    Left Upper Extremity: Light Touch Sensation Assessment severe impairment, less than 50% correct responses;absent sensation  -MS (r) TD (t) MS (c)     Comment, Left Upper Extremity: Light Touch Sensation Assessment Absent sensation in hand and forearm. Impaired sensation above elbow. Intact around shoulder.   -MS (r) TD (t) MS (c)     Right Upper Extremity: Light Touch Sensation Assessment severe impairment, less than 50% correct responses;absent sensation  -MS (r) TD (t) MS (c)     Comment, Right Upper Extremity: Light Touch Sensation Assessment Absent sensation in hands, forearm. severe impiarement above elbow. Intact near shoulder  -MS (r) TD (t) MS (c)     Left Lower Extremity: Light Touch Sensation Assessment absent sensation;severe impairment, less than 50% correct responses  -MS (r) TD (t) MS (c)     Comment, Left Lower Extremity: Light Touch Sensation Assessment Absent sensation in feet and sever impairement in lower leg.   -MS (r) TD (t) MS (c)     Right Lower Extremity: Light Touch Sensation Assessment absent sensation;severe impairment, less than 50% correct responses  -MS (r) TD (t) MS (c)     Comment, Right Lower Extremity: Light Touch Sensation Assessment Absent sensation in feet, severe impairment lower leg.  -MS (r) TD (t) MS (c)     Row Name 10/11/18 0955          Sharp/Dull Sensation Assessment    Left Upper Extremity: Sharp/Dull Sensation Assessment absent sensation;moderate impairment, 50 to 74% correct responses  -MS (r) TD (t) MS (c)     Comment, Left Upper Extremity: Sharp/Dull Sensation Assessment  Absent sharp sensation in hand, impaired sharp sensation in arms.   -MS (r) TD (t) MS (c)     Right Upper Extremity: Sharp/Dull Sensation Assessment absent sensation;moderate impairment, 50 to 74% correct responses  -MS (r) TD (t) MS (c)     Comment, Right Upper Extremity: Sharp/Dull Sensation Assessment Absent sharp sensation in hand. impaired sharp sensation in arm  -MS (r) TD (t) MS (c)     Left Lower Extremity: Sharp/Dull Sensation Assessment absent sensation;moderate impairment, 50 to 74% correct responses  -MS (r) TD (t) MS (c)     Comment, Left Lower Extremity: Sharp/Dull Sensation Assessment Absent sharp sensation in hand. impaired sharp sensation in arm  -MS (r) TD (t) MS (c)     Right Lower Extremity: Sharp/Dull Sensation Assessment absent sensation;moderate impairment, 50 to 74% correct responses  -MS (r) TD (t) MS (c)     Comment, Right Lower Extremity: Sharp/Dull Sensation Assessment Absent sharp sensation in hand. impaired sharp sensation in arm  -MS (r) TD (t) MS (c)     Row Name 10/11/18 0955          Pain Assessment    Additional Documentation Pain Scale: Numbers Pre/Post-Treatment (Group)  -MS (r) TD (t) MS (c)     Row Name 10/11/18 0955          Pain Scale: Numbers Pre/Post-Treatment    Pain Scale: Numbers, Pretreatment 6/10  -MS (r) TD (t) MS (c)     Pain Scale: Numbers, Post-Treatment 7/10  -MS (r) TD (t) MS (c)     Pain Location - Side Right  -MS (r) TD (t) MS (c)     Pain Location ankle  -MS (r) TD (t) MS (c)     Pain Intervention(s) Medication (See MAR);Repositioned;Ambulation/increased activity  -MS (r) TD (t) MS (c)     Row Name             Wound 10/10/18 1112 Right leg incision    Wound - Properties Group Date first assessed: 10/10/18  -AD Time first assessed: 1112 -AD Side: Right  -AD Location: leg  -AD Type: incision  -AD    Row Name 10/11/18 0955          Plan of Care Review    Plan of Care Reviewed With patient  -MS (r) TD (t) MS (c)     Row Name 10/11/18 0955          Physical  Therapy Clinical Impression    Date of Referral to PT 10/10/18  -MS (r) TD (t) MS (c)     PT Diagnosis (PT Clinical Impression) Impaired sensation, strength, balance, gait  -MS (r) TD (t) MS (c)     Patient/Family Goals Statement (PT Clinical Impression) return home  -MS (r) TD (t) MS (c)     Criteria for Skilled Interventions Met (PT Clinical Impression) yes;treatment indicated  -MS (r) TD (t) MS (c)     Pathology/Pathophysiology Noted (Describe Specifically for Each System) musculoskeletal;neuromuscular  -MS (r) TD (t) MS (c)     Impairments Found (describe specific impairments) aerobic capacity/endurance;gait, locomotion, and balance;muscle performance;sensory Integrity;reflex Integrity  -MS (r) TD (t) MS (c)     Functional Limitations in Following Categories (Describe Specific Limitations) self-care;home management;work;community/leisure  -MS (r) TD (t) MS (c)     Rehab Potential (PT Clinical Summary) fair, will monitor progress closely  -MS (r) TD (t) MS (c)     Predicted Duration of Therapy (PT) until d/c  -MS (r) TD (t) MS (c)     Care Plan Review (PT) evaluation/treatment results reviewed;care plan/treatment goals reviewed;risks/benefits reviewed;current/potential barriers reviewed;patient/other agree to care plan  -MS (r) TD (t) MS (c)     Row Name 10/11/18 0933          Vital Signs    Pre SpO2 (%) 96  -MS (r) TD (t) MS (c)     O2 Delivery Pre Treatment room air  -MS (r) TD (t) MS (c)     Post SpO2 (%) 96  -MS (r) TD (t) MS (c)     O2 Delivery Post Treatment room air  -MS (r) TD (t) MS (c)     Row Name 10/11/18 0932          Physical Therapy Goals    Transfer Goal Selection (PT) transfer, PT goal 1  -MS (r) TD (t) MS (c)     Gait Training Goal Selection (PT) --  -MS (r) TD (t) MS (c)     Balance Goal Selection (PT) balance, PT goal 1  -MS (r) TD (t) MS (c)     Safety Awareness Goal Selection (PT) safety awareness, PT goal 1  -MS (r) TD (t) MS (c)     Additional Documentation Balance Goal Selection (PT)  (Row);Safety Awareness Goal Selection (PT) (Row)  -MS (r) TD (t) MS (c)     Row Name 10/11/18 0955          Transfer Goal 1 (PT)    Activity/Assistive Device (Transfer Goal 1, PT) transfers, all;walker, rolling   pivots  -MS (r) TD (t) MS (c)     [place] Level/Cues Needed (Transfer Goal 1, PT) standby assist  -MS (r) TD (t) MS (c)     Time Frame (Transfer Goal 1, PT) by discharge  -MS (r) TD (t) MS (c)     Progress/Outcome (Transfer Goal 1, PT) goal ongoing  -MS (r) TD (t) MS (c)     Row Name 10/11/18 0955          Balance Goal 1 (PT)    Activity/Assistive Device (Balance Goal 1, PT) assistive device use;standing, static  -MS (r) TD (t) MS (c)     [place] Level/Cues Needed (Balance Goal 1, PT) standby assist  -MS (r) TD (t) MS (c)     Time Frame (Balance Goal 1, PT) by discharge  -MS (r) TD (t) MS (c)     Progress/Outcomes (Balance Goal 1, PT) goal ongoing  -MS (r) TD (t) MS (c)     Row Name 10/11/18 0938          Safety Awareness Goal 1 (PT)    Activity (Safety Awareness Goal 1, PT) impulse control;insight into deficits/self awareness;judgment;safe use of assistive device/equipment;follow through of safety precautions;demonstrates compliance;demonstration of safe behaviors;demonstrates understanding  -MS (r) TD (t) MS (c)     [place]/Cues/Accuracy (Safety Awareness Goal 1, PT) independent  -MS (r) TD (t) MS (c)     Time Frame (Safety Awareness Goal 1, PT) by discharge  -MS (r) TD (t) MS (c)     Progress/Outcome (Safety Awareness Goal 1, PT) goal ongoing  -MS (r) TD (t) MS (c)     Row Name 10/11/18 9855          Patient Education Goal (PT)    Activity (Patient Education Goal, PT) --  -MS (r) TD (t) MS (c)     [place]/Cues/Accuracy (Memory Goal 2, PT) --  -MS (r) TD (t) MS (c)     Time Frame (Patient Education Goal, PT) --  -MS (r) TD (t) MS (c)     Progress/Outcome (Patient Education Goal, PT) --  -MS (r) TD (t) MS (c)     Row Name 10/11/18 0955          Positioning and Restraints     Pre-Treatment Position in bed  -MS (r) TD (t) MS (c)     Post Treatment Position bed  -MS (r) TD (t) MS (c)     In Bed fowlers;call light within reach;encouraged to call for assist;with family/caregiver;notified nsg;R heel elevated  -MS (r) TD (t) MS (c)     Row Name 10/11/18 0955          Living Environment    Home Accessibility stairs to enter home;tub/shower is not walk in  -MS (r) TD (t) MS (c)       User Key  (r) = Recorded By, (t) = Taken By, (c) = Cosigned By    Initials Name Provider Type    Cary Ibrahim R, PT, DPT, NCS Physical Therapist    Dean Mejia, RN Registered Nurse    Marlene Rayo, KIRILL Student PT Student          Physical Therapy Education     Title: PT OT SLP Therapies (Done)     Topic: Physical Therapy (Done)     Point: Mobility training (Done)    Learning Progress Summary     Learner Status Readiness Method Response Comment Documented by    Patient Done Acceptance E VU,NR,DU PT was educated on benefits of PT and PT POC. Pt was educated on safety awareness and body mechanics with transfers and use of AD. Pt was edcuated on positioning of AD before hopping and on her impulsitivity. Pt was educated on NWB status. TD 10/11/18 1210          Point: Body mechanics (Done)    Learning Progress Summary     Learner Status Readiness Method Response Comment Documented by    Patient Done Acceptance E VU,NR,DU PT was educated on benefits of PT and PT POC. Pt was educated on safety awareness and body mechanics with transfers and use of AD. Pt was edcuated on positioning of AD before hopping and on her impulsitivity. Pt was educated on NWB status. TD 10/11/18 1210          Point: Precautions (Done)    Learning Progress Summary     Learner Status Readiness Method Response Comment Documented by    Patient Done Acceptance E VU,NR,DU PT was educated on benefits of PT and PT POC. Pt was educated on safety awareness and body mechanics with transfers and use of AD. Pt was edcuated on positioning of AD  before hopping and on her impulsitivity. Pt was educated on NWB status. TD 10/11/18 1210                      User Key     Initials Effective Dates Name Provider Type Discipline    TD 09/07/18 -  Marlene Castellanos, PT Student PT Student PT                PT Recommendation and Plan  Anticipated Discharge Disposition (PT): home with assist, home with home health  Planned Therapy Interventions (PT Eval): bed mobility training, balance training, gait training, strengthening, transfer training, patient/family education  Therapy Frequency (PT Clinical Impression): 2 times/day  Outcome Summary/Treatment Plan (PT)  Anticipated Equipment Needs at Discharge (PT): walker, bedside commode  Anticipated Discharge Disposition (PT): home with assist, home with home health  Plan of Care Reviewed With: patient          Outcome Measures     Row Name 10/11/18 1200 10/11/18 1038          How much help from another person do you currently need...    Turning from your back to your side while in flat bed without using bedrails? 4  -MS (r) TD (t) MS (c)  --     Moving from lying on back to sitting on the side of a flat bed without bedrails? 4  -MS (r) TD (t) MS (c)  --     Moving to and from a bed to a chair (including a wheelchair)? 2  -MS (r) TD (t) MS (c)  --     Standing up from a chair using your arms (e.g., wheelchair, bedside chair)? 2  -MS (r) TD (t) MS (c)  --     Climbing 3-5 steps with a railing? 1  -MS (r) TD (t) MS (c)  --     To walk in hospital room? 2  -MS (r) TD (t) MS (c)  --     AM-[facility] 6 Clicks Score 15  -MS (r) TD (t)  --        How much help from another is currently needed...    Putting on and taking off regular lower body clothing?  -- 2  -AC     Bathing (including washing, rinsing, and drying)  -- 2  -AC     Toileting (which includes using toilet bed pan or urinal)  -- 2  -AC     Putting on and taking off regular upper body clothing  -- 4  -AC     Taking care of personal grooming (such as brushing teeth)  -- 3  -AC      Eating meals  -- 4  -AC     Score  -- 17  -        Functional Assessment    Outcome Measure Options AM-PAC 6 Clicks Basic Mobility (PT)  -MS (r) TD (t) MS (c) AM-PAC 6 Clicks Daily Activity (OT)  -AC       User Key  (r) = Recorded By, (t) = Taken By, (c) = Cosigned By    Initials Name Provider Type    AC Dean Felix, OTR/L Occupational Therapist    Cary Ibrahim, PT, DPT, NCS Physical Therapist    TD Marlene Castellanos, PT Student PT Student           Time Calculation:         PT Charges     Row Name 10/11/18 1212             Time Calculation    Start Time 1050   chart review: [id number], first eval part [id number]  -MS (r) TD (t) MS (c)      Stop Time 1137  -MS (r) TD (t) MS (c)      Time Calculation (min) 47 min  -MS (r) TD (t)      PT Received On 10/11/18  -MS (r) TD (t) MS (c)      PT Goal Re-Cert Due Date 10/21/18  -MS (r) TD (t) MS (c)        User Key  (r) = Recorded By, (t) = Taken By, (c) = Cosigned By    Initials Name Provider Type    Cary Ibrahim, PT, DPT, NCS Physical Therapist    Marlene Rayo, PT Student PT Student        Therapy Suggested Charges     Code   Minutes Charges    None           Therapy Charges for Today     Code Description Service Date Service Provider Modifiers Qty    75323499656 HC PT EVAL MOD COMPLEXITY 4 10/11/2018 Marlene Castellanos, PT Student GP, KX 1    [id number] HC GAIT TRAINING EA 15 MIN 10/11/2018 Marlene Castellanos, PT Student GP, KX 2          PT G-Codes  PT Professional Judgement Used?: Yes  Outcome Measure Options: AM-PAC 6 Clicks Basic Mobility (PT)  AM-PAC 6 Clicks Score: 15  Score: 17  Functional Limitation: Mobility: Walking and moving around  Mobility: Walking and Moving Around Current Status (): At least 40 percent but less than 60 percent impaired, limited or restricted  Mobility: Walking and Moving Around Goal Status (): At least 20 percent but less than 40 percent impaired, limited or restricted      Marlene Castellanos, KIRILL  Student  10/11/2018

## 2018-10-11 NOTE — THERAPY TREATMENT NOTE
Acute Care - Physical Therapy Treatment Note  [facility]     Patient Name: America Garcia  : 1955  MRN: [id number]  Today's Date: 10/11/2018  Onset of Illness/Injury or Date of Surgery: 10/09/18  Date of Referral to PT: 10/10/18  Referring Physician: Latasha AMBROSE    Admit Date: 10/9/2018    Visit Dx:    ICD-10-CM ICD-9-CM   1. Closed bimalleolar fracture of right ankle, initial encounter S82.841A 824.4   2. Fall, initial encounter W19.XXXA E888.9   3. Injury of head, initial encounter S09.90XA 959.01   4. Closed bimalleolar fracture of right ankle with routine healing, subsequent encounter S82.841D V54.19   5. Decreased activities of daily living (ADL) R68.89 780.99   6. Impaired functional mobility, balance, gait, and endurance Z74.09 V49.89     Patient Active Problem List   Diagnosis   • Closed bimalleolar fracture of right ankle   • Closed fracture dislocation of right ankle       Therapy Treatment          Rehabilitation Treatment Summary     Row Name 10/11/18 1351             Treatment Time/Intention    Discipline physical therapy assistant  -      Document Type therapy note (daily note)  -2      Subjective Information complains of;pain;weakness  -2      Existing Precautions/Restrictions fall;right;non-weight bearing   NWB RLE  -AH2      Treatment Considerations/Comments NWB RLE  -AH2      Recorded by [AH] Shandra Chavira, PTA 10/11/18 1351  [2] Shandra Chavira, PTA 10/11/18 1526      Row Name 10/11/18 1351             Safety Issues, Functional Mobility    Safety Issues Affecting Function (Mobility) impulsivity  -      Recorded by [AH] Shandra Chavira, PTA 10/11/18 1533      Row Name 10/11/18 1351             Mobility Assessment/Intervention    Extremity Weight-bearing Status right lower extremity  -      Right Lower Extremity (Weight-bearing Status) non weight-bearing (NWB)  -      Recorded by [] Shandra Chavira, PTA 10/11/18 1533      Row Name 10/11/18 1351             Bed  Mobility Assessment/Treatment    Supine-Sit [place] (Bed Mobility) supervision  -      Sit-Supine [place] (Bed Mobility) --   chair  -      Recorded by [] Shandra Chavira, PTA 10/11/18 1533      Row Name 10/11/18 1351             Transfer Assessment/Treatment    Transfer Assessment/Treatment stand pivot/stand step transfer  -      Comment (Transfers) worked on stand pivot to left side 5 times,cues for NWB RLE and safety  -AH      Recorded by [] Shandra Chavira, PTA 10/11/18 1533      Row Name 10/11/18 1351             Sit-Stand Transfer    Sit-Stand [place] (Transfers) minimum assist (75% patient effort);verbal cues  -      Recorded by [] Shandra Chavira, PTA 10/11/18 1533      Row Name 10/11/18 1351             Stand-Sit Transfer    Stand-Sit [place] (Transfers) minimum assist (75% patient effort);verbal cues  -      Recorded by [] Shandra Chavira, PTA 10/11/18 1533      Row Name 10/11/18 1351             Stand Pivot/Stand Step Transfer    Stand Pivot/Stand Step [place] minimum assist (75% patient effort);verbal cues  -      Assistive Device (Stand Pivot Stand Step Transfer) walker, front-wheeled  -      Recorded by [] Shandra Chavira, PTA 10/11/18 1533      Row Name 10/11/18 1351             Motor Skills Assessment/Interventions    Additional Documentation Therapeutic Exercise (Group)  -      Recorded by [] Shandra Chavira, PTA 10/11/18 1533      Row Name 10/11/18 1351             Therapeutic Exercise    Lower Extremity (Therapeutic Exercise) LAQ (long arc quad), bilateral;marching while seated  -      Lower Extremity Range of Motion (Therapeutic Exercise) hip abduction/adduction, bilateral;ankle dorsiflexion/plantar flexion, left  -      Sets/Reps (Therapeutic Exercise) 20  -      Recorded by [] Shandra Chavira, PTA 10/11/18 1533      Row Name 10/11/18 1351             Positioning and Restraints    Pre-Treatment Position in bed  -      Post Treatment  Position chair  -AH      In Chair reclined;call light within reach;encouraged to call for assist;exit alarm on;notified nsg  -AH      Recorded by [] Shandra Chavira, PTA 10/11/18 1533      Row Name 10/11/18 1351             Pain Assessment    Additional Documentation Pain Scale: Numbers Pre/Post-Treatment (Group)  -AH      Recorded by [] Shandra Chavira, PTA 10/11/18 1533      Row Name 10/11/18 1351             Pain Scale: Numbers Pre/Post-Treatment    Pain Scale: Numbers, Pretreatment 7/10  -AH      Pain Scale: Numbers, Post-Treatment 7/10  -AH      Pain Location - Side Right  -AH      Pain Location ankle  -AH      Pain Intervention(s) Medication (See MAR);Repositioned  -AH      Recorded by [] Shandra Chavira, PTA 10/11/18 1533      Row Name                Wound 10/10/18 1112 Right leg incision    Wound - Properties Group Date first assessed: 10/10/18 [AD] Time first assessed: 1112 [AD] Side: Right [AD] Location: leg [AD] Type: incision [AD] Recorded by:  [AD] Dean De Santiago RN 10/10/18 1112      User Key  (r) = Recorded By, (t) = Taken By, (c) = Cosigned By    Initials Name Effective Dates Discipline     Shandra Chavira, PTA 08/02/16 -  PT    AD Dean De Santiago RN 08/02/17 -  Nurse          Wound 10/10/18 1112 Right leg incision (Active)   Dressing Appearance dry;intact 10/11/2018  7:42 AM   Drainage Amount none 10/11/2018  7:42 AM   Dressing Care, Wound elastic bandage 10/11/2018  7:42 AM               PT Rehab Goals     Row Name 10/11/18 0955             Transfer Goal 1 (PT)    Activity/Assistive Device (Transfer Goal 1, PT) transfers, all;walker, rolling   pivots  -MS (r) TD (t) MS (c)      [place] Level/Cues Needed (Transfer Goal 1, PT) standby assist  -MS (r) TD (t) MS (c)      Time Frame (Transfer Goal 1, PT) by discharge  -MS (r) TD (t) MS (c)      Progress/Outcome (Transfer Goal 1, PT) goal ongoing  -MS (r) TD (t) MS (c)         Patient Education Goal (PT)    Activity (Patient Education  Goal, PT) --  -MS (r) TD (t) MS (c)      [place]/Cues/Accuracy (Memory Goal 2, PT) --  -MS (r) TD (t) MS (c)      Time Frame (Patient Education Goal, PT) --  -MS (r) TD (t) MS (c)      Progress/Outcome (Patient Education Goal, PT) --  -MS (r) TD (t) MS (c)        User Key  (r) = Recorded By, (t) = Taken By, (c) = Cosigned By    Initials Name Provider Type Discipline    MS Cary Kern ALBERTO, PT, DPT, NCS Physical Therapist PT    TD Marlene Castellanos, PT Student PT Student PT          Physical Therapy Education     Title: PT OT SLP Therapies (Done)     Topic: Physical Therapy (Done)     Point: Mobility training (Done)    Learning Progress Summary     Learner Status Readiness Method Response Comment Documented by    Patient Done Acceptance E VU,NR,DU PT was educated on benefits of PT and PT POC. Pt was educated on safety awareness and body mechanics with transfers and use of AD. Pt was edcuated on positioning of AD before hopping and on her impulsitivity. Pt was educated on NWB status. TD 10/11/18 1210          Point: Body mechanics (Done)    Learning Progress Summary     Learner Status Readiness Method Response Comment Documented by    Patient Done Acceptance E VU,NR,DU PT was educated on benefits of PT and PT POC. Pt was educated on safety awareness and body mechanics with transfers and use of AD. Pt was edcuated on positioning of AD before hopping and on her impulsitivity. Pt was educated on NWB status. TD 10/11/18 1210          Point: Precautions (Done)    Learning Progress Summary     Learner Status Readiness Method Response Comment Documented by    Patient Done Acceptance E VU,NR,DU PT was educated on benefits of PT and PT POC. Pt was educated on safety awareness and body mechanics with transfers and use of AD. Pt was edcuated on positioning of AD before hopping and on her impulsitivity. Pt was educated on NWB status. TD 10/11/18 1210                      User Key     Initials Effective Dates Name Provider Type  Discipline    TD 09/07/18 -  Marlene Castellanos, PT Student PT Student PT                    PT Recommendation and Plan                Outcome Measures     Row Name 10/11/18 1200 10/11/18 1038          How much help from another person do you currently need...    Turning from your back to your side while in flat bed without using bedrails? 4  -MS (r) TD (t) MS (c)  --     Moving from lying on back to sitting on the side of a flat bed without bedrails? 4  -MS (r) TD (t) MS (c)  --     Moving to and from a bed to a chair (including a wheelchair)? 2  -MS (r) TD (t) MS (c)  --     Standing up from a chair using your arms (e.g., wheelchair, bedside chair)? 2  -MS (r) TD (t) MS (c)  --     Climbing 3-5 steps with a railing? 1  -MS (r) TD (t) MS (c)  --     To walk in hospital room? 2  -MS (r) TD (t) MS (c)  --     AM-PAC 6 Clicks Score 15  -MS (r) TD (t)  --        How much help from another is currently needed...    Putting on and taking off regular lower body clothing?  -- 2  -AC     Bathing (including washing, rinsing, and drying)  -- 2  -AC     Toileting (which includes using toilet bed pan or urinal)  -- 2  -AC     Putting on and taking off regular upper body clothing  -- 4  -AC     Taking care of personal grooming (such as brushing teeth)  -- 3  -AC     Eating meals  -- 4  -AC     Score  -- 17  -AC        Functional Assessment    Outcome Measure Options AM-PAC 6 Clicks Basic Mobility (PT)  -MS (r) TD (t) MS (c) AM-PAC 6 Clicks Daily Activity (OT)  -AC       User Key  (r) = Recorded By, (t) = Taken By, (c) = Cosigned By    Initials Name Provider Type    Dean Gatica, OTR/L Occupational Therapist    Cary Ibrahim, PT, DPT, NCS Physical Therapist    Marlene Rayo, PT Student PT Student           Time Calculation:         PT Charges     Row Name 10/11/18 1533 10/11/18 1212          Time Calculation    Start Time 1351  - 1050   chart review: [id number], first eval part 1710-4832  -MS (r) TD (t) MS (c)      Stop Time 1425  - 1137  -MS (r) TD (t) MS (c)     Time Calculation (min) 34 min  - 47 min  -MS (r) TD (t)     PT Received On  -- 10/11/18  -MS (r) TD (t) MS (c)     PT Goal Re-Cert Due Date  -- 10/21/18  -MS (r) TD (t) MS (c)        Time Calculation- PT    Total Timed Code Minutes- PT 34 minute(s)  -  --        Timed Charges    65948 - PT Therapeutic Exercise Minutes 17 -AH  --     51234 - PT Therapeutic Activity Minutes 17 -AH  --       User Key  (r) = Recorded By, (t) = Taken By, (c) = Cosigned By    Initials Name Provider Type     Shandra Chavira, PTA Physical Therapy Assistant    MS Kern Cary ALBERTO, PT, DPT, NCS Physical Therapist    TD Marlene Castellanos, PT Student PT Student        Therapy Suggested Charges     Code   Minutes Charges    04097 (CPT®) Hc Pt Neuromusc Re Education Ea 15 Min      05073 (CPT®) Hc Pt Ther Proc Ea 15 Min 17 1    00935 (CPT®) Hc Gait Training Ea 15 Min      52930 (CPT®) Hc Pt Therapeutic Act Ea 15 Min 17 1    58112 (CPT®) Hc Pt Manual Therapy Ea 15 Min      37070 (CPT®) Hc Pt Iontophoresis Ea 15 Min      62465 (CPT®) Hc Pt Elec Stim Ea-Per 15 Min      15233 (CPT®) Hc Pt Ultrasound Ea 15 Min      68244 (CPT®) Hc Pt Self Care/Mgmt/Train Ea 15 Min      56768 (CPT®) Hc Pt Prosthetic (S) Train Initial Encounter, Each 15 Min      21752 (CPT®) Hc Pt Orthotic(S)/Prosthetic(S) Encounter, Each 15 Min      92163 (CPT®) Hc Orthotic(S) Mgmt/Train Initial Encounter, Each 15min      Total  34 2        Therapy Charges for Today     Code Description Service Date Service Provider Modifiers Qty    04916652290 HC PT THER PROC EA 15 MIN 10/11/2018 Shandra Chavira, PTA GP, KX 1    64062897486 HC PT THERAPEUTIC ACT EA 15 MIN 10/11/2018 Shandra Chavira, PTA GP, KX 1          PT G-Codes  PT Professional Judgement Used?: Yes  Outcome Measure Options: AM-PAC 6 Clicks Basic Mobility (PT)  AM-PAC 6 Clicks Score: 15  Score: 17  Functional Limitation: Mobility: Walking and moving around  Mobility: Walking  and Moving Around Current Status (): At least 40 percent but less than 60 percent impaired, limited or restricted  Mobility: Walking and Moving Around Goal Status (): At least 20 percent but less than 40 percent impaired, limited or restricted    Shandra Chavira, PTA  10/11/2018

## 2018-10-11 NOTE — PLAN OF CARE
Problem: Patient Care Overview  Goal: Plan of Care Review  Outcome: Ongoing (interventions implemented as appropriate)   10/11/18 4590   Coping/Psychosocial   Plan of Care Reviewed With patient   Plan of Care Review   Progress improving   OTHER   Outcome Summary medicated x 2 today for c/o pain. worked with therapy today. up in chair. miralax given today aas ordered. dc plan home with  vs [facility]. safety maintianed.

## 2018-10-12 ENCOUNTER — TELEPHONE (OUTPATIENT)
Dept: NEUROLOGY | Age: 63
End: 2018-10-12

## 2018-10-12 VITALS
BODY MASS INDEX: 28.52 KG/M2 | WEIGHT: 171.19 LBS | RESPIRATION RATE: 16 BRPM | SYSTOLIC BLOOD PRESSURE: 109 MMHG | DIASTOLIC BLOOD PRESSURE: 57 MMHG | HEART RATE: 78 BPM | HEIGHT: 65 IN | TEMPERATURE: 98.5 F | OXYGEN SATURATION: 93 %

## 2018-10-12 LAB
GLUCOSE BLDC GLUCOMTR-MCNC: 151 MG/DL (ref 70–130)
GLUCOSE BLDC GLUCOMTR-MCNC: 157 MG/DL (ref 70–130)

## 2018-10-12 PROCEDURE — 97530 THERAPEUTIC ACTIVITIES: CPT

## 2018-10-12 PROCEDURE — 97535 SELF CARE MNGMENT TRAINING: CPT

## 2018-10-12 PROCEDURE — 94760 N-INVAS EAR/PLS OXIMETRY 1: CPT

## 2018-10-12 PROCEDURE — 94799 UNLISTED PULMONARY SVC/PX: CPT

## 2018-10-12 PROCEDURE — 82962 GLUCOSE BLOOD TEST: CPT

## 2018-10-12 RX ORDER — BISACODYL 10 MG
10 SUPPOSITORY, RECTAL RECTAL DAILY PRN
Status: DISCONTINUED | OUTPATIENT
Start: 2018-10-12 | End: 2018-10-12 | Stop reason: HOSPADM

## 2018-10-12 RX ADMIN — MILK OF MAGNESIA 10 ML: 2400 CONCENTRATE ORAL at 08:25

## 2018-10-12 RX ADMIN — ASPIRIN 81 MG 81 MG: 81 TABLET ORAL at 08:25

## 2018-10-12 RX ADMIN — OXYCODONE HYDROCHLORIDE AND ACETAMINOPHEN 1 TABLET: 10; 325 TABLET ORAL at 09:48

## 2018-10-12 RX ADMIN — OXYCODONE HYDROCHLORIDE AND ACETAMINOPHEN 1 TABLET: 10; 325 TABLET ORAL at 15:55

## 2018-10-12 RX ADMIN — ROPINIROLE HYDROCHLORIDE 0.5 MG: 0.25 TABLET, FILM COATED ORAL at 08:25

## 2018-10-12 RX ADMIN — OXYCODONE HYDROCHLORIDE AND ACETAMINOPHEN 1 TABLET: 10; 325 TABLET ORAL at 05:17

## 2018-10-12 RX ADMIN — METFORMIN HYDROCHLORIDE 1000 MG: 500 TABLET ORAL at 08:25

## 2018-10-12 RX ADMIN — GABAPENTIN 300 MG: 300 CAPSULE ORAL at 05:17

## 2018-10-12 RX ADMIN — ESCITALOPRAM 10 MG: 10 TABLET, FILM COATED ORAL at 08:25

## 2018-10-12 RX ADMIN — GABAPENTIN 300 MG: 300 CAPSULE ORAL at 14:11

## 2018-10-12 RX ADMIN — CELECOXIB 200 MG: 200 CAPSULE ORAL at 08:25

## 2018-10-12 RX ADMIN — GLIPIZIDE 5 MG: 5 TABLET ORAL at 08:25

## 2018-10-12 RX ADMIN — POLYETHYLENE GLYCOL 3350 17 G: 17 POWDER, FOR SOLUTION ORAL at 08:26

## 2018-10-12 RX ADMIN — DOCUSATE SODIUM 100 MG: 100 CAPSULE ORAL at 08:25

## 2018-10-12 RX ADMIN — PANTOPRAZOLE SODIUM 40 MG: 40 TABLET, DELAYED RELEASE ORAL at 05:17

## 2018-10-12 NOTE — PROGRESS NOTES
Continued Stay Note   [facility]     Patient Name: America Garcia  MRN: [id number]  Today's Date: 10/12/2018    Admit Date: 10/9/2018          Discharge Plan     Row Name 10/12/18 1043       Plan    Plan Prefers [facility] when ordered    Patient/Family in Agreement with Plan yes    Plan Comments Pt will return home with spouse since Acute Rehab was not an option. They are wanting [facility] care to follow at home, will await orders.  Knee scooters are not covered under ins. and they are aware.  Will follow.               Discharge Codes    No documentation.       Expected Discharge Date and Time     Expected Discharge Date Expected Discharge Time    Oct 11, 2018             YESY Servin

## 2018-10-12 NOTE — PROGRESS NOTES
This patient was previously discharged yesterday but did not go home.  She will be discharged today therefore her discharge date is 10 1218 incident 10 1118 all orders still apply for follow-up

## 2018-10-12 NOTE — PROGRESS NOTES
Continued Stay Note   [facility]     Patient Name: America Garcia  MRN: [id number]  Today's Date: 10/12/2018    Admit Date: 10/9/2018          Discharge Plan     Row Name 10/12/18 1429       Plan    Plan [facility]     Patient/Family in Agreement with Plan yes    Final Discharge Disposition Code 06 - home with home health care    Final Note Pt is being discharged home today. Pt has  care orders and prefers [facility], called [facility] there and provided referral and informed of d/c today [phone].  Asked pt preference on where her RW comes from and she prefers [facility]. Called Siva at [facility] and provided referral and informed she would like delivered to room before she leaves [phone].              Discharge Codes    No documentation.       Expected Discharge Date and Time     Expected Discharge Date Expected Discharge Time    Oct 12, 2018             YESY Servin

## 2018-10-12 NOTE — THERAPY TREATMENT NOTE
Acute Care - Physical Therapy Treatment Note  [facility]     Patient Name: America Garcia  : 1955  MRN: [id number]  Today's Date: 10/12/2018  Onset of Illness/Injury or Date of Surgery: 10/09/18  Date of Referral to PT: 10/10/18  Referring Physician: Latasha AMBROSE    Admit Date: 10/9/2018    Visit Dx:    ICD-10-CM ICD-9-CM   1. Closed bimalleolar fracture of right ankle, initial encounter S82.841A 824.4   2. Fall, initial encounter W19.XXXA E888.9   3. Injury of head, initial encounter S09.90XA 959.01   4. Closed bimalleolar fracture of right ankle with routine healing, subsequent encounter S82.841D V54.19   5. Decreased activities of daily living (ADL) R68.89 780.99   6. Impaired functional mobility, balance, gait, and endurance Z74.09 V49.89     Patient Active Problem List   Diagnosis   • Closed bimalleolar fracture of right ankle   • Closed fracture dislocation of right ankle       Therapy Treatment          Rehabilitation Treatment Summary     Row Name 10/12/18 1000 10/12/18 0748          Treatment Time/Intention    Discipline physical therapy assistant  -AH occupational therapy assistant  -TS     Document Type therapy note (daily note)  -2 therapy note (daily note)  -TS     Subjective Information complains of;pain;weakness  -2 complains of;numbness  -TS2     Patient/Family Observations  -- son present  -TS2     Existing Precautions/Restrictions fall;right;non-weight bearing   NWB RLE  -AH2 fall;right;non-weight bearing   NWB RLE  -TS2     Recorded by [AH] Shandra Chavira, PTA 10/12/18 1022  [AH2] Shandra Chavira, PTA 10/12/18 1034 [TS] Augusta Linares WISDOM/L 10/12/18 0748  [TS2] Augusta Linares WISDOM/L 10/12/18 0940     Row Name 10/12/18 0748             Cognitive Assessment/Intervention- PT/OT    Safety Deficit (Cognitive) impulsivity  -TS      Personal Safety Interventions elopement precautions initiated;fall prevention program maintained;gait belt;nonskid shoes/slippers when out of  bed  -TS      Recorded by [TS] Augusta Linares COTA/L 10/12/18 0940      Row Name 10/12/18 1000             Mobility Assessment/Intervention    Extremity Weight-bearing Status right lower extremity  -      Right Lower Extremity (Weight-bearing Status) non weight-bearing (NWB)  -      Recorded by [] Shandra Chavira, PTA 10/12/18 1034      Row Name 10/12/18 1000 10/12/18 0748          Bed Mobility Assessment/Treatment    Bed Mobility Assessment/Treatment  -- supine-sit;scooting/bridging  -TS     Scooting/Bridging [place] (Bed Mobility)  -- supervision;conditional independence  -TS     Supine-Sit [place] (Bed Mobility) supervision  - supervision;conditional independence  -TS     Sit-Supine [place] (Bed Mobility) supervision  -  --     Assistive Device (Bed Mobility)  -- head of bed elevated;bed rails  -TS     Recorded by [] Shandra Chavira, PTA 10/12/18 1034 [TS] Augusta Linares WISDOM/L 10/12/18 0940     Row Name 10/12/18 0748             Functional Mobility    Functional Mobility- Ind. Level contact guard assist;minimum assist (75% patient effort);verbal cues required  -TS      Functional Mobility- Device rolling walker  -TS      Functional Mobility- Comment in room, in bathroom. recliner then positioned right outside BR door and pt was able to take a few hops to recliner from BSC  -TS      Recorded by [TS] Augusta Linares COTA/L 10/12/18 0940      Row Name 10/12/18 1000 10/12/18 0748          Transfer Assessment/Treatment    Transfer Assessment/Treatment stand pivot/stand step transfer  - sit-stand transfer;stand-sit transfer;toilet transfer  -TS     Comment (Transfers) worked on stand pivot trans to left 6 times, pt cont to require cues for tech  - pt instructed to visually attend to hand and L foot placement prior to transfers due to pt neuropathy in hands and feet. Pt also educated repeatedly on importance of taking her time with all tasks including transfers to  ensure greater safety and decreasing fall risks  -TS     Recorded by [] Shandra Chavira, PTA 10/12/18 1034 [TS] Augusta Linares WISDOM/L 10/12/18 0940     Row Name 10/12/18 1000 10/12/18 0748          Sit-Stand Transfer    Sit-Stand [place] (Transfers) minimum assist (75% patient effort);verbal cues  - contact guard;minimum assist (75% patient effort)  -TS     Assistive Device (Sit-Stand Transfers)  -- walker, front-wheeled  -TS     Recorded by [] Shandra Chavira, PTA 10/12/18 1034 [TS] Augusta Linares WISDOM/L 10/12/18 0940     Row Name 10/12/18 1000 10/12/18 0748          Stand-Sit Transfer    Stand-Sit [place] (Transfers) minimum assist (75% patient effort);verbal cues  - contact guard;minimum assist (75% patient effort);verbal cues  -TS     Recorded by [] Shandra Chavira, PTA 10/12/18 1034 [TS] Augusta Linares WISDOM/L 10/12/18 0940     Row Name 10/12/18 1000             Stand Pivot/Stand Step Transfer    Stand Pivot/Stand Step [place] minimum assist (75% patient effort);verbal cues  -      Assistive Device (Stand Pivot Stand Step Transfer) walker, front-wheeled  -AH      Recorded by [] Shandra Chavira, PTA 10/12/18 1034      Row Name 10/12/18 0748             Toilet Transfer    Type (Toilet Transfer) sit-stand;stand-sit  -TS      [place] Level (Toilet Transfer) contact guard;minimum assist (75% patient effort);verbal cues  -TS      Assistive Device (Toilet Transfer) commode, bedside without drop arms;walker, front-wheeled  -TS      Recorded by [TS] Augusta Linares WISDOM/L 10/12/18 0940      Row Name 10/12/18 0748             ADL Assessment/Intervention    09019 - OT Self Care/Mgmt Minutes 39  -TS      BADL Assessment/Intervention toileting  -TS      Recorded by [TS] Augusta Linares WISDOM/L 10/12/18 0940      Row Name 10/12/18 0748             Toileting Assessment/Training    [place] Level (Toileting) toileting skills;adjust/manage clothing;perform  perineal hygiene;set up;verbal cues;minimum assist (75% patient effort);moderate assist (50% patient effort)  -TS      Assistive Devices (Toileting) commode, bedside without drop arms  -TS      Toileting Position supported sitting;supported standing  -TS      Recorded by [TS] Augusta Linares WISDOM/L 10/12/18 0940      Row Name 10/12/18 1000             Therapeutic Exercise    77902 - PT Therapeutic Activity Minutes 30  -AH      Recorded by [] Shandra Chavira, PTA 10/12/18 1034      Row Name 10/12/18 0748             Dynamic Standing Balance    Level of [place], Reaches Outside Midline (Standing, Dynamic Balance) minimal assist, 75% patient effort  -TS      Recorded by [TS] Augusta Linares WISDOM/L 10/12/18 0940      Row Name 10/12/18 1000 10/12/18 0748          Positioning and Restraints    Pre-Treatment Position in bed  -AH in bed  -TS     Post Treatment Position bed  -AH chair  -TS     In Bed fowlers;call light within reach;encouraged to call for assist;exit alarm on;with family/caregiver  -  --     In Chair  -- sitting;call light within reach;encouraged to call for assist;exit alarm on;with family/caregiver  -TS     Recorded by [] Shandra Chavira, PTA 10/12/18 1034 [TS] Augusta Linares WISDOM/L 10/12/18 0940     Row Name 10/12/18 1000             Pain Assessment    Additional Documentation Pain Scale 2: Numbers Pre/Post-Treatment (Group)  -AH      Recorded by [] Shandra Chavira, PTA 10/12/18 1034      Row Name 10/12/18 1000 10/12/18 0748          Pain Scale: Numbers Pre/Post-Treatment    Pain Scale: Numbers, Pretreatment 4/10  -AH 0/10 - no pain  -TS     Pain Scale: Numbers, Post-Treatment 4/10  -AH  --     Pain Location - Side Right  -AH  --     Pain Location ankle  -AH  --     Pain Intervention(s) Repositioned  -  --     Recorded by [] Shandra Chavira, PTA 10/12/18 1034 [TS] Augusta Linares WISDOM/L 10/12/18 0940     Row Name 10/12/18 1000             Pain Scale 2: Numbers  Pre/Post-Treatment    Pain Scale 2: Numbers, Pretreatment 6/10  -      Pain Scale 2: Numbers, Post-Treatment 6/10  -      Pain Location 2 neck  -AH      Pain Intervention(s) 2 Repositioned  -AH      Recorded by [] Shandra Chavira, PTA 10/12/18 1034      Row Name                Wound 10/10/18 1112 Right leg incision    Wound - Properties Group Date first assessed: 10/10/18 [AD] Time first assessed: 1112 [AD] Side: Right [AD] Location: leg [AD] Type: incision [AD] Recorded by:  [AD] Dean De Santiago RN 10/10/18 1112    Row Name 10/12/18 0748             Outcome Summary/Treatment Plan (OT)    Daily Summary of Progress (OT) progress towards functional goals is fair  -TS      Recorded by [TS] Augusta Linares COTA/L 10/12/18 0940        User Key  (r) = Recorded By, (t) = Taken By, (c) = Cosigned By    Initials Name Effective Dates Discipline     Shandra Chavira PTA 08/02/16 -  PT    TS Augusta Linares COTA/L 08/02/16 -  OT    AD Dean De Santiago RN 08/02/17 -  Nurse          Wound 10/10/18 1112 Right leg incision (Active)   Dressing Appearance dry;intact 10/12/2018  8:00 AM   Drainage Amount none 10/12/2018  8:00 AM   Dressing Care, Wound elastic bandage 10/12/2018  8:00 AM             Physical Therapy Education     Title: PT OT SLP Therapies (Done)     Topic: Physical Therapy (Done)     Point: Mobility training (Done)    Learning Progress Summary     Learner Status Readiness Method Response Comment Documented by    Patient Done Acceptance E VU,NR,DU PT was educated on benefits of PT and PT POC. Pt was educated on safety awareness and body mechanics with transfers and use of AD. Pt was edcuated on positioning of AD before hopping and on her impulsitivity. Pt was educated on NWB status. TD 10/11/18 1210          Point: Body mechanics (Done)    Learning Progress Summary     Learner Status Readiness Method Response Comment Documented by    Patient Done Acceptance E VU,NR,DU PT was educated on benefits of  PT and PT POC. Pt was educated on safety awareness and body mechanics with transfers and use of AD. Pt was edcuated on positioning of AD before hopping and on her impulsitivity. Pt was educated on NWB status. TD 10/11/18 1210          Point: Precautions (Done)    Learning Progress Summary     Learner Status Readiness Method Response Comment Documented by    Patient Done Acceptance E,TB VU,NR stand pivot NWB LLE  10/12/18 1034     Done Acceptance E VU,NR,DU PT was educated on benefits of PT and PT POC. Pt was educated on safety awareness and body mechanics with transfers and use of AD. Pt was edcuated on positioning of AD before hopping and on her impulsitivity. Pt was educated on NWB status. TD 10/11/18 1210    Family Done Acceptance E,TB VU,NR stand pivot NWB LLE  10/12/18 1034                      User Key     Initials Effective Dates Name Provider Type Discipline     08/02/16 -  Shandra Chavira PTA Physical Therapy Assistant PT     09/07/18 -  Marlene Castellanos, PT Student PT Student PT                    PT Recommendation and Plan     Plan of Care Reviewed With: patient  Progress: no change  Outcome Summary: pt with decreased safety awareness, worked on stand pivot to the left with rwx 6 times, min assist cues for safety and NWB LLE.          Outcome Measures     Row Name 10/12/18 1000 10/12/18 0900 10/11/18 1200       How much help from another person do you currently need...    Turning from your back to your side while in flat bed without using bedrails? 4  -  -- 4  -MS (r) TD (t) MS (c)    Moving from lying on back to sitting on the side of a flat bed without bedrails? 4  -AH  -- 4  -MS (r) TD (t) MS (c)    Moving to and from a bed to a chair (including a wheelchair)? 3  -AH  -- 2  -MS (r) TD (t) MS (c)    Standing up from a chair using your arms (e.g., wheelchair, bedside chair)? 3  -AH  -- 2  -MS (r) TD (t) MS (c)    Climbing 3-5 steps with a railing? 1  -  -- 1  -MS (r) TD (t) MS (c)    To walk  in hospital room? 2  -AH  -- 2  -MS (r) TD (t) MS (c)    AM-PAC 6 Clicks Score 17  -AH  -- 15  -MS (r) TD (t)       How much help from another is currently needed...    Putting on and taking off regular lower body clothing?  -- 2  -TS  --    Bathing (including washing, rinsing, and drying)  -- 2  -TS  --    Toileting (which includes using toilet bed pan or urinal)  -- 3  -TS  --    Putting on and taking off regular upper body clothing  -- 4  -TS  --    Taking care of personal grooming (such as brushing teeth)  -- 3  -TS  --    Eating meals  -- 4  -TS  --    Score  -- 18  -TS  --       Functional Assessment    Outcome Measure Options AM-PAC 6 Clicks Basic Mobility (PT)  -AH AM-PAC 6 Clicks Daily Activity (OT)  -TS AM-PAC 6 Clicks Basic Mobility (PT)  -MS (r) TD (t) MS (c)    Row Name 10/11/18 1038             How much help from another is currently needed...    Putting on and taking off regular lower body clothing? 2  -AC      Bathing (including washing, rinsing, and drying) 2  -AC      Toileting (which includes using toilet bed pan or urinal) 2  -AC      Putting on and taking off regular upper body clothing 4  -AC      Taking care of personal grooming (such as brushing teeth) 3  -AC      Eating meals 4  -AC      Score 17  -AC         Functional Assessment    Outcome Measure Options AM-PAC 6 Clicks Daily Activity (OT)  -AC        User Key  (r) = Recorded By, (t) = Taken By, (c) = Cosigned By    Initials Name Provider Type    AC Dean Felix N, OTR/L Occupational Therapist    Shandra Ross, PTA Physical Therapy Assistant    Augusta Field, WISDOM/L Occupational Therapy Assistant    Cary Ibrahim, PT, DPT, NCS Physical Therapist    TD Marlene Castellanos, PT Student PT Student           Time Calculation:         PT Charges     Row Name 10/12/18 1036 10/12/18 1000          Time Calculation    Start Time 1000  -AH  --     Stop Time 1030  -AH  --     Time Calculation (min) 30 min  -AH  --     PT Received  On 10/12/18  -  --     PT Goal Re-Cert Due Date 10/21/18  -  --        Time Calculation- PT    Total Timed Code Minutes- PT 30 minute(s)  -  --        Timed Charges    65385 - PT Therapeutic Activity Minutes  -- 30  -AH       User Key  (r) = Recorded By, (t) = Taken By, (c) = Cosigned By    Initials Name Provider Type     Shandra Chavira PTA Physical Therapy Assistant        Therapy Suggested Charges     Code   Minutes Charges    82566 (CPT®) Hc Pt Neuromusc Re Education Ea 15 Min      01966 (CPT®) Hc Pt Ther Proc Ea 15 Min      41570 (CPT®) Hc Gait Training Ea 15 Min      15337 (CPT®) Hc Pt Therapeutic Act Ea 15 Min 30 2    57571 (CPT®) Hc Pt Manual Therapy Ea 15 Min      95264 (CPT®) Hc Pt Iontophoresis Ea 15 Min      01548 (CPT®) Hc Pt Elec Stim Ea-Per 15 Min      86961 (CPT®) Hc Pt Ultrasound Ea 15 Min      04721 (CPT®) Hc Pt Self Care/Mgmt/Train Ea 15 Min      66142 (CPT®) Hc Pt Prosthetic (S) Train Initial Encounter, Each 15 Min      47182 (CPT®) Hc Pt Orthotic(S)/Prosthetic(S) Encounter, Each 15 Min      58227 (CPT®) Hc Orthotic(S) Mgmt/Train Initial Encounter, Each 15min      Total  30 2        Therapy Charges for Today     Code Description Service Date Service Provider Modifiers Qty    89411272171 HC PT THER PROC EA 15 MIN 10/11/2018 Shandra Chavira, SHEREE GP, KX 1    22011368913 HC PT THERAPEUTIC ACT EA 15 MIN 10/11/2018 Shandra Chavira PTA GP, KX 1    66267662490 HC PT THERAPEUTIC ACT EA 15 MIN 10/12/2018 Shandra Chavira, PTA GP, KX 2          PT G-Codes  PT Professional Judgement Used?: Yes  Outcome Measure Options: AM-PAC 6 Clicks Basic Mobility (PT)  AM-PAC 6 Clicks Score: 17  Score: 18  Functional Limitation: Mobility: Walking and moving around  Mobility: Walking and Moving Around Current Status (): At least 40 percent but less than 60 percent impaired, limited or restricted  Mobility: Walking and Moving Around Goal Status (): At least 20 percent but less than 40 percent impaired,  limited or restricted    Shandra Chavira, PTA  10/12/2018

## 2018-10-12 NOTE — DISCHARGE SUMMARY
[facility] Medicine Services  DISCHARGE SUMMARY       Date of Admission: 10/9/2018  Date of Discharge:  10/12/2018  Primary Care Physician: Balaji Cornelius MD    Presenting Problem/History of Present Illness:  Closed bimalleolar fracture of right ankle, initial encounter [S82.843Y]     Final Discharge Diagnoses:  1.  Acute Bimalleolar fracture dislocation of the right ankle- status post open reduction internal fixation 10/10  2.  Fall  3.  Non-insulin-dependent diabetes mellitus type II with hyperglycemia, hemoglobin A1c 7.8%  4.  Polyneuropathy likely secondary to diabetes mellitus  5.  Essential hypertension  6.  Hyperlipidemia  7.  Normocytic anemia    Consults:   1.  Dr. Bart Oglesby- orthopedic surgery    Procedures Performed:   1.  10/10/2018-open reduction internal fixation right ankle fracture    Pertinent Test Results:   Lab Results (all)     Procedure Component Value Units Date/Time    POC Glucose Once [[id number]]  (Abnormal) Collected:  10/12/18 1051    Specimen:  Blood Updated:  10/12/18 1103     Glucose 157 (H) mg/dL      Comment: : [id number] Reasor NathanMeter ID: [id number]       POC Glucose Once [[id number]]  (Abnormal) Collected:  10/12/18 0722    Specimen:  Blood Updated:  10/12/18 0733     Glucose 151 (H) mg/dL      Comment: : [id number] Reasor NathanMeter ID: [id number]       POC Glucose Once [[id number]]  (Abnormal) Collected:  10/11/18 2035    Specimen:  Blood Updated:  10/11/18 2056     Glucose 161 (H) mg/dL      Comment: : [id number] Estehr WildvaMeter ID: [id number]       POC Glucose Once [[id number]]  (Abnormal) Collected:  10/11/18 1708    Specimen:  Blood Updated:  10/11/18 1720     Glucose 139 (H) mg/dL      Comment: : [id number] Saurabh Rivera AnnMeter ID: [id number]       POC Glucose Once [[id number]]  (Abnormal) Collected:  10/11/18 1056    Specimen:  Blood Updated:  10/11/18 1108     Glucose 188 (H) mg/dL      Comment: :  [id number] Reasor NathanMeter ID: [id number]       Basic Metabolic Panel [[id number]]  (Abnormal) Collected:  10/11/18 0923    Specimen:  Blood Updated:  10/11/18 1041     Glucose 212 (H) mg/dL      BUN 8 mg/dL      Creatinine 0.70 mg/dL      Sodium 138 mmol/L      Potassium 3.9 mmol/L      Chloride 99 mmol/L      CO2 29.0 mmol/L      Calcium 9.3 mg/dL      eGFR Non African Amer 84 mL/min/1.73      BUN/Creatinine Ratio 11.4     Anion Gap 10.0 mmol/L     Narrative:       GFR Normal >60  Chronic Kidney Disease <60  Kidney Failure <15    CBC (No Diff) [[id number]]  (Abnormal) Collected:  10/11/18 0923    Specimen:  Blood Updated:  10/11/18 1026     WBC 10.03 10*3/mm3      RBC 3.36 (L) 10*6/mm3      Hemoglobin 9.3 (L) g/dL      Hematocrit 28.7 (L) %      MCV 85.4 fL      MCH 27.7 (L) pg      MCHC 32.4 (L) g/dL      RDW 14.4 %      RDW-SD 44.7 fl      MPV 10.8 fL      Platelets 266 10*3/mm3     POC Glucose Once [[id number]]  (Abnormal) Collected:  10/11/18 0729    Specimen:  Blood Updated:  10/11/18 0752     Glucose 165 (H) mg/dL      Comment: : [id number] Reasor NathanMeter ID: [id number]       POC Glucose Once [[id number]]  (Abnormal) Collected:  10/10/18 2040    Specimen:  Blood Updated:  10/10/18 2052     Glucose 229 (H) mg/dL      Comment: : [id number] [facility] ID: [id number]       POC Glucose Once [[id number]]  (Abnormal) Collected:  10/10/18 1644    Specimen:  Blood Updated:  10/10/18 1656     Glucose 338 (H) mg/dL      Comment: : [id number] Reasor NathanMeter ID: [id number]       POC Glucose Once [[id number]]  (Abnormal) Collected:  10/10/18 1136    Specimen:  Blood Updated:  10/10/18 1148     Glucose 210 (H) mg/dL      Comment: : [id number] Dawson [place] ID: [id number]       POC Glucose Once [[id number]]  (Abnormal) Collected:  10/10/18 0936    Specimen:  Blood Updated:  10/10/18 0948     Glucose 193 (H) mg/dL      Comment: : [id number] Refugio WALKEReter ID: [id number]        POC Glucose Once [[id number]]  (Abnormal) Collected:  10/10/18 0728    Specimen:  Blood Updated:  10/10/18 0739     Glucose 208 (H) mg/dL      Comment: : [id number] Reasor NathanMeter ID: [id number]       Basic Metabolic Panel [[id number]]  (Abnormal) Collected:  10/10/18 0533    Specimen:  Blood Updated:  10/10/18 0556     Glucose 170 (H) mg/dL      BUN 8 mg/dL      Creatinine 0.67 mg/dL      Sodium 139 mmol/L      Potassium 4.0 mmol/L      Chloride 98 mmol/L      CO2 31.0 mmol/L      Calcium 8.9 mg/dL      eGFR Non African Amer 89 mL/min/1.73      BUN/Creatinine Ratio 11.9     Anion Gap 10.0 mmol/L     CBC (No Diff) [[id number]]  (Abnormal) Collected:  10/10/18 0533    Specimen:  Blood Updated:  10/10/18 0543     WBC 7.69 10*3/mm3      RBC 3.52 (L) 10*6/mm3      Hemoglobin 9.7 (L) g/dL      Hematocrit 29.9 (L) %      MCV 84.9 fL      MCH 27.6 (L) pg      MCHC 32.4 (L) g/dL      RDW 14.1 %      RDW-SD 43.9 fl      MPV 10.1 fL      Platelets 275 10*3/mm3     POC Glucose Once [[id number]]  (Abnormal) Collected:  10/09/18 1948    Specimen:  Blood Updated:  10/09/18 1959     Glucose 286 (H) mg/dL      Comment: : [id number] [facility] [place] ID: [id number]       Hemoglobin A1c [[id number]] Collected:  10/09/18 1429    Specimen:  Blood Updated:  10/09/18 1816     Hemoglobin A1C 7.8 %     Comprehensive Metabolic Panel [[id number]]  (Abnormal) Collected:  10/09/18 1429    Specimen:  Blood Updated:  10/09/18 1450     Glucose 206 (H) mg/dL      BUN 8 mg/dL      Creatinine 0.68 mg/dL      Sodium 136 mmol/L      Potassium 4.3 mmol/L      Chloride 95 (L) mmol/L      CO2 29.0 mmol/L      Calcium 9.1 mg/dL      Total Protein 7.0 g/dL      Albumin 4.30 g/dL      ALT (SGPT) 36 U/L      AST (SGOT) 30 U/L      Alkaline Phosphatase 88 U/L      Total Bilirubin 0.4 mg/dL      eGFR Non African Amer 87 mL/min/1.73      Globulin 2.7 gm/dL      A/G Ratio 1.6 g/dL      BUN/Creatinine Ratio 11.8     Anion Gap 12.0 mmol/L     aPTT  [[id number]]  (Abnormal) Collected:  10/09/18 1429    Specimen:  Blood Updated:  10/09/18 1447     PTT 21.9 (L) seconds     Protime-INR [[id number]]  (Normal) Collected:  10/09/18 1429    Specimen:  Blood Updated:  10/09/18 1447     Protime 12.7 Seconds      INR 0.93    CBC Auto Differential [[id number]]  (Abnormal) Collected:  10/09/18 1429    Specimen:  Blood Updated:  10/09/18 1438     WBC 9.65 10*3/mm3      RBC 3.91 (L) 10*6/mm3      Hemoglobin 10.7 (L) g/dL      Hematocrit 32.9 (L) %      MCV 84.1 fL      MCH 27.4 (L) pg      MCHC 32.5 (L) g/dL      RDW 14.1 %      RDW-SD 43.2 fl      MPV 10.2 fL      Platelets 297 10*3/mm3      Neutrophil % 74.7 %      Lymphocyte % 16.2 %      Monocyte % 7.9 %      Eosinophil % 0.4 %      Basophil % 0.4 %      Immature Grans % 0.4 %      Neutrophils, Absolute 7.21 10*3/mm3      Lymphocytes, Absolute 1.56 10*3/mm3      Monocytes, Absolute 0.76 10*3/mm3      Eosinophils, Absolute 0.04 10*3/mm3      Basophils, Absolute 0.04 10*3/mm3      Immature Grans, Absolute 0.04 (H) 10*3/mm3      nRBC 0.0 /100 WBC         Imaging Results (all)     Procedure Component Value Units Date/Time    XR Ankle 2 View Right [[id number]] Collected:  10/10/18 1122     Updated:  10/11/18 0751    Narrative:       EXAMINATION: XR ANKLE 2 VW RIGHT- 10/10/2018 11:22 AM CDT     HISTORY: ORIF     Fluoroscopy time: 8 seconds     Number of images: 3     FINDINGS:  Multiple fluoroscopic images are submitted from the OR. A radiologist  was not present for the acquisition or intraoperative interpretation of  these images. Images demonstrate internal fixation of the medial  malleolus as well as the distal fibula. Please see the operative report  for details pertaining to this exam.  This report was finalized on 10/10/2018 11:23 by Dr. Hector Holbrook MD.    FL C Arm During Surgery [[id number]] Collected:  10/10/18 1122     Updated:  10/11/18 0751    Narrative:       EXAMINATION: XR ANKLE 2 VW RIGHT- 10/10/2018 11:22 AM  CDT     HISTORY: ORIF     Fluoroscopy time: 8 seconds     Number of images: 3     FINDINGS:  Multiple fluoroscopic images are submitted from the OR. A radiologist  was not present for the acquisition or intraoperative interpretation of  these images. Images demonstrate internal fixation of the medial  malleolus as well as the distal fibula. Please see the operative report  for details pertaining to this exam.  This report was finalized on 10/10/2018 11:23 by Dr. Hector Holbrook MD.    XR Ankle 2 View Right [[id number]] Collected:  10/09/18 1631     Updated:  10/09/18 1635    Narrative:       EXAMINATION: Right ankle 2 views 10/9/2018     HISTORY: Post reduction     FINDINGS: Two-view exam of the right ankle reveals the patient's  undergone successful closed reduction for a bimalleolar fracture  dislocation of the right ankle. There is good restoration of anatomic  alignment.       Impression:       Successful reduction for a bimalleolar fracture dislocation  of the right ankle with good restoration of anatomic alignment.  This report was finalized on 10/09/2018 16:32 by Dr. South Uriostegui MD.    XR Chest 1 View [[id number]] Collected:  10/09/18 1539     Updated:  10/09/18 1543    Narrative:       EXAMINATION: XR CHEST 1 VW- 10/9/2018 3:39 PM CDT     HISTORY: pre-op.     REPORT: Comparison is made with study from 4/9/2012.        One-view chest: Frontal view of the chest was obtained. The lungs are  clear and normally expanded. There is mild stable elevation of the right  hemidiaphragm. The cardiac and mediastinal silhouettes are within normal  limits. The visualized bony thorax and upper abdomen are unremarkable  except for evidence of previous cervical fusion surgery and multiple  surgical clips are present in the right upper abdomen..                                                                                                                                                       Impression:              No  acute cardiopulmonary abnormality.                                                                      This report was finalized on [id number] by Dr. Caleb Chase MD.    XR Foot 3+ View Right [[id number]] Collected:  10/09/18 1433     Updated:  10/09/18 1437    Narrative:       EXAMINATION: Right foot 3 views 10/9/2018     HISTORY: Fall     FINDINGS: Three-view exam of the right foot demonstrates a bimalleolar  fracture dislocation of the ankle. The foot is unremarkable with no  evidence of localized soft tissue swelling or discrete fracture line.       Impression:       1.. Normal exam of the right foot.  2. Bimalleolar fracture dislocation of the ankle.  This report was finalized on 10/09/2018 14:34 by Dr. South Uriostegui MD.    XR Ankle 3+ View Right [[id number]] Collected:  10/09/18 1530     Updated:  10/09/18 1436    Narrative:       EXAMINATION: Right ankle 3 views 10/9/2018     HISTORY: Right ankle pain after fall     FINDINGS: Three-view exam of the right ankle demonstrates diffuse soft  tissue swelling about the distal tibia and ankle with a bimalleolar  fracture/ dislocation of the ankle. The distal tibial plafond is  medially subluxed upon the talar dome. There is displacement of the  medial and lateral malleolar fracture fragments. No other fractures are  present.       Impression:       1.. Fracture dislocation of the ankle with a bimalleolar fracture. There  is medial subluxation of the distal tibia upon the talar dome.  2. Associated diffuse soft tissue swelling.  This report was finalized on 10/09/2018 14:33 by Dr. South Uriostegui MD.    XR Elbow 3+ View Left [[id number]] Collected:  10/09/18 1529     Updated:  10/09/18 1433    Narrative:       LEFT ELBOW, 2 views 10/9/2018 1:39 PM CDT     HISTORY: left elbow pain, fall     COMPARISON: None      FINDINGS:   Frontal and lateral views of the left elbow were obtained.      There is no fracture or dislocation. No significant joint  space  narrowing is noted. There is no significant joint effusion.       No gross soft tissue abnormality is visualized.        Impression:       1. Unremarkable radiographs of the left elbow.        This report was finalized on 10/09/2018 14:30 by Dr. South Uriostegui MD.    CT Cervical Spine Without Contrast [[id number]] Collected:  10/09/18 1407     Updated:  10/09/18 1416    Narrative:       CT CERVICAL SPINE WO CONTRAST- 10/9/2018 1:35 PM CDT     HISTORY: fall, hit head     COMPARISON: None      DOSE LENGTH PRODUCT: 186 mGy cm. Automated exposure control was utilized  to diminish patient radiation dose.     TECHNIQUE: Serial helical tomographic images of the cervical spine were  obtained without the use of intravenous contrast. Additionally, sagittal  and coronal reformatted images were also provided for review.      FINDINGS:   Alignment: There is mild loss of normal cervical lordosis.     Bones: There is no evidence of fracture. The patient's undergone prior  fusion at the C4-C7 levels with interbody grafts. There is degenerative  disc disease at C3-C4 with narrowing of the space height and spondylosis  as well as at the C7-T1 level.     Disc spaces: There is degenerative disc disease at C3-C4 and C7-T1.     Canal and neuroforamina: Neural foraminal narrowing is noted at multiple  levels related to facet overgrowth and uncinate spurring.     Soft tissues: Unremarkable.     Lung apices: Clear. No pneumothorax.       Impression:       1. The patient's undergone prior fusion at the C4-C7 levels.  Degenerative disc disease is noted at C3-C4 and C7-T1.  2. No evidence of fracture or malalignment.  3. Bony neural foraminal encroachment at multiple levels related to  facet arthropathy and uncinate spurring.        This report was finalized on 10/09/2018 14:13 by Dr. South Uriostegui MD.    CT Head Without Contrast [[id number]] Collected:  10/09/18 1403     Updated:  10/09/18 1408    Narrative:       EXAMINATION: CT  HEAD WO CONTRAST- 10/9/2018 2:03 PM CDT     HISTORY: Fall, hit head, head injury with pain     COMPARISON: None     DOSE: 601 mGy-cm     TECHNIQUE: Sequential imaging was performed from the vertex through the  base of the skull without the use of IV contrast.  Sagittal and coronal  reformations were made from the original source data and reviewed.  Automated exposure control was also utilized to decrease patient  radiation dose.     FINDINGS:   There is mild diffuse cerebral atrophy. There is no evidence of acute  intracranial hemorrhage, mass, or midline shift. There is no evidence of  acute territorial infarct. The ventricles appear normal in  configuration. The basilar cisterns are patent. Posterior fossa appears  grossly normal. The calvarium is intact. The visualized paranasal  sinuses and mastoid air cells appear clear. Calcifications are seen in  the cavernous carotid arteries. There is a mostly empty sella  configuration.          Impression:       No acute intracranial findings.     This report was finalized on 10/09/2018 14:05 by Dr. Hector Holbrook MD.        History of Present Illness on Day of Discharge: The patient's resting in bed with family at bedside.  She relates that her pain is better controlled today.  She does feel safe for discharge home today.  She denies any chest pain or shortness of breath.  She was able to have bowel movement today.    Hospital Course:  Ms. Garcia is a 63-year-old  female who follows Dr. Balaji Cornelius for primary care.  She has a past medical history significant for non-insulin-dependent diabetes mellitus type II, hyperlipidemia, chronic neck pain, hypertension, and polyneuropathy.  He presented to the [facility] emergency department on 10/09/2018 following a fall at home.  The patient states she has very little sensation in her feet and hands and has been having issues with this since June of this year.  This has caused problems with her gait, which  contributed to her fall.  In the emergency department, laboratory workup was essentially within normal limits.  X-ray of the right ankle showed fracture dislocation with a bimalleolar fracture.  There is also medial subluxation of the distal tibia with associated diffuse soft tissue swelling.  The fracture was reduced in the emergency department.  The patient was admitted to the hospitalist for further evaluation and management.    The patient was seen in consultation by orthopedic surgery, and was taken for open reduction internal fixation of the fracture on 10/10/2018.  The patient has done well postoperatively, and has been seen by both physical and occupational therapy.  They feel that the safest option for the patient will be home with home health.  We did speak to the physical therapist regarding their concerns of the patient's neuropathy and hyperreflexia.  This has been noted by her primary care provider, and she has recently been referred to Dr. Olivares at [facility].  Physical therapy is concerned about the possibility of cervical spinal compression relating to her hyperreflexia.  There was a CT without contrast performed of the C-spine on admission, which was without evidence of fracture or malalignment.  There was degenerative disc disease and bony neural formoterol encroachment at multiple levels related to arthropathy and uncinate spurring.  We will defer any further workup to Dr. Olivares.  The patient's work-up may be limited with the surgery performed and hardware that was implanted.     The patient is overall hemodynamically stable and appropriate for discharge home with home health today.  She will need to follow-up with her primary care provider next week and with Dr. Bart Oglesby as per his instruction.    Condition on Discharge:  Stable    Physical Exam on Discharge:  /57 (BP Location: Left arm, Patient Position: Lying)   Pulse 78   Temp 98.5 °F (36.9 °C) (Oral)   Resp 16   Ht  "165.1 cm (65\")   Wt 77.7 kg (171 lb 3 oz)   LMP  (LMP Unknown)   SpO2 93%   BMI 28.49 kg/m²   Physical Exam  Constitutional: She is oriented to person, place, and time. She appears well-developed and well-nourished. No distress.   HENT:   Head: Normocephalic and atraumatic.   Neck: Normal range of motion. Neck supple. No JVD present. No tracheal deviation present. No thyromegaly present.   Cardiovascular: Normal rate, regular rhythm, normal heart sounds and intact distal pulses.  Exam reveals no gallop and no friction rub.    No murmur heard.  Pulmonary/Chest: Effort normal and breath sounds normal. She has no wheezes. She has no rales.   Abdominal: Soft. Bowel sounds are normal. She exhibits no distension. There is no tenderness. There is no guarding.   Musculoskeletal: She exhibits tenderness (Right ankle). She exhibits no edema.   Lymphadenopathy:     She has no cervical adenopathy.   Neurological: She is alert and oriented to person, place, and time.   Peripheral neuropathy to bilateral feet and hands Capillary refill <3 seconds, able to move right toes.  Skin: Skin is warm and dry. No rash noted. No erythema.   Dressing intact to right ankle   Psychiatric: She has a normal mood and affect. Her behavior is normal. Judgment and thought content normal.   Vitals reviewed.    Discharge Disposition:  Home or Self Care    Discharge Medications:     Discharge Medications      New Medications      Instructions Start Date   polyethylene glycol pack packet  Commonly known as:  MIRALAX   17 g, Oral, Daily PRN         Continue These Medications      Instructions Start Date   AMBIEN 10 MG tablet  Generic drug:  zolpidem   10 mg, Oral, Nightly PRN      amitriptyline 10 MG tablet  Commonly known as:  ELAVIL   10 mg, Oral, Nightly      aspirin 81 MG chewable tablet   81 mg, Oral, Daily      carisoprodol 350 MG tablet  Commonly known as:  SOMA   350 mg, Oral, 4 Times Daily PRN      celecoxib 200 MG capsule  Commonly known " as:  CeleBREX   200 mg, Oral, Daily With Breakfast      clonazePAM 0.5 MG tablet  Commonly known as:  KlonoPIN   0.5 mg, Oral, 2 Times Daily PRN      colestipol 1 g tablet  Commonly known as:  COLESTID   1 g, Oral, 2 Times Daily      escitalopram 10 MG tablet  Commonly known as:  LEXAPRO   10 mg, Oral, Daily      esomeprazole 20 MG capsule  Commonly known as:  nexIUM   20 mg, Oral, Every Morning Before Breakfast      gabapentin 100 MG capsule  Commonly known as:  NEURONTIN   100 mg, Oral, 3 Times Daily      glimepiride 2 MG tablet  Commonly known as:  AMARYL   2 mg, Oral, Every Morning Before Breakfast      lisinopril 10 MG tablet  Commonly known as:  PRINIVIL,ZESTRIL   20 mg, Oral, Daily With Breakfast      metFORMIN  MG 24 hr tablet  Commonly known as:  GLUCOPHAGE-XR   1,000 mg, Oral, 2 Times Daily      oxyCODONE-acetaminophen  MG per tablet  Commonly known as:  PERCOCET   1 tablet, Oral, Every 8 Hours PRN      rOPINIRole 0.5 MG tablet  Commonly known as:  REQUIP   0.5 mg, Oral, 2 Times Daily, Take 1 hour before bedtime.      simvastatin 20 MG tablet  Commonly known as:  ZOCOR   20 mg, Oral, Nightly         Stop These Medications    pregabalin 50 MG capsule  Commonly known as:  LYRICA          Discharge Diet:   Diet Instructions     Diet: Consistent Carbohydrate; Thin       Discharge Diet:  Consistent Carbohydrate    Fluid Consistency:  Thin        Activity at Discharge:   Activity Instructions     Activity as Tolerated       Non-weight bearing to RLE        Discharge Care Plan/Instructions:   1.  Return for any acute or worsening symptoms  2.  Home with home health for physical/occupational therapy  3.  Knee walker, standard walker with wheels. Patient has a shower chair at home    Follow-up Appointments:   1.  Primary care provider 10/19/2018  2.  Dr. Bart Oglesby 10/22/2018    Test Results Pending at Discharge: None    ARNOL Garcia  10/12/18  2:34 PM    Time: 35 minutes    Chart  reviewed.   Patient examined  Agree with assessment and plan.  Dottie Ramsay DO  10/12/18  2:44 PM

## 2018-10-12 NOTE — PLAN OF CARE
Problem: Patient Care Overview  Goal: Discharge Needs Assessment  Outcome: Outcome(s) achieved Date Met: 10/12/18   10/12/18 1611   Discharge Needs Assessment   Readmission Within the Last 30 Days no previous admission in last 30 days   Patient/Family Anticipates Transition to home   Patient/Family Anticipated Services at Transition none   Transportation Concerns car, none   Transportation Anticipated family or friend will provide   Anticipated Changes Related to Illness none   Equipment Needed After Discharge walker, rolling   Discharge Facility/Level of Care Needs home with home health   Offered/Gave Vendor List yes   Patient's Choice of Community Agency(s) [facility]   Discharge Coordination/Progress DC INSTRUCTIONS GIVEN AND EXPLAINED TO PT AND SPOUSE. NO QUESTIONS OR CONCERNS VERBALIZED AT TIME OF DC.

## 2018-10-12 NOTE — PLAN OF CARE
Problem: Patient Care Overview  Goal: Plan of Care Review  Outcome: Ongoing (interventions implemented as appropriate)   10/12/18 1034   Coping/Psychosocial   Plan of Care Reviewed With patient   Plan of Care Review   Progress no change   OTHER   Outcome Summary pt with decreased safety awareness, worked on stand pivot to the left with rwx 6 times, min assist cues for safety and NWB LLE.

## 2018-10-12 NOTE — PLAN OF CARE
Problem: Patient Care Overview  Goal: Plan of Care Review  Outcome: Outcome(s) achieved Date Met: 10/12/18   10/12/18 9197   Coping/Psychosocial   Plan of Care Reviewed With patient   Plan of Care Review   Progress improving   OTHER   Outcome Summary pt's pain has been well controled, VSS, dressing c/d/i, cap refill <3 seconds to right toes. safety maintained. Son at bedside       Problem: Fall Risk (Adult)  Goal: Absence of Fall  Outcome: Ongoing (interventions implemented as appropriate)      Problem: Fracture Orthopaedic (Adult)  Goal: Signs and Symptoms of Listed Potential Problems Will be Absent, Minimized or Managed (Fracture Orthopaedic)  Outcome: Ongoing (interventions implemented as appropriate)

## 2018-10-12 NOTE — THERAPY TREATMENT NOTE
Acute Care - Occupational Therapy Treatment Note  [facility]     Patient Name: America Garcia  : 1955  MRN: [id number]  Today's Date: 10/12/2018  Onset of Illness/Injury or Date of Surgery: 10/09/18  Date of Referral to OT: 10/10/18  Referring Physician: Latasha AMBROSE    Admit Date: 10/9/2018       ICD-10-CM ICD-9-CM   1. Closed bimalleolar fracture of right ankle, initial encounter S82.841A 824.4   2. Fall, initial encounter W19.XXXA E888.9   3. Injury of head, initial encounter S09.90XA 959.01   4. Closed bimalleolar fracture of right ankle with routine healing, subsequent encounter S82.841D V54.19   5. Decreased activities of daily living (ADL) R68.89 780.99   6. Impaired functional mobility, balance, gait, and endurance Z74.09 V49.89     Patient Active Problem List   Diagnosis   • Closed bimalleolar fracture of right ankle   • Closed fracture dislocation of right ankle     Past Medical History:   Diagnosis Date   • Diabetes mellitus (CMS/HCC)    • Hyperlipidemia    • Hypertension      Past Surgical History:   Procedure Laterality Date   • ANKLE OPEN REDUCTION INTERNAL FIXATION Right 10/10/2018    Procedure: ANKLE OPEN REDUCTION INTERNAL FIXATION - RIGHT;  Surgeon: Bart Oglesby MD;  Location: [facility];  Service: Orthopedics   • APPENDECTOMY     • CHOLECYSTECTOMY     • HYSTERECTOMY     • NECK SURGERY      x3       Therapy Treatment          Rehabilitation Treatment Summary     Row Name 10/12/18 0748             Treatment Time/Intention    Discipline occupational therapy assistant  -TS      Document Type therapy note (daily note)  -TS      Subjective Information complains of;numbness  -TS2      Patient/Family Observations son present  -TS2      Existing Precautions/Restrictions fall;right;non-weight bearing   NWB RLE  -TS2      Recorded by [TS] Augusta Linares COTA/L 10/12/18 0748  [TS2] Augusta Linares COTA/L 10/12/18 0940      Row Name 10/12/18 0748             Cognitive  Assessment/Intervention- PT/OT    Safety Deficit (Cognitive) impulsivity  -TS      Personal Safety Interventions elopement precautions initiated;fall prevention program maintained;gait belt;nonskid shoes/slippers when out of bed  -TS      Recorded by [TS] Augusta Linares COTA/L 10/12/18 0940      Row Name 10/12/18 0748             Bed Mobility Assessment/Treatment    Bed Mobility Assessment/Treatment supine-sit;scooting/bridging  -TS      Scooting/Bridging [place] (Bed Mobility) supervision;conditional independence  -TS      Supine-Sit [place] (Bed Mobility) supervision;conditional independence  -TS      Assistive Device (Bed Mobility) head of bed elevated;bed rails  -TS      Recorded by [TS] Augusta Linares COTA/L 10/12/18 0940      Row Name 10/12/18 0748             Functional Mobility    Functional Mobility- Ind. Level contact guard assist;minimum assist (75% patient effort);verbal cues required  -TS      Functional Mobility- Device rolling walker  -TS      Functional Mobility- Comment in room, in bathroom. recliner then positioned right outside BR door and pt was able to take a few hops to recliner from BS  -TS      Recorded by [TS] Augusta Linares COTA/L 10/12/18 0940      Row Name 10/12/18 0748             Transfer Assessment/Treatment    Transfer Assessment/Treatment sit-stand transfer;stand-sit transfer;toilet transfer  -TS      Comment (Transfers) pt instructed to visually attend to hand and L foot placement prior to transfers due to pt neuropathy in hands and feet. Pt also educated repeatedly on importance of taking her time with all tasks including transfers to ensure greater safety and decreasing fall risks  -TS      Recorded by [TS] Augusta Linares COTA/L 10/12/18 0940      Row Name 10/12/18 0748             Sit-Stand Transfer    Sit-Stand [place] (Transfers) contact guard;minimum assist (75% patient effort)  -TS      Assistive Device (Sit-Stand Transfers) walker,  front-wheeled  -TS      Recorded by [TS] Augusta Linares WISDOM/L 10/12/18 0940      Row Name 10/12/18 0748             Stand-Sit Transfer    Stand-Sit [place] (Transfers) contact guard;minimum assist (75% patient effort);verbal cues  -TS      Recorded by [TS] Augusta Linares WISDOM/L 10/12/18 0940      Row Name 10/12/18 0748             Toilet Transfer    Type (Toilet Transfer) sit-stand;stand-sit  -TS      [place] Level (Toilet Transfer) contact guard;minimum assist (75% patient effort);verbal cues  -TS      Assistive Device (Toilet Transfer) commode, bedside without drop arms;walker, front-wheeled  -TS      Recorded by [TS] Augusta Linares WISDOM/L 10/12/18 0940      Row Name 10/12/18 0748             ADL Assessment/Intervention    11019 - OT Self Care/Mgmt Minutes 39  -TS      BADL Assessment/Intervention toileting  -TS      Recorded by [TS] Augusta Linares WISDOM/L 10/12/18 0940      Row Name 10/12/18 0748             Toileting Assessment/Training    [place] Level (Toileting) toileting skills;adjust/manage clothing;perform perineal hygiene;set up;verbal cues;minimum assist (75% patient effort);moderate assist (50% patient effort)  -TS      Assistive Devices (Toileting) commode, bedside without drop arms  -TS      Toileting Position supported sitting;supported standing  -TS      Recorded by [TS] Augusta Linares COTA/L 10/12/18 0940      Row Name 10/12/18 0748             Dynamic Standing Balance    Level of [place], Reaches Outside Midline (Standing, Dynamic Balance) minimal assist, 75% patient effort  -TS      Recorded by [TS] Augusta Linares WISDOM/L 10/12/18 0940      Row Name 10/12/18 0748             Positioning and Restraints    Pre-Treatment Position in bed  -TS      Post Treatment Position chair  -TS      In Chair sitting;call light within reach;encouraged to call for assist;exit alarm on;with family/caregiver  -TS      Recorded by [TS] Augusta Linares,  ALYX/L 10/12/18 0940      Row Name 10/12/18 0748             Pain Scale: Numbers Pre/Post-Treatment    Pain Scale: Numbers, Pretreatment 0/10 - no pain  -TS      Recorded by [TS] Augusta Linares COTA/L 10/12/18 0940      Row Name                Wound 10/10/18 1112 Right leg incision    Wound - Properties Group Date first assessed: 10/10/18 [AD] Time first assessed: 1112 [AD] Side: Right [AD] Location: leg [AD] Type: incision [AD] Recorded by:  [AD] Dean De Santiago RN 10/10/18 1112    Row Name 10/12/18 0748             Outcome Summary/Treatment Plan (OT)    Daily Summary of Progress (OT) progress towards functional goals is fair  -TS      Recorded by [TS] Augusta Linares COTA/L 10/12/18 0940        User Key  (r) = Recorded By, (t) = Taken By, (c) = Cosigned By    Initials Name Effective Dates Discipline    TS Augusta Linares COTA/L 08/02/16 -  OT    AD Dean De Santiago RN 08/02/17 -  Nurse        Wound 10/10/18 1112 Right leg incision (Active)   Dressing Appearance dry;intact 10/12/2018  8:00 AM   Drainage Amount none 10/12/2018  8:00 AM   Dressing Care, Wound elastic bandage 10/12/2018  8:00 AM         Occupational Therapy Education     Title: PT OT SLP Therapies (Done)     Topic: Occupational Therapy (Done)     Point: ADL training (Done)     Description: Instruct learner(s) on proper safety adaptation and remediation techniques during self care or transfers.   Instruct in proper use of assistive devices.   Learning Progress Summary     Learner Status Readiness Method Response Comment Documented by    Patient Done Acceptance E VU home safety, DME, LB dressing TS 10/12/18 0941     Done Acceptance E VU,NR,DU PT was educated on benefits of PT and PT POC. Pt was educated on safety awareness and body mechanics with transfers and use of AD. Pt was edcuated on positioning of AD before hopping and on her impulsitivity. Pt was educated on NWB status. TD 10/11/18 1210     Done Acceptance E,TB VU OT POC,  benefits of activity, NWB status, DME needs at discharge, home safety AC 10/11/18 1039    Family Done Acceptance E VU home safety, DME, LB dressing TS 10/12/18 0941          Point: Precautions (Done)     Description: Instruct learner(s) on prescribed precautions during self-care and functional transfers.   Learning Progress Summary     Learner Status Readiness Method Response Comment Documented by    Patient Done Acceptance E VU home safety, DME, LB dressing TS 10/12/18 0941     Done Acceptance E VU,NR,DU PT was educated on benefits of PT and PT POC. Pt was educated on safety awareness and body mechanics with transfers and use of AD. Pt was edcuated on positioning of AD before hopping and on her impulsitivity. Pt was educated on NWB status. TD 10/11/18 1210     Done Acceptance E,TB VU OT POC, benefits of activity, NWB status, DME needs at discharge, home safety AC 10/11/18 1039    Family Done Acceptance E VU home safety, DME, LB dressing TS 10/12/18 0941          Point: Body mechanics (Done)     Description: Instruct learner(s) on proper positioning and spine alignment during self-care, functional mobility activities and/or exercises.   Learning Progress Summary     Learner Status Readiness Method Response Comment Documented by    Patient Done Acceptance E VU,NR,DU PT was educated on benefits of PT and PT POC. Pt was educated on safety awareness and body mechanics with transfers and use of AD. Pt was edcuated on positioning of AD before hopping and on her impulsitivity. Pt was educated on NWB status. TD 10/11/18 1210     Done Acceptance E,TB VU OT POC, benefits of activity, NWB status, DME needs at discharge, home safety AC 10/11/18 1039                      User Key     Initials Effective Dates Name Provider Type Discipline    AC 06/22/15 -  Dean Felix, OTR/L Occupational Therapist OT    TS 08/02/16 -  Augusta Linares WISDOM/L Occupational Therapy Assistant OT    TD 09/07/18 -  Marlene Castellanos, PT Student  PT Student PT                OT Recommendation and Plan  Outcome Summary/Treatment Plan (OT)  Daily Summary of Progress (OT): progress towards functional goals is fair  Daily Summary of Progress (OT): progress towards functional goals is fair  Plan of Care Review  Plan of Care Reviewed With: patient  Plan of Care Reviewed With: patient  Outcome Summary: Pt and son educated on DME for home including BSC and shower seat to assist with safe transfers and pt ability to maintain NWB status of RLE. Pt educated on importance of slowing down and focusing on transfers and ADL task to improve safety. Pt S for bed mobility and CGA/min A for ambulation with RW. Pt was able to maintain NWB status. Pt required min A for balance when completing clothing management. Pt would benefit from HH OT/PT at discharge. Continue OT POC         Outcome Measures     Row Name 10/12/18 0900 10/11/18 1200 10/11/18 1038       How much help from another person do you currently need...    Turning from your back to your side while in flat bed without using bedrails?  -- 4  -MS (r) TD (t) MS (c)  --    Moving from lying on back to sitting on the side of a flat bed without bedrails?  -- 4  -MS (r) TD (t) MS (c)  --    Moving to and from a bed to a chair (including a wheelchair)?  -- 2  -MS (r) TD (t) MS (c)  --    Standing up from a chair using your arms (e.g., wheelchair, bedside chair)?  -- 2  -MS (r) TD (t) MS (c)  --    Climbing 3-5 steps with a railing?  -- 1  -MS (r) TD (t) MS (c)  --    To walk in hospital room?  -- 2  -MS (r) TD (t) MS (c)  --    AM-PAC 6 Clicks Score  -- 15  -MS (r) TD (t)  --       How much help from another is currently needed...    Putting on and taking off regular lower body clothing? 2  -TS  -- 2  -AC    Bathing (including washing, rinsing, and drying) 2  -TS  -- 2  -AC    Toileting (which includes using toilet bed pan or urinal) 3  -TS  -- 2  -AC    Putting on and taking off regular upper body clothing 4  -TS  -- 4  -AC     Taking care of personal grooming (such as brushing teeth) 3  -TS  -- 3  -AC    Eating meals 4  -TS  -- 4  -AC    Score 18  -TS  -- 17  -AC       Functional Assessment    Outcome Measure Options AM-PAC 6 Clicks Daily Activity (OT)  -TS AM-PAC 6 Clicks Basic Mobility (PT)  -MS (r) TD (t) MS (c) AM-PAC 6 Clicks Daily Activity (OT)  -AC      User Key  (r) = Recorded By, (t) = Taken By, (c) = Cosigned By    Initials Name Provider Type    AC Dean Felix, OTR/L Occupational Therapist    TS Augusta Linares, WISDOM/L Occupational Therapy Assistant    MS Cary Kern, PT, DPT, NCS Physical Therapist    TD Marlene Castellanos, PT Student PT Student           Time Calculation:         Time Calculation- OT     Row Name 10/12/18 0945 10/12/18 0748          Time Calculation- OT    OT Start Time 0746  -TS  --     OT Stop Time 0825  -TS  --     OT Time Calculation (min) 39 min  -TS  --     Total Timed Code Minutes- OT 39 minute(s)  -TS  --     OT Received On 10/12/18  -TS  --        Timed Charges    92795 - OT Self Care/Mgmt Minutes  -- 39  -TS       User Key  (r) = Recorded By, (t) = Taken By, (c) = Cosigned By    Initials Name Provider Type     Augusta Linares WISDOM/L Occupational Therapy Assistant           Therapy Suggested Charges     Code   Minutes Charges    43106 (CPT®) Hc Ot Neuromusc Re Education Ea 15 Min      24982 (CPT®) Hc Ot Ther Proc Ea 15 Min      66059 (CPT®) Hc Ot Therapeutic Act Ea 15 Min      19565 (CPT®) Hc Ot Manual Therapy Ea 15 Min      36495 (CPT®) Hc Ot Iontophoresis Ea 15 Min      02630 (CPT®) Hc Ot Elec Stim Ea-Per 15 Min      35492 (CPT®) Hc Ot Ultrasound Ea 15 Min      30784 (CPT®) Hc Ot Self Care/Mgmt/Train Ea 15 Min 39 3    Total  39 3        Therapy Charges for Today     Code Description Service Date Service Provider Modifiers Qty    [id number] HC OT SELF CARE/MGMT/TRAIN EA 15 MIN 10/12/2018 Augusta Linares COTA/L GO, KX 3          OT G-codes  OT Professional Judgement  Used?: Yes  OT Functional Scales Options: AM-PAC 6 Clicks Daily Activity (OT)  Score: 17  Functional Limitation: Self care  Self Care Current Status (): At least 40 percent but less than 60 percent impaired, limited or restricted  Self Care Goal Status (): At least 20 percent but less than 40 percent impaired, limited or restricted    ALYX Beard/FIONA  10/12/2018

## 2018-10-12 NOTE — DISCHARGE PLACEMENT REQUEST
"SHOAIB SARGENT [phone]  Dacia Garcia (63 y.o. Female)     Date of Birth Social Security Number Address Home Phone MRN    1955  [address] [facility] [address] [phone] [id number]    [employer] Marital Status          Other        Admission Date Admission Type Admitting Provider Attending Provider Department, Room/Bed    10/9/18 Emergency Dottie Ramsay DO Horn, Frances Marie, DO [facility] 3A, 325/1    Discharge Date Discharge Disposition Discharge Destination         Home or Self Care              Attending Provider:  Dottie Ramsay DO    Allergies:  Codeine, Morphine    Isolation:  None   Infection:  None   Code Status:  CPR    Ht:  165.1 cm (65\")   Wt:  77.7 kg (171 lb 3 oz)    Admission Cmt:  None   Principal Problem:  None                Active Insurance as of 10/9/2018     Primary Coverage     Payor Plan Insurance Group Employer/Plan Group    [facility] [employer] EMPLOYEE [id number]     Payor Plan Address Payor Plan Phone Number Effective From Effective To    [address] [phone] 1/1/2015     [facility] [address]       Subscriber Name Subscriber Birth Date Member ID       DACIA GARCIA 1955 [id number]                 Emergency Contacts      (Rel.) Home Phone Work Phone Mobile Phone    Demarcus Garcia (Spouse) [phone] -- --               History & Physical      Dottie Ramsay DO at 10/9/2018  4:44 PM              [facility]  HISTORY AND PHYSICAL    Date of Admission: 10/9/2018  Primary Care Physician: Balaji Cornelius MD    Subjective     Chief Complaint: Right ankle pain.    History of Present Illness  Fell this morning after getting up.  Stone a pop and was unable to walk on right foot.  Has been having issues with neuropathy since at least June.  Currently being worked up by [facility].  This has been causing her problems " "walking.          Review of Systems   Constitutional: Negative.  Negative for fever.   HENT: Negative for congestion and mouth sores.    Eyes: Negative for visual disturbance.   Respiratory: Negative for cough and shortness of breath.    Cardiovascular: Negative for chest pain and palpitations.   Gastrointestinal: Negative for diarrhea, nausea and vomiting.   Endocrine: Negative.    Genitourinary: Negative.    Musculoskeletal: Positive for arthralgias and myalgias.   Allergic/Immunologic: Negative.    Neurological: Positive for numbness (feet and hands bilaterally). Negative for dizziness.   Hematological: Negative.    Psychiatric/Behavioral: Negative.             Past Medical History:   Past Medical History:   Diagnosis Date   • Diabetes mellitus (CMS/HCC)    • Hyperlipidemia    • Hypertension    Polyneuropathy    Past Surgical History:  Past Surgical History:   Procedure Laterality Date   • APPENDECTOMY     • CHOLECYSTECTOMY     • HYSTERECTOMY     • NECK SURGERY      x3     Social History:     Retired  No alcohol, drugs or tobacco  DPOA, use   Living will none  Code full  PCP  Baljai Cornelius  Neurology \"[facility]    Family History: family history includes Heart disease in her father and mother; Leukemia in her father.       Allergies:  Allergies   Allergen Reactions   • Codeine Shortness Of Breath   • Morphine Shortness Of Breath     Medications:  Per her recollection as she does not have a list with her.    Clonipin  Soma  Percocet  Metformin  Celebrex  Ambien  Lisinopril  Cholestid    Her Records in Care Everywhere show a more extensive list, will have family bring in correct list of medications.     Objective     Vital Signs: /95   Pulse 87   Temp 98.5 °F (36.9 °C) (Temporal Artery )   Resp 16   Ht 165.1 cm (65\")   Wt (S) 76.2 kg (168 lb) Comment: recently weighed at PCP's   SpO2 93%   BMI 27.96 kg/m²    Physical Exam   Constitutional: She is oriented to person, place, and time. " She appears well-developed and well-nourished.   HENT:   Head: Normocephalic and atraumatic.   Eyes: Pupils are equal, round, and reactive to light. Conjunctivae and EOM are normal.   Neck: Neck supple. No JVD present.   Cardiovascular: Normal rate, regular rhythm and normal heart sounds.  Exam reveals no gallop and no friction rub.    No murmur heard.  Pulmonary/Chest: Effort normal and breath sounds normal.   Abdominal: Soft. Bowel sounds are normal. There is no hepatosplenomegaly. There is no tenderness.   Musculoskeletal: Normal range of motion. She exhibits no edema.   Right lower leg in cast   Neurological: She is alert and oriented to person, place, and time. No cranial nerve deficit.   Toes right foot warm to touch, mobile and can tell when they are being touched.   Skin: Skin is warm and dry.   Psychiatric: She has a normal mood and affect. Her behavior is normal.   Nursing note and vitals reviewed.          Results Reviewed:  Lab Results (last 24 hours)     Procedure Component Value Units Date/Time    Comprehensive Metabolic Panel [[id number]]  (Abnormal) Collected:  10/09/18 1429    Specimen:  Blood Updated:  10/09/18 1450     Glucose 206 (H) mg/dL      BUN 8 mg/dL      Creatinine 0.68 mg/dL      Sodium 136 mmol/L      Potassium 4.3 mmol/L      Chloride 95 (L) mmol/L      CO2 29.0 mmol/L      Calcium 9.1 mg/dL      Total Protein 7.0 g/dL      Albumin 4.30 g/dL      ALT (SGPT) 36 U/L      AST (SGOT) 30 U/L      Alkaline Phosphatase 88 U/L      Total Bilirubin 0.4 mg/dL      eGFR Non African Amer 87 mL/min/1.73      Globulin 2.7 gm/dL      A/G Ratio 1.6 g/dL      BUN/Creatinine Ratio 11.8     Anion Gap 12.0 mmol/L     aPTT [[id number]]  (Abnormal) Collected:  10/09/18 1429    Specimen:  Blood Updated:  10/09/18 1447     PTT 21.9 (L) seconds     Protime-INR [[id number]]  (Normal) Collected:  10/09/18 1429    Specimen:  Blood Updated:  10/09/18 1447     Protime 12.7 Seconds      INR 0.93    CBC & Differential  [[id number]] Collected:  10/09/18 1429    Specimen:  Blood Updated:  10/09/18 1438    Narrative:       The following orders were created for panel order CBC & Differential.  Procedure                               Abnormality         Status                     ---------                               -----------         ------                     CBC Auto Differential[[id number]]        Abnormal            Final result                 Please view results for these tests on the individual orders.    CBC Auto Differential [[id number]]  (Abnormal) Collected:  10/09/18 1429    Specimen:  Blood Updated:  10/09/18 1438     WBC 9.65 10*3/mm3      RBC 3.91 (L) 10*6/mm3      Hemoglobin 10.7 (L) g/dL      Hematocrit 32.9 (L) %      MCV 84.1 fL      MCH 27.4 (L) pg      MCHC 32.5 (L) g/dL      RDW 14.1 %      RDW-SD 43.2 fl      MPV 10.2 fL      Platelets 297 10*3/mm3      Neutrophil % 74.7 %      Lymphocyte % 16.2 %      Monocyte % 7.9 %      Eosinophil % 0.4 %      Basophil % 0.4 %      Immature Grans % 0.4 %      Neutrophils, Absolute 7.21 10*3/mm3      Lymphocytes, Absolute 1.56 10*3/mm3      Monocytes, Absolute 0.76 10*3/mm3      Eosinophils, Absolute 0.04 10*3/mm3      Basophils, Absolute 0.04 10*3/mm3      Immature Grans, Absolute 0.04 (H) 10*3/mm3      nRBC 0.0 /100 WBC         Imaging Results (last 24 hours)     Procedure Component Value Units Date/Time    XR Ankle 2 View Right [[id number]] Collected:  10/09/18 1631     Updated:  10/09/18 1635    Narrative:       EXAMINATION: Right ankle 2 views 10/9/2018     HISTORY: Post reduction     FINDINGS: Two-view exam of the right ankle reveals the patient's  undergone successful closed reduction for a bimalleolar fracture  dislocation of the right ankle. There is good restoration of anatomic  alignment.       Impression:       Successful reduction for a bimalleolar fracture dislocation  of the right ankle with good restoration of anatomic alignment.  This report was finalized on  10/09/2018 16:32 by Dr. South Uriostegui MD.    XR Chest 1 View [[id number]] Collected:  10/09/18 1539     Updated:  10/09/18 1543    Narrative:       EXAMINATION: XR CHEST 1 VW- 10/9/2018 3:39 PM CDT     HISTORY: pre-op.     REPORT: Comparison is made with study from 4/9/2012.        One-view chest: Frontal view of the chest was obtained. The lungs are  clear and normally expanded. There is mild stable elevation of the right  hemidiaphragm. The cardiac and mediastinal silhouettes are within normal  limits. The visualized bony thorax and upper abdomen are unremarkable  except for evidence of previous cervical fusion surgery and multiple  surgical clips are present in the right upper abdomen..                                                                                                                                                       Impression:              No acute cardiopulmonary abnormality.                                                                      This report was finalized on [id number] by Dr. Caleb Chase MD.    XR Foot 3+ View Right [[id number]] Collected:  10/09/18 1433     Updated:  10/09/18 1437    Narrative:       EXAMINATION: Right foot 3 views 10/9/2018     HISTORY: Fall     FINDINGS: Three-view exam of the right foot demonstrates a bimalleolar  fracture dislocation of the ankle. The foot is unremarkable with no  evidence of localized soft tissue swelling or discrete fracture line.       Impression:       1.. Normal exam of the right foot.  2. Bimalleolar fracture dislocation of the ankle.  This report was finalized on 10/09/2018 14:34 by Dr. South Uriostegui MD.    XR Ankle 3+ View Right [[id number]] Collected:  10/09/18 1530     Updated:  10/09/18 1436    Narrative:       EXAMINATION: Right ankle 3 views 10/9/2018     HISTORY: Right ankle pain after fall     FINDINGS: Three-view exam of the right ankle demonstrates diffuse soft  tissue swelling about the distal tibia and ankle  with a bimalleolar  fracture/ dislocation of the ankle. The distal tibial plafond is  medially subluxed upon the talar dome. There is displacement of the  medial and lateral malleolar fracture fragments. No other fractures are  present.       Impression:       1.. Fracture dislocation of the ankle with a bimalleolar fracture. There  is medial subluxation of the distal tibia upon the talar dome.  2. Associated diffuse soft tissue swelling.  This report was finalized on 10/09/2018 14:33 by Dr. South Uriostegui MD.    XR Elbow 3+ View Left [[id number]] Collected:  10/09/18 1529     Updated:  10/09/18 1433    Narrative:       LEFT ELBOW, 2 views 10/9/2018 1:39 PM CDT     HISTORY: left elbow pain, fall     COMPARISON: None      FINDINGS:   Frontal and lateral views of the left elbow were obtained.      There is no fracture or dislocation. No significant joint space  narrowing is noted. There is no significant joint effusion.       No gross soft tissue abnormality is visualized.        Impression:       1. Unremarkable radiographs of the left elbow.        This report was finalized on 10/09/2018 14:30 by Dr. South Uriostegui MD.    CT Cervical Spine Without Contrast [[id number]] Collected:  10/09/18 1407     Updated:  10/09/18 1416    Narrative:       CT CERVICAL SPINE WO CONTRAST- 10/9/2018 1:35 PM CDT     HISTORY: fall, hit head     COMPARISON: None      DOSE LENGTH PRODUCT: 186 mGy cm. Automated exposure control was utilized  to diminish patient radiation dose.     TECHNIQUE: Serial helical tomographic images of the cervical spine were  obtained without the use of intravenous contrast. Additionally, sagittal  and coronal reformatted images were also provided for review.      FINDINGS:   Alignment: There is mild loss of normal cervical lordosis.     Bones: There is no evidence of fracture. The patient's undergone prior  fusion at the C4-C7 levels with interbody grafts. There is degenerative  disc disease at C3-C4 with  narrowing of the space height and spondylosis  as well as at the C7-T1 level.     Disc spaces: There is degenerative disc disease at C3-C4 and C7-T1.     Canal and neuroforamina: Neural foraminal narrowing is noted at multiple  levels related to facet overgrowth and uncinate spurring.     Soft tissues: Unremarkable.     Lung apices: Clear. No pneumothorax.       Impression:       1. The patient's undergone prior fusion at the C4-C7 levels.  Degenerative disc disease is noted at C3-C4 and C7-T1.  2. No evidence of fracture or malalignment.  3. Bony neural foraminal encroachment at multiple levels related to  facet arthropathy and uncinate spurring.        This report was finalized on 10/09/2018 14:13 by Dr. South Uriostegui MD.    CT Head Without Contrast [[id number]] Collected:  10/09/18 1403     Updated:  10/09/18 1408    Narrative:       EXAMINATION: CT HEAD WO CONTRAST- 10/9/2018 2:03 PM CDT     HISTORY: Fall, hit head, head injury with pain     COMPARISON: None     DOSE: 601 mGy-cm     TECHNIQUE: Sequential imaging was performed from the vertex through the  base of the skull without the use of IV contrast.  Sagittal and coronal  reformations were made from the original source data and reviewed.  Automated exposure control was also utilized to decrease patient  radiation dose.     FINDINGS:   There is mild diffuse cerebral atrophy. There is no evidence of acute  intracranial hemorrhage, mass, or midline shift. There is no evidence of  acute territorial infarct. The ventricles appear normal in  configuration. The basilar cisterns are patent. Posterior fossa appears  grossly normal. The calvarium is intact. The visualized paranasal  sinuses and mastoid air cells appear clear. Calcifications are seen in  the cavernous carotid arteries. There is a mostly empty sella  configuration.          Impression:       No acute intracranial findings.     This report was finalized on 10/09/2018 14:05 by Dr. Hector Holbrook MD.     "    I have personally reviewed and interpreted the radiology studies and ECG obtained at time of admission.     Assessment / Plan     Assessment:     Fracture right ankle, bimalleolar   DM II uncontrolled  Hyperglycemia  Hypertension  Chronic diarrhea  Poly neuropthy      Plan:      Admit   Consult ortho  Glucometer ac/hs  Insulin  Check A1d  Resume home medications with accurate list obtained.  Elevate right leg.   AM lab  BMP CBC        Code Status: full     I discussed the patient's findings and my recommendations with the patient and family    Estimated length of stay 3-5 days    Dottie Ramsay DO   10/09/18   4:44 PM              Electronically signed by Dottie Ramsay DO at 10/9/2018  4:55 PM          Physician Progress Notes (last 24 hours) (Notes from 10/11/2018  2:26 PM through 10/12/2018  2:26 PM)      Bart Oglesby MD at 10/12/2018 12:44 PM        This patient was previously discharged yesterday but did not go home.  She will be discharged today therefore her discharge date is 10 1218 incident 10 1118 all orders still apply for follow-up    Electronically signed by Bart Oglesby MD at 10/12/2018 12:45 PM       Medical Student Notes (last 24 hours) (Notes from 10/11/2018  2:26 PM through 10/12/2018  2:26 PM)     No notes of this type exist for this encounter.        [address] [address]  Dept. Phone:  [phone]  Dept. Fax:   Date Ordered: Oct 12, 2018         Patient:  America Garcia MRN:  [id number]   [address] [place] [id number] :  1955  SSN:    Phone: [phone] Sex:  F     Weight: 77.7 kg (171 lb 3 oz)         Ht Readings from Last 1 Encounters:   10/09/18 165.1 cm (65\")         Walker               (Order ID: [id number])    Diagnosis:  Closed bimalleolar fracture of right ankle with routine healing, subsequent encounter (S82.841D [ICD-10-CM] V54.19 [ICD-9-CM])   Quantity:  1     Equipment:  Walker " Folding with Wheels  Length of Need (99 Months = Lifetime): 6 Months        Authorizing Provider's Phone: [phone]         Authorizing Provider:Brii Ring APRN  Authorizing Provider's NPI: [id number]  Order Entered By: Brii Ring APRN 10/12/2018  2:07 PM     Electronically signed by: Brii Ring APRN 10/12/2018  2:07 PM

## 2018-10-12 NOTE — PLAN OF CARE
Problem: Patient Care Overview  Goal: Plan of Care Review  Outcome: Ongoing (interventions implemented as appropriate)   10/12/18 0942   Coping/Psychosocial   Plan of Care Reviewed With patient   Plan of Care Review   Progress improving   OTHER   Outcome Summary Pt and son educated on DME for home including BSC and shower seat to assist with safe transfers and pt ability to maintain NWB status of RLE. Pt educated on importance of slowing down and focusing on transfers and ADL task to improve safety. Pt S for bed mobility and CGA/min A for ambulation with RW. Pt was able to maintain NWB status. Pt required min A for balance when completing clothing management. Pt would benefit from HH OT/PT at discharge. Continue OT POC

## 2018-10-13 NOTE — THERAPY DISCHARGE NOTE
Acute Care - Occupational Therapy Discharge Summary  [facility]     Patient Name: America Garcia  : 1955  MRN: [id number]    Today's Date: 10/13/2018  Onset of Illness/Injury or Date of Surgery: 10/09/18    Date of Referral to OT: 10/10/18  Referring Physician: Latasha AMBROSE      Admit Date: 10/9/2018        OT Recommendation and Plan    Visit Dx:    ICD-10-CM ICD-9-CM   1. Closed bimalleolar fracture of right ankle, initial encounter S82.841A 824.4   2. Fall, initial encounter W19.XXXA E888.9   3. Injury of head, initial encounter S09.90XA 959.01   4. Closed bimalleolar fracture of right ankle with routine healing, subsequent encounter S82.841D V54.19   5. Decreased activities of daily living (ADL) R68.89 780.99   6. Impaired functional mobility, balance, gait, and endurance Z74.09 V49.89                     OT Rehab Goals     Row Name 10/13/18 0750             Transfer Goal 1 (OT)    Activity/Assistive Device (Transfer Goal 1, OT) bed-to-chair/chair-to-bed;toilet;tub;tub bench;walker, rolling  -MM      Eddy Level/Cues Needed (Transfer Goal 1, OT) standby assist  -MM      Time Frame (Transfer Goal 1, OT) long term goal (LTG);10 days  -MM      Progress/Outcome (Transfer Goal 1, OT) goal not met  -MM         Bathing Goal 1 (OT)    Activity/Assistive Device (Bathing Goal 1, OT) bathing skills, all;tub bench  -MM      Eddy Level/Cues Needed (Bathing Goal 1, OT) contact guard assist  -MM      Time Frame (Bathing Goal 1, OT) long term goal (LTG);10 days  -MM      Progress/Outcomes (Bathing Goal 1, OT) goal not met  -MM         Dressing Goal 1 (OT)    Activity/Assistive Device (Dressing Goal 1, OT) lower body dressing  -MM      Eddy/Cues Needed (Dressing Goal 1, OT) standby assist  -MM      Time Frame (Dressing Goal 1, OT) long term goal (LTG);10 days  -MM      Progress/Outcome (Dressing Goal 1, OT) goal not met  -MM        User Key  (r) = Recorded By, (t) = Taken By, (c) = Cosigned By     Initials Name Provider Type Discipline    MM Dena Stephenson OTA Occupational Therapy Assistant OT                Outcome Measures     Row Name 10/12/18 1000 10/12/18 0900 10/11/18 1200       How much help from another person do you currently need...    Turning from your back to your side while in flat bed without using bedrails? 4  -AH  -- 4  -MS (r) TD (t) MS (c)    Moving from lying on back to sitting on the side of a flat bed without bedrails? 4  -AH  -- 4  -MS (r) TD (t) MS (c)    Moving to and from a bed to a chair (including a wheelchair)? 3  -AH  -- 2  -MS (r) TD (t) MS (c)    Standing up from a chair using your arms (e.g., wheelchair, bedside chair)? 3  -AH  -- 2  -MS (r) TD (t) MS (c)    Climbing 3-5 steps with a railing? 1  -AH  -- 1  -MS (r) TD (t) MS (c)    To walk in hospital room? 2  -AH  -- 2  -MS (r) TD (t) MS (c)    AM-PAC 6 Clicks Score 17  -AH  -- 15  -MS (r) TD (t)       How much help from another is currently needed...    Putting on and taking off regular lower body clothing?  -- 2  -TS  --    Bathing (including washing, rinsing, and drying)  -- 2  -TS  --    Toileting (which includes using toilet bed pan or urinal)  -- 3  -TS  --    Putting on and taking off regular upper body clothing  -- 4  -TS  --    Taking care of personal grooming (such as brushing teeth)  -- 3  -TS  --    Eating meals  -- 4  -TS  --    Score  -- 18  -TS  --       Functional Assessment    Outcome Measure Options AM-PAC 6 Clicks Basic Mobility (PT)  -AH AM-PAC 6 Clicks Daily Activity (OT)  -TS AM-PAC 6 Clicks Basic Mobility (PT)  -MS (r) TD (t) MS (c)    Row Name 10/11/18 1038             How much help from another is currently needed...    Putting on and taking off regular lower body clothing? 2  -AC      Bathing (including washing, rinsing, and drying) 2  -AC      Toileting (which includes using toilet bed pan or urinal) 2  -AC      Putting on and taking off regular upper body clothing 4  -AC      Taking care of  personal grooming (such as brushing teeth) 3  -AC      Eating meals 4  -AC      Score 17  -AC         Functional Assessment    Outcome Measure Options AM-PAC 6 Clicks Daily Activity (OT)  -AC        User Key  (r) = Recorded By, (t) = Taken By, (c) = Cosigned By    Initials Name Provider Type    AC Dean Felix, OTR/L Occupational Therapist    Shandra Ross, PTA Physical Therapy Assistant    TS Augusta Linares, WISDOM/L Occupational Therapy Assistant    Cary Ibrahim, PT, DPT, NCS Physical Therapist    Marlene Rayo, PT Student PT Student          Therapy Suggested Charges     Code   Minutes Charges    30820 (CPT®) Hc Ot Neuromusc Re Education Ea 15 Min      49581 (CPT®) Hc Ot Ther Proc Ea 15 Min      87386 (CPT®) Hc Ot Therapeutic Act Ea 15 Min      63706 (CPT®) Hc Ot Manual Therapy Ea 15 Min      82824 (CPT®) Hc Ot Iontophoresis Ea 15 Min      81137 (CPT®) Hc Ot Elec Stim Ea-Per 15 Min      06231 (CPT®) Hc Ot Ultrasound Ea 15 Min      66093 (CPT®) Hc Ot Self Care/Mgmt/Train Ea 15 Min 39 3    Total  39 3              OT Discharge Summary  Reason for Discharge: Discharge from facility  Outcomes Achieved: Refer to plan of care for updates on goals achieved  Discharge Destination: Home, Home with assist      RY Arboleda  10/13/2018

## 2018-10-13 NOTE — PAYOR COMM NOTE
"DC HOME 10-12-18    [id number]  Dacia Garcia (63 y.o. Female)     Date of Birth Social Security Number Address Home Phone MRN    1955  [address] [facility] [address] [phone] [id number]    Shinto Marital Status          Other        Admission Date Admission Type Admitting Provider Attending Provider Department, Room/Bed    10/9/18 Emergency Dottie Ramsay DO  [facility] 3A, 325/1    Discharge Date Discharge Disposition Discharge Destination        10/12/2018 Home or Self Care              Attending Provider:  (none)   Allergies:  Codeine, Morphine    Isolation:  None   Infection:  None   Code Status:  Prior    Ht:  165.1 cm (65\")   Wt:  77.7 kg (171 lb 3 oz)    Admission Cmt:  None   Principal Problem:  None                Active Insurance as of 10/9/2018     Primary Coverage     Payor Plan Insurance Group Employer/Plan Group    [employer] [facility] EMPLOYEE [id number]     Payor Plan Address Payor Plan Phone Number Effective From Effective To    [address] [phone] 1/1/2015     [facility] [id number]       Subscriber Name Subscriber Birth Date Member ID       DACIA GARCIA [id number]                 Emergency Contacts      (Rel.) Home Phone Work Phone Mobile Phone    Demarcus Garcia (Spouse) [phone] -- --               Discharge Summary      Bart Oglesby MD at 10/11/2018  8:07 AM          [facility]  DISCHARGE SUMMARY       Date of Admission: 10/9/2018  Date of Discharge:  10/11/2018  Primary Care Physician: Balaji Cornelius MD    Presenting Problem/History of Present Illness:  Closed bimalleolar fracture of right ankle, initial encounter [I95.018I]     Final Discharge Diagnoses:  Active Hospital Problems    Diagnosis   • Closed bimalleolar fracture of right ankle   • Closed fracture dislocation of right ankle       Consults: None    Procedures Performed: Open reduction internal fixation right " "ankle    Pertinent Test Results: Negative    History of Present Illness on Day of Discharge: Stable on discharge      Hospital Course:  The patient is a 63 y.o. female who presented to [facility] with presented with a closed displaced trimalleolar ankle fracture on the right.  On the day after discharge patient was taken to surgery and open reduction and internal fixation was carried out.  Postoperatively she done well she is to follow-up in the office.        /58 (BP Location: Left arm, Patient Position: Lying)   Pulse 83   Temp 98.2 °F (36.8 °C) (Oral)   Resp 16   Ht 165.1 cm (65\")   Wt 77.7 kg (171 lb 3 oz)   LMP  (LMP Unknown)   SpO2 96%   BMI 28.49 kg/m²        Discharge Medications:     Discharge Medications      ASK your doctor about these medications      Instructions Start Date   AMBIEN 10 MG tablet  Generic drug:  zolpidem   10 mg, Oral, Nightly PRN      amitriptyline 10 MG tablet  Commonly known as:  ELAVIL   10 mg, Oral, Nightly      aspirin 81 MG chewable tablet   81 mg, Oral, Daily      carisoprodol 350 MG tablet  Commonly known as:  SOMA   350 mg, Oral, 4 Times Daily PRN      celecoxib 200 MG capsule  Commonly known as:  CeleBREX   200 mg, Oral, Daily With Breakfast      clonazePAM 0.5 MG tablet  Commonly known as:  KlonoPIN   0.5 mg, Oral, 2 Times Daily PRN      colestipol 1 g tablet  Commonly known as:  COLESTID   1 g, Oral, 2 Times Daily      escitalopram 10 MG tablet  Commonly known as:  LEXAPRO   10 mg, Oral, Daily      esomeprazole 20 MG capsule  Commonly known as:  nexIUM   20 mg, Oral, Every Morning Before Breakfast      gabapentin 100 MG capsule  Commonly known as:  NEURONTIN   100 mg, Oral, 3 Times Daily      glimepiride 2 MG tablet  Commonly known as:  AMARYL   2 mg, Oral, Every Morning Before Breakfast      lisinopril 10 MG tablet  Commonly known as:  PRINIVIL,ZESTRIL   20 mg, Oral, Daily With Breakfast      metFORMIN  MG 24 hr tablet  Commonly known as:  " GLUCOPHAGE-XR   1,000 mg, Oral, 2 Times Daily      oxyCODONE-acetaminophen  MG per tablet  Commonly known as:  PERCOCET   1 tablet, Oral, Every 8 Hours PRN      pregabalin 50 MG capsule  Commonly known as:  LYRICA   50 mg, Oral, 3 Times Daily      rOPINIRole 0.5 MG tablet  Commonly known as:  REQUIP   0.5 mg, Oral, 2 Times Daily, Take 1 hour before bedtime.      simvastatin 20 MG tablet  Commonly known as:  ZOCOR   20 mg, Oral, Nightly             Discharge Diet:  preoperative    Discharge Care Plan/Instructions: #1 she is use crutches nonweightbearing on the right #2 she is to follow-up in the office #3 she is to call for any problems    Follow-up Appointments:   Monday, October 22      Bart Oglesby MD  10/11/18  8:07 AM        Electronically signed by Bart Oglesby MD at 10/11/2018  8:10 AM     Dottie Ramsay DO at 10/12/2018  2:08 PM              [facility] Medicine Services  DISCHARGE SUMMARY       Date of Admission: 10/9/2018  Date of Discharge:  10/12/2018  Primary Care Physician: Balaji Cornelius MD    Presenting Problem/History of Present Illness:  Closed bimalleolar fracture of right ankle, initial encounter [S82.847B]     Final Discharge Diagnoses:  1.  Acute Bimalleolar fracture dislocation of the right ankle- status post open reduction internal fixation 10/10  2.  Fall  3.  Non-insulin-dependent diabetes mellitus type II with hyperglycemia, hemoglobin A1c 7.8%  4.  Polyneuropathy likely secondary to diabetes mellitus  5.  Essential hypertension  6.  Hyperlipidemia  7.  Normocytic anemia    Consults:   1.  Dr. Bart Oglesby- orthopedic surgery    Procedures Performed:   1.  10/10/2018-open reduction internal fixation right ankle fracture    Pertinent Test Results:   Lab Results (all)     Procedure Component Value Units Date/Time    POC Glucose Once [[id number]]  (Abnormal) Collected:  10/12/18 1051    Specimen:  Blood Updated:  10/12/18 7695      Glucose 157 (H) mg/dL      Comment: : [id number] Reasor NathanMeter ID: [id number]       POC Glucose Once [[id number]]  (Abnormal) Collected:  10/12/18 0722    Specimen:  Blood Updated:  10/12/18 0733     Glucose 151 (H) mg/dL      Comment: : [id number] Reasor NathanMeter ID: [id number]       POC Glucose Once [[id number]]  (Abnormal) Collected:  10/11/18 2035    Specimen:  Blood Updated:  10/11/18 2056     Glucose 161 (H) mg/dL      Comment: : [id number] Esther GenevaMeter ID: [id number]       POC Glucose Once [[id number]]  (Abnormal) Collected:  10/11/18 1708    Specimen:  Blood Updated:  10/11/18 1720     Glucose 139 (H) mg/dL      Comment: : [id number] Saurabh Rivera AnnMeter ID: [id number]       POC Glucose Once [[id number]]  (Abnormal) Collected:  10/11/18 1056    Specimen:  Blood Updated:  10/11/18 1108     Glucose 188 (H) mg/dL      Comment: : [id number] Reasor NathanMeter ID: [id number]       Basic Metabolic Panel [[id number]]  (Abnormal) Collected:  10/11/18 0923    Specimen:  Blood Updated:  10/11/18 1041     Glucose 212 (H) mg/dL      BUN 8 mg/dL      Creatinine 0.70 mg/dL      Sodium 138 mmol/L      Potassium 3.9 mmol/L      Chloride 99 mmol/L      CO2 29.0 mmol/L      Calcium 9.3 mg/dL      eGFR Non African Amer 84 mL/min/1.73      BUN/Creatinine Ratio 11.4     Anion Gap 10.0 mmol/L     Narrative:       GFR Normal >60  Chronic Kidney Disease <60  Kidney Failure <15    CBC (No Diff) [[id number]]  (Abnormal) Collected:  10/11/18 0923    Specimen:  Blood Updated:  10/11/18 1026     WBC 10.03 10*3/mm3      RBC 3.36 (L) 10*6/mm3      Hemoglobin 9.3 (L) g/dL      Hematocrit 28.7 (L) %      MCV 85.4 fL      MCH 27.7 (L) pg      MCHC 32.4 (L) g/dL      RDW 14.4 %      RDW-SD 44.7 fl      MPV 10.8 fL      Platelets 266 10*3/mm3     POC Glucose Once [[id number]]  (Abnormal) Collected:  10/11/18 0729    Specimen:  Blood Updated:  10/11/18 0752     Glucose 165 (H) mg/dL      Comment: : [id number]  Reasor NathanMeter ID: [id number]       POC Glucose Once [[id number]]  (Abnormal) Collected:  10/10/18 2040    Specimen:  Blood Updated:  10/10/18 2052     Glucose 229 (H) mg/dL      Comment: : [id number] Esther PeterMeter ID: [id number]       POC Glucose Once [[id number]]  (Abnormal) Collected:  10/10/18 1644    Specimen:  Blood Updated:  10/10/18 1656     Glucose 338 (H) mg/dL      Comment: : [id number] Reasor NathanMeter ID: [id number]       POC Glucose Once [[id number]]  (Abnormal) Collected:  10/10/18 1136    Specimen:  Blood Updated:  10/10/18 1148     Glucose 210 (H) mg/dL      Comment: : [id number] Dawson Teresa MMeter ID: [id number]       POC Glucose Once [[id number]]  (Abnormal) Collected:  10/10/18 0936    Specimen:  Blood Updated:  10/10/18 0948     Glucose 193 (H) mg/dL      Comment: : [id number] Refugio Castro RMeter ID: [id number]       POC Glucose Once [[id number]]  (Abnormal) Collected:  10/10/18 0728    Specimen:  Blood Updated:  10/10/18 0739     Glucose 208 (H) mg/dL      Comment: : [id number] Reasor NathanMeter ID: [id number]       Basic Metabolic Panel [[id number]]  (Abnormal) Collected:  10/10/18 0533    Specimen:  Blood Updated:  10/10/18 0556     Glucose 170 (H) mg/dL      BUN 8 mg/dL      Creatinine 0.67 mg/dL      Sodium 139 mmol/L      Potassium 4.0 mmol/L      Chloride 98 mmol/L      CO2 31.0 mmol/L      Calcium 8.9 mg/dL      eGFR Non African Amer 89 mL/min/1.73      BUN/Creatinine Ratio 11.9     Anion Gap 10.0 mmol/L     CBC (No Diff) [[id number]]  (Abnormal) Collected:  10/10/18 0533    Specimen:  Blood Updated:  10/10/18 0543     WBC 7.69 10*3/mm3      RBC 3.52 (L) 10*6/mm3      Hemoglobin 9.7 (L) g/dL      Hematocrit 29.9 (L) %      MCV 84.9 fL      MCH 27.6 (L) pg      MCHC 32.4 (L) g/dL      RDW 14.1 %      RDW-SD 43.9 fl      MPV 10.1 fL      Platelets 275 10*3/mm3     POC Glucose Once [[id number]]  (Abnormal) Collected:  10/09/18 1948    Specimen:  Blood  Updated:  10/09/18 1959     Glucose 286 (H) mg/dL      Comment: : [id number] Esther GenevaMeter ID: [id number]       Hemoglobin A1c [[id number]] Collected:  10/09/18 1429    Specimen:  Blood Updated:  10/09/18 1816     Hemoglobin A1C 7.8 %     Comprehensive Metabolic Panel [[id number]]  (Abnormal) Collected:  10/09/18 1429    Specimen:  Blood Updated:  10/09/18 1450     Glucose 206 (H) mg/dL      BUN 8 mg/dL      Creatinine 0.68 mg/dL      Sodium 136 mmol/L      Potassium 4.3 mmol/L      Chloride 95 (L) mmol/L      CO2 29.0 mmol/L      Calcium 9.1 mg/dL      Total Protein 7.0 g/dL      Albumin 4.30 g/dL      ALT (SGPT) 36 U/L      AST (SGOT) 30 U/L      Alkaline Phosphatase 88 U/L      Total Bilirubin 0.4 mg/dL      eGFR Non African Amer 87 mL/min/1.73      Globulin 2.7 gm/dL      A/G Ratio 1.6 g/dL      BUN/Creatinine Ratio 11.8     Anion Gap 12.0 mmol/L     aPTT [[id number]]  (Abnormal) Collected:  10/09/18 1429    Specimen:  Blood Updated:  10/09/18 1447     PTT 21.9 (L) seconds     Protime-INR [[id number]]  (Normal) Collected:  10/09/18 1429    Specimen:  Blood Updated:  10/09/18 1447     Protime 12.7 Seconds      INR 0.93    CBC Auto Differential [[id number]]  (Abnormal) Collected:  10/09/18 1429    Specimen:  Blood Updated:  10/09/18 1438     WBC 9.65 10*3/mm3      RBC 3.91 (L) 10*6/mm3      Hemoglobin 10.7 (L) g/dL      Hematocrit 32.9 (L) %      MCV 84.1 fL      MCH 27.4 (L) pg      MCHC 32.5 (L) g/dL      RDW 14.1 %      RDW-SD 43.2 fl      MPV 10.2 fL      Platelets 297 10*3/mm3      Neutrophil % 74.7 %      Lymphocyte % 16.2 %      Monocyte % 7.9 %      Eosinophil % 0.4 %      Basophil % 0.4 %      Immature Grans % 0.4 %      Neutrophils, Absolute 7.21 10*3/mm3      Lymphocytes, Absolute 1.56 10*3/mm3      Monocytes, Absolute 0.76 10*3/mm3      Eosinophils, Absolute 0.04 10*3/mm3      Basophils, Absolute 0.04 10*3/mm3      Immature Grans, Absolute 0.04 (H) 10*3/mm3      nRBC 0.0 /100 WBC         Imaging  Results (all)     Procedure Component Value Units Date/Time    XR Ankle 2 View Right [[id number]] Collected:  10/10/18 1122     Updated:  10/11/18 0751    Narrative:       EXAMINATION: XR ANKLE 2 VW RIGHT- 10/10/2018 11:22 AM CDT     HISTORY: ORIF     Fluoroscopy time: 8 seconds     Number of images: 3     FINDINGS:  Multiple fluoroscopic images are submitted from the OR. A radiologist  was not present for the acquisition or intraoperative interpretation of  these images. Images demonstrate internal fixation of the medial  malleolus as well as the distal fibula. Please see the operative report  for details pertaining to this exam.  This report was finalized on 10/10/2018 11:23 by Dr. Hector Holbrook MD.    FL C Arm During Surgery [[id number]] Collected:  10/10/18 1122     Updated:  10/11/18 0751    Narrative:       EXAMINATION: XR ANKLE 2 VW RIGHT- 10/10/2018 11:22 AM CDT     HISTORY: ORIF     Fluoroscopy time: 8 seconds     Number of images: 3     FINDINGS:  Multiple fluoroscopic images are submitted from the OR. A radiologist  was not present for the acquisition or intraoperative interpretation of  these images. Images demonstrate internal fixation of the medial  malleolus as well as the distal fibula. Please see the operative report  for details pertaining to this exam.  This report was finalized on 10/10/2018 11:23 by Dr. Hector Holbrook MD.    XR Ankle 2 View Right [[id number]] Collected:  10/09/18 1631     Updated:  10/09/18 1635    Narrative:       EXAMINATION: Right ankle 2 views 10/9/2018     HISTORY: Post reduction     FINDINGS: Two-view exam of the right ankle reveals the patient's  undergone successful closed reduction for a bimalleolar fracture  dislocation of the right ankle. There is good restoration of anatomic  alignment.       Impression:       Successful reduction for a bimalleolar fracture dislocation  of the right ankle with good restoration of anatomic alignment.  This report was finalized on  10/09/2018 16:32 by Dr. South Uriostegui MD.    XR Chest 1 View [[id number]] Collected:  10/09/18 1539     Updated:  10/09/18 1543    Narrative:       EXAMINATION: XR CHEST 1 VW- 10/9/2018 3:39 PM CDT     HISTORY: pre-op.     REPORT: Comparison is made with study from 4/9/2012.        One-view chest: Frontal view of the chest was obtained. The lungs are  clear and normally expanded. There is mild stable elevation of the right  hemidiaphragm. The cardiac and mediastinal silhouettes are within normal  limits. The visualized bony thorax and upper abdomen are unremarkable  except for evidence of previous cervical fusion surgery and multiple  surgical clips are present in the right upper abdomen..                                                                                                                                                       Impression:              No acute cardiopulmonary abnormality.                                                                      This report was finalized on [id number] by Dr. Caleb Chase MD.    XR Foot 3+ View Right [[id number]] Collected:  10/09/18 1433     Updated:  10/09/18 1437    Narrative:       EXAMINATION: Right foot 3 views 10/9/2018     HISTORY: Fall     FINDINGS: Three-view exam of the right foot demonstrates a bimalleolar  fracture dislocation of the ankle. The foot is unremarkable with no  evidence of localized soft tissue swelling or discrete fracture line.       Impression:       1.. Normal exam of the right foot.  2. Bimalleolar fracture dislocation of the ankle.  This report was finalized on 10/09/2018 14:34 by Dr. South Uriostegui MD.    XR Ankle 3+ View Right [[id number]] Collected:  10/09/18 1530     Updated:  10/09/18 1436    Narrative:       EXAMINATION: Right ankle 3 views 10/9/2018     HISTORY: Right ankle pain after fall     FINDINGS: Three-view exam of the right ankle demonstrates diffuse soft  tissue swelling about the distal tibia and ankle  with a bimalleolar  fracture/ dislocation of the ankle. The distal tibial plafond is  medially subluxed upon the talar dome. There is displacement of the  medial and lateral malleolar fracture fragments. No other fractures are  present.       Impression:       1.. Fracture dislocation of the ankle with a bimalleolar fracture. There  is medial subluxation of the distal tibia upon the talar dome.  2. Associated diffuse soft tissue swelling.  This report was finalized on 10/09/2018 14:33 by Dr. South Uriostegui MD.    XR Elbow 3+ View Left [[id number]] Collected:  10/09/18 1529     Updated:  10/09/18 1433    Narrative:       LEFT ELBOW, 2 views 10/9/2018 1:39 PM CDT     HISTORY: left elbow pain, fall     COMPARISON: None      FINDINGS:   Frontal and lateral views of the left elbow were obtained.      There is no fracture or dislocation. No significant joint space  narrowing is noted. There is no significant joint effusion.       No gross soft tissue abnormality is visualized.        Impression:       1. Unremarkable radiographs of the left elbow.        This report was finalized on 10/09/2018 14:30 by Dr. South Uriostegui MD.    CT Cervical Spine Without Contrast [[id number]] Collected:  10/09/18 1407     Updated:  10/09/18 1416    Narrative:       CT CERVICAL SPINE WO CONTRAST- 10/9/2018 1:35 PM CDT     HISTORY: fall, hit head     COMPARISON: None      DOSE LENGTH PRODUCT: 186 mGy cm. Automated exposure control was utilized  to diminish patient radiation dose.     TECHNIQUE: Serial helical tomographic images of the cervical spine were  obtained without the use of intravenous contrast. Additionally, sagittal  and coronal reformatted images were also provided for review.      FINDINGS:   Alignment: There is mild loss of normal cervical lordosis.     Bones: There is no evidence of fracture. The patient's undergone prior  fusion at the C4-C7 levels with interbody grafts. There is degenerative  disc disease at C3-C4 with  narrowing of the space height and spondylosis  as well as at the C7-T1 level.     Disc spaces: There is degenerative disc disease at C3-C4 and C7-T1.     Canal and neuroforamina: Neural foraminal narrowing is noted at multiple  levels related to facet overgrowth and uncinate spurring.     Soft tissues: Unremarkable.     Lung apices: Clear. No pneumothorax.       Impression:       1. The patient's undergone prior fusion at the C4-C7 levels.  Degenerative disc disease is noted at C3-C4 and C7-T1.  2. No evidence of fracture or malalignment.  3. Bony neural foraminal encroachment at multiple levels related to  facet arthropathy and uncinate spurring.        This report was finalized on 10/09/2018 14:13 by Dr. South Uriostegui MD.    CT Head Without Contrast [[id number]] Collected:  10/09/18 1403     Updated:  10/09/18 1408    Narrative:       EXAMINATION: CT HEAD WO CONTRAST- 10/9/2018 2:03 PM CDT     HISTORY: Fall, hit head, head injury with pain     COMPARISON: None     DOSE: 601 mGy-cm     TECHNIQUE: Sequential imaging was performed from the vertex through the  base of the skull without the use of IV contrast.  Sagittal and coronal  reformations were made from the original source data and reviewed.  Automated exposure control was also utilized to decrease patient  radiation dose.     FINDINGS:   There is mild diffuse cerebral atrophy. There is no evidence of acute  intracranial hemorrhage, mass, or midline shift. There is no evidence of  acute territorial infarct. The ventricles appear normal in  configuration. The basilar cisterns are patent. Posterior fossa appears  grossly normal. The calvarium is intact. The visualized paranasal  sinuses and mastoid air cells appear clear. Calcifications are seen in  the cavernous carotid arteries. There is a mostly empty sella  configuration.          Impression:       No acute intracranial findings.     This report was finalized on 10/09/2018 14:05 by Dr. Hector Holbrook MD.         History of Present Illness on Day of Discharge: The patient's resting in bed with family at bedside.  She relates that her pain is better controlled today.  She does feel safe for discharge home today.  She denies any chest pain or shortness of breath.  She was able to have bowel movement today.    Hospital Course:  Ms. Garcia is a 63-year-old  female who follows Dr. Balaji Cornelius for primary care.  She has a past medical history significant for non-insulin-dependent diabetes mellitus type II, hyperlipidemia, chronic neck pain, hypertension, and polyneuropathy.  He presented to the [facility] emergency department on 10/09/2018 following a fall at home.  The patient states she has very little sensation in her feet and hands and has been having issues with this since June of this year.  This has caused problems with her gait, which contributed to her fall.  In the emergency department, laboratory workup was essentially within normal limits.  X-ray of the right ankle showed fracture dislocation with a bimalleolar fracture.  There is also medial subluxation of the distal tibia with associated diffuse soft tissue swelling.  The fracture was reduced in the emergency department.  The patient was admitted to the hospitalist for further evaluation and management.    The patient was seen in consultation by orthopedic surgery, and was taken for open reduction internal fixation of the fracture on 10/10/2018.  The patient has done well postoperatively, and has been seen by both physical and occupational therapy.  They feel that the safest option for the patient will be home with home health.  We did speak to the physical therapist regarding their concerns of the patient's neuropathy and hyperreflexia.  This has been noted by her primary care provider, and she has recently been referred to Dr. Olivares at [facility].  Physical therapy is concerned about the possibility of cervical spinal compression relating  "to her hyperreflexia.  There was a CT without contrast performed of the C-spine on admission, which was without evidence of fracture or malalignment.  There was degenerative disc disease and bony neural formoterol encroachment at multiple levels related to arthropathy and uncinate spurring.  We will defer any further workup to Dr. Olivares.  The patient's work-up may be limited with the surgery performed and hardware that was implanted.     The patient is overall hemodynamically stable and appropriate for discharge home with home health today.  She will need to follow-up with her primary care provider next week and with Dr. Bart Oglesby as per his instruction.    Condition on Discharge:  Stable    Physical Exam on Discharge:  /57 (BP Location: Left arm, Patient Position: Lying)   Pulse 78   Temp 98.5 °F (36.9 °C) (Oral)   Resp 16   Ht 165.1 cm (65\")   Wt 77.7 kg (171 lb 3 oz)   LMP  (LMP Unknown)   SpO2 93%   BMI 28.49 kg/m²    Physical Exam  Constitutional: She is oriented to person, place, and time. She appears well-developed and well-nourished. No distress.   HENT:   Head: Normocephalic and atraumatic.   Neck: Normal range of motion. Neck supple. No JVD present. No tracheal deviation present. No thyromegaly present.   Cardiovascular: Normal rate, regular rhythm, normal heart sounds and intact distal pulses.  Exam reveals no gallop and no friction rub.    No murmur heard.  Pulmonary/Chest: Effort normal and breath sounds normal. She has no wheezes. She has no rales.   Abdominal: Soft. Bowel sounds are normal. She exhibits no distension. There is no tenderness. There is no guarding.   Musculoskeletal: She exhibits tenderness (Right ankle). She exhibits no edema.   Lymphadenopathy:     She has no cervical adenopathy.   Neurological: She is alert and oriented to person, place, and time.   Peripheral neuropathy to bilateral feet and hands Capillary refill <3 seconds, able to move right toes.  Skin: " Skin is warm and dry. No rash noted. No erythema.   Dressing intact to right ankle   Psychiatric: She has a normal mood and affect. Her behavior is normal. Judgment and thought content normal.   Vitals reviewed.    Discharge Disposition:  Home or Self Care    Discharge Medications:     Discharge Medications      New Medications      Instructions Start Date   polyethylene glycol pack packet  Commonly known as:  MIRALAX   17 g, Oral, Daily PRN         Continue These Medications      Instructions Start Date   AMBIEN 10 MG tablet  Generic drug:  zolpidem   10 mg, Oral, Nightly PRN      amitriptyline 10 MG tablet  Commonly known as:  ELAVIL   10 mg, Oral, Nightly      aspirin 81 MG chewable tablet   81 mg, Oral, Daily      carisoprodol 350 MG tablet  Commonly known as:  SOMA   350 mg, Oral, 4 Times Daily PRN      celecoxib 200 MG capsule  Commonly known as:  CeleBREX   200 mg, Oral, Daily With Breakfast      clonazePAM 0.5 MG tablet  Commonly known as:  KlonoPIN   0.5 mg, Oral, 2 Times Daily PRN      colestipol 1 g tablet  Commonly known as:  COLESTID   1 g, Oral, 2 Times Daily      escitalopram 10 MG tablet  Commonly known as:  LEXAPRO   10 mg, Oral, Daily      esomeprazole 20 MG capsule  Commonly known as:  nexIUM   20 mg, Oral, Every Morning Before Breakfast      gabapentin 100 MG capsule  Commonly known as:  NEURONTIN   100 mg, Oral, 3 Times Daily      glimepiride 2 MG tablet  Commonly known as:  AMARYL   2 mg, Oral, Every Morning Before Breakfast      lisinopril 10 MG tablet  Commonly known as:  PRINIVIL,ZESTRIL   20 mg, Oral, Daily With Breakfast      metFORMIN  MG 24 hr tablet  Commonly known as:  GLUCOPHAGE-XR   1,000 mg, Oral, 2 Times Daily      oxyCODONE-acetaminophen  MG per tablet  Commonly known as:  PERCOCET   1 tablet, Oral, Every 8 Hours PRN      rOPINIRole 0.5 MG tablet  Commonly known as:  REQUIP   0.5 mg, Oral, 2 Times Daily, Take 1 hour before bedtime.      simvastatin 20 MG  tablet  Commonly known as:  ZOCOR   20 mg, Oral, Nightly         Stop These Medications    pregabalin 50 MG capsule  Commonly known as:  LYRICA          Discharge Diet:   Diet Instructions     Diet: Consistent Carbohydrate; Thin       Discharge Diet:  Consistent Carbohydrate    Fluid Consistency:  Thin        Activity at Discharge:   Activity Instructions     Activity as Tolerated       Non-weight bearing to RLE        Discharge Care Plan/Instructions:   1.  Return for any acute or worsening symptoms  2.  Home with home health for physical/occupational therapy  3.  Knee walker, standard walker with wheels. Patient has a shower chair at home    Follow-up Appointments:   1.  Primary care provider 10/19/2018  2.  Dr. Bart Oglesby 10/22/2018    Test Results Pending at Discharge: None    ARNOL Garcia  10/12/18  2:34 PM    Time: 35 minutes    Chart reviewed.   Patient examined  Agree with assessment and plan.  Dottie Ramsay DO  10/12/18  2:44 PM      Electronically signed by Dottie Ramsay DO at 10/12/2018  2:44 PM

## 2018-10-13 NOTE — THERAPY DISCHARGE NOTE
Acute Care - Physical Therapy Discharge Summary  [facility]       Patient Name: America Garcia  : 1955  MRN: [id number]    Today's Date: 10/13/2018  Onset of Illness/Injury or Date of Surgery: 10/09/18    Date of Referral to PT: 10/10/18  Referring Physician: Latasha AMBROSE      Admit Date: 10/9/2018      PT Recommendation and Plan    Visit Dx:    ICD-10-CM ICD-9-CM   1. Closed bimalleolar fracture of right ankle, initial encounter S82.841A 824.4   2. Fall, initial encounter W19.XXXA E888.9   3. Injury of head, initial encounter S09.90XA 959.01   4. Closed bimalleolar fracture of right ankle with routine healing, subsequent encounter S82.841D V54.19   5. Decreased activities of daily living (ADL) R68.89 780.99   6. Impaired functional mobility, balance, gait, and endurance Z74.09 V49.89             Outcome Measures     Row Name 10/12/18 1000 10/12/18 0900 10/11/18 1200       How much help from another person do you currently need...    Turning from your back to your side while in flat bed without using bedrails? 4  -AH  -- 4  -MS (r) TD (t) MS (c)    Moving from lying on back to sitting on the side of a flat bed without bedrails? 4  -AH  -- 4  -MS (r) TD (t) MS (c)    Moving to and from a bed to a chair (including a wheelchair)? 3  -AH  -- 2  -MS (r) TD (t) MS (c)    Standing up from a chair using your arms (e.g., wheelchair, bedside chair)? 3  -AH  -- 2  -MS (r) TD (t) MS (c)    Climbing 3-5 steps with a railing? 1  -AH  -- 1  -MS (r) TD (t) MS (c)    To walk in hospital room? 2  -AH  -- 2  -MS (r) TD (t) MS (c)    AM-PAC 6 Clicks Score 17  -AH  -- 15  -MS (r) TD (t)       How much help from another is currently needed...    Putting on and taking off regular lower body clothing?  -- 2  -TS  --    Bathing (including washing, rinsing, and drying)  -- 2  -TS  --    Toileting (which includes using toilet bed pan or urinal)  -- 3  -TS  --    Putting on and taking off regular upper body clothing  -- 4  -TS  --     Taking care of personal grooming (such as brushing teeth)  -- 3  -TS  --    Eating meals  -- 4  -TS  --    Score  -- 18  -TS  --       Functional Assessment    Outcome Measure Options AM-PAC 6 Clicks Basic Mobility (PT)  - AM-PAC 6 Clicks Daily Activity (OT)  -TS AM-PAC 6 Clicks Basic Mobility (PT)  -MS (r) TD (t) MS (c)    Row Name 10/11/18 1038             How much help from another is currently needed...    Putting on and taking off regular lower body clothing? 2  -AC      Bathing (including washing, rinsing, and drying) 2  -AC      Toileting (which includes using toilet bed pan or urinal) 2  -AC      Putting on and taking off regular upper body clothing 4  -AC      Taking care of personal grooming (such as brushing teeth) 3  -AC      Eating meals 4  -AC      Score 17  -AC         Functional Assessment    Outcome Measure Options AM-PAC 6 Clicks Daily Activity (OT)  -AC        User Key  (r) = Recorded By, (t) = Taken By, (c) = Cosigned By    Initials Name Provider Type    AC Dean Felix, OTR/L Occupational Therapist     Shandra Chavira, PTA Physical Therapy Assistant    TS Augusta Linares, WISDOM/L Occupational Therapy Assistant    MS Cary Kern, PT, DPT, NCS Physical Therapist    TD Marlene Castellanos, PT Student PT Student            Therapy Suggested Charges     Code   Minutes Charges    17981 (CPT®) Hc Pt Neuromusc Re Education Ea 15 Min      36128 (CPT®) Hc Pt Ther Proc Ea 15 Min      74304 (CPT®) Hc Gait Training Ea 15 Min      40504 (CPT®) Hc Pt Therapeutic Act Ea 15 Min 30 2    58834 (CPT®) Hc Pt Manual Therapy Ea 15 Min      37243 (CPT®) Hc Pt Iontophoresis Ea 15 Min      45029 (CPT®) Hc Pt Elec Stim Ea-Per 15 Min      71948 (CPT®) Hc Pt Ultrasound Ea 15 Min      54854 (CPT®) Hc Pt Self Care/Mgmt/Train Ea 15 Min      77135 (CPT®) Hc Pt Prosthetic (S) Train Initial Encounter, Each 15 Min      40276 (CPT®)  Pt Orthotic(S)/Prosthetic(S) Encounter, Each 15 Min      63215 (CPT®)   Orthotic(S) Mgmt/Train Initial Encounter, Each 15min      Total  30 2                PT Rehab Goals     Row Name 10/13/18 0723             Transfer Goal 1 (PT)    Activity/Assistive Device (Transfer Goal 1, PT) transfers, all;walker, rolling   pivots  -WK      [place] Level/Cues Needed (Transfer Goal 1, PT) standby assist  -WK      Time Frame (Transfer Goal 1, PT) by discharge  -WK      Progress/Outcome (Transfer Goal 1, PT) goal not met  -WK        User Key  (r) = Recorded By, (t) = Taken By, (c) = Cosigned By    Initials Name Provider Type Discipline    WK Karina Hernandez PTA Physical Therapy Assistant PT           10/13/18 0723   Balance Goal 1 (PT)   Activity/Assistive Device (Balance Goal 1, PT) assistive device use;standing, static   [place] Level/Cues Needed (Balance Goal 1, PT) standby assist   Time Frame (Balance Goal 1, PT) by discharge   Progress/Outcomes (Balance Goal 1, PT) goal not met         PT Discharge Summary  Anticipated Discharge Disposition (PT): home  Reason for Discharge: Discharge from facility  Outcomes Achieved: Refer to plan of care for updates on goals achieved  Discharge Destination: Home      Karina Hernandez PTA   10/13/2018

## 2018-10-17 ENCOUNTER — TELEPHONE (OUTPATIENT)
Dept: PRIMARY CARE CLINIC | Age: 63
End: 2018-10-17

## 2018-10-19 ENCOUNTER — OFFICE VISIT (OUTPATIENT)
Dept: PRIMARY CARE CLINIC | Age: 63
End: 2018-10-19
Payer: COMMERCIAL

## 2018-10-19 ENCOUNTER — TELEPHONE (OUTPATIENT)
Dept: NEUROLOGY | Age: 63
End: 2018-10-19

## 2018-10-19 VITALS
TEMPERATURE: 98 F | HEART RATE: 95 BPM | DIASTOLIC BLOOD PRESSURE: 78 MMHG | OXYGEN SATURATION: 95 % | SYSTOLIC BLOOD PRESSURE: 110 MMHG

## 2018-10-19 DIAGNOSIS — E11.9 TYPE 2 DIABETES MELLITUS WITHOUT COMPLICATION, WITHOUT LONG-TERM CURRENT USE OF INSULIN (HCC): ICD-10-CM

## 2018-10-19 DIAGNOSIS — S82.841D: Primary | ICD-10-CM

## 2018-10-19 DIAGNOSIS — Z23 NEED FOR INFLUENZA VACCINATION: ICD-10-CM

## 2018-10-19 LAB — HBA1C MFR BLD: 7.2 %

## 2018-10-19 PROCEDURE — 83036 HEMOGLOBIN GLYCOSYLATED A1C: CPT | Performed by: FAMILY MEDICINE

## 2018-10-19 PROCEDURE — 90686 IIV4 VACC NO PRSV 0.5 ML IM: CPT | Performed by: FAMILY MEDICINE

## 2018-10-19 PROCEDURE — 99213 OFFICE O/P EST LOW 20 MIN: CPT | Performed by: FAMILY MEDICINE

## 2018-10-19 PROCEDURE — 1111F DSCHRG MED/CURRENT MED MERGE: CPT | Performed by: FAMILY MEDICINE

## 2018-10-19 PROCEDURE — 90471 IMMUNIZATION ADMIN: CPT | Performed by: FAMILY MEDICINE

## 2018-10-19 ASSESSMENT — ENCOUNTER SYMPTOMS
NAUSEA: 0
CHEST TIGHTNESS: 0
WHEEZING: 0
CONSTIPATION: 0
SHORTNESS OF BREATH: 0
ABDOMINAL PAIN: 0
VOMITING: 0
COUGH: 0
DIARRHEA: 0

## 2018-10-19 NOTE — PROGRESS NOTES
Machelle Mathur is a 58 y.o. female who presents today for   Chief Complaint   Patient presents with    Follow-Up from Hospital       HPI  Patient is here for hospital follow-up after having a fracture of her right ankle. She noted as well that when she was in the hospital she had better control of her diabetes because a doing sliding scale insulin. She is wondering if she needs to be on it today. Last A1c was 7.2 and today's is 7.2 and she continues to take metformin daily. Patient is on aspirin for DVT prophylaxis. She is doing well with no complications of fracture repair. She follows up with orthopedic surgeon is currently doing physical therapy at home. No change in PMH, family, social, or surgical history unless mentioned above. Review of Systems   Constitutional: Negative for chills and fever. Respiratory: Negative for cough, chest tightness, shortness of breath and wheezing. Cardiovascular: Negative for chest pain, palpitations and leg swelling. Gastrointestinal: Negative for abdominal pain, constipation, diarrhea, nausea and vomiting. Genitourinary: Negative for difficulty urinating, dysuria and frequency. Musculoskeletal: Positive for gait problem. Negative for arthralgias. Neurological: Positive for weakness. Negative for tremors.        Past Medical History:   Diagnosis Date    Anxiety     Cervical disc disease     Chest discomfort 10/17/2014    H/o negative stress tests AUC indication 15, AUC score 4, [facility] 2012;[id number] 10/17/2014  Cath  Mild cad,normal LVFX      Diabetes mellitus ([facility] [address] 10/17/2014    Diabetic neuropathy (HCC)     Hyperlipidemia     Hypertension     Insomnia     Insomnia     Osteoarthritis     Restless leg syndrome        Current Outpatient Prescriptions   Medication Sig Dispense Refill    ondansetron (ZOFRAN) 4 MG tablet Take 1 tablet by mouth daily as needed for Nausea or Vomiting 30 tablet 0    zoster recombinant adjuvanted vaccine [facility]) 50 MCG SUSR injection 50 MCG IM then repeat 2-6 months. 0.5 mL 1    clonazePAM (KLONOPIN) 0.5 MG tablet Take 0.5 mg by mouth 2 times daily as needed. Kenn Guzman esomeprazole Magnesium (NEXIUM) 20 MG PACK Take 20 mg by mouth daily      gabapentin (NEURONTIN) 100 MG capsule Take 100 mg by mouth 3 times daily. Kenn Guzman zolpidem (AMBIEN) 10 MG tablet Take by mouth nightly as needed for Sleep. Kenn Guzman amitriptyline (ELAVIL) 10 MG tablet Take 10 mg by mouth nightly      colestipol (COLESTID) 1 g tablet TAKE (1) TABLET BY MOUTH TWICE A DAY. 60 tablet 2    escitalopram (LEXAPRO) 10 MG tablet TAKE 1 TABLET BY MOUTH ONCE DAILY 30 tablet 5    Insulin Pen Needle (B-D UF III MINI PEN NEEDLES) 31G X 5 MM MISC Inject 1 each as directed daily 100 each 0    metFORMIN (GLUCOPHAGE-XR) 500 MG extended release tablet Take two tablets twice daily 120 tablet 3    celecoxib (CELEBREX) 200 MG capsule TAKE 1 CAPSULE BY MOUTH DAILY IN THE MORNING 30 capsule 11    rOPINIRole (REQUIP) 0.5 MG tablet TAKE (1) TABLET BY MOUTH TWICE A DAY. 60 tablet 11    aspirin EC 81 MG EC tablet Take 1 tablet by mouth daily 90 tablet 5    simvastatin (ZOCOR) 20 MG tablet Take 1 tablet by mouth nightly 90 tablet 3    lisinopril (PRINIVIL;ZESTRIL) 20 MG tablet TAKE 1 TABLET BY MOUTH DAILY IN THE MORNING 30 tablet 11    glimepiride (AMARYL) 2 MG tablet TAKE 1 TABLET BY MOUTH DAILY IN THE MORNING (BEFORE BREAKFAST) 30 tablet 11    Diabetic Shoe MISC by Does not apply route With insert Dx E11.9 1 each 0    carisoprodol (SOMA) 350 MG tablet Take 350 mg by mouth 2 times daily       oxyCODONE-acetaminophen (PERCOCET)  MG per tablet Take 1 tablet by mouth every 8 hours as needed       pregabalin (LYRICA) 50 MG capsule Take 1 capsule by mouth 3 times daily for 14 days. . 42 capsule 0     No current facility-administered medications for this visit.         Allergies   Allergen Reactions    Codeine     Sulfa Antibiotics        Past Surgical History:

## 2018-10-19 NOTE — TELEPHONE ENCOUNTER
I called to let pt know we had her MRI's and nerve conduction test scheduled for 11/7/18 arrival time 8am. Pt did not answer.  I LMOM

## 2018-10-19 NOTE — PATIENT INSTRUCTIONS
Please arrive 15 minutes early to next follow up appointment in 3 months or schedule an appointment sooner if needed.

## 2018-10-23 ENCOUNTER — TELEPHONE (OUTPATIENT)
Dept: FAMILY MEDICINE CLINIC | Age: 63
End: 2018-10-23

## 2018-10-23 DIAGNOSIS — M19.90 OSTEOARTHRITIS, UNSPECIFIED OSTEOARTHRITIS TYPE, UNSPECIFIED SITE: Primary | ICD-10-CM

## 2018-10-24 ENCOUNTER — TELEPHONE (OUTPATIENT)
Dept: PRIMARY CARE CLINIC | Age: 63
End: 2018-10-24

## 2018-10-25 ENCOUNTER — TELEPHONE (OUTPATIENT)
Dept: NEUROLOGY | Age: 63
End: 2018-10-25

## 2018-10-26 ENCOUNTER — TELEPHONE (OUTPATIENT)
Dept: PRIMARY CARE CLINIC | Age: 63
End: 2018-10-26

## 2018-10-26 NOTE — TELEPHONE ENCOUNTER
Alma Parker with [facility] called stated that pt had a fall 3 days ago. Pt slid off side of bed on to the floor, but no injury was caused by this.

## 2018-10-30 ENCOUNTER — OFFICE VISIT (OUTPATIENT)
Dept: NEUROLOGY | Age: 63
End: 2018-10-30
Payer: COMMERCIAL

## 2018-10-30 VITALS
DIASTOLIC BLOOD PRESSURE: 76 MMHG | BODY MASS INDEX: 28.32 KG/M2 | HEART RATE: 110 BPM | HEIGHT: 65 IN | WEIGHT: 170 LBS | SYSTOLIC BLOOD PRESSURE: 112 MMHG

## 2018-10-30 DIAGNOSIS — E11.42 DIABETIC POLYNEUROPATHY ASSOCIATED WITH TYPE 2 DIABETES MELLITUS (HCC): Primary | ICD-10-CM

## 2018-10-30 DIAGNOSIS — G56.03 BILATERAL CARPAL TUNNEL SYNDROME: ICD-10-CM

## 2018-10-30 DIAGNOSIS — M47.12 OSTEOARTHRITIS OF CERVICAL SPINE WITH MYELOPATHY: ICD-10-CM

## 2018-10-30 PROCEDURE — 99214 OFFICE O/P EST MOD 30 MIN: CPT | Performed by: PSYCHIATRY & NEUROLOGY

## 2018-10-30 RX ORDER — PREGABALIN 50 MG/1
50 CAPSULE ORAL 3 TIMES DAILY
COMMUNITY
End: 2019-05-01

## 2018-11-07 ENCOUNTER — HOSPITAL ENCOUNTER (OUTPATIENT)
Dept: NEUROLOGY | Age: 63
Discharge: HOME | End: 2018-11-07
Payer: COMMERCIAL

## 2018-11-07 PROCEDURE — 95886 MUSC TEST DONE W/N TEST COMP: CPT

## 2018-11-07 PROCEDURE — 95911 NRV CNDJ TEST 9-10 STUDIES: CPT

## 2018-11-07 PROCEDURE — 95911 NRV CNDJ TEST 9-10 STUDIES: CPT | Performed by: PSYCHIATRY & NEUROLOGY

## 2018-11-07 PROCEDURE — 95886 MUSC TEST DONE W/N TEST COMP: CPT | Performed by: PSYCHIATRY & NEUROLOGY

## 2018-11-11 DIAGNOSIS — M47.12 OSTEOARTHRITIS OF CERVICAL SPINE WITH MYELOPATHY: Primary | ICD-10-CM

## 2018-12-04 ENCOUNTER — TELEPHONE (OUTPATIENT)
Dept: NEUROLOGY | Age: 63
End: 2018-12-04

## 2018-12-04 DIAGNOSIS — M47.12 OSTEOARTHRITIS OF CERVICAL SPINE WITH MYELOPATHY: Primary | ICD-10-CM

## 2018-12-07 ENCOUNTER — TELEPHONE (OUTPATIENT)
Dept: NEUROLOGY | Age: 63
End: 2018-12-07

## 2018-12-10 ENCOUNTER — TELEPHONE (OUTPATIENT)
Dept: NEUROSURGERY | Age: 63
End: 2018-12-10

## 2018-12-18 ENCOUNTER — TELEPHONE (OUTPATIENT)
Dept: NEUROLOGY | Age: 63
End: 2018-12-18

## 2018-12-26 DIAGNOSIS — G47.00 INSOMNIA, UNSPECIFIED TYPE: Primary | ICD-10-CM

## 2018-12-27 RX ORDER — ZOLPIDEM TARTRATE 10 MG/1
TABLET ORAL
Qty: 30 TABLET | Refills: 0 | OUTPATIENT
Start: 2018-12-27 | End: 2019-01-24 | Stop reason: SDUPTHER

## 2019-01-07 DIAGNOSIS — E11.9 TYPE 2 DIABETES MELLITUS WITHOUT COMPLICATION, WITHOUT LONG-TERM CURRENT USE OF INSULIN (HCC): ICD-10-CM

## 2019-01-07 RX ORDER — METFORMIN HYDROCHLORIDE 500 MG/1
TABLET, EXTENDED RELEASE ORAL
Qty: 120 TABLET | Refills: 0 | Status: SHIPPED | OUTPATIENT
Start: 2019-01-07 | End: 2019-01-30 | Stop reason: SDUPTHER

## 2019-01-07 RX ORDER — MONTELUKAST SODIUM 4 MG/1
TABLET, CHEWABLE ORAL
Qty: 60 TABLET | Refills: 0 | Status: SHIPPED | OUTPATIENT
Start: 2019-01-07 | End: 2019-01-30 | Stop reason: SDUPTHER

## 2019-01-17 ENCOUNTER — APPOINTMENT (OUTPATIENT)
Dept: PREADMISSION TESTING | Facility: HOSPITAL | Age: 64
End: 2019-01-17

## 2019-01-17 ENCOUNTER — HOSPITAL ENCOUNTER (OUTPATIENT)
Dept: GENERAL RADIOLOGY | Facility: HOSPITAL | Age: 64
Discharge: HOME | End: 2019-01-17
Admitting: ORTHOPAEDIC SURGERY

## 2019-01-17 VITALS
HEIGHT: 65 IN | WEIGHT: 156.09 LBS | DIASTOLIC BLOOD PRESSURE: 70 MMHG | SYSTOLIC BLOOD PRESSURE: 124 MMHG | BODY MASS INDEX: 26.01 KG/M2 | OXYGEN SATURATION: 97 % | HEART RATE: 75 BPM

## 2019-01-17 LAB
ALBUMIN SERPL-MCNC: 4.7 G/DL (ref 3.5–5)
ALBUMIN/GLOB SERPL: 1.4 G/DL (ref 1.1–2.5)
ALP SERPL-CCNC: 119 U/L (ref 24–120)
ALT SERPL W P-5'-P-CCNC: <15 U/L (ref 0–54)
ANION GAP SERPL CALCULATED.3IONS-SCNC: 15 MMOL/L (ref 4–13)
APTT PPP: 23.8 SECONDS (ref 24.1–34.8)
AST SERPL-CCNC: 23 U/L (ref 7–45)
BASOPHILS # BLD AUTO: 0.04 10*3/MM3 (ref 0–0.2)
BASOPHILS NFR BLD AUTO: 0.8 % (ref 0–2)
BILIRUB SERPL-MCNC: 0.3 MG/DL (ref 0.1–1)
BILIRUB UR QL STRIP: NEGATIVE
BUN BLD-MCNC: 13 MG/DL (ref 5–21)
BUN/CREAT SERPL: 20.6 (ref 7–25)
CALCIUM SPEC-SCNC: 9.6 MG/DL (ref 8.4–10.4)
CHLORIDE SERPL-SCNC: 96 MMOL/L (ref 98–110)
CLARITY UR: CLEAR
CO2 SERPL-SCNC: 27 MMOL/L (ref 24–31)
COLOR UR: YELLOW
CREAT BLD-MCNC: 0.63 MG/DL (ref 0.5–1.4)
DEPRECATED RDW RBC AUTO: 44.6 FL (ref 40–54)
EOSINOPHIL # BLD AUTO: 0.07 10*3/MM3 (ref 0–0.7)
EOSINOPHIL NFR BLD AUTO: 1.4 % (ref 0–4)
ERYTHROCYTE [DISTWIDTH] IN BLOOD BY AUTOMATED COUNT: 14.7 % (ref 12–15)
GFR SERPL CREATININE-BSD FRML MDRD: 95 ML/MIN/1.73
GLOBULIN UR ELPH-MCNC: 3.4 GM/DL
GLUCOSE BLD-MCNC: 262 MG/DL (ref 70–100)
GLUCOSE UR STRIP-MCNC: ABNORMAL MG/DL
HCT VFR BLD AUTO: 36.7 % (ref 37–47)
HGB BLD-MCNC: 11.5 G/DL (ref 12–16)
HGB UR QL STRIP.AUTO: NEGATIVE
IMM GRANULOCYTES # BLD AUTO: 0.02 10*3/MM3 (ref 0–0.03)
IMM GRANULOCYTES NFR BLD AUTO: 0.4 % (ref 0–5)
INR PPP: 0.91 (ref 0.91–1.09)
KETONES UR QL STRIP: NEGATIVE
LEUKOCYTE ESTERASE UR QL STRIP.AUTO: NEGATIVE
LYMPHOCYTES # BLD AUTO: 1.93 10*3/MM3 (ref 0.72–4.86)
LYMPHOCYTES NFR BLD AUTO: 38.7 % (ref 15–45)
MCH RBC QN AUTO: 26.1 PG (ref 28–32)
MCHC RBC AUTO-ENTMCNC: 31.3 G/DL (ref 33–36)
MCV RBC AUTO: 83.2 FL (ref 82–98)
MONOCYTES # BLD AUTO: 0.51 10*3/MM3 (ref 0.19–1.3)
MONOCYTES NFR BLD AUTO: 10.2 % (ref 4–12)
NEUTROPHILS # BLD AUTO: 2.42 10*3/MM3 (ref 1.87–8.4)
NEUTROPHILS NFR BLD AUTO: 48.5 % (ref 39–78)
NITRITE UR QL STRIP: NEGATIVE
NRBC BLD AUTO-RTO: 0 /100 WBC (ref 0–0)
PH UR STRIP.AUTO: <=5 [PH] (ref 5–8)
PLATELET # BLD AUTO: 350 10*3/MM3 (ref 130–400)
PMV BLD AUTO: 10.1 FL (ref 6–12)
POTASSIUM BLD-SCNC: 4.3 MMOL/L (ref 3.5–5.3)
PROT SERPL-MCNC: 8.1 G/DL (ref 6.3–8.7)
PROT UR QL STRIP: NEGATIVE
PROTHROMBIN TIME: 12.5 SECONDS (ref 11.9–14.6)
RBC # BLD AUTO: 4.41 10*6/MM3 (ref 4.2–5.4)
SODIUM BLD-SCNC: 138 MMOL/L (ref 135–145)
SP GR UR STRIP: 1.01 (ref 1–1.03)
UROBILINOGEN UR QL STRIP: ABNORMAL
WBC NRBC COR # BLD: 4.99 10*3/MM3 (ref 4.8–10.8)

## 2019-01-17 PROCEDURE — 71046 X-RAY EXAM CHEST 2 VIEWS: CPT

## 2019-01-17 PROCEDURE — 81003 URINALYSIS AUTO W/O SCOPE: CPT | Performed by: ORTHOPAEDIC SURGERY

## 2019-01-17 PROCEDURE — 85730 THROMBOPLASTIN TIME PARTIAL: CPT | Performed by: ORTHOPAEDIC SURGERY

## 2019-01-17 PROCEDURE — 80053 COMPREHEN METABOLIC PANEL: CPT | Performed by: ORTHOPAEDIC SURGERY

## 2019-01-17 PROCEDURE — 85610 PROTHROMBIN TIME: CPT | Performed by: ORTHOPAEDIC SURGERY

## 2019-01-17 PROCEDURE — 93010 ELECTROCARDIOGRAM REPORT: CPT | Performed by: INTERNAL MEDICINE

## 2019-01-17 PROCEDURE — 36415 COLL VENOUS BLD VENIPUNCTURE: CPT

## 2019-01-17 PROCEDURE — 93005 ELECTROCARDIOGRAM TRACING: CPT

## 2019-01-17 PROCEDURE — 85025 COMPLETE CBC W/AUTO DIFF WBC: CPT | Performed by: ORTHOPAEDIC SURGERY

## 2019-01-17 NOTE — DISCHARGE INSTRUCTIONS
DAY OF SURGERY INSTRUCTIONS        YOUR SURGEON: ***    PROCEDURE: ***anterior cervical disc fusion cervical 3-4    DATE OF SURGERY: ***1/21/19    ARRIVAL TIME: AS DIRECTED BY OFFICE    YOU MAY TAKE THE FOLLOWING MEDICATION(S) THE MORNING OF SURGERY WITH A SIP OF WATER: ***percocet, klonopin (clonazepam), gabapentin (neurotin)                MANAGING PAIN AFTER SURGERY    We know you are probably wondering what your pain will be like after surgery.  Following surgery it is unrealistic to expect you will not have pain.   Pain is how our bodies let us know that something is wrong or cautions us to be careful.  That said, our goal is to make your pain tolerable.    Methods we may use to treat your pain include (oral or IV medications, PCAs, epidurals, nerve blocks, etc.)   While some procedures require IV pain medications for a short time after surgery, transitioning to pain medications by mouth allows for better management of pain.   Your nurse will encourage you to take oral pain medications whenever possible.  IV medications work almost immediately, but only last a short while.  Taking medications by mouth allows for a more constant level of medication in your blood stream for a longer period of time.      Once your pain is out of control it is harder to get back under control.  It is important you are aware when your next dose of pain medication is due.  If you are admitted, your nurse may write the time of your next dose on the white board in your room to help you remember.      We are interested in your pain and encourage you to inform us about aggravating factors during your visit.   Many times a simple repositioning every few hours can make a big difference.    If your physician says it is okay, do not let your pain prevent you from getting out of bed. Be sure to call your nurse for assistance prior to getting up so you do not fall.      Before surgery, please decide your tolerable pain goal.  These  faces help describe the pain ratings we use on a 0-10 scale.   Be prepared to tell us your goal and whether or not you take pain or anxiety medications at home.          BEFORE YOU COME TO THE HOSPITAL  (Pre-op instructions)  • Do not eat, drink, smoke or chew gum after midnight the night before surgery.  This also includes no mints.  • Morning of surgery take only the medicines you have been instructed with a sip of water unless otherwise instructed  by your physician.  • Do not shave, wear makeup or dark nail polish.  • Remove all jewelry including rings.  • Leave anything you consider valuable at home.  • Leave your suitcase in the car until after your surgery.  • Bring the following with you if applicable:  o Picture ID and insurance, Medicare or Medicaid cards  o Co-pay/deductible required by insurance (cash, check, credit card)  o Copy of advance directive, living will or power-of- documents if not brought to PAT  o CPAP or BIPAP mask and tubing  o Relaxation aids (MP3 player, book, magazine)  • On the day of surgery check in at registration located at the main entrance of the hospital.       Outpatient Surgery Guidelines, Adult  Outpatient procedures are those for which the person having the procedure is allowed to go home the same day as the procedure. Various procedures are done on an outpatient basis. You should follow some general guidelines if you will be having an outpatient procedure.  LET YOUR HEALTH CARE PROVIDER KNOW ABOUT:  · Any allergies you have.  · All medicines you are taking, including vitamins, herbs, eye drops, creams, and over-the-counter medicines.  · Previous problems you or members of your family have had with the use of anesthetics.  · Any blood disorders you have.  · Previous surgeries you have had.  · Medical conditions you have.  RISKS AND COMPLICATIONS  Your health care provider will discuss possible risks and complications with you before surgery. Common risks and  complications include:    · Problems due to the use of anesthetics.  · Blood loss and replacement (does not apply to minor surgical procedures).  · Temporary increase in pain due to surgery.  · Uncorrected pain or problems that the surgery was meant to correct.  · Infection.  · New damage.  BEFORE THE PROCEDURE  · Ask your health care provider about changing or stopping your regular medicines. You may need to stop taking certain medicines in the days or weeks before the procedure.  · Stop smoking at least 2 weeks before surgery. This lowers your risk for complications during and after surgery. Ask your health care provider for help with this if needed.  · Eat your usual meals and a light supper the day before surgery. Continue fluid intake. Do not drink alcohol.  · Do not eat or drink after midnight the night before your surgery.   · Arrange for someone to take you home and to stay with you for 24 hours after the procedure. Medicine given for your procedure may affect your ability to drive or to care for yourself.  · Call your health care provider's office if you develop an illness or problem that may prevent you from safely having your procedure.  AFTER THE PROCEDURE  After surgery, you will be taken to a recovery area, where your progress will be monitored. If there are no complications, you will be allowed to go home when you are awake, stable, and taking fluids well. You may have numbness around the surgical site. Healing will take some time. You will have tenderness at the surgical site and may have some swelling and bruising. You may also have some nausea.  HOME CARE INSTRUCTIONS  · Do not drive for 24 hours, or as directed by your health care provider. Do not drive while taking prescription pain medicines.  · Do not drink alcohol for 24 hours.  · Do not make important decisions or sign legal documents for 24 hours.  · You may resume a normal diet and activities as directed.  · Do not lift anything heavier  than 10 pounds (4.5 kg) or play contact sports until your health care provider says it is okay.  · Change your bandages (dressings) as directed.  · Only take over-the-counter or prescription medicines as directed by your health care provider.  · Follow up with your health care provider as directed.  SEEK MEDICAL CARE IF:  · You have increased bleeding (more than a small spot) from the surgical site.  · You have redness, swelling, or increasing pain in the wound.  · You see pus coming from the wound.  · You have a fever.  · You notice a bad smell coming from the wound or dressing.  · You feel lightheaded or faint.  · You develop a rash.  · You have trouble breathing.  · You develop allergies.  MAKE SURE YOU:  · Understand these instructions.  · Will watch your condition.  · Will get help right away if you are not doing well or get worse.     This information is not intended to replace advice given to you by your health care provider. Make sure you discuss any questions you have with your health care provider.     Document Released: 09/12/2002 Document Revised: 05/03/2016 Document Reviewed: 05/22/2014  [employer] Interactive Patient Education ©2016 [employer]       Fall Prevention in Hospitals, Adult  As a hospital patient, your condition and the treatments you receive can increase your risk for falls. Some additional risk factors for falls in a hospital include:  · Being in an unfamiliar environment.  · Being on bed rest.  · Your surgery.  · Taking certain medicines.  · Your tubing requirements, such as intravenous (IV) therapy or catheters.  It is important that you learn how to decrease fall risks while at the hospital. Below are important tips that can help prevent falls.  SAFETY TIPS FOR PREVENTING FALLS  Talk about your risk of falling.  · Ask your health care provider why you are at risk for falling. Is it your medicine, illness, tubing placement, or something else?  · Make a plan with your health care  provider to keep you safe from falls.  · Ask your health care provider or pharmacist about side effects of your medicines. Some medicines can make you dizzy or affect your coordination.  Ask for help.  · Ask for help before getting out of bed. You may need to press your call button.  · Ask for assistance in getting safely to the toilet.  · Ask for a walker or cane to be put at your bedside. Ask that most of the side rails on your bed be placed up before your health care provider leaves the room.  · Ask family or friends to sit with you.  · Ask for things that are out of your reach, such as your glasses, hearing aids, telephone, bedside table, or call button.  Follow these tips to avoid falling:  · Stay lying or seated, rather than standing, while waiting for help.  · Wear rubber-soled slippers or shoes whenever you walk in the hospital.  · Avoid quick, sudden movements.  ¨ Change positions slowly.  ¨ Sit on the side of your bed before standing.  ¨ Stand up slowly and wait before you start to walk.  · Let your health care provider know if there is a spill on the floor.  · Pay careful attention to the medical equipment, electrical cords, and tubes around you.  · When you need help, use your call button by your bed or in the bathroom. Wait for one of your health care providers to help you.  · If you feel dizzy or unsure of your footing, return to bed and wait for assistance.  · Avoid being distracted by the TV, telephone, or another person in your room.  · Do not lean or support yourself on rolling objects, such as IV poles or bedside tables.     This information is not intended to replace advice given to you by your health care provider. Make sure you discuss any questions you have with your health care provider.     Document Released: 12/15/2001 Document Revised: 01/08/2016 Document Reviewed: 08/25/2013  [employer] Interactive Patient Education ©2016 [employer]       Surgical Site Infections FAQs  What is a Surgical  Site Infection (SSI)?  A surgical site infection is an infection that occurs after surgery in the part of the body where the surgery took place. Most patients who have surgery do not develop an infection. However, infections develop in about 1 to 3 out of every 100 patients who have surgery.  Some of the common symptoms of a surgical site infection are:  · Redness and pain around the area where you had surgery  · Drainage of cloudy fluid from your surgical wound  · Fever  Can SSIs be treated?  Yes. Most surgical site infections can be treated with antibiotics. The antibiotic given to you depends on the bacteria (germs) causing the infection. Sometimes patients with SSIs also need another surgery to treat the infection.  What are some of the things that hospitals are doing to prevent SSIs?  To prevent SSIs, doctors, nurses, and other healthcare providers:  · Clean their hands and arms up to their elbows with an antiseptic agent just before the surgery.  · Clean their hands with soap and water or an alcohol-based hand rub before and after caring for each patient.  · May remove some of your hair immediately before your surgery using electric clippers if the hair is in the same area where the procedure will occur. They should not shave you with a razor.  · Wear special hair covers, masks, gowns, and gloves during surgery to keep the surgery area clean.  · Give you antibiotics before your surgery starts. In most cases, you should get antibiotics within 60 minutes before the surgery starts and the antibiotics should be stopped within 24 hours after surgery.  · Clean the skin at the site of your surgery with a special soap that kills germs.  What can I do to help prevent SSIs?  Before your surgery:  · Tell your doctor about other medical problems you may have. Health problems such as allergies, diabetes, and obesity could affect your surgery and your treatment.  · Quit smoking. Patients who smoke get more infections. Talk  to your doctor about how you can quit before your surgery.  · Do not shave near where you will have surgery. Shaving with a razor can irritate your skin and make it easier to develop an infection.  At the time of your surgery:  · Speak up if someone tries to shave you with a razor before surgery. Ask why you need to be shaved and talk with your surgeon if you have any concerns.  · Ask if you will get antibiotics before surgery.  After your surgery:  · Make sure that your healthcare providers clean their hands before examining you, either with soap and water or an alcohol-based hand rub.  · If you do not see your providers clean their hands, please ask them to do so.  · Family and friends who visit you should not touch the surgical wound or dressings.  · Family and friends should clean their hands with soap and water or an alcohol-based hand rub before and after visiting you. If you do not see them clean their hands, ask them to clean their hands.  What do I need to do when I go home from the hospital?  · Before you go home, your doctor or nurse should explain everything you need to know about taking care of your wound. Make sure you understand how to care for your wound before you leave the hospital.  · Always clean your hands before and after caring for your wound.  · Before you go home, make sure you know who to contact if you have questions or problems after you get home.  · If you have any symptoms of an infection, such as redness and pain at the surgery site, drainage, or fever, call your doctor immediately.  If you have additional questions, please ask your doctor or nurse.  Developed and co-sponsored by The Society for Healthcare Epidemiology of [place] (SHEA); Infectious Diseases Society of [place] (IDSA); American Hospital Association; Association for Professionals in Infection Control and Epidemiology (APIC); Centers for Disease Control and Prevention (CDC); and The Joint Commission.     This information  is not intended to replace advice given to you by your health care provider. Make sure you discuss any questions you have with your health care provider.     Document Released: 12/23/2014 Document Revised: 01/08/2016 Document Reviewed: 03/02/2016  [employer] Interactive Patient Education ©2016 [employer]       [facility]  CHG 4% Patient Instruction Sheet    Preparing the Skin Before Surgery  Preparing or “prepping” skin before surgery can reduce the risk of infection at the surgical site. To make the process easier,[facility] has chosen 4% Chlorhexidine Gluconate (CHG) antiseptic solution.   The steps below outline the prepping process and should be carefully followed.                                                                                                                                                      Prep the skin at the following time(s):                                                      We recommend you shower the night before surgery, and again the morning of surgery with the 4% CHG antiseptic solution using half of the bottle and a cloth each time.  Dress in clean clothes/sleepwear after showering.  See instructions below for application.          Do not apply any lotions or moisturizers.       Do not shave the area to be prepped for at least 2 days prior to surgery.    Clipping the hair may be done immediately prior to your surgery at the hospital    if needed.    Directions:  Thoroughly rinse your body with water.  Apply 4% CHG to a cloth and wash skin gently, paying special attention to the operative site.  Rinse again thoroughly.  Once you have begun using this product do not apply anything else to your skin. If itching or redness persists, rinse affected areas and discontinue use.    When using this product:  • Keep out of eyes, ears, and mouth.  • If solution should contact these areas, rinse out promptly and thoroughly with water.  • For external use only.  • Do not use in genital  area, on your face or head.      PATIENT/FAMILY/RESPONSIBLE PARTY VERBALIZES UNDERSTANDING OF ABOVE EDUCATION.  COPY OF PAIN SCALE GIVEN AND REVIEWED WITH VERBALIZED UNDERSTANDING.

## 2019-01-18 ENCOUNTER — ANESTHESIA EVENT (OUTPATIENT)
Dept: PERIOP | Facility: HOSPITAL | Age: 64
End: 2019-01-18

## 2019-01-21 ENCOUNTER — HOSPITAL ENCOUNTER (INPATIENT)
Facility: HOSPITAL | Age: 64
LOS: 2 days | Discharge: HOME | End: 2019-01-23
Attending: ORTHOPAEDIC SURGERY | Admitting: ORTHOPAEDIC SURGERY

## 2019-01-21 ENCOUNTER — APPOINTMENT (OUTPATIENT)
Dept: GENERAL RADIOLOGY | Facility: HOSPITAL | Age: 64
End: 2019-01-21

## 2019-01-21 ENCOUNTER — ANESTHESIA (OUTPATIENT)
Dept: PERIOP | Facility: HOSPITAL | Age: 64
End: 2019-01-21

## 2019-01-21 DIAGNOSIS — Z74.09 IMPAIRED MOBILITY: Primary | ICD-10-CM

## 2019-01-21 DIAGNOSIS — Z78.9 DECREASED ACTIVITIES OF DAILY LIVING (ADL): ICD-10-CM

## 2019-01-21 PROBLEM — M48.02 STENOSIS, CERVICAL SPINE: Status: ACTIVE | Noted: 2019-01-21

## 2019-01-21 LAB
ABO GROUP BLD: NORMAL
BLD GP AB SCN SERPL QL: NEGATIVE
GLUCOSE BLDC GLUCOMTR-MCNC: 144 MG/DL (ref 70–130)
GLUCOSE BLDC GLUCOMTR-MCNC: 186 MG/DL (ref 70–130)
RH BLD: POSITIVE
T&S EXPIRATION DATE: NORMAL

## 2019-01-21 PROCEDURE — 94799 UNLISTED PULMONARY SVC/PX: CPT

## 2019-01-21 PROCEDURE — 25010000003 CEFAZOLIN 1-4 GM/50ML-% SOLUTION: Performed by: ORTHOPAEDIC SURGERY

## 2019-01-21 PROCEDURE — C1713 ANCHOR/SCREW BN/BN,TIS/BN: HCPCS | Performed by: ORTHOPAEDIC SURGERY

## 2019-01-21 PROCEDURE — 25010000002 PROPOFOL 10 MG/ML EMULSION: Performed by: NURSE ANESTHETIST, CERTIFIED REGISTERED

## 2019-01-21 PROCEDURE — 25010000002 ONDANSETRON PER 1 MG: Performed by: ORTHOPAEDIC SURGERY

## 2019-01-21 PROCEDURE — 25010000002 DEXAMETHASONE PER 1 MG: Performed by: NURSE ANESTHETIST, CERTIFIED REGISTERED

## 2019-01-21 PROCEDURE — 25010000002 HYDROMORPHONE 1 MG/ML SOLUTION: Performed by: ORTHOPAEDIC SURGERY

## 2019-01-21 PROCEDURE — 86901 BLOOD TYPING SEROLOGIC RH(D): CPT | Performed by: ORTHOPAEDIC SURGERY

## 2019-01-21 PROCEDURE — 86850 RBC ANTIBODY SCREEN: CPT | Performed by: ORTHOPAEDIC SURGERY

## 2019-01-21 PROCEDURE — 0RB30ZZ EXCISION OF CERVICAL VERTEBRAL DISC, OPEN APPROACH: ICD-10-PCS | Performed by: ORTHOPAEDIC SURGERY

## 2019-01-21 PROCEDURE — 0RG10A0 FUSION OF CERVICAL VERTEBRAL JOINT WITH INTERBODY FUSION DEVICE, ANTERIOR APPROACH, ANTERIOR COLUMN, OPEN APPROACH: ICD-10-PCS | Performed by: ORTHOPAEDIC SURGERY

## 2019-01-21 PROCEDURE — 86900 BLOOD TYPING SEROLOGIC ABO: CPT | Performed by: ORTHOPAEDIC SURGERY

## 2019-01-21 PROCEDURE — 72040 X-RAY EXAM NECK SPINE 2-3 VW: CPT

## 2019-01-21 PROCEDURE — 25010000002 ONDANSETRON PER 1 MG: Performed by: ANESTHESIOLOGY

## 2019-01-21 PROCEDURE — 82962 GLUCOSE BLOOD TEST: CPT

## 2019-01-21 PROCEDURE — 97166 OT EVAL MOD COMPLEX 45 MIN: CPT | Performed by: OCCUPATIONAL THERAPIST

## 2019-01-21 PROCEDURE — 97162 PT EVAL MOD COMPLEX 30 MIN: CPT

## 2019-01-21 PROCEDURE — 25010000002 MIDAZOLAM PER 1 MG: Performed by: NURSE ANESTHETIST, CERTIFIED REGISTERED

## 2019-01-21 PROCEDURE — 94760 N-INVAS EAR/PLS OXIMETRY 1: CPT

## 2019-01-21 PROCEDURE — 25010000002 ONDANSETRON PER 1 MG: Performed by: NURSE ANESTHETIST, CERTIFIED REGISTERED

## 2019-01-21 PROCEDURE — 76000 FLUOROSCOPY <1 HR PHYS/QHP: CPT

## 2019-01-21 PROCEDURE — 25010000002 PROPOFOL 1000 MG/ML EMULSION: Performed by: NURSE ANESTHETIST, CERTIFIED REGISTERED

## 2019-01-21 PROCEDURE — 25010000002 MIDAZOLAM PER 1 MG: Performed by: ANESTHESIOLOGY

## 2019-01-21 PROCEDURE — 25010000002 FENTANYL CITRATE (PF) 100 MCG/2ML SOLUTION: Performed by: ANESTHESIOLOGY

## 2019-01-21 DEVICE — MATRX BONE VIBONE 1CC: Type: IMPLANTABLE DEVICE | Status: FUNCTIONAL

## 2019-01-21 DEVICE — HEMO ABS GELFOAM PWDR PORCN 1GM: Type: IMPLANTABLE DEVICE | Status: FUNCTIONAL

## 2019-01-21 DEVICE — IMPLANTABLE DEVICE: Type: IMPLANTABLE DEVICE | Status: FUNCTIONAL

## 2019-01-21 RX ORDER — OXYCODONE AND ACETAMINOPHEN 10; 325 MG/1; MG/1
1 TABLET ORAL ONCE AS NEEDED
Status: COMPLETED | OUTPATIENT
Start: 2019-01-21 | End: 2019-01-21

## 2019-01-21 RX ORDER — METFORMIN HYDROCHLORIDE 500 MG/1
1000 TABLET, EXTENDED RELEASE ORAL 2 TIMES DAILY WITH MEALS
Status: DISCONTINUED | OUTPATIENT
Start: 2019-01-21 | End: 2019-01-23 | Stop reason: HOSPADM

## 2019-01-21 RX ORDER — SODIUM CHLORIDE 0.9 % (FLUSH) 0.9 %
3 SYRINGE (ML) INJECTION EVERY 12 HOURS SCHEDULED
Status: DISCONTINUED | OUTPATIENT
Start: 2019-01-21 | End: 2019-01-23 | Stop reason: HOSPADM

## 2019-01-21 RX ORDER — KETAMINE HYDROCHLORIDE 50 MG/ML
INJECTION, SOLUTION, CONCENTRATE INTRAMUSCULAR; INTRAVENOUS AS NEEDED
Status: DISCONTINUED | OUTPATIENT
Start: 2019-01-21 | End: 2019-01-21 | Stop reason: SURG

## 2019-01-21 RX ORDER — ROCURONIUM BROMIDE 10 MG/ML
INJECTION, SOLUTION INTRAVENOUS AS NEEDED
Status: DISCONTINUED | OUTPATIENT
Start: 2019-01-21 | End: 2019-01-21 | Stop reason: SURG

## 2019-01-21 RX ORDER — PROPOFOL 10 MG/ML
VIAL (ML) INTRAVENOUS AS NEEDED
Status: DISCONTINUED | OUTPATIENT
Start: 2019-01-21 | End: 2019-01-21 | Stop reason: SURG

## 2019-01-21 RX ORDER — FAMOTIDINE 20 MG/1
20 TABLET, FILM COATED ORAL EVERY 12 HOURS SCHEDULED
Status: DISCONTINUED | OUTPATIENT
Start: 2019-01-21 | End: 2019-01-23 | Stop reason: HOSPADM

## 2019-01-21 RX ORDER — MIDAZOLAM HYDROCHLORIDE 1 MG/ML
2 INJECTION INTRAMUSCULAR; INTRAVENOUS
Status: DISCONTINUED | OUTPATIENT
Start: 2019-01-21 | End: 2019-01-21

## 2019-01-21 RX ORDER — METOCLOPRAMIDE HYDROCHLORIDE 5 MG/ML
5 INJECTION INTRAMUSCULAR; INTRAVENOUS
Status: DISCONTINUED | OUTPATIENT
Start: 2019-01-21 | End: 2019-01-21 | Stop reason: HOSPADM

## 2019-01-21 RX ORDER — SUFENTANIL CITRATE 50 UG/ML
INJECTION EPIDURAL; INTRAVENOUS AS NEEDED
Status: DISCONTINUED | OUTPATIENT
Start: 2019-01-21 | End: 2019-01-21 | Stop reason: SURG

## 2019-01-21 RX ORDER — DEXAMETHASONE SODIUM PHOSPHATE 4 MG/ML
4 INJECTION, SOLUTION INTRA-ARTICULAR; INTRALESIONAL; INTRAMUSCULAR; INTRAVENOUS; SOFT TISSUE ONCE AS NEEDED
Status: DISCONTINUED | OUTPATIENT
Start: 2019-01-21 | End: 2019-01-21

## 2019-01-21 RX ORDER — DEXAMETHASONE SODIUM PHOSPHATE 4 MG/ML
INJECTION, SOLUTION INTRA-ARTICULAR; INTRALESIONAL; INTRAMUSCULAR; INTRAVENOUS; SOFT TISSUE AS NEEDED
Status: DISCONTINUED | OUTPATIENT
Start: 2019-01-21 | End: 2019-01-21 | Stop reason: SURG

## 2019-01-21 RX ORDER — LISINOPRIL 20 MG/1
20 TABLET ORAL
Status: DISCONTINUED | OUTPATIENT
Start: 2019-01-22 | End: 2019-01-23 | Stop reason: HOSPADM

## 2019-01-21 RX ORDER — GABAPENTIN 100 MG/1
100 CAPSULE ORAL 3 TIMES DAILY
Status: DISCONTINUED | OUTPATIENT
Start: 2019-01-21 | End: 2019-01-23 | Stop reason: HOSPADM

## 2019-01-21 RX ORDER — ONDANSETRON 2 MG/ML
4 INJECTION INTRAMUSCULAR; INTRAVENOUS AS NEEDED
Status: DISCONTINUED | OUTPATIENT
Start: 2019-01-21 | End: 2019-01-21 | Stop reason: HOSPADM

## 2019-01-21 RX ORDER — SODIUM CHLORIDE 9 MG/ML
75 INJECTION, SOLUTION INTRAVENOUS CONTINUOUS
Status: DISPENSED | OUTPATIENT
Start: 2019-01-21 | End: 2019-01-22

## 2019-01-21 RX ORDER — LABETALOL HYDROCHLORIDE 5 MG/ML
5 INJECTION, SOLUTION INTRAVENOUS
Status: DISCONTINUED | OUTPATIENT
Start: 2019-01-21 | End: 2019-01-21 | Stop reason: HOSPADM

## 2019-01-21 RX ORDER — ONDANSETRON 4 MG/1
4 TABLET, ORALLY DISINTEGRATING ORAL EVERY 6 HOURS PRN
Status: DISCONTINUED | OUTPATIENT
Start: 2019-01-21 | End: 2019-01-23 | Stop reason: HOSPADM

## 2019-01-21 RX ORDER — SODIUM CHLORIDE 0.9 % (FLUSH) 0.9 %
3 SYRINGE (ML) INJECTION AS NEEDED
Status: DISCONTINUED | OUTPATIENT
Start: 2019-01-21 | End: 2019-01-21

## 2019-01-21 RX ORDER — SODIUM CHLORIDE, SODIUM LACTATE, POTASSIUM CHLORIDE, CALCIUM CHLORIDE 600; 310; 30; 20 MG/100ML; MG/100ML; MG/100ML; MG/100ML
100 INJECTION, SOLUTION INTRAVENOUS CONTINUOUS
Status: DISCONTINUED | OUTPATIENT
Start: 2019-01-21 | End: 2019-01-21

## 2019-01-21 RX ORDER — SODIUM CHLORIDE, SODIUM LACTATE, POTASSIUM CHLORIDE, CALCIUM CHLORIDE 600; 310; 30; 20 MG/100ML; MG/100ML; MG/100ML; MG/100ML
30 INJECTION, SOLUTION INTRAVENOUS CONTINUOUS
Status: DISCONTINUED | OUTPATIENT
Start: 2019-01-21 | End: 2019-01-21

## 2019-01-21 RX ORDER — GLIPIZIDE 5 MG/1
5 TABLET ORAL
Status: DISCONTINUED | OUTPATIENT
Start: 2019-01-22 | End: 2019-01-23 | Stop reason: HOSPADM

## 2019-01-21 RX ORDER — MAGNESIUM HYDROXIDE 1200 MG/15ML
LIQUID ORAL AS NEEDED
Status: DISCONTINUED | OUTPATIENT
Start: 2019-01-21 | End: 2019-01-21 | Stop reason: HOSPADM

## 2019-01-21 RX ORDER — MEPERIDINE HYDROCHLORIDE 25 MG/ML
12.5 INJECTION INTRAMUSCULAR; INTRAVENOUS; SUBCUTANEOUS
Status: DISCONTINUED | OUTPATIENT
Start: 2019-01-21 | End: 2019-01-21 | Stop reason: HOSPADM

## 2019-01-21 RX ORDER — ONDANSETRON 2 MG/ML
INJECTION INTRAMUSCULAR; INTRAVENOUS AS NEEDED
Status: DISCONTINUED | OUTPATIENT
Start: 2019-01-21 | End: 2019-01-21 | Stop reason: SURG

## 2019-01-21 RX ORDER — SODIUM CHLORIDE 0.9 % (FLUSH) 0.9 %
3 SYRINGE (ML) INJECTION EVERY 12 HOURS SCHEDULED
Status: DISCONTINUED | OUTPATIENT
Start: 2019-01-21 | End: 2019-01-21

## 2019-01-21 RX ORDER — FLUMAZENIL 0.1 MG/ML
0.2 INJECTION INTRAVENOUS AS NEEDED
Status: DISCONTINUED | OUTPATIENT
Start: 2019-01-21 | End: 2019-01-21 | Stop reason: HOSPADM

## 2019-01-21 RX ORDER — CLONAZEPAM 0.5 MG/1
0.5 TABLET ORAL 2 TIMES DAILY PRN
Status: DISCONTINUED | OUTPATIENT
Start: 2019-01-21 | End: 2019-01-23 | Stop reason: HOSPADM

## 2019-01-21 RX ORDER — IPRATROPIUM BROMIDE AND ALBUTEROL SULFATE 2.5; .5 MG/3ML; MG/3ML
3 SOLUTION RESPIRATORY (INHALATION) ONCE AS NEEDED
Status: DISCONTINUED | OUTPATIENT
Start: 2019-01-21 | End: 2019-01-21 | Stop reason: HOSPADM

## 2019-01-21 RX ORDER — FAMOTIDINE 10 MG/ML
20 INJECTION, SOLUTION INTRAVENOUS EVERY 12 HOURS SCHEDULED
Status: DISCONTINUED | OUTPATIENT
Start: 2019-01-21 | End: 2019-01-23 | Stop reason: HOSPADM

## 2019-01-21 RX ORDER — CARISOPRODOL 350 MG/1
350 TABLET ORAL 4 TIMES DAILY PRN
Status: DISCONTINUED | OUTPATIENT
Start: 2019-01-21 | End: 2019-01-23 | Stop reason: HOSPADM

## 2019-01-21 RX ORDER — SODIUM CHLORIDE 0.9 % (FLUSH) 0.9 %
3-10 SYRINGE (ML) INJECTION AS NEEDED
Status: DISCONTINUED | OUTPATIENT
Start: 2019-01-21 | End: 2019-01-23 | Stop reason: HOSPADM

## 2019-01-21 RX ORDER — NALOXONE HCL 0.4 MG/ML
0.04 VIAL (ML) INJECTION AS NEEDED
Status: DISCONTINUED | OUTPATIENT
Start: 2019-01-21 | End: 2019-01-21 | Stop reason: HOSPADM

## 2019-01-21 RX ORDER — FENTANYL CITRATE 50 UG/ML
25 INJECTION, SOLUTION INTRAMUSCULAR; INTRAVENOUS AS NEEDED
Status: DISCONTINUED | OUTPATIENT
Start: 2019-01-21 | End: 2019-01-21 | Stop reason: HOSPADM

## 2019-01-21 RX ORDER — SODIUM CHLORIDE 9 MG/ML
75 INJECTION, SOLUTION INTRAVENOUS CONTINUOUS
Status: DISCONTINUED | OUTPATIENT
Start: 2019-01-21 | End: 2019-01-23 | Stop reason: HOSPADM

## 2019-01-21 RX ORDER — SODIUM CHLORIDE 0.9 % (FLUSH) 0.9 %
3-10 SYRINGE (ML) INJECTION AS NEEDED
Status: DISCONTINUED | OUTPATIENT
Start: 2019-01-21 | End: 2019-01-21

## 2019-01-21 RX ORDER — ZOLPIDEM TARTRATE 5 MG/1
10 TABLET ORAL NIGHTLY PRN
Status: DISCONTINUED | OUTPATIENT
Start: 2019-01-21 | End: 2019-01-23 | Stop reason: HOSPADM

## 2019-01-21 RX ORDER — ROPINIROLE 0.25 MG/1
0.5 TABLET, FILM COATED ORAL 2 TIMES DAILY
Status: DISCONTINUED | OUTPATIENT
Start: 2019-01-21 | End: 2019-01-23 | Stop reason: HOSPADM

## 2019-01-21 RX ORDER — MIDAZOLAM HYDROCHLORIDE 1 MG/ML
INJECTION INTRAMUSCULAR; INTRAVENOUS AS NEEDED
Status: DISCONTINUED | OUTPATIENT
Start: 2019-01-21 | End: 2019-01-21 | Stop reason: SURG

## 2019-01-21 RX ORDER — ONDANSETRON 4 MG/1
4 TABLET, FILM COATED ORAL EVERY 6 HOURS PRN
Status: DISCONTINUED | OUTPATIENT
Start: 2019-01-21 | End: 2019-01-23 | Stop reason: HOSPADM

## 2019-01-21 RX ORDER — ONDANSETRON 2 MG/ML
4 INJECTION INTRAMUSCULAR; INTRAVENOUS EVERY 6 HOURS PRN
Status: DISCONTINUED | OUTPATIENT
Start: 2019-01-21 | End: 2019-01-23 | Stop reason: HOSPADM

## 2019-01-21 RX ORDER — MORPHINE SULFATE 2 MG/ML
2 INJECTION, SOLUTION INTRAMUSCULAR; INTRAVENOUS
Status: DISCONTINUED | OUTPATIENT
Start: 2019-01-21 | End: 2019-01-21 | Stop reason: HOSPADM

## 2019-01-21 RX ORDER — OXYCODONE AND ACETAMINOPHEN 10; 325 MG/1; MG/1
2 TABLET ORAL EVERY 4 HOURS PRN
Status: DISCONTINUED | OUTPATIENT
Start: 2019-01-21 | End: 2019-01-23 | Stop reason: HOSPADM

## 2019-01-21 RX ORDER — SCOLOPAMINE TRANSDERMAL SYSTEM 1 MG/1
1 PATCH, EXTENDED RELEASE TRANSDERMAL CONTINUOUS
Status: DISCONTINUED | OUTPATIENT
Start: 2019-01-21 | End: 2019-01-21

## 2019-01-21 RX ORDER — ONDANSETRON 2 MG/ML
4 INJECTION INTRAMUSCULAR; INTRAVENOUS EVERY 6 HOURS PRN
Status: DISCONTINUED | OUTPATIENT
Start: 2019-01-21 | End: 2019-01-21 | Stop reason: SDUPTHER

## 2019-01-21 RX ORDER — SODIUM CHLORIDE 0.9 % (FLUSH) 0.9 %
1-10 SYRINGE (ML) INJECTION AS NEEDED
Status: DISCONTINUED | OUTPATIENT
Start: 2019-01-21 | End: 2019-01-21

## 2019-01-21 RX ORDER — VASOPRESSIN 20 U/ML
INJECTION PARENTERAL AS NEEDED
Status: DISCONTINUED | OUTPATIENT
Start: 2019-01-21 | End: 2019-01-21 | Stop reason: SURG

## 2019-01-21 RX ORDER — CEFAZOLIN SODIUM 1 G/50ML
1 INJECTION, SOLUTION INTRAVENOUS EVERY 8 HOURS
Status: COMPLETED | OUTPATIENT
Start: 2019-01-21 | End: 2019-01-22

## 2019-01-21 RX ORDER — DEXTROSE MONOHYDRATE 25 G/50ML
12.5 INJECTION, SOLUTION INTRAVENOUS AS NEEDED
Status: DISCONTINUED | OUTPATIENT
Start: 2019-01-21 | End: 2019-01-21

## 2019-01-21 RX ORDER — LIDOCAINE HYDROCHLORIDE 20 MG/ML
INJECTION, SOLUTION INFILTRATION; PERINEURAL AS NEEDED
Status: DISCONTINUED | OUTPATIENT
Start: 2019-01-21 | End: 2019-01-21 | Stop reason: SURG

## 2019-01-21 RX ORDER — SODIUM CHLORIDE, SODIUM LACTATE, POTASSIUM CHLORIDE, CALCIUM CHLORIDE 600; 310; 30; 20 MG/100ML; MG/100ML; MG/100ML; MG/100ML
9 INJECTION, SOLUTION INTRAVENOUS CONTINUOUS
Status: DISCONTINUED | OUTPATIENT
Start: 2019-01-21 | End: 2019-01-21

## 2019-01-21 RX ORDER — FENTANYL CITRATE 50 UG/ML
25 INJECTION, SOLUTION INTRAMUSCULAR; INTRAVENOUS
Status: DISCONTINUED | OUTPATIENT
Start: 2019-01-21 | End: 2019-01-21

## 2019-01-21 RX ORDER — ESCITALOPRAM OXALATE 10 MG/1
10 TABLET ORAL DAILY
Status: DISCONTINUED | OUTPATIENT
Start: 2019-01-21 | End: 2019-01-23 | Stop reason: HOSPADM

## 2019-01-21 RX ORDER — DIPHENHYDRAMINE HCL 25 MG
25 CAPSULE ORAL NIGHTLY PRN
Status: DISCONTINUED | OUTPATIENT
Start: 2019-01-21 | End: 2019-01-23 | Stop reason: HOSPADM

## 2019-01-21 RX ORDER — AMITRIPTYLINE HYDROCHLORIDE 10 MG/1
10 TABLET, FILM COATED ORAL NIGHTLY
Status: DISCONTINUED | OUTPATIENT
Start: 2019-01-21 | End: 2019-01-23 | Stop reason: HOSPADM

## 2019-01-21 RX ORDER — LIDOCAINE HYDROCHLORIDE 40 MG/ML
SOLUTION TOPICAL AS NEEDED
Status: DISCONTINUED | OUTPATIENT
Start: 2019-01-21 | End: 2019-01-21 | Stop reason: SURG

## 2019-01-21 RX ORDER — HYDRALAZINE HYDROCHLORIDE 20 MG/ML
5 INJECTION INTRAMUSCULAR; INTRAVENOUS
Status: DISCONTINUED | OUTPATIENT
Start: 2019-01-21 | End: 2019-01-21 | Stop reason: HOSPADM

## 2019-01-21 RX ORDER — OXYCODONE HCL 20 MG/1
20 TABLET, FILM COATED, EXTENDED RELEASE ORAL ONCE
Status: COMPLETED | OUTPATIENT
Start: 2019-01-21 | End: 2019-01-21

## 2019-01-21 RX ORDER — FAMOTIDINE 10 MG/ML
20 INJECTION, SOLUTION INTRAVENOUS
Status: DISCONTINUED | OUTPATIENT
Start: 2019-01-21 | End: 2019-01-21

## 2019-01-21 RX ORDER — SODIUM CHLORIDE, SODIUM LACTATE, POTASSIUM CHLORIDE, CALCIUM CHLORIDE 600; 310; 30; 20 MG/100ML; MG/100ML; MG/100ML; MG/100ML
1000 INJECTION, SOLUTION INTRAVENOUS CONTINUOUS
Status: DISCONTINUED | OUTPATIENT
Start: 2019-01-21 | End: 2019-01-21

## 2019-01-21 RX ORDER — MIDAZOLAM HYDROCHLORIDE 1 MG/ML
1 INJECTION INTRAMUSCULAR; INTRAVENOUS
Status: DISCONTINUED | OUTPATIENT
Start: 2019-01-21 | End: 2019-01-21

## 2019-01-21 RX ADMIN — CEFAZOLIN SODIUM 1 G: 1 INJECTION, SOLUTION INTRAVENOUS at 16:28

## 2019-01-21 RX ADMIN — CLONAZEPAM 0.5 MG: 0.5 TABLET ORAL at 20:54

## 2019-01-21 RX ADMIN — SUFENTANIL CITRATE 10 MCG: 50 INJECTION, SOLUTION EPIDURAL; INTRAVENOUS at 09:46

## 2019-01-21 RX ADMIN — DEXAMETHASONE SODIUM PHOSPHATE 8 MG: 4 INJECTION, SOLUTION INTRAMUSCULAR; INTRAVENOUS at 09:46

## 2019-01-21 RX ADMIN — MIDAZOLAM HYDROCHLORIDE 1 MG: 1 INJECTION, SOLUTION INTRAMUSCULAR; INTRAVENOUS at 09:37

## 2019-01-21 RX ADMIN — PROPOFOL 125 MCG/KG/MIN: 10 INJECTION, EMULSION INTRAVENOUS at 09:49

## 2019-01-21 RX ADMIN — ROPINIROLE HYDROCHLORIDE 0.5 MG: 0.25 TABLET, FILM COATED ORAL at 20:51

## 2019-01-21 RX ADMIN — OXYCODONE HYDROCHLORIDE 20 MG: 20 TABLET, FILM COATED, EXTENDED RELEASE ORAL at 07:05

## 2019-01-21 RX ADMIN — LIDOCAINE HYDROCHLORIDE 50 MG: 20 INJECTION, SOLUTION INFILTRATION; PERINEURAL at 09:46

## 2019-01-21 RX ADMIN — PROPOFOL 80 MG: 10 INJECTION, EMULSION INTRAVENOUS at 09:46

## 2019-01-21 RX ADMIN — FENTANYL CITRATE: 50 INJECTION INTRAMUSCULAR; INTRAVENOUS at 11:59

## 2019-01-21 RX ADMIN — LABETALOL 20 MG/4 ML (5 MG/ML) INTRAVENOUS SYRINGE 5 MG: at 12:00

## 2019-01-21 RX ADMIN — VASOPRESSIN 1 UNITS: 20 INJECTION INTRAVENOUS at 10:05

## 2019-01-21 RX ADMIN — OXYCODONE HYDROCHLORIDE AND ACETAMINOPHEN 1 TABLET: 10; 325 TABLET ORAL at 12:15

## 2019-01-21 RX ADMIN — MIDAZOLAM HYDROCHLORIDE 3 MG: 1 INJECTION, SOLUTION INTRAMUSCULAR; INTRAVENOUS at 09:46

## 2019-01-21 RX ADMIN — GABAPENTIN 100 MG: 100 CAPSULE ORAL at 20:51

## 2019-01-21 RX ADMIN — METFORMIN HYDROCHLORIDE 1000 MG: 500 TABLET, EXTENDED RELEASE ORAL at 17:10

## 2019-01-21 RX ADMIN — ONDANSETRON HYDROCHLORIDE 4 MG: 2 SOLUTION INTRAMUSCULAR; INTRAVENOUS at 09:46

## 2019-01-21 RX ADMIN — SUFENTANIL CITRATE 20 MCG: 50 INJECTION, SOLUTION EPIDURAL; INTRAVENOUS at 09:52

## 2019-01-21 RX ADMIN — KETAMINE HYDROCHLORIDE 50 MG: 50 INJECTION, SOLUTION INTRAMUSCULAR; INTRAVENOUS at 10:21

## 2019-01-21 RX ADMIN — CEFAZOLIN 1 G: 1 INJECTION, POWDER, FOR SOLUTION INTRAMUSCULAR; INTRAVENOUS; PARENTERAL at 09:49

## 2019-01-21 RX ADMIN — ESCITALOPRAM 10 MG: 10 TABLET, FILM COATED ORAL at 16:27

## 2019-01-21 RX ADMIN — SODIUM CHLORIDE, POTASSIUM CHLORIDE, SODIUM LACTATE AND CALCIUM CHLORIDE: 600; 310; 30; 20 INJECTION, SOLUTION INTRAVENOUS at 11:13

## 2019-01-21 RX ADMIN — OXYCODONE HYDROCHLORIDE AND ACETAMINOPHEN 2 TABLET: 10; 325 TABLET ORAL at 18:05

## 2019-01-21 RX ADMIN — ROCURONIUM BROMIDE 10 MG: 10 INJECTION INTRAVENOUS at 09:46

## 2019-01-21 RX ADMIN — OXYCODONE HYDROCHLORIDE AND ACETAMINOPHEN 2 TABLET: 10; 325 TABLET ORAL at 22:46

## 2019-01-21 RX ADMIN — FENTANYL CITRATE 25 MCG: 50 INJECTION INTRAMUSCULAR; INTRAVENOUS at 12:15

## 2019-01-21 RX ADMIN — FAMOTIDINE 20 MG: 10 INJECTION, SOLUTION INTRAVENOUS at 08:32

## 2019-01-21 RX ADMIN — LIDOCAINE HYDROCHLORIDE 1 EACH: 40 SOLUTION TOPICAL at 09:48

## 2019-01-21 RX ADMIN — MEPERIDINE HYDROCHLORIDE 25 MG: 25 INJECTION INTRAMUSCULAR; INTRAVENOUS; SUBCUTANEOUS at 12:00

## 2019-01-21 RX ADMIN — SUFENTANIL CITRATE 10 MCG: 50 INJECTION, SOLUTION EPIDURAL; INTRAVENOUS at 10:31

## 2019-01-21 RX ADMIN — OXYCODONE HYDROCHLORIDE AND ACETAMINOPHEN 2 TABLET: 10; 325 TABLET ORAL at 13:55

## 2019-01-21 RX ADMIN — SODIUM CHLORIDE 75 ML/HR: 9 INJECTION, SOLUTION INTRAVENOUS at 16:27

## 2019-01-21 RX ADMIN — ONDANSETRON 4 MG: 2 SOLUTION INTRAMUSCULAR; INTRAVENOUS at 13:55

## 2019-01-21 RX ADMIN — SCOPOLAMINE 1 PATCH: 1 PATCH, EXTENDED RELEASE TRANSDERMAL at 08:32

## 2019-01-21 RX ADMIN — SODIUM CHLORIDE, POTASSIUM CHLORIDE, SODIUM LACTATE AND CALCIUM CHLORIDE: 600; 310; 30; 20 INJECTION, SOLUTION INTRAVENOUS at 07:10

## 2019-01-21 RX ADMIN — MIDAZOLAM HYDROCHLORIDE 1 MG: 1 INJECTION, SOLUTION INTRAMUSCULAR; INTRAVENOUS at 08:32

## 2019-01-21 RX ADMIN — MIDAZOLAM HYDROCHLORIDE 2 MG: 1 INJECTION, SOLUTION INTRAMUSCULAR; INTRAVENOUS at 10:10

## 2019-01-21 RX ADMIN — ZOLPIDEM TARTRATE 10 MG: 5 TABLET ORAL at 20:54

## 2019-01-21 RX ADMIN — AMITRIPTYLINE HYDROCHLORIDE 10 MG: 10 TABLET, FILM COATED ORAL at 20:51

## 2019-01-21 RX ADMIN — HYDROMORPHONE HYDROCHLORIDE 1 MG: 1 INJECTION, SOLUTION INTRAMUSCULAR; INTRAVENOUS; SUBCUTANEOUS at 19:21

## 2019-01-21 RX ADMIN — SUFENTANIL CITRATE 10 MCG: 50 INJECTION, SOLUTION EPIDURAL; INTRAVENOUS at 10:05

## 2019-01-21 RX ADMIN — KETAMINE HYDROCHLORIDE 50 MG: 50 INJECTION, SOLUTION INTRAMUSCULAR; INTRAVENOUS at 09:46

## 2019-01-21 RX ADMIN — ONDANSETRON 4 MG: 2 SOLUTION INTRAMUSCULAR; INTRAVENOUS at 11:59

## 2019-01-21 RX ADMIN — SODIUM CHLORIDE, POTASSIUM CHLORIDE, SODIUM LACTATE AND CALCIUM CHLORIDE 100 ML/HR: 600; 310; 30; 20 INJECTION, SOLUTION INTRAVENOUS at 07:16

## 2019-01-21 RX ADMIN — FAMOTIDINE 20 MG: 10 INJECTION INTRAVENOUS at 20:51

## 2019-01-21 RX ADMIN — SODIUM CHLORIDE, POTASSIUM CHLORIDE, SODIUM LACTATE AND CALCIUM CHLORIDE 30 ML/HR: 600; 310; 30; 20 INJECTION, SOLUTION INTRAVENOUS at 07:17

## 2019-01-21 RX ADMIN — VASOPRESSIN 1 UNITS: 20 INJECTION INTRAVENOUS at 10:16

## 2019-01-21 RX ADMIN — SODIUM CHLORIDE, PRESERVATIVE FREE 3 ML: 5 INJECTION INTRAVENOUS at 13:56

## 2019-01-21 NOTE — THERAPY EVALUATION
Acute Care - Physical Therapy Initial Evaluation  [facility]     Patient Name: America Garcia  : 1955  MRN: [id number]  Today's Date: 2019   Onset of Illness/Injury or Date of Surgery: 19  Date of Referral to PT: 19  Referring Physician: Dr. Bird      Admit Date: 2019    Visit Dx:     ICD-10-CM ICD-9-CM   1. Impaired mobility Z74.09 799.89     Patient Active Problem List   Diagnosis   • Closed bimalleolar fracture of right ankle   • Closed fracture dislocation of right ankle   • Stenosis, cervical spine     Past Medical History:   Diagnosis Date   • Acid reflux    • Anxiety    • Diabetes mellitus (CMS/HCC)    • Hyperlipidemia    • Hypertension    • Insomnia    • Migraines    • Muscle, jerky movements (uncontrolled)    • Panic attack    • PONV (postoperative nausea and vomiting)      Past Surgical History:   Procedure Laterality Date   • ANKLE OPEN REDUCTION INTERNAL FIXATION Right 10/10/2018    Procedure: ANKLE OPEN REDUCTION INTERNAL FIXATION - RIGHT;  Surgeon: Bart Oglesby MD;  Location: [facility];  Service: Orthopedics   • APPENDECTOMY     • CHOLECYSTECTOMY     • HYSTERECTOMY     • NECK SURGERY      x3        PT ASSESSMENT (last 12 hours)      Physical Therapy Evaluation     Row Name 19 1455          PT Evaluation Time/Intention    Subjective Information  complains of;fatigue;pain;dizziness;numbness reports B hand numbness  -RAY     Document Type  evaluation  -RAY     Mode of Treatment  physical therapy  -RAY     Row Name 19 1452          General Information    Patient Profile Reviewed?  yes  -RAY     Onset of Illness/Injury or Date of Surgery  19  -RAY     Referring Physician  Dr. Bird  -RAY     Patient Observations  alert;cooperative;agree to therapy  -RAY     Patient/Family Observations  famiyl x 2 present in room  -RAY     General Observations of Patient  Fowlers in bed, cervical collar on  -RAY     Prior Level of Function  mod  assist:;dressing;bathing;grooming;gait;independent:;bed mobility;transfer  -RAY     Equipment Currently Used at Home  grab bar;bath bench;cane, straight;wheelchair  -RAY     Pertinent History of Current Functional Problem  Pt s/p ACDF C3-4, revision anterior fusion C6-7 with instrumentation C4-7  -RAY     Existing Precautions/Restrictions  fall;spinal cervical collar at all times  -RAY     Equipment Issued to Patient  gait belt;walker, front wheeled  -RAY     Risks Reviewed  patient and family:;LOB;nausea/vomiting;dizziness;increased discomfort;change in vital signs;lines disloged  -RAY     Benefits Reviewed  patient and family:;improve function;increase independence;increase strength;increase balance;decrease pain;increase knowledge;improve skin integrity  -RAY     Barriers to Rehab  medically complex  -RAY     Row Name 01/21/19 1455          Relationship/Environment    Lives With  spouse  -RAY     Row Name 01/21/19 1455          Resource/Environmental Concerns    Current Living Arrangements  home/apartment/condo  -RAY     Row Name 01/21/19 1455          Home Main Entrance    Number of Stairs, Main Entrance  one  -RAY     Stair Railings, Main Entrance  none  -RAY     Row Name 01/21/19 1455          Cognitive Assessment/Interventions    Additional Documentation  Cognitive Assessment/Intervention (Group)  -RAY     Row Name 01/21/19 1453          Cognitive Assessment/Intervention- PT/OT    Affect/Mental Status (Cognitive)  WFL  -RAY     Orientation Status (Cognition)  oriented x 4  -RAY     Follows Commands (Cognition)  WFL  -RAY     Personal Safety Interventions  fall prevention program maintained;gait belt;muscle strengthening facilitated;nonskid shoes/slippers when out of bed  -RAY     Row Name 01/21/19 145          Safety Issues, Functional Mobility    Impairments Affecting Function (Mobility)  balance;endurance/activity tolerance;pain;sensation/sensory awareness;strength;grasp  -RAY     Row Name 01/21/19 1457          Bed  Mobility Assessment/Treatment    Bed Mobility Assessment/Treatment  supine-sit;sit-supine  -RAY     Supine-Sit [place] (Bed Mobility)  contact guard  -RAY     Sit-Supine [place] (Bed Mobility)  contact guard  -RAY     Assistive Device (Bed Mobility)  head of bed elevated  -RAY     Row Name 01/21/19 1455          Transfer Assessment/Treatment    Transfer Assessment/Treatment  sit-stand transfer;stand-sit transfer  -RAY     Sit-Stand [place] (Transfers)  contact guard;2 person assist  -RAY     Stand-Sit [place] (Transfers)  contact guard;2 person assist  -RAY     Row Name 01/21/19 1455          Sit-Stand Transfer    Assistive Device (Sit-Stand Transfers)  walker, front-wheeled  -RAY     Row Name 01/21/19 1455          Stand-Sit Transfer    Assistive Device (Stand-Sit Transfers)  walker, front-wheeled  -RAY     Row Name 01/21/19 1455          Gait/Stairs Assessment/Training    Gait/Stairs Assessment/Training  gait/ambulation independence;gait/ambulation assistive device;distance ambulated;gait deviations;gait pattern  -RAY     [place] Level (Gait)  contact guard;2 person assist  -RAY     Assistive Device (Gait)  walker, front-wheeled  -RAY     Distance in Feet (Gait)  40  -RAY     Pattern (Gait)  swing-to  -RAY     Deviations/Abnormal Patterns (Gait)  bilateral deviations;rosie decreased;gait speed decreased decreased B LE coordination with gait  -RAY     Row Name 01/21/19 1455          General ROM    GENERAL ROM COMMENTS  B LE AROM WFL  -RAY     Row Name 01/21/19 1455          MMT (Manual Muscle Testing)    General MMT Comments  B LE functiionally 4-/5  -RAY     Row Name 01/21/19 1455          Motor Assessment/Intervention    Additional Documentation  Balance (Group)  -RAY     Row Name 01/21/19 1455          Balance    Balance  static sitting balance;static standing balance  -RAY     Row Name 01/21/19 1455          Static Sitting Balance    Level of [place] (Unsupported Sitting, Static Balance)  contact  guard assist  -RAY     Sitting Position (Unsupported Sitting, Static Balance)  sitting on edge of bed  -[facility] Name 01/21/19 1455          Static Standing Balance    Level of [place] (Supported Standing, Static Balance)  contact guard assist;minimal assist, 75% patient effort  -RAY     Assistive Device Utilized (Supported Standing, Static Balance)  rolling walker  -[facility] Name 01/21/19 1455          Sensory Assessment/Intervention    Sensory General Assessment  no sensation deficits identified B LE, per pt  -[facility] Name 01/21/19 1455          Pain Assessment    Additional Documentation  Pain Scale: Numbers Pre/Post-Treatment (Group)  -[facility] Name 01/21/19 1455          Pain Scale: Numbers Pre/Post-Treatment    Pain Scale: Numbers, Pretreatment  7/10  -RAY     Pain Location  neck  -RAY     Pain Intervention(s)  Repositioned;Ambulation/increased activity  -[facility] Name 01/21/19 1455          Orthotics & Prosthetics Management    Orthosis Location  spinal orthosis  -RAY     Additional Documentation  Orthosis Location (Row)  -RAY     Row Name 01/21/19 1455          Spinal Orthosis Management    Type (Spinal Orthosis)  cervical collar, hard  -RAY     Therapeutic Indications (Spinal Orthosis)  post-op positioning/protection  -RAY     Wearing Schedule (Spinal Orthosis)  wear full time  -RAY     Orthosis Training (Spinal Orthosis)  patient and caregiver;cleaning/care of orthosis;donning/doffing orthosis;orthosis adjustment;orthosis maintenance;purpose/goals of orthosis  -RAY     Compliance/Wearing Issues (Spinal Orthosis)  patient/caregiver comprehend strategies;follow-up training required  -RAY     Row Name 01/21/19 1455          Health Promotion    Additional Documentation  Coping (Group);Plan of Care Review (Group)  -RAY     Row Name             Wound 01/21/19 1124 Other (See comments) neck incision    Wound - Properties Group Date first assessed: 01/21/19  - Time first assessed: 1124  - Side: Other (See  comments)  - Location: neck  -LH Type: incision  -LH    Row Name 01/21/19 1451          Coping    Observed Emotional State  accepting;cooperative  -RAY     Row Name 01/21/19 1456          Plan of Care Review    Plan of Care Reviewed With  patient;spouse;family  -RAY     Row Name 01/21/19 1451          Physical Therapy Clinical Impression    Date of Referral to PT  01/21/19  -RAY     Patient/Family Goals Statement (PT Clinical Impression)  Go home Wednesday  -RAY     Criteria for Skilled Interventions Met (PT Clinical Impression)  yes;treatment indicated  -RAY     Impairments Found (describe specific impairments)  gait, locomotion, and balance  -RAY     Rehab Potential (PT Clinical Summary)  good, to achieve stated therapy goals  -RAY     Predicted Duration of Therapy (PT)  until d/c  -RAY     Care Plan Review (PT)  evaluation/treatment results reviewed;risks/benefits reviewed;current/potential barriers reviewed;patient/other agree to care plan  -RAY     Care Plan Review, Other Participant (PT Clinical Impression)  family;spouse  -RAY     Row Name 01/21/19 1451          Physical Therapy Goals    Transfer Goal Selection (PT)  transfer, PT goal 1  -RAY     Gait Training Goal Selection (PT)  gait training, PT goal 1  -RAY     Stairs Goal Selection (PT)  stairs, PT goal 1  -RAY     Additional Documentation  Stairs Goal Selection (PT) (Row)  -RAY     Row Name 01/21/19 6057          Transfer Goal 1 (PT)    Activity/Assistive Device (Transfer Goal 1, PT)  sit-to-stand/stand-to-sit;bed-to-chair/chair-to-bed;walker, rolling  -RAY     [place] Level/Cues Needed (Transfer Goal 1, PT)  supervision required  -RAY     Time Frame (Transfer Goal 1, PT)  long term goal (LTG);10 days  -RAY     Progress/Outcome (Transfer Goal 1, PT)  goal ongoing  -RAY     Row Name 01/21/19 4908          Gait Training Goal 1 (PT)    Activity/Assistive Device (Gait Training Goal 1, PT)  gait (walking locomotion);assistive device use;decrease fall risk;improve  balance and speed;increase endurance/gait distance;walker, rolling  -RAY     [place] Level (Gait Training Goal 1, PT)  supervision required  -RAY     Distance (Gait Goal 1, PT)  125  -RAY     Time Frame (Gait Training Goal 1, PT)  long term goal (LTG);10 days  -RAY     Progress/Outcome (Gait Training Goal 1, PT)  goal ongoing  -RAY     Row Name 01/21/19 1455          Stairs Goal 1 (PT)    Activity/Assistive Device (Stairs Goal 1, PT)  ascending stairs;descending stairs  -RAY     [place] Level/Cues Needed (Stairs Goal 1, PT)  contact guard assist  -RAY     Number of Stairs (Stairs Goal 1, PT)  1  -RAY     Time Frame (Stairs Goal 1, PT)  long term goal (LTG);10 days  -RAY     Progress/Outcome (Stairs Goal 1, PT)  goal ongoing  -RAY     Row Name 01/21/19 1455          Positioning and Restraints    Pre-Treatment Position  in bed  -RAY     Post Treatment Position  bed  -RAY     In Bed  fowlers;call light within reach;encouraged to call for assist;with family/caregiver;SCD pump applied  -RAY     Row Name 01/21/19 1455          Living Environment    Home Accessibility  stairs to enter home  -RAY       User Key  (r) = Recorded By, (t) = Taken By, (c) = Cosigned By    Initials Name Provider Type    Jordy Watters, PT DPT Physical Therapist    Sherine Chavarria, RN Registered Nurse        Physical Therapy Education     Title: PT OT SLP Therapies (In Progress)     Topic: Physical Therapy (In Progress)     Point: Mobility training (Done)     Learning Progress Summary           Patient Acceptance, E, VU,NR by RAY at 1/21/2019  2:55 PM    Comment:  Educated pt./family on progression of  PT POC, benefits of activity, c-collaru use/yesenia/doff, neck prec   Family Acceptance, E, VU,NR by RAY at 1/21/2019  2:55 PM    Comment:  Educated pt./family on progression of  PT POC, benefits of activity, c-collaru use/yesenia/doff, neck prec                   Point: Precautions (Done)     Learning Progress Summary           Patient Acceptance, E,  TANYA,NR by RAY at 1/21/2019  2:55 PM    Comment:  Educated pt./family on progression of  PT POC, benefits of activity, c-collaru use/yesenia/doff, neck prec   Family Ramin, E, TANYA,NR by RAY at 1/21/2019  2:55 PM    Comment:  Educated pt./family on progression of  PT POC, benefits of activity, c-collaru use/yesenia/doff, neck prec                               User Key     Initials Effective Dates Name Provider Type Discipline    RAY 08/02/16 -  Jordy Ahumada, PT DPT Physical Therapist PT              PT Recommendation and Plan  Anticipated Discharge Disposition (PT): home with assist, home with home health, home with 24/7 care  Planned Therapy Interventions (PT Eval): bed mobility training, transfer training, gait training, balance training, home exercise program, patient/family education, postural re-education, stair training, strengthening, orthotic fitting/training, motor coordination training  Therapy Frequency (PT Clinical Impression): 2 times/day  Outcome Summary/Treatment Plan (PT)  Anticipated Discharge Disposition (PT): home with assist, home with home health, home with 24/7 care  Plan of Care Reviewed With: patient, spouse, family  Outcome Summary: PT eval completed. She required CGA to get in and out of bed and was CGA x 2 to stand and ambulate ~ 40 ft with a RW. She demos decreased coordination in B LE with steps, making her a fall risk. She needed CGA/min assist x 1 for dynamic balance with gait. PT will continue to progress her functional mobility, balance, and strength. Anticiapte she will d.c home with family and spouse.   Outcome Measures     Row Name 01/21/19 1500 01/21/19 1455          How much help from another person do you currently need...    Turning from your back to your side while in flat bed without using bedrails?  --  3  -RAY     Moving from lying on back to sitting on the side of a flat bed without bedrails?  --  3  -RAY     Moving to and from a bed to a chair (including a wheelchair)?  --   3  -RAY     Standing up from a chair using your arms (e.g., wheelchair, bedside chair)?  --  3  -RAY     Climbing 3-5 steps with a railing?  --  2  -RAY     To walk in hospital room?  --  3  -RAY     AM-PAC 6 Clicks Score  --  17  -RAY        How much help from another is currently needed...    Putting on and taking off regular lower body clothing?  3  -JJ  --     Bathing (including washing, rinsing, and drying)  3  -JJ  --     Toileting (which includes using toilet bed pan or urinal)  3  -JJ  --     Putting on and taking off regular upper body clothing  3  -JJ  --     Taking care of personal grooming (such as brushing teeth)  3  -JJ  --     Eating meals  4  -JJ  --     Score  19  -JJ  --        Functional Assessment    Outcome Measure Options  AM-PAC 6 Clicks Daily Activity (OT)  -JJ  AM-PAC 6 Clicks Basic Mobility (PT)  -RAY       User Key  (r) = Recorded By, (t) = Taken By, (c) = Cosigned By    Initials Name Provider Type    Jordy Watters, PT DPT Physical Therapist    Diane Zayas, OTR/L Occupational Therapist         Time Calculation:   PT Charges     Row Name 01/21/19 1548             Time Calculation    Start Time  1455  -RAY      Stop Time  1535  -RAY      Time Calculation (min)  40 min  -RAY      PT Received On  01/21/19  -RAY      PT Goal Re-Cert Due Date  01/31/19  -RAY        User Key  (r) = Recorded By, (t) = Taken By, (c) = Cosigned By    Initials Name Provider Type    Jordy Watters PT DPT Physical Therapist        Therapy Suggested Charges     Code   Minutes Charges    None           Therapy Charges for Today     Code Description Service Date Service Provider Modifiers Qty    [id number] HC PT EVAL MOD COMPLEXITY 3 1/21/2019 Jordy Ahumada, PT DPT GP 1          PT G-Codes  Outcome Measure Options: AM-PAC 6 Clicks Daily Activity (OT)  AM-PAC 6 Clicks Score: 17  Score: 19      Jordy Ahumada PT DPT  1/21/2019

## 2019-01-21 NOTE — OP NOTE
CERVICAL DISCECTOMY ANTERIOR FUSION WITH INSTRUMENTATION, CERVICAL HARDWARE REMOVAL  Procedure Note    America Garcia  1/21/2019    Pre-op Diagnosis:     1.  Status post removal of broken instrumentation C6-7, ACDF C4-5, revision anterior fusion C6-7 with instrumentation C4 to 7, 4/23/2012  2.  Cervical spondylotic myelopathy  3.  Bilateral upper, lower extremity weakness  4.  Difficulty walking  5.  Neck pain  6.  Headaches  7.  Bilateral hand numbness  8.  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  Facet arthropathy C3-4  10.  Severe central and bilateral foraminal stenosis C3-4  11.  Myelomalacia C3-4    Post-op Diagnosis:    same    Procedure/CPT® Codes:     1.  Anterior cervical discectomy with interbody fusion C3-4  2.  Use of PEEK interbody biomechanical device for fusion C3-4 (RTI [employer] C PEEK spacer)  3.  Use of locally obtained autograft bone  4.  Use of allograft bone matrix  5.  Use of operating microscope for decompression and microdissection  6.  Use of fluoroscopy for confirmation of surgical level, placement of instrumentation and PEEK spacer  7.  Intraoperative neuromonitoring     Anesthesia: General     Surgeon: MADISON Bird MD     Assistant:  Geronimo Hall PA-C     Estimated Blood Loss: Minimal     Complications: None     Condition: Stable to PACU.     Indications:     The patient is a 63-year-old who sees Dr. Balaji Cornelius for medical issues.  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  She did well with this procedure for a number of years but unfortunately presented back to the office with bilateral upper and lower extremity weakness, as well as difficulty walking and bilateral hand numbness.  She also had complaints of neck pain and headaches.  Her symptoms and exam were very consistent with cervical spondylotic myelopathy.  Imaging studies revealed severe  adjacent level degenerative disc disease, this time at C3-4 where there was facet arthropathy and myelomalacia as a result of severe central and bilateral foraminal stenosis.     After failing conservative measures, it was mutually decided that surgery would again be the best option.  Risks, benefits, and complications of surgery were discussed with the patient.  The patient appeared well informed and wished to proceed.  We specifically discussed the risks of infection, blood loss, nerve root injury, CSF leak, spinal cord injury, and the possibility of incomplete resolution of symptoms.  We also discussed the possible risk of a nonunion and the potential need for additional surgery in the event of a pseudoarthrosis or hardware failure.  I did specifically discuss the severe stenosis and myelopathic symptoms and the fact that the surgery is meant to prevent her from getting worse.     Operative Procedure:     After obtaining informed consent and verifying the correct operative level, the patient was brought to the operating room and placed supine on an operating table.  A general anesthetic was provided by the anesthesia service with the assistance of an endotracheal tube.  Once this was properly positioned and secured, a bump was placed under the patient's shoulders placing the neck into a gentle extended position.  Head halter traction was then used with 10 pounds weight to maintain head position.  The shoulders were taped using 3 inch wide cloth tape to assist with radiographic visualization.  Fluoroscopy was used to identify the disc space of C3-4, and the skin was marked for our planned incision.  The anterior neck region was then prepped and draped in the usual sterile fashion.  A surgical timeout was taken to confirm this was the correct patient, we were working at the correct level, and that preoperative antibiotics were given in a timely fashion.     A transverse incision was then created over the anterior  cervical region using a 10 blade scalpel directly centered over the level of interest.  Dissection was carried sharply through subcutaneous tissues.  The platysma was divided in line with the incision and blunt dissection was carried along the medial border of the sternocleidomastoid muscle, lateral to the strap musculature the neck.  The carotid pulse was then palpated, and dissection was carried medial to the carotid sheath and lateral to the trachea and esophagus.  Handheld Cloward retractors along with Kitners were used to dissect through pretracheal and prevertebral fascia.       The previous instrumentation was easily identified making identification of the adjacent level C3-4 rather easy.  There was some scar tissue as expected in the area of the previous fusion.  The longus coli muscles were then elevated from either side of the spine using Bovie cautery exposing just the upper end of the instrumentation spanning at about C4 as well as the C3-4 disc space.  Fluoroscopy was used to confirm we are indeed at the correct level however.     An initial discectomy was then started by creating an annulotomy with Bovie cautery.  A Villarreal elevator was used to remove some of the disc material off of the endplates.  Disc material was initially removed using forward angled curettes and pituitaries.  Some anterior osteophytes were removed using a rongeur.  All retrieved bone was cleaned, morcellized and saved for later packing into the disc spaces to assist with obtaining a fusion.  [place] pins were then placed to allow gentle distraction across the disc space.  The microscope was then positioned for the microdissection and decompression portion of the procedure.     I used Kerrisons to remove remaining anterior osteophytes off of the endplates.  Straight curettes were used to remove the cartilaginous endplates and all remaining disc material.  The high-speed bur was then used to remove posterior osteophytes and to contour  the endplates in preparation for fusion.  A forward angled curette was used to free up the posterior margins of the vertebral bodies, releasing the posterior longitudinal ligament.  Posterior osteophytes, posterior disc material, and the PLL were all resected using Kerrisons.  Kerrisons were also used to perform a bilateral neural foraminotomy.  At the end of the discectomy decompression, the central spinal canal and the exiting nerve roots appeared to be free of compression.  Bleeding in the epidural space was controlled using FloSeal.  The wound was then copiously irrigated with saline solution.     Next, trial spacers from RTI were tapped into position into the disc space.  Once the appropriate size was determined, an actual PEEK spacer matching the same size as the trial was delivered to the sterile field.  The spacer was packed as tightly as possible with a combination of locally obtained autograft bone and allograft bone matrix.  The spacer was then malleted into position into the disc space under fluoroscopic guidance.  It was placed as a PEEK interbody biomechanical device to assist with fusion.     After confirming the PEEK spacer was properly positioned with fluoroscopy, the Royal pins were removed.  Remaining anterior osteophytes were contoured off of the vertebral bodies using a combination of the high-speed bur and the [place] to allow for the best possible plate fixation.  Through the spacer I then drilled up and down through the drill holes for the placement of fixation screws.  A 14 mm screw was drilled up into C3 and a second 14 mm screw was drilled down into C4.  I was able to place the C4 screw without at all interacting with the previous instrumentation and screws into C4.     Final fluoroscopy imaging confirmed adequate position of the PEEK spacer and fixation screws.  All implants appeared to be properly positioned with good maintenance of cervical alignment.  A final inspection of the  operative field was then undertaken to ensure that we had adequate hemostasis.  Bleeding at this point was controlled with FloSeal and bipolar cautery.     Closure was accomplished by reapproximating the platysma with a 3-0 Vicryl.  Immediate subcutaneous tissues were reapproximated with a 4-0 Vicryl in a buried fashion.  Final skin closure was accomplished with Mastisol and Steri-Strips.  The wound was then washed and a sterile Bioclusive dressing was applied.  The patient was then placed into a hard cervical collar, extubated, and sent to the recovery room in good stable condition.     The patient tolerated the procedure well.  There were no complications.  We estimated blood loss to be minimal.  Intraoperative neural monitoring was used during the procedure.  We used motor evoked potentials, free run EMG, and SSEPs.  There were no neuro monitoring signal changes during the procedure.     Geronimo Hall PA-C provided critical assistance during the procedure.  His assistance was medically necessary in order to allow the procedure to occur in the most safe and efficient manner.  He assisted not only with patient positioning and wound closure, but more importantly with retraction of delicate neurovascular structures, assistance during the discectomy decompression, as well as the placement of the PEEK spacer and instrumentation to obtain a fusion.    MADISON Bird MD     Date: 1/21/2019  Time: 11:35 AM

## 2019-01-21 NOTE — ANESTHESIA POSTPROCEDURE EVALUATION
Patient: America Garcia    Procedure Summary     Date:  01/21/19 Room / Location:   [facility]  /  [facility]    Anesthesia Start:  0945 Anesthesia Stop:  1141    Procedure:  ANTERIOR CERVICAL DISCECTOMY FUSION C3-4 POSSIBLE REMOVAL OF INSTRUMENTATION C4-7 (N/A Spine Cervical) Diagnosis:  (M54.12)    Surgeon:  CARLA Bird MD Provider:  Caleb Stafford CRNA    Anesthesia Type:  general ASA Status:  2          Anesthesia Type: general  Last vitals  BP   141/74 (01/21/19 1300)   Temp   98.1 °F (36.7 °C) (01/21/19 1300)   Pulse   98 (01/21/19 1300)   Resp   16 (01/21/19 1300)     SpO2   95 % (01/21/19 1300)     Post Anesthesia Care and Evaluation    Patient location during evaluation: PACU  Level of consciousness: awake  Pain management: adequate  Airway patency: patent  Anesthetic complications: No anesthetic complications  PONV Status: none  Cardiovascular status: acceptable  Respiratory status: acceptable  Hydration status: acceptable

## 2019-01-21 NOTE — THERAPY EVALUATION
Acute Care - Occupational Therapy Initial Evaluation  [facility]     Patient Name: America Garcia  : 1955  MRN: [id number]  Today's Date: 2019  Onset of Illness/Injury or Date of Surgery: 19  Date of Referral to OT: 19  Referring Physician: Dr. Bird    Admit Date: 2019       ICD-10-CM ICD-9-CM   1. Impaired mobility Z74.09 799.89   2. Decreased activities of daily living (ADL) R68.89 780.99     Patient Active Problem List   Diagnosis   • Closed bimalleolar fracture of right ankle   • Closed fracture dislocation of right ankle   • Stenosis, cervical spine     Past Medical History:   Diagnosis Date   • Acid reflux    • Anxiety    • Diabetes mellitus (CMS/HCC)    • Hyperlipidemia    • Hypertension    • Insomnia    • Migraines    • Muscle, jerky movements (uncontrolled)    • Panic attack    • PONV (postoperative nausea and vomiting)      Past Surgical History:   Procedure Laterality Date   • ANKLE OPEN REDUCTION INTERNAL FIXATION Right 10/10/2018    Procedure: ANKLE OPEN REDUCTION INTERNAL FIXATION - RIGHT;  Surgeon: Bart Oglesby MD;  Location: [facility];  Service: Orthopedics   • APPENDECTOMY     • CHOLECYSTECTOMY     • HYSTERECTOMY     • NECK SURGERY      x3          OT ASSESSMENT FLOWSHEET (last 72 hours)      Occupational Therapy Evaluation     Row Name 19 1452                   OT Evaluation Time/Intention    Subjective Information  complains of;fatigue;pain;numbness B hand numbness  -JJ        Document Type  evaluation see MAR  -JJ        Mode of Treatment  occupational therapy;concurrent therapy  -JJ        Patient Effort  good  -JJ           General Information    Patient Profile Reviewed?  yes  -JJ        Onset of Illness/Injury or Date of Surgery  19  -JJ        Referring Physician  Dr. Bird  -        Patient Observations  alert;cooperative;agree to therapy  -JJ        Patient/Family Observations  family present in room   -JJ        General Observations  of Patient  fowlers in bed, IV infusing, pulse ox, 3L 02/NC.   -JJ        Prior Level of Function  mod assist:;dressing;bathing;grooming;independent:;bed mobility;transfer w/c for mobility  -J        Equipment Currently Used at Home  grab bar;bath bench;cane, straight;wheelchair  -JJ        Pertinent History of Current Functional Problem  Pt s/p ACDF C3-4, revision anterior fusion C6-7 with instrumentation C4-7.   -JJ        Existing Precautions/Restrictions  fall;spinal  (Significant)   -JJ        Equipment Issued to Patient  gait belt;walker, front wheeled  -JJ        Risks Reviewed  patient and family:;LOB;nausea/vomiting;dizziness;increased discomfort;change in vital signs;increased drainage;lines disloged  -JJ        Benefits Reviewed  patient and family:;improve function;increase independence;increase balance;increase strength;decrease pain;decrease risk of DVT;improve skin integrity;increase knowledge  -J           Relationship/Environment    Lives With  spouse  -        Family Caregiver if Needed  spouse  -        Family Caregiver Names  Demarcus  -           Resource/Environmental Concerns    Current Living Arrangements  home/apartment/condo  -        Resource/Environmental Concerns  none  -JJ           Home Main Entrance    Number of Stairs, Main Entrance  one  -JJ        Stair Railings, Main Entrance  none  -JJ           Cognitive Assessment/Interventions    Additional Documentation  Cognitive Assessment/Intervention (Group)  -           Cognitive Assessment/Intervention- PT/OT    Affect/Mental Status (Cognitive)  WNL  -JJ        Orientation Status (Cognition)  oriented x 4  -JJ        Follows Commands (Cognition)  WNL  -JJ        Cognitive Function (Cognitive)  WNL  -JJ        Personal Safety Interventions  elopement precautions initiated;fall prevention program maintained;gait belt;muscle strengthening facilitated;nonskid shoes/slippers when out of bed;supervised activity  -J           Safety  Issues, Functional Mobility    Impairments Affecting Function (Mobility)  balance;endurance/activity tolerance;pain;sensation/sensory awareness;strength;grasp  -           Bed Mobility Assessment/Treatment    Bed Mobility Assessment/Treatment  supine-sit;sit-supine  -        Supine-Sit [place] (Bed Mobility)  contact guard;verbal cues  -        Sit-Supine [place] (Bed Mobility)  contact guard;verbal cues  -        Assistive Device (Bed Mobility)  head of bed elevated  -           Functional Mobility    Functional Mobility- Ind. Level  contact guard assist;minimum assist (75% patient effort);verbal cues required  -        Functional Mobility- Device  rolling walker  -        Functional Mobility- Safety Issues  balance decreased during turns  -        Functional Mobility- Comment  Amb from bed to bathroom and back to bed with CGA/Min A and rwx. Decreased balance during turns requiring Min A to correct.   -           Transfer Assessment/Treatment    Transfer Assessment/Treatment  sit-stand transfer;stand-sit transfer;toilet transfer  -           Sit-Stand Transfer    Sit-Stand [place] (Transfers)  contact guard;verbal cues  -        Assistive Device (Sit-Stand Transfers)  walker, front-wheeled  [facility]           Stand-Sit Transfer    Stand-Sit [place] (Transfers)  contact guard;verbal cues  -        Assistive Device (Stand-Sit Transfers)  walker, front-wheeled  -           Toilet Transfer    Type (Toilet Transfer)  sit-stand;stand-sit  -        [place] Level (Toilet Transfer)  contact guard;verbal cues  -        Assistive Device (Toilet Transfer)  commode;grab bars/safety frame;walker, front-wheeled  -           ADL Assessment/Intervention    BADL Assessment/Intervention  lower body dressing;toileting  -           Lower Body Dressing Assessment/Training    Lower Body Dressing [place] Level  don;socks;maximum assist (25% patient effort);undergarment;minimum  assist (75% patient effort)  -        Lower Body Dressing Position  supported sitting;edge of bed sitting  -           Toileting Assessment/Training    [place] Level (Toileting)  toileting skills;adjust/manage clothing;change pad/brief;perform perineal hygiene;contact guard assist;minimum assist (75% patient effort)  -        Assistive Devices (Toileting)  commode;grab bar/safety frame  -        Toileting Position  supported sitting;supported standing  -           BADL Safety/Performance    Impairments, BADL Safety/Performance  balance;endurance/activity tolerance;grasp/prehension;pain;strength;sensory awareness  -           General ROM    GENERAL ROM COMMENTS  BUE AROM WFL   -           MMT (Manual Muscle Testing)    General MMT Comments  BUE strength functionally 4/5  -           Motor Assessment/Interventions    Additional Documentation  Balance (Group)  -           Balance    Balance  static sitting balance;static standing balance  -           Static Sitting Balance    Level of [place] (Unsupported Sitting, Static Balance)  contact guard assist  -        Sitting Position (Unsupported Sitting, Static Balance)  sitting on edge of bed  -           Static Standing Balance    Level of [place] (Supported Standing, Static Balance)  contact guard assist;minimal assist, 75% patient effort  -        Assistive Device Utilized (Supported Standing, Static Balance)  rolling walker  -           Sensory Assessment/Intervention    Sensory General Assessment  light touch sensation deficits identified  -           Light Touch Sensation Assessment    Left Upper Extremity: Light Touch Sensation Assessment  mild impairment, 75% or more correct responses;moderate impairment, 50 to 74% correct responses previous deficit  -        Right Upper Extremity: Light Touch Sensation Assessment  mild impairment, 75% or more correct responses;moderate impairment, 50 to 74% correct responses previous  deficit  -JJ        Comment, Right Upper Extremity: Light Touch Sensation Assessment  '  -JJ           Positioning and Restraints    Pre-Treatment Position  in bed  -JJ        Post Treatment Position  bed  -JJ        In Bed  fowlers;call light within reach;encouraged to call for assist;exit alarm on;with family/caregiver;side rails up x2;SCD pump applied  -JJ           Pain Assessment    Additional Documentation  Pain Scale: Numbers Pre/Post-Treatment (Group)  -JJ           Pain Scale: Numbers Pre/Post-Treatment    Pain Scale: Numbers, Pretreatment  7/10  -JJ        Pain Location - Side  Left  -JJ        Pain Location  neck  -JJ        Pain Intervention(s)  Medication (See MAR);Repositioned;Ambulation/increased activity  -JJ           Orthotics & Prosthetics Management    Orthosis Location  spinal orthosis  -JJ        Additional Documentation  Orthosis Location (Row)  -JJ           Spinal Orthosis Management    Type (Spinal Orthosis)  cervical collar, hard  -JJ        Fabrication Comment (Spinal Orthosis)  Cervical collar readjusted for appropriate fitting.  -JJ        Functional Design (Spinal Orthosis)  static orthosis  -JJ        Therapeutic Indications (Spinal Orthosis)  post-op positioning/protection;rest/inflammation reduction;stabilization and support  -JJ        Wearing Schedule (Spinal Orthosis)  wear full time  -JJ        Orthosis Training (Spinal Orthosis)  patient;all orthosis skills  -JJ        Compliance/Wearing Issues (Spinal Orthosis)  patient/caregiver comprehend strategies  -JJ           Wound 01/21/19 1124 Other (See comments) neck incision    Wound - Properties Group Date first assessed: 01/21/19  -LH Time first assessed: 1124  -LH Side: Other (See comments)  - Location: neck  -LH Type: incision  -LH       Coping    Verbalized Emotional State  acceptance  -JJ           Plan of Care Review    Plan of Care Reviewed With  patient  -JJ           Clinical Impression (OT)    Date of Referral to OT   01/21/19  -        OT Diagnosis  decreased adls  -J        Prognosis (OT Eval)  good  -J        Patient/Family Goals Statement (OT Eval)  return home  -        Criteria for Skilled Therapeutic Interventions Met (OT Eval)  yes;treatment indicated  -        Rehab Potential (OT Eval)  good, to achieve stated therapy goals  -J        Therapy Frequency (OT Eval)  5 times/wk  -        Predicted Duration of Therapy Intervention (Therapy Eval)  until d/c from facility   -        Care Plan Review (OT)  evaluation/treatment results reviewed;care plan/treatment goals reviewed;risks/benefits reviewed;current/potential barriers reviewed;patient/other agree to care plan  -        Anticipated Discharge Disposition (OT)  home with assist  -           Planned OT Interventions    Planned Therapy Interventions (OT Eval)  activity tolerance training;BADL retraining;functional balance retraining;occupation/activity based interventions;orthotic fabrication/fitting/training;patient/caregiver education/training;strengthening exercise;transfer/mobility retraining  -           OT Goals    Bathing Goal Selection (OT)  --  -        Dressing Goal Selection (OT)  dressing, OT goal 1  -        Toileting Goal Selection (OT)  toileting, OT goal 1  -           Dressing Goal 1 (OT)    Activity/Assistive Device (Dressing Goal 1, OT)  upper body dressing;lower body dressing  -JJ        [place]/Cues Needed (Dressing Goal 1, OT)  standby assist  -J        Time Frame (Dressing Goal 1, OT)  long term goal (LTG);by discharge  -JJ        Progress/Outcome (Dressing Goal 1, OT)  goal ongoing  -           Toileting Goal 1 (OT)    Activity/Device (Toileting Goal 1, OT)  toileting skills, all;commode  -        [place] Level/Cues Needed (Toileting Goal 1, OT)  independent  -JJ        Time Frame (Toileting Goal 1, OT)  long term goal (LTG);by discharge  -JJ        Progress/Outcome (Toileting Goal 1, OT)  goal ongoing  -            Patient Education Goal (OT)    Activity (Patient Education Goal, OT)  Pt will demonstrate donning/doffing cervical collar and provide teachback of spinal precuations for increased knowledge and compliance.   -JJ        [place]/Cues/Accuracy (Memory Goal 2, OT)  independent  -JJ        Time Frame (Patient Education Goal, OT)  long term goal (LTG);by discharge  -JJ        Progress/Outcome (Patient Education Goal, OT)  goal ongoing  -JJ           Living Environment    Home Accessibility  stairs to enter home  -JJ          User Key  (r) = Recorded By, (t) = Taken By, (c) = Cosigned By    Initials Name Effective Dates    Diane Zayas, OTR/L 10/12/18 -      Sherine Chapa RN 11/23/18 -          Occupational Therapy Education     Title: PT OT SLP Therapies (In Progress)     Topic: Occupational Therapy (In Progress)     Point: ADL training (Done)     Description: Instruct learner(s) on proper safety adaptation and remediation techniques during self care or transfers.   Instruct in proper use of assistive devices.    Learning Progress Summary           Patient Acceptance, E, VU by JARED at 1/21/2019  3:50 PM    Comment:  Pt educated on the benefits and roles of OT, POC, adl modifications, t/fs, cervical collar wear schedule/fitting/mgt,                   Point: Home exercise program (Done)     Description: Instruct learner(s) on appropriate technique for monitoring, assisting and/or progressing therapeutic exercises/activities.    Learning Progress Summary           Patient Acceptance, E, VU by JARED at 1/21/2019  3:50 PM    Comment:  Pt educated on the benefits and roles of OT, POC, adl modifications, t/fs, cervical collar wear schedule/fitting/mgt,                   Point: Body mechanics (Done)     Description: Instruct learner(s) on proper positioning and spine alignment during self-care, functional mobility activities and/or exercises.    Learning Progress Summary           Patient Acceptance, E, VU by  JARED at 1/21/2019  3:50 PM    Comment:  Pt educated on the benefits and roles of OT, POC, adl modifications, t/fs, cervical collar wear schedule/fitting/mgt,                               User Key     Initials Effective Dates Name Provider Type Discipline    JARED 10/12/18 -  Diane Bustos, OTR/L Occupational Therapist OT                  OT Recommendation and Plan  Outcome Summary/Treatment Plan (OT)  Anticipated Discharge Disposition (OT): home with assist  Planned Therapy Interventions (OT Eval): activity tolerance training, BADL retraining, functional balance retraining, occupation/activity based interventions, orthotic fabrication/fitting/training, patient/caregiver education/training, strengthening exercise, transfer/mobility retraining  Therapy Frequency (OT Eval): 5 times/wk  Plan of Care Review  Plan of Care Reviewed With: patient  Plan of Care Reviewed With: patient  Outcome Summary: OT eval completed. Pt required CGA for bed mobility. Max A for donning socks and Min A for threading undergarments. Pt amb from bed to bathroom and back to bed with CGA/Min A and rwx. Pt demo's decreased balance and coordination with amb. CGA for sit <> stand t/f from EOB. Pt would benefit from skilled OT services to increase balance, endurance, strength, coordination for safety and independence with adls, t/fs and mobility. Recommend home with assist upon d/c from facility.     Outcome Measures     Row Name 01/21/19 1500 01/21/19 1455          How much help from another person do you currently need...    Turning from your back to your side while in flat bed without using bedrails?  --  3  -RAY     Moving from lying on back to sitting on the side of a flat bed without bedrails?  --  3  -RAY     Moving to and from a bed to a chair (including a wheelchair)?  --  3  -RAY     Standing up from a chair using your arms (e.g., wheelchair, bedside chair)?  --  3  -RAY     Climbing 3-5 steps with a railing?  --  2  -RAY     To walk in  hospital room?  --  3  -RAY     AM-PAC 6 Clicks Score  --  17  -RAY        How much help from another is currently needed...    Putting on and taking off regular lower body clothing?  3  -JJ  --     Bathing (including washing, rinsing, and drying)  3  -JJ  --     Toileting (which includes using toilet bed pan or urinal)  3  -JJ  --     Putting on and taking off regular upper body clothing  3  -JJ  --     Taking care of personal grooming (such as brushing teeth)  3  -JJ  --     Eating meals  4  -JJ  --     Score  19  -JJ  --        Functional Assessment    Outcome Measure Options  AM-PAC 6 Clicks Daily Activity (OT)  -  AM-PAC 6 Clicks Basic Mobility (PT)  -RAY       User Key  (r) = Recorded By, (t) = Taken By, (c) = Cosigned By    Initials Name Provider Type    Jordy Watters, PT DPT Physical Therapist    Diane Zayas OTR/L Occupational Therapist          Time Calculation:   Time Calculation- OT     Row Name 01/21/19 1545             Time Calculation- OT    OT Start Time  1440 add 6 minutes for chart review   -      OT Stop Time  1529  -      OT Time Calculation (min)  49 min  -      OT Received On  01/21/19  -      OT Goal Re-Cert Due Date  01/31/19  -        User Key  (r) = Recorded By, (t) = Taken By, (c) = Cosigned By    Initials Name Provider Type    Diane Zayas OTR/L Occupational Therapist        Therapy Suggested Charges     Code   Minutes Charges    None           Therapy Charges for Today     Code Description Service Date Service Provider Modifiers Qty    [id number]  OT EVAL MOD COMPLEXITY 4 1/21/2019 Diane Bustos OTR/L GO 1               BEN Bacon/FIONA  1/21/2019

## 2019-01-21 NOTE — PLAN OF CARE
Problem: Patient Care Overview  Goal: Plan of Care Review  Outcome: Ongoing (interventions implemented as appropriate)   01/21/19 8790   OTHER   Outcome Summary OT eval completed. Pt required CGA for bed mobility. Max A for donning socks and Min A for threading undergarments. Pt amb from bed to bathroom and back to bed with CGA/Min A and rwx. Pt demo's decreased balance and coordination with amb. CGA for sit <> stand t/f from EOB. Pt would benefit from skilled OT services to increase balance, endurance, strength, coordination for safety and independence with adls, t/fs and mobility. Recommend home with assist upon d/c from facility.    Coping/Psychosocial   Plan of Care Reviewed With patient   Plan of Care Review   Progress improving

## 2019-01-21 NOTE — ANESTHESIA PREPROCEDURE EVALUATION
Anesthesia Evaluation     Patient summary reviewed   history of anesthetic complications: PONV  NPO Solid Status: > 8 hours             Airway   Mallampati: II  TM distance: >3 FB  Neck ROM: full  Dental    (+) upper dentures and lower dentures    Pulmonary    (-) COPD, asthma, sleep apnea, not a smoker  Cardiovascular   Exercise tolerance: excellent (>7 METS)    ECG reviewed    (+) hypertension,   (-) pacemaker, past MI, angina, cardiac stents      Neuro/Psych  (-) seizures, TIA, CVA  GI/Hepatic/Renal/Endo    (+)  GERD,  diabetes mellitus,   (-) liver disease, no renal disease    Musculoskeletal     (+) chronic pain,   Abdominal    Substance History      OB/GYN          Other                        Anesthesia Plan    ASA 2     general     intravenous induction   Anesthetic plan, all risks, benefits, and alternatives have been provided, discussed and informed consent has been obtained with: patient.

## 2019-01-21 NOTE — PLAN OF CARE
Problem: Patient Care Overview  Goal: Plan of Care Review  Outcome: Ongoing (interventions implemented as appropriate)   01/21/19 7344   OTHER   Outcome Summary PT eval completed. She required CGA to get in and out of bed and was CGA x 2 to stand and ambulate ~ 40 ft with a RW. She demos decreased coordination in B LE with steps, making her a fall risk. She needed CGA/min assist x 1 for dynamic balance with gait. PT will continue to progress her functional mobility, balance, and strength. Anticiapte she will d.c home with family and spouse.    Coping/Psychosocial   Plan of Care Reviewed With patient;spouse;family

## 2019-01-21 NOTE — ANESTHESIA PROCEDURE NOTES
Airway  Urgency: elective    Airway not difficult    General Information and Staff    Patient location during procedure: OR    Indications and Patient Condition  Indications for airway management: airway protection  MILS maintained throughout  Mask difficulty assessment: 1 - vent by mask    Final Airway Details  Final airway type: endotracheal airway      Successful airway: ETT  Cuffed: yes   Successful intubation technique: direct laryngoscopy  Endotracheal tube insertion site: oral  Blade: [place]  Blade size: 2  ETT size (mm): 7.0  Cormack-Lehane Classification: grade I - full view of glottis  Placement verified by: chest auscultation and capnometry   Cuff volume (mL): 4  Measured from: lips  ETT to lips (cm): 20  Number of attempts at approach: 1

## 2019-01-21 NOTE — PLAN OF CARE
Problem: Patient Care Overview  Goal: Plan of Care Review  Outcome: Ongoing (interventions implemented as appropriate)   01/21/19 4616   OTHER   Outcome Summary Pt c/o pain w/ relief from PRN pain meds given. Cervical collar in place. Dressing to front neck CDI. No issues swollowing. Pt sO2 sat good on 3L O2. No abnormal swlling palpated. A&O. IV. Voiding. Uses wheelchair at home. VSS. Safety maintained.   Coping/Psychosocial   Plan of Care Reviewed With patient   Plan of Care Review   Progress improving       Problem: Surgery Nonspecified (Adult)  Goal: Signs and Symptoms of Listed Potential Problems Will be Absent, Minimized or Managed (Surgery Nonspecified)  Outcome: Ongoing (interventions implemented as appropriate)    Goal: Anesthesia/Sedation Recovery  Outcome: Ongoing (interventions implemented as appropriate)      Problem: Fall Risk (Adult)  Goal: Absence of Fall  Outcome: Ongoing (interventions implemented as appropriate)      Problem: Skin Injury Risk (Adult)  Goal: Skin Health and Integrity  Outcome: Ongoing (interventions implemented as appropriate)

## 2019-01-22 LAB
ANION GAP SERPL CALCULATED.3IONS-SCNC: 10 MMOL/L (ref 4–13)
BASOPHILS # BLD AUTO: 0.02 10*3/MM3 (ref 0–0.2)
BASOPHILS NFR BLD AUTO: 0.2 % (ref 0–2)
BUN BLD-MCNC: 7 MG/DL (ref 5–21)
BUN/CREAT SERPL: 12.1 (ref 7–25)
CALCIUM SPEC-SCNC: 8.9 MG/DL (ref 8.4–10.4)
CHLORIDE SERPL-SCNC: 95 MMOL/L (ref 98–110)
CO2 SERPL-SCNC: 32 MMOL/L (ref 24–31)
CREAT BLD-MCNC: 0.58 MG/DL (ref 0.5–1.4)
DEPRECATED RDW RBC AUTO: 45 FL (ref 40–54)
EOSINOPHIL # BLD AUTO: 0.05 10*3/MM3 (ref 0–0.7)
EOSINOPHIL NFR BLD AUTO: 0.4 % (ref 0–4)
ERYTHROCYTE [DISTWIDTH] IN BLOOD BY AUTOMATED COUNT: 14.6 % (ref 12–15)
GFR SERPL CREATININE-BSD FRML MDRD: 105 ML/MIN/1.73
GLUCOSE BLD-MCNC: 177 MG/DL (ref 70–100)
GLUCOSE BLDC GLUCOMTR-MCNC: 157 MG/DL (ref 70–130)
HCT VFR BLD AUTO: 32 % (ref 37–47)
HGB BLD-MCNC: 9.8 G/DL (ref 12–16)
IMM GRANULOCYTES # BLD AUTO: 0.06 10*3/MM3 (ref 0–0.03)
IMM GRANULOCYTES NFR BLD AUTO: 0.5 % (ref 0–5)
LYMPHOCYTES # BLD AUTO: 1.89 10*3/MM3 (ref 0.72–4.86)
LYMPHOCYTES NFR BLD AUTO: 16.4 % (ref 15–45)
MCH RBC QN AUTO: 25.7 PG (ref 28–32)
MCHC RBC AUTO-ENTMCNC: 30.6 G/DL (ref 33–36)
MCV RBC AUTO: 84 FL (ref 82–98)
MONOCYTES # BLD AUTO: 1.11 10*3/MM3 (ref 0.19–1.3)
MONOCYTES NFR BLD AUTO: 9.7 % (ref 4–12)
NEUTROPHILS # BLD AUTO: 8.36 10*3/MM3 (ref 1.87–8.4)
NEUTROPHILS NFR BLD AUTO: 72.8 % (ref 39–78)
NRBC BLD AUTO-RTO: 0 /100 WBC (ref 0–0)
PLATELET # BLD AUTO: 317 10*3/MM3 (ref 130–400)
PMV BLD AUTO: 10.4 FL (ref 6–12)
POTASSIUM BLD-SCNC: 4 MMOL/L (ref 3.5–5.3)
RBC # BLD AUTO: 3.81 10*6/MM3 (ref 4.2–5.4)
SODIUM BLD-SCNC: 137 MMOL/L (ref 135–145)
WBC NRBC COR # BLD: 11.49 10*3/MM3 (ref 4.8–10.8)

## 2019-01-22 PROCEDURE — 80048 BASIC METABOLIC PNL TOTAL CA: CPT | Performed by: ORTHOPAEDIC SURGERY

## 2019-01-22 PROCEDURE — 25010000003 CEFAZOLIN 1-4 GM/50ML-% SOLUTION: Performed by: ORTHOPAEDIC SURGERY

## 2019-01-22 PROCEDURE — 25010000002 HYDROMORPHONE 1 MG/ML SOLUTION: Performed by: ORTHOPAEDIC SURGERY

## 2019-01-22 PROCEDURE — 25010000002 ONDANSETRON PER 1 MG: Performed by: ORTHOPAEDIC SURGERY

## 2019-01-22 PROCEDURE — 97535 SELF CARE MNGMENT TRAINING: CPT

## 2019-01-22 PROCEDURE — 94799 UNLISTED PULMONARY SVC/PX: CPT

## 2019-01-22 PROCEDURE — 85025 COMPLETE CBC W/AUTO DIFF WBC: CPT | Performed by: ORTHOPAEDIC SURGERY

## 2019-01-22 PROCEDURE — 82962 GLUCOSE BLOOD TEST: CPT

## 2019-01-22 PROCEDURE — 25010000002 PROMETHAZINE PER 50 MG: Performed by: PHYSICIAN ASSISTANT

## 2019-01-22 RX ORDER — PROMETHAZINE HYDROCHLORIDE 25 MG/ML
12.5 INJECTION, SOLUTION INTRAMUSCULAR; INTRAVENOUS EVERY 6 HOURS PRN
Status: DISCONTINUED | OUTPATIENT
Start: 2019-01-22 | End: 2019-01-23 | Stop reason: HOSPADM

## 2019-01-22 RX ADMIN — AMITRIPTYLINE HYDROCHLORIDE 10 MG: 10 TABLET, FILM COATED ORAL at 20:31

## 2019-01-22 RX ADMIN — ONDANSETRON 4 MG: 2 SOLUTION INTRAMUSCULAR; INTRAVENOUS at 06:26

## 2019-01-22 RX ADMIN — OXYCODONE HYDROCHLORIDE AND ACETAMINOPHEN 2 TABLET: 10; 325 TABLET ORAL at 08:18

## 2019-01-22 RX ADMIN — LISINOPRIL 20 MG: 20 TABLET ORAL at 08:19

## 2019-01-22 RX ADMIN — SODIUM CHLORIDE 75 ML/HR: 9 INJECTION, SOLUTION INTRAVENOUS at 20:38

## 2019-01-22 RX ADMIN — OXYCODONE HYDROCHLORIDE AND ACETAMINOPHEN 2 TABLET: 10; 325 TABLET ORAL at 16:58

## 2019-01-22 RX ADMIN — GABAPENTIN 100 MG: 100 CAPSULE ORAL at 08:19

## 2019-01-22 RX ADMIN — ONDANSETRON 4 MG: 4 TABLET, FILM COATED ORAL at 23:54

## 2019-01-22 RX ADMIN — CEFAZOLIN SODIUM 1 G: 1 INJECTION, SOLUTION INTRAVENOUS at 08:20

## 2019-01-22 RX ADMIN — HYDROMORPHONE HYDROCHLORIDE 1 MG: 1 INJECTION, SOLUTION INTRAMUSCULAR; INTRAVENOUS; SUBCUTANEOUS at 01:16

## 2019-01-22 RX ADMIN — SODIUM CHLORIDE, PRESERVATIVE FREE 3 ML: 5 INJECTION INTRAVENOUS at 20:32

## 2019-01-22 RX ADMIN — GLIPIZIDE 5 MG: 5 TABLET ORAL at 08:19

## 2019-01-22 RX ADMIN — ROPINIROLE HYDROCHLORIDE 0.5 MG: 0.25 TABLET, FILM COATED ORAL at 08:19

## 2019-01-22 RX ADMIN — PROMETHAZINE HYDROCHLORIDE 12.5 MG: 25 INJECTION INTRAMUSCULAR; INTRAVENOUS at 12:50

## 2019-01-22 RX ADMIN — OXYCODONE HYDROCHLORIDE AND ACETAMINOPHEN 2 TABLET: 10; 325 TABLET ORAL at 20:31

## 2019-01-22 RX ADMIN — METFORMIN HYDROCHLORIDE 1000 MG: 500 TABLET, EXTENDED RELEASE ORAL at 08:19

## 2019-01-22 RX ADMIN — PROMETHAZINE HYDROCHLORIDE 12.5 MG: 25 INJECTION INTRAMUSCULAR; INTRAVENOUS at 20:31

## 2019-01-22 RX ADMIN — FAMOTIDINE 20 MG: 20 TABLET, FILM COATED ORAL at 20:31

## 2019-01-22 RX ADMIN — ONDANSETRON 4 MG: 2 SOLUTION INTRAMUSCULAR; INTRAVENOUS at 09:11

## 2019-01-22 RX ADMIN — SODIUM CHLORIDE 75 ML/HR: 9 INJECTION, SOLUTION INTRAVENOUS at 06:28

## 2019-01-22 RX ADMIN — CARISOPRODOL 350 MG: 350 TABLET ORAL at 06:28

## 2019-01-22 RX ADMIN — OXYCODONE HYDROCHLORIDE AND ACETAMINOPHEN 2 TABLET: 10; 325 TABLET ORAL at 04:15

## 2019-01-22 RX ADMIN — ESCITALOPRAM 10 MG: 10 TABLET, FILM COATED ORAL at 08:20

## 2019-01-22 RX ADMIN — GABAPENTIN 100 MG: 100 CAPSULE ORAL at 20:31

## 2019-01-22 RX ADMIN — ROPINIROLE HYDROCHLORIDE 0.5 MG: 0.25 TABLET, FILM COATED ORAL at 20:31

## 2019-01-22 RX ADMIN — FAMOTIDINE 20 MG: 20 TABLET, FILM COATED ORAL at 08:19

## 2019-01-22 RX ADMIN — CLONAZEPAM 0.5 MG: 0.5 TABLET ORAL at 23:54

## 2019-01-22 RX ADMIN — CEFAZOLIN SODIUM 1 G: 1 INJECTION, SOLUTION INTRAVENOUS at 01:19

## 2019-01-22 RX ADMIN — METFORMIN HYDROCHLORIDE 1000 MG: 500 TABLET, EXTENDED RELEASE ORAL at 21:20

## 2019-01-22 RX ADMIN — SODIUM CHLORIDE, PRESERVATIVE FREE 3 ML: 5 INJECTION INTRAVENOUS at 08:20

## 2019-01-22 RX ADMIN — OXYCODONE HYDROCHLORIDE AND ACETAMINOPHEN 2 TABLET: 10; 325 TABLET ORAL at 12:40

## 2019-01-22 RX ADMIN — METFORMIN HYDROCHLORIDE 1000 MG: 500 TABLET, EXTENDED RELEASE ORAL at 16:58

## 2019-01-22 RX ADMIN — GABAPENTIN 100 MG: 100 CAPSULE ORAL at 16:58

## 2019-01-22 NOTE — PLAN OF CARE
Problem: Patient Care Overview  Goal: Plan of Care Review  Outcome: Ongoing (interventions implemented as appropriate)   01/22/19 6642   OTHER   Outcome Summary Pt. given PRN Percocet and Dilaudid with relief, dressing cdi, mild swelling noted to left side of incision, swallows w/o difficulty, BP elevated at beginning of shift but has come down, O2 sats maintained with O2 @ 2L, up with 1 assist, voiding, cervical collar in place, safety maintained, will continue to monitor.    Coping/Psychosocial   Plan of Care Reviewed With patient   Plan of Care Review   Progress improving     Goal: Individualization and Mutuality  Outcome: Ongoing (interventions implemented as appropriate)      Problem: Fall Risk (Adult)  Goal: Identify Related Risk Factors and Signs and Symptoms  Outcome: Outcome(s) achieved Date Met: 01/22/19    Goal: Absence of Fall  Outcome: Ongoing (interventions implemented as appropriate)      Problem: Laminectomy/Foraminotomy/Discectomy (Adult)  Goal: Signs and Symptoms of Listed Potential Problems Will be Absent, Minimized or Managed (Laminectomy/Foraminotomy/Discectomy)  Outcome: Ongoing (interventions implemented as appropriate)

## 2019-01-22 NOTE — THERAPY TREATMENT NOTE
Acute Care - Occupational Therapy Treatment Note  [facility]     Patient Name: America Garcia  : 1955  MRN: [id number]  Today's Date: 2019  Onset of Illness/Injury or Date of Surgery: 19  Date of Referral to OT: 19  Referring Physician: Dr. Bird    Admit Date: 2019       ICD-10-CM ICD-9-CM   1. Impaired mobility Z74.09 799.89   2. Decreased activities of daily living (ADL) R68.89 780.99     Patient Active Problem List   Diagnosis   • Closed bimalleolar fracture of right ankle   • Closed fracture dislocation of right ankle   • Stenosis, cervical spine     Past Medical History:   Diagnosis Date   • Acid reflux    • Anxiety    • Diabetes mellitus (CMS/HCC)    • Hyperlipidemia    • Hypertension    • Insomnia    • Migraines    • Muscle, jerky movements (uncontrolled)    • Panic attack    • PONV (postoperative nausea and vomiting)      Past Surgical History:   Procedure Laterality Date   • ANKLE OPEN REDUCTION INTERNAL FIXATION Right 10/10/2018    Procedure: ANKLE OPEN REDUCTION INTERNAL FIXATION - RIGHT;  Surgeon: Bart Oglesby MD;  Location: [facility];  Service: Orthopedics   • ANTERIOR CERVICAL DISCECTOMY W/ FUSION N/A 2019    Procedure: ANTERIOR CERVICAL DISCECTOMY FUSION C3-4 POSSIBLE REMOVAL OF INSTRUMENTATION C4-7;  Surgeon: CARLA Bird MD;  Location: [facility];  Service: Orthopedic Spine   • APPENDECTOMY     • CHOLECYSTECTOMY     • HYSTERECTOMY     • NECK SURGERY      x3       Therapy Treatment    Rehabilitation Treatment Summary     Row Name 19 1139 19 0951          Treatment Time/Intention    Discipline  occupational therapy assistant  -TS  physical therapy assistant  -NW     Document Type  therapy note (daily note)  -TS  --     Subjective Information  complains of;pain;nausea/vomiting  -TS2  --     Patient Effort  good  -TS2  --     Comment  --  pt Nausea and vomitting request to let rest will ck bk later  -NW     Reason Treatment Not Performed  --   patient/family declined treatment, not feeling well  -NW     Existing Precautions/Restrictions  fall;spinal  -TS2  --     Treatment Considerations/Comments  c collar  -TS2  --     Recorded by [TS] Augusta Linares WISDOM/L 01/22/19 1139  [TS2] Augusta Linares WISDOM/L 01/22/19 1317 [NW] Lakisha Dodd, PTA 01/22/19 0951     Row Name 01/22/19 1139             Cognitive Assessment/Intervention- PT/OT    Personal Safety Interventions  fall prevention program maintained;nonskid shoes/slippers when out of bed  -TS      Recorded by [TS] Augusta Linares WISDOM/L 01/22/19 1317      Row Name 01/22/19 1139             Bed Mobility Assessment/Treatment    Bed Mobility Assessment/Treatment  sidelying-sit;sit-sidelying  -TS      Sidelying-Sit [place] (Bed Mobility)  minimum assist (75% patient effort)  -TS      Sit-Sidelying [place] (Bed Mobility)  minimum assist (75% patient effort)  -TS      Assistive Device (Bed Mobility)  bed rails  -TS      Recorded by [TS] Augusta Linares WISDOM/L 01/22/19 1317      Row Name 01/22/19 1139             Functional Mobility    Functional Mobility- Ind. Level  contact guard assist;standby assist  -TS      Functional Mobility- Comment  side steps at EOB  -TS      Recorded by [TS] Augusta Linares WISDOM/L 01/22/19 1317      Row Name 01/22/19 1139             Transfer Assessment/Treatment    Transfer Assessment/Treatment  sit-stand transfer;stand-sit transfer  -TS      Recorded by [TS] Augusta Linares WISDOM/L 01/22/19 1317      Row Name 01/22/19 1139             Sit-Stand Transfer    Sit-Stand [place] (Transfers)  stand by assist  -TS      Recorded by [TS] Augusta Linares WISDOM/L 01/22/19 1317      Row Name 01/22/19 1139             Stand-Sit Transfer    Stand-Sit [place] (Transfers)  stand by assist  -TS      Recorded by [TS] Augusta Linares WISDOM/L 01/22/19 1317      Row Name 01/22/19 1139             ADL Assessment/Intervention    BADL  Assessment/Intervention  upper body dressing  -TS      Recorded by [TS] Augusta Linares COTA/L 01/22/19 1317      Row Name 01/22/19 1139             Upper Body Dressing Assessment/Training    Upper Body Dressing [place] Level  maximum assist (25% patient effort) doff/don c collar  -TS      Upper Body Dressing Position  edge of bed sitting  -TS      Recorded by [TS] Augusta Linares COTA/L 01/22/19 1317      Row Name 01/22/19 1139             Positioning and Restraints    Pre-Treatment Position  in bed  -TS      Post Treatment Position  bed  -TS      In Bed  side lying left;call light within reach;encouraged to call for assist;with family/caregiver;side rails up x2;with brace  -TS      Recorded by [TS] Augusta Linares COTA/L 01/22/19 1317      Row Name 01/22/19 1139             Pain Scale: Numbers Pre/Post-Treatment    Pain Scale: Numbers, Pretreatment  9/10  -TS      Pain Location  neck  -TS      Pain Intervention(s)  Repositioned  -TS      Recorded by [TS] Augusta Linares COTA/L 01/22/19 1317      Row Name 01/22/19 1139             Spinal Orthosis Management    Orthosis Training (Spinal Orthosis)  patient and caregiver;cleaning/care of orthosis;activity limitations/precautions;donning/doffing orthosis;orthosis adjustment;purpose/goals of orthosis;wearing schedule  -TS      Recorded by [TS] Augusta Linares COTA/L 01/22/19 1317      Row Name                Wound 01/21/19 1124 Other (See comments) neck incision    Wound - Properties Group Date first assessed: 01/21/19 [LH] Time first assessed: 1124 [LH] Side: Other (See comments) [LH] Location: neck [LH] Type: incision [LH] Recorded by:  [LH] Sherine Chapa RN 01/21/19 1124    Row Name 01/22/19 1139             Outcome Summary/Treatment Plan (OT)    Daily Summary of Progress (OT)  progress towards functional goals is fair  -TS      Recorded by [TS] Augusta Linares WISDOM/FIONA 01/22/19 1317        User Key  (r) = Recorded By, (t) =  Taken By, (c) = Cosigned By    Initials Name Effective Dates Discipline    TS Augusta Linares MARIKA, WISDOM/L 08/02/16 -  OT    NW Lakisha Dodd, PTA 08/02/16 -  PT    Sherine Chavarria, RN 11/23/18 -  Nurse        Wound 01/21/19 1124 Other (See comments) neck incision (Active)   Dressing Appearance dry;intact;no drainage 1/22/2019  8:18 AM   Closure EWA 1/22/2019  4:15 AM   Base dressing in place, unable to visualize 1/22/2019  4:15 AM   Periwound edematous 1/22/2019  4:15 AM   Periwound Skin Turgor soft 1/22/2019  4:15 AM   Drainage Amount none 1/22/2019  4:15 AM   Dressing Care, Wound dressing changed;gauze 1/22/2019  8:18 AM       Occupational Therapy Education     Title: PT OT SLP Therapies (In Progress)     Topic: Occupational Therapy (In Progress)     Point: ADL training (Done)     Description: Instruct learner(s) on proper safety adaptation and remediation techniques during self care or transfers.   Instruct in proper use of assistive devices.    Learning Progress Summary           Patient Acceptance, E, VU by KISHAN at 1/22/2019  1:18 PM    Comment:  neck precautions, how to adjust c collar and proper positioning and fit, wearing schedule, ADL modification    Acceptance, E, VU by JARED at 1/21/2019  3:50 PM    Comment:  Pt educated on the benefits and roles of OT, POC, adl modifications, t/fs, cervical collar wear schedule/fitting/mgt,   Family Acceptance, E, VU by KISHAN at 1/22/2019  1:18 PM    Comment:  neck precautions, how to adjust c collar and proper positioning and fit, wearing schedule, ADL modification                   Point: Home exercise program (Done)     Description: Instruct learner(s) on appropriate technique for monitoring, assisting and/or progressing therapeutic exercises/activities.    Learning Progress Summary           Patient Acceptance, E, VU by JARED at 1/21/2019  3:50 PM    Comment:  Pt educated on the benefits and roles of OT, POC, adl modifications, t/fs, cervical collar wear  schedule/fitting/mgt,                   Point: Body mechanics (Done)     Description: Instruct learner(s) on proper positioning and spine alignment during self-care, functional mobility activities and/or exercises.    Learning Progress Summary           Patient Acceptance, E, VU by JARED at 1/21/2019  3:50 PM    Comment:  Pt educated on the benefits and roles of OT, POC, adl modifications, t/fs, cervical collar wear schedule/fitting/mgt,                               User Key     Initials Effective Dates Name Provider Type Discipline     08/02/16 -  Augusta Linares COTA/L Occupational Therapy Assistant OT    JARED 10/12/18 -  Diane Bustos OTR/L Occupational Therapist OT                OT Recommendation and Plan  Outcome Summary/Treatment Plan (OT)  Daily Summary of Progress (OT): progress towards functional goals is fair  Daily Summary of Progress (OT): progress towards functional goals is fair  Outcome Measures     Row Name 01/22/19 1300 01/21/19 1500 01/21/19 1455       How much help from another person do you currently need...    Turning from your back to your side while in flat bed without using bedrails?  --  --  3  -RAY    Moving from lying on back to sitting on the side of a flat bed without bedrails?  --  --  3  -RAY    Moving to and from a bed to a chair (including a wheelchair)?  --  --  3  -RAY    Standing up from a chair using your arms (e.g., wheelchair, bedside chair)?  --  --  3  -RAY    Climbing 3-5 steps with a railing?  --  --  2  -RAY    To walk in hospital room?  --  --  3  -RAY    AM-PAC 6 Clicks Score  --  --  17  -RAY       How much help from another is currently needed...    Putting on and taking off regular lower body clothing?  3  -TS  3  -JJ  --    Bathing (including washing, rinsing, and drying)  3  -TS  3  -JJ  --    Toileting (which includes using toilet bed pan or urinal)  4  -TS  3  -JJ  --    Putting on and taking off regular upper body clothing  4  -TS  3  -JJ  --    Taking care  of personal grooming (such as brushing teeth)  4  -TS  3  -JJ  --    Eating meals  4  -TS  4  -JJ  --    Score  22  -TS  19  -JJ  --       Functional Assessment    Outcome Measure Options  AM-PAC 6 Clicks Daily Activity (OT)  -TS  AM-PAC 6 Clicks Daily Activity (OT)  -JJ  AM-PAC 6 Clicks Basic Mobility (PT)  -RAY      User Key  (r) = Recorded By, (t) = Taken By, (c) = Cosigned By    Initials Name Provider Type    TS Augusta Linares WISDOM/L Occupational Therapy Assistant    Jordy Watters, PT DPT Physical Therapist    Diane Zayas, OTR/L Occupational Therapist           Time Calculation:   Time Calculation- OT     Row Name 01/22/19 1319             Time Calculation- OT    OT Start Time  1139  -TS      OT Stop Time  1203  -TS      OT Time Calculation (min)  24 min  -TS      Total Timed Code Minutes- OT  24 minute(s)  -TS      OT Received On  01/22/19  -TS         Timed Charges    12326 - OT Self Care/Mgmt Minutes  24  -TS        User Key  (r) = Recorded By, (t) = Taken By, (c) = Cosigned By    Initials Name Provider Type    TS Augusta Linares WISDOM/L Occupational Therapy Assistant           Therapy Suggested Charges     Code   Minutes Charges    15775 (CPT®) Hc Ot Neuromusc Re Education Ea 15 Min      44878 (CPT®) Hc Ot Ther Proc Ea 15 Min      71151 (CPT®) Hc Ot Therapeutic Act Ea 15 Min      14094 (CPT®) Hc Ot Manual Therapy Ea 15 Min      70526 (CPT®) Hc Ot Iontophoresis Ea 15 Min      42743 (CPT®) Hc Ot Elec Stim Ea-Per 15 Min      77170 (CPT®) Hc Ot Ultrasound Ea 15 Min      18157 (CPT®) Hc Ot Self Care/Mgmt/Train Ea 15 Min 24 2    Total  24 2        Therapy Charges for Today     Code Description Service Date Service Provider Modifiers Qty    92248896972 HC OT SELF CARE/MGMT/TRAIN EA 15 MIN 1/22/2019 Augusta Linares WISDOM/L GO 2               Augusta HAIRSTON. ALYX Linares/FIONA  1/22/2019

## 2019-01-22 NOTE — PLAN OF CARE
Problem: Patient Care Overview  Goal: Plan of Care Review  Outcome: Ongoing (interventions implemented as appropriate)   01/22/19 1611   OTHER   Outcome Summary medicated as ordered for c/o neck pain. swelling noted to right side of incision. soft to touch. dr ingram aware. MT plan home tomorrow    Coping/Psychosocial   Plan of Care Reviewed With patient;family   Plan of Care Review   Progress improving

## 2019-01-22 NOTE — PROGRESS NOTES
Orthopedic Spine Progress Note    America Garcia  63 y.o.  female  Subjective     Post-Operative Day # 1    Systemic or Specific Complaints:     C/o nausea. Medications helping. Pain well controlled with medication. Ambulating well. No other specific complaints.    Objective     Vital signs in last 24 hours:  Temp:  [97.2 °F (36.2 °C)-99.3 °F (37.4 °C)] 98.8 °F (37.1 °C)  Heart Rate:  [] 115  Resp:  [14-18] 16  BP: (114-183)/() 159/78    Physical Exam:    Alert and oriented ×3, no acute distress, grossly neurovascularly intact, vital signs stable, dressing clean dry and intact, moving all extremities without focal deficit    Diagnostic Data:  CBC:  Results from last 7 days   Lab Units 01/22/19  0443   WBC 10*3/mm3 11.49*   RBC 10*6/mm3 3.81*   HEMOGLOBIN g/dL 9.8*   HEMATOCRIT % 32.0*   PLATELETS 10*3/mm3 317          Assessment/Plan     1.  Status post removal of broken instrumentation C6-7, ACDF C4-5, revision anterior fusion C6-7 with instrumentation C4 to 7, 4/23/2012  2.  Cervical spondylotic myelopathy  3.  Bilateral upper, lower extremity weakness  4.  Difficulty walking  5.  Neck pain  6.  Headaches  7.  Bilateral hand numbness  8.  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  Facet arthropathy C3-4  10.  Severe central and bilateral foraminal stenosis C3-4  11.  Myelomalacia C3-4  12. Status post ACDF C3-4, 1/21/2019    Plan:  1. PT/OT/Brace  2. Periops  3. Probably home tomorrow      Geronimo Hall PA-C    Date: 1/22/2019  Time: 7:38 AM

## 2019-01-23 VITALS
HEART RATE: 112 BPM | HEIGHT: 65 IN | DIASTOLIC BLOOD PRESSURE: 89 MMHG | OXYGEN SATURATION: 97 % | SYSTOLIC BLOOD PRESSURE: 169 MMHG | TEMPERATURE: 98.4 F | WEIGHT: 156 LBS | BODY MASS INDEX: 25.99 KG/M2 | RESPIRATION RATE: 18 BRPM

## 2019-01-23 PROCEDURE — 25010000002 PROMETHAZINE PER 50 MG: Performed by: PHYSICIAN ASSISTANT

## 2019-01-23 PROCEDURE — 25010000002 HYDROMORPHONE 1 MG/ML SOLUTION: Performed by: ORTHOPAEDIC SURGERY

## 2019-01-23 PROCEDURE — 94799 UNLISTED PULMONARY SVC/PX: CPT

## 2019-01-23 RX ORDER — OXYCODONE AND ACETAMINOPHEN 10; 325 MG/1; MG/1
1 TABLET ORAL EVERY 6 HOURS PRN
Start: 2019-01-23 | End: 2019-01-31

## 2019-01-23 RX ORDER — ONDANSETRON 4 MG/1
4 TABLET, FILM COATED ORAL EVERY 6 HOURS PRN
Qty: 60 TABLET | Refills: 0 | Status: SHIPPED | OUTPATIENT
Start: 2019-01-23

## 2019-01-23 RX ADMIN — GABAPENTIN 100 MG: 100 CAPSULE ORAL at 09:17

## 2019-01-23 RX ADMIN — ROPINIROLE HYDROCHLORIDE 0.5 MG: 0.25 TABLET, FILM COATED ORAL at 09:17

## 2019-01-23 RX ADMIN — OXYCODONE HYDROCHLORIDE AND ACETAMINOPHEN 2 TABLET: 10; 325 TABLET ORAL at 00:46

## 2019-01-23 RX ADMIN — OXYCODONE HYDROCHLORIDE AND ACETAMINOPHEN 2 TABLET: 10; 325 TABLET ORAL at 09:15

## 2019-01-23 RX ADMIN — LISINOPRIL 20 MG: 20 TABLET ORAL at 09:15

## 2019-01-23 RX ADMIN — METFORMIN HYDROCHLORIDE 1000 MG: 500 TABLET, EXTENDED RELEASE ORAL at 09:15

## 2019-01-23 RX ADMIN — ESCITALOPRAM 10 MG: 10 TABLET, FILM COATED ORAL at 09:17

## 2019-01-23 RX ADMIN — FAMOTIDINE 20 MG: 20 TABLET, FILM COATED ORAL at 09:16

## 2019-01-23 RX ADMIN — HYDROMORPHONE HYDROCHLORIDE 1 MG: 1 INJECTION, SOLUTION INTRAMUSCULAR; INTRAVENOUS; SUBCUTANEOUS at 00:56

## 2019-01-23 RX ADMIN — GLIPIZIDE 5 MG: 5 TABLET ORAL at 09:15

## 2019-01-23 RX ADMIN — PROMETHAZINE HYDROCHLORIDE 12.5 MG: 25 INJECTION INTRAMUSCULAR; INTRAVENOUS at 04:24

## 2019-01-23 RX ADMIN — SODIUM CHLORIDE, PRESERVATIVE FREE 3 ML: 5 INJECTION INTRAVENOUS at 09:16

## 2019-01-23 NOTE — DISCHARGE SUMMARY
Date of Discharge:  1/23/2019    Admission Diagnosis: M54.12    Discharge Diagnosis:   1.  Status post removal of broken instrumentation C6-7, ACDF C4-5, revision anterior fusion C6-7 with instrumentation C4 to 7, 4/23/2012  2.  Cervical spondylotic myelopathy  3.  Bilateral upper, lower extremity weakness  4.  Difficulty walking  5.  Neck pain  6.  Headaches  7.  Bilateral hand numbness  8.  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  Facet arthropathy C3-4  10.  Severe central and bilateral foraminal stenosis C3-4  11.  Myelomalacia C3-4  12. Status post ACDF C3-4, 1/21/2019      Consults During Admission: none    Hospital Course  Patient is a 63 y.o. female Known to our practice. Admitted for the above fusion.  This has been well tolerated and the patient will be discharged home today in good stable condition with instructions for brace at all times.  No driving until directed.  Patient will follow-up with Dr. Bird's clinic in two weeks. They will call if problems arise.       Condition on Discharge:  STABLE    Vital Signs  Temp:  [98 °F (36.7 °C)-100.1 °F (37.8 °C)] 98 °F (36.7 °C)  Heart Rate:  [107-117] 109  Resp:  [16-18] 18  BP: (146-159)/() 153/73    Physical Exam:   Alert and oriented ×3, no acute distress, grossly neurovascularly intact, vital signs stable, dressing clean dry and intact, moving all extremities without focal deficit    Discharge Disposition  Home or Self Care    Discharge Medications     Discharge Medications      New Medications      Instructions Start Date   ondansetron 4 MG tablet  Commonly known as:  ZOFRAN   4 mg, Oral, Every 6 Hours PRN         Changes to Medications      Instructions Start Date   oxyCODONE-acetaminophen  MG per tablet  Commonly known as:  PERCOCET  What changed:    · when to take this  · reasons to take this   1 tablet, Oral, Every 6 Hours PRN         Continue These Medications      Instructions Start Date   AMBIEN 10 MG  tablet  Generic drug:  zolpidem   10 mg, Oral, Nightly PRN      amitriptyline 10 MG tablet  Commonly known as:  ELAVIL   10 mg, Oral, Nightly      aspirin 81 MG chewable tablet   81 mg, Oral, Daily      carisoprodol 350 MG tablet  Commonly known as:  SOMA   350 mg, Oral, 4 Times Daily PRN      clonazePAM 0.5 MG tablet  Commonly known as:  KlonoPIN   0.5 mg, Oral, 2 Times Daily PRN      escitalopram 10 MG tablet  Commonly known as:  LEXAPRO   10 mg, Oral, Daily      esomeprazole 20 MG capsule  Commonly known as:  nexIUM   20 mg, Oral, Every Morning Before Breakfast      gabapentin 100 MG capsule  Commonly known as:  NEURONTIN   100 mg, Oral, 3 Times Daily      glimepiride 2 MG tablet  Commonly known as:  AMARYL   2 mg, Oral, Every Morning Before Breakfast      lisinopril 10 MG tablet  Commonly known as:  PRINIVIL,ZESTRIL   20 mg, Oral, Daily With Breakfast      metFORMIN  MG 24 hr tablet  Commonly known as:  GLUCOPHAGE-XR   1,000 mg, Oral, 2 Times Daily      rOPINIRole 0.5 MG tablet  Commonly known as:  REQUIP   0.5 mg, Oral, 2 Times Daily, Take 1 hour before bedtime.         Stop These Medications    celecoxib 200 MG capsule  Commonly known as:  CeleBREX            Discharge Diet: Resume Home diet, advance as tolerated    Activity at Discharge: Resume home activity advace as tolerated, no lifting, no twisting, no bending, brace as directed, no driving until directed.     Follow-up Appointments  Followup with PCP within one week  Followup [facility] at 2weeks post-op         Geronimo Hall PA-C  01/23/19  6:44 AM

## 2019-01-23 NOTE — THERAPY DISCHARGE NOTE
Acute Care - Physical Therapy Discharge Summary  [facility]       Patient Name: America Garcia  : 1955  MRN: [id number]    Today's Date: 2019  Onset of Illness/Injury or Date of Surgery: 19    Date of Referral to PT: 19  Referring Physician: Dr. Bird      Admit Date: 2019      PT Recommendation and Plan    Visit Dx:    ICD-10-CM ICD-9-CM   1. Impaired mobility Z74.09 799.89   2. Decreased activities of daily living (ADL) R68.89 780.99       Outcome Measures     Row Name 19 1300 19 1500 19 1455       How much help from another person do you currently need...    Turning from your back to your side while in flat bed without using bedrails?  --  --  3  -RAY    Moving from lying on back to sitting on the side of a flat bed without bedrails?  --  --  3  -RAY    Moving to and from a bed to a chair (including a wheelchair)?  --  --  3  -RAY    Standing up from a chair using your arms (e.g., wheelchair, bedside chair)?  --  --  3  -RAY    Climbing 3-5 steps with a railing?  --  --  2  -RAY    To walk in hospital room?  --  --  3  -RAY    AM-PAC 6 Clicks Score  --  --  17  -RAY       How much help from another is currently needed...    Putting on and taking off regular lower body clothing?  3  -TS  3  -JJ  --    Bathing (including washing, rinsing, and drying)  3  -TS  3  -JJ  --    Toileting (which includes using toilet bed pan or urinal)  4  -TS  3  -JJ  --    Putting on and taking off regular upper body clothing  4  -TS  3  -JJ  --    Taking care of personal grooming (such as brushing teeth)  4  -TS  3  -JJ  --    Eating meals  4  -TS  4  -JJ  --    Score  22  -TS  19  -JJ  --       Functional Assessment    Outcome Measure Options  AM-PAC 6 Clicks Daily Activity (OT)  -TS  AM-PAC 6 Clicks Daily Activity (OT)  -JJ  AM-PAC 6 Clicks Basic Mobility (PT)  -RAY      User Key  (r) = Recorded By, (t) = Taken By, (c) = Cosigned By    Initials Name Provider Type    Augusta Field,  WISDOM/L Occupational Therapy Assistant    Jordy Watters, PT DPT Physical Therapist    Diane Zayas OTR/L Occupational Therapist            Therapy Suggested Charges     Code   Minutes Charges    None             Rehab Goal Summary     Row Name 01/23/19 7505             Physical Therapy Goals    Transfer Goal Selection (PT)  transfer, PT goal 1  -NW      Gait Training Goal Selection (PT)  gait training, PT goal 1  -NW      Stairs Goal Selection (PT)  stairs, PT goal 1  -NW         Transfer Goal 1 (PT)    Activity/Assistive Device (Transfer Goal 1, PT)  sit-to-stand/stand-to-sit;bed-to-chair/chair-to-bed;walker, rolling  -NW      [place] Level/Cues Needed (Transfer Goal 1, PT)  supervision required  -NW      Time Frame (Transfer Goal 1, PT)  long term goal (LTG);10 days  -NW      Progress/Outcome (Transfer Goal 1, PT)  goal not met  -NW         Gait Training Goal 1 (PT)    Activity/Assistive Device (Gait Training Goal 1, PT)  gait (walking locomotion);assistive device use;decrease fall risk;improve balance and speed;increase endurance/gait distance;walker, rolling  -NW      [place] Level (Gait Training Goal 1, PT)  supervision required  -NW      Distance (Gait Goal 1, PT)  125  -NW      Time Frame (Gait Training Goal 1, PT)  long term goal (LTG);10 days  -NW      Progress/Outcome (Gait Training Goal 1, PT)  goal not met  -NW         Stairs Goal 1 (PT)    Activity/Assistive Device (Stairs Goal 1, PT)  ascending stairs;descending stairs  -NW      [place] Level/Cues Needed (Stairs Goal 1, PT)  contact guard assist  -NW      Number of Stairs (Stairs Goal 1, PT)  1  -NW      Time Frame (Stairs Goal 1, PT)  long term goal (LTG);10 days  -NW      Progress/Outcome (Stairs Goal 1, PT)  goal not met  -NW        User Key  (r) = Recorded By, (t) = Taken By, (c) = Cosigned By    Initials Name Provider Type Discipline    Lakisha Laura, PTA Physical Therapy Assistant PT              PT Discharge  Summary  Anticipated Discharge Disposition (PT): home  Reason for Discharge: Discharge from facility  Outcomes Achieved: Refer to plan of care for updates on goals achieved  Discharge Destination: Home      Lakisha Dodd, PTA   1/23/2019

## 2019-01-23 NOTE — PLAN OF CARE
Problem: Patient Care Overview  Goal: Plan of Care Review  Outcome: Ongoing (interventions implemented as appropriate)   01/23/19 1230   OTHER   Outcome Summary Pt a& o x4. C-collar in place. Dressing changed to tegaderm as ordered. No episodes of vomiting or nausea this shift. VSS. To be discharged to home.    Coping/Psychosocial   Plan of Care Reviewed With patient   Plan of Care Review   Progress improving       Problem: Fall Risk (Adult)  Goal: Absence of Fall  Outcome: Ongoing (interventions implemented as appropriate)      Problem: Laminectomy/Foraminotomy/Discectomy (Adult)  Goal: Signs and Symptoms of Listed Potential Problems Will be Absent, Minimized or Managed (Laminectomy/Foraminotomy/Discectomy)  Outcome: Ongoing (interventions implemented as appropriate)    Goal: Anesthesia/Sedation Recovery  Outcome: Ongoing (interventions implemented as appropriate)

## 2019-01-23 NOTE — PLAN OF CARE
Problem: Patient Care Overview  Goal: Plan of Care Review  Outcome: Ongoing (interventions implemented as appropriate)   01/23/19 0399   OTHER   Outcome Summary Patient c/o pain to cervical spine, some relief with 20 mg percocet q 4H. C/o nausea with emesis. Gave Dilaudid due to patient threw up pills right after being given then at 0030. No new edema noted. No falls noted. Son bedside. Continue to monitor.    Coping/Psychosocial   Plan of Care Reviewed With patient   Plan of Care Review   Progress improving     Goal: Individualization and Mutuality  Outcome: Ongoing (interventions implemented as appropriate)    Goal: Discharge Needs Assessment  Outcome: Ongoing (interventions implemented as appropriate)    Goal: Interprofessional Rounds/Family Conf  Outcome: Ongoing (interventions implemented as appropriate)      Problem: Fall Risk (Adult)  Goal: Absence of Fall  Outcome: Ongoing (interventions implemented as appropriate)      Problem: Laminectomy/Foraminotomy/Discectomy (Adult)  Goal: Signs and Symptoms of Listed Potential Problems Will be Absent, Minimized or Managed (Laminectomy/Foraminotomy/Discectomy)  Outcome: Ongoing (interventions implemented as appropriate)    Goal: Anesthesia/Sedation Recovery  Outcome: Ongoing (interventions implemented as appropriate)

## 2019-01-23 NOTE — NURSING NOTE
Attempted to place new peripheral IV site due to edema on right hand from IV fluid.  One missed attempted to left posterior forearm, no flashback noted.  Aseptic technique used.  Asked Sherine AVILES to attempt 2nd try.  She placed 22 LFA posterior without problem.  Border dressing used.  Patient tolerated well.

## 2019-01-24 ENCOUNTER — TELEPHONE (OUTPATIENT)
Dept: INTERNAL MEDICINE | Age: 64
End: 2019-01-24

## 2019-01-24 DIAGNOSIS — G95.9 CERVICAL MYELOPATHY (HCC): Primary | ICD-10-CM

## 2019-01-24 NOTE — THERAPY DISCHARGE NOTE
Acute Care - Occupational Therapy Discharge Summary  [facility]     Patient Name: America Garcia  : 1955  MRN: [id number]    Today's Date: 2019  Onset of Illness/Injury or Date of Surgery: 19    Date of Referral to OT: 19  Referring Physician: Dr. Brid      Admit Date: 2019        OT Recommendation and Plan    Visit Dx:    ICD-10-CM ICD-9-CM   1. Impaired mobility Z74.09 799.89   2. Decreased activities of daily living (ADL) R68.89 780.99               Rehab Goal Summary     Row Name 19 0700             Dressing Goal 1 (OT)    Activity/Assistive Device (Dressing Goal 1, OT)  upper body dressing;lower body dressing  -TS      [place]/Cues Needed (Dressing Goal 1, OT)  standby assist  -TS      Time Frame (Dressing Goal 1, OT)  long term goal (LTG);by discharge  -TS      Progress/Outcome (Dressing Goal 1, OT)  goal not met  -TS         Toileting Goal 1 (OT)    Activity/Device (Toileting Goal 1, OT)  toileting skills, all;commode  -TS      [place] Level/Cues Needed (Toileting Goal 1, OT)  independent  -TS      Time Frame (Toileting Goal 1, OT)  long term goal (LTG);by discharge  -TS      Progress/Outcome (Toileting Goal 1, OT)  goal not met  -TS         Patient Education Goal (OT)    Activity (Patient Education Goal, OT)  Pt will demonstrate donning/doffing cervical collar and provide teachback of spinal precuations for increased knowledge and compliance.   -TS      [place]/Cues/Accuracy (Memory Goal 2, OT)  independent  -TS      Time Frame (Patient Education Goal, OT)  long term goal (LTG);by discharge  -TS      Progress/Outcome (Patient Education Goal, OT)  goal not met  -TS        User Key  (r) = Recorded By, (t) = Taken By, (c) = Cosigned By    Initials Name Provider Type Discipline    TS Augusta Linares, WISDOM/L Occupational Therapy Assistant OT          Outcome Measures     Row Name 19 1300 19 1500 19 1455       How much help from another  person do you currently need...    Turning from your back to your side while in flat bed without using bedrails?  --  --  3  -RAY    Moving from lying on back to sitting on the side of a flat bed without bedrails?  --  --  3  -RAY    Moving to and from a bed to a chair (including a wheelchair)?  --  --  3  -RAY    Standing up from a chair using your arms (e.g., wheelchair, bedside chair)?  --  --  3  -RAY    Climbing 3-5 steps with a railing?  --  --  2  -RAY    To walk in hospital room?  --  --  3  -RAY    AM-PAC 6 Clicks Score  --  --  17  -RAY       How much help from another is currently needed...    Putting on and taking off regular lower body clothing?  3  -TS  3  -JJ  --    Bathing (including washing, rinsing, and drying)  3  -TS  3  -JJ  --    Toileting (which includes using toilet bed pan or urinal)  4  -TS  3  -JJ  --    Putting on and taking off regular upper body clothing  4  -TS  3  -JJ  --    Taking care of personal grooming (such as brushing teeth)  4  -TS  3  -JJ  --    Eating meals  4  -TS  4  -JJ  --    Score  22  -TS  19  -JJ  --       Functional Assessment    Outcome Measure Options  AM-PAC 6 Clicks Daily Activity (OT)  -TS  AM-PAC 6 Clicks Daily Activity (OT)  -JJ  AM-PAC 6 Clicks Basic Mobility (PT)  -RAY      User Key  (r) = Recorded By, (t) = Taken By, (c) = Cosigned By    Initials Name Provider Type    TS Augusta Linares, WISDOM/L Occupational Therapy Assistant    Jordy Watters, PT DPT Physical Therapist    Diane Zayas OTR/L Occupational Therapist          Therapy Suggested Charges     Code   Minutes Charges    11946 (CPT®) Hc Ot Neuromusc Re Education Ea 15 Min      96793 (CPT®) Hc Ot Ther Proc Ea 15 Min      21451 (CPT®) Hc Ot Therapeutic Act Ea 15 Min      16979 (CPT®) Hc Ot Manual Therapy Ea 15 Min      54388 (CPT®) Hc Ot Iontophoresis Ea 15 Min      47669 (CPT®) Hc Ot Elec Stim Ea-Per 15 Min      03955 (CPT®) Hc Ot Ultrasound Ea 15 Min      17569 (CPT®) Hc Ot Self  Care/Mgmt/Train Ea 15 Min 24 2    Total  24 2              OT Discharge Summary  Reason for Discharge: Discharge from facility  Outcomes Achieved: Refer to plan of care for updates on goals achieved  Discharge Destination: Home with assist      ALYX Beard/FIONA  1/24/2019

## 2019-01-24 NOTE — PAYOR COMM NOTE
"DC HOME 1-23-19    [id number]  Jose Dacia SHAKIR (63 y.o. Female)     Date of Birth Social Security Number Address Home Phone MRN    1955  [id number]  [facility] [place] [address] [phone] [id number]    Amish Marital Status          Other        Admission Date Admission Type Admitting Provider Attending Provider Department, Room/Bed    1/21/19 Elective CARLA Bird MD  [facility] 3A, 347/1    Discharge Date Discharge Disposition Discharge Destination        1/23/2019 Home or Self Care              Attending Provider:  (none)   Allergies:  Codeine, Morphine    Isolation:  None   Infection:  None   Code Status:  Prior    Ht:  165 cm (64.96\")   Wt:  70.8 kg (156 lb)    Admission Cmt:  None   Principal Problem:  None                Active Insurance as of 1/21/2019     Primary Coverage     Payor Plan Insurance Group Employer/Plan Group    [employer] [employer] EMPL[employer]EE [id number]     Payor Plan Address Payor Plan Phone Number Payor Plan Fax Number Effective Dates    [address] [phone]  1/1/2015 - None Entered    [place] [address]       Subscriber Name Subscriber Birth Date Member ID       JOSEDACIA SCHILLING 1955 [id number]                 Emergency Contacts      (Rel.) Home Phone Work Phone Mobile Phone    Demarcus Garcia (Spouse) [phone] -- --               Operative/Procedure Notes (all)      CARLA Bird MD at 1/21/2019  5:47 AM  Version 1 of 1       CERVICAL DISCECTOMY ANTERIOR FUSION WITH INSTRUMENTATION, CERVICAL HARDWARE REMOVAL  Procedure Note    Dacia Garcia  1/21/2019    Pre-op Diagnosis:     1.  Status post removal of broken instrumentation C6-7, ACDF C4-5, revision anterior fusion C6-7 with instrumentation C4 to 7, 4/23/2012  2.  Cervical spondylotic myelopathy  3.  Bilateral upper, lower extremity weakness  4.  Difficulty walking  5.  Neck pain  6.  Headaches  7.  Bilateral hand numbness  8.  Severe adjacent " level degenerative disc disease C3-4  9.  Facet arthropathy C3-4  10.  Severe central and bilateral foraminal stenosis C3-4  11.  Myelomalacia C3-4    Post-op Diagnosis:    same    Procedure/CPT® Codes:     1.  Anterior cervical discectomy with interbody fusion C3-4  2.  Use of PEEK interbody biomechanical device for fusion C3-4 ( RTI [employer] C PEEK spacer)  3.  Use of locally obtained autograft bone  4.  Use of allograft bone matrix  5.  Use of operating microscope for decompression and microdissection  6.  Use of fluoroscopy for confirmation of surgical level, placement of instrumentation and PEEK spacer  7.  Intraoperative neuromonitoring     Anesthesia: General     Surgeon: MADISON Bird MD     Assistant:  Geronimo Hall PA-C     Estimated Blood Loss: Minimal     Complications: None     Condition: Stable to PACU.     Indications:     The patient is a 63-year-old who sees Dr. Balaji Cornelius for medical issues.  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  She did well with this procedure for a number of years but unfortunately presented back to the office with bilateral upper and lower extremity weakness, as well as difficulty walking and bilateral hand numbness.  She also had complaints of neck pain and headaches.  Her symptoms and exam were very consistent with cervical spondylotic myelopathy.  Imaging studies revealed severe adjacent level degenerative disc disease, this time at C3-4 where there was facet arthropathy and myelomalacia as a result of severe central and bilateral foraminal stenosis.     After failing conservative measures, it was mutually decided that surgery would again be the best option.  Risks, benefits, and complications of surgery were discussed with the patient.  The patient appeared well informed and wished to proceed.  We specifically discussed the risks of infection, blood  loss, nerve root injury, CSF leak, spinal cord injury, and the possibility of incomplete resolution of symptoms.  We also discussed the possible risk of a nonunion and the potential need for additional surgery in the event of a pseudoarthrosis or hardware failure.  I did specifically discuss the severe stenosis and myelopathic symptoms and the fact that the surgery is meant to prevent her from getting worse.     Operative Procedure:     After obtaining informed consent and verifying the correct operative level, the patient was brought to the operating room and placed supine on an operating table.  A general anesthetic was provided by the anesthesia service with the assistance of an endotracheal tube.  Once this was properly positioned and secured, a bump was placed under the patient's shoulders placing the neck into a gentle extended position.  Head halter traction was then used with 10 pounds weight to maintain head position.  The shoulders were taped using 3 inch wide cloth tape to assist with radiographic visualization.  Fluoroscopy was used to identify the disc space of C3-4, and the skin was marked for our planned incision.  The anterior neck region was then prepped and draped in the usual sterile fashion.  A surgical timeout was taken to confirm this was the correct patient, we were working at the correct level, and that preoperative antibiotics were given in a timely fashion.     A transverse incision was then created over the anterior cervical region using a 10 blade scalpel directly centered over the level of interest.  Dissection was carried sharply through subcutaneous tissues.  The platysma was divided in line with the incision and blunt dissection was carried along the medial border of the sternocleidomastoid muscle, lateral to the strap musculature the neck.  The carotid pulse was then palpated, and dissection was carried medial to the carotid sheath and lateral to the trachea and esophagus.  Handheld  Cloward retractors along with Kitners were used to dissect through pretracheal and prevertebral fascia.       The previous instrumentation was easily identified making identification of the adjacent level C3-4 rather easy.  There was some scar tissue as expected in the area of the previous fusion.  The longus coli muscles were then elevated from either side of the spine using Bovie cautery exposing just the upper end of the instrumentation spanning at about C4 as well as the C3-4 disc space.  Fluoroscopy was used to confirm we are indeed at the correct level however.     An initial discectomy was then started by creating an annulotomy with Bovie cautery.  A Villarreal elevator was used to remove some of the disc material off of the endplates.  Disc material was initially removed using forward angled curettes and pituitaries.  Some anterior osteophytes were removed using a rongeur.  All retrieved bone was cleaned, morcellized and saved for later packing into the disc spaces to assist with obtaining a fusion.  [place] pins were then placed to allow gentle distraction across the disc space.  The microscope was then positioned for the microdissection and decompression portion of the procedure.     I used Kerrisons to remove remaining anterior osteophytes off of the endplates.  Straight curettes were used to remove the cartilaginous endplates and all remaining disc material.  The high-speed bur was then used to remove posterior osteophytes and to contour the endplates in preparation for fusion.  A forward angled curette was used to free up the posterior margins of the vertebral bodies, releasing the posterior longitudinal ligament.  Posterior osteophytes, posterior disc material, and the PLL were all resected using Kerrisons.  Kerrisons were also used to perform a bilateral neural foraminotomy.  At the end of the discectomy decompression, the central spinal canal and the exiting nerve roots appeared to be free of compression.   Bleeding in the epidural space was controlled using FloSeal.  The wound was then copiously irrigated with saline solution.     Next, trial spacers from RTI were tapped into position into the disc space.  Once the appropriate size was determined, an actual PEEK spacer matching the same size as the trial was delivered to the sterile field.  The spacer was packed as tightly as possible with a combination of locally obtained autograft bone and allograft bone matrix.  The spacer was then malleted into position into the disc space under fluoroscopic guidance.  It was placed as a PEEK interbody biomechanical device to assist with fusion.     After confirming the PEEK spacer was properly positioned with fluoroscopy, the [place] pins were removed.  Remaining anterior osteophytes were contoured off of the vertebral bodies using a combination of the high-speed bur and the Rongeur to allow for the best possible plate fixation.  Through the spacer I then drilled up and down through the drill holes for the placement of fixation screws.  A 14 mm screw was drilled up into C3 and a second 14 mm screw was drilled down into C4.  I was able to place the C4 screw without at all interacting with the previous instrumentation and screws into C4.     Final fluoroscopy imaging confirmed adequate position of the PEEK spacer and fixation screws.  All implants appeared to be properly positioned with good maintenance of cervical alignment.  A final inspection of the operative field was then undertaken to ensure that we had adequate hemostasis.  Bleeding at this point was controlled with FloSeal and bipolar cautery.     Closure was accomplished by reapproximating the platysma with a 3-0 Vicryl.  Immediate subcutaneous tissues were reapproximated with a 4-0 Vicryl in a buried fashion.  Final skin closure was accomplished with Mastisol and Steri-Strips.  The wound was then washed and a sterile Bioclusive dressing was applied.  The patient was then  placed into a hard cervical collar, extubated, and sent to the recovery room in good stable condition.     The patient tolerated the procedure well.  There were no complications.  We estimated blood loss to be minimal.  Intraoperative neural monitoring was used during the procedure.  We used motor evoked potentials, free run EMG, and SSEPs.  There were no neuro monitoring signal changes during the procedure.     Geronimo Hall PA-C provided critical assistance during the procedure.  His assistance was medically necessary in order to allow the procedure to occur in the most safe and efficient manner.  He assisted not only with patient positioning and wound closure, but more importantly with retraction of delicate neurovascular structures, assistance during the discectomy decompression, as well as the placement of the PEEK spacer and instrumentation to obtain a fusion.    MADISON Bird MD     Date: 1/21/2019  Time: 11:35 AM      Electronically signed by CARLA Bird MD at 1/21/2019 11:35 AM          Discharge Summary      Geronimo Hall PA-C at 1/23/2019  6:44 AM     Attestation signed by CARLA Bird MD at 1/23/2019  2:31 PM    agree                    Date of Discharge:  1/23/2019    Admission Diagnosis: M54.12    Discharge Diagnosis:   1.  Status post removal of broken instrumentation C6-7, ACDF C4-5, revision anterior fusion C6-7 with instrumentation C4 to 7, 4/23/2012  2.  Cervical spondylotic myelopathy  3.  Bilateral upper, lower extremity weakness  4.  Difficulty walking  5.  Neck pain  6.  Headaches  7.  Bilateral hand numbness  8.  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  Facet arthropathy C3-4  10.  Severe central and bilateral foraminal stenosis C3-4  11.  Myelomalacia C3-4  12. Status post ACDF C3-4, 1/21/2019      Consults During Admission: none    Hospital Course  Patient is a 63 y.o. female Known to our practice. Admitted for the above fusion.  This has been well  tolerated and the patient will be discharged home today in good stable condition with instructions for brace at all times.  No driving until directed.  Patient will follow-up with Dr. Bird's clinic in two weeks. They will call if problems arise.       Condition on Discharge:  STABLE    Vital Signs  Temp:  [98 °F (36.7 °C)-100.1 °F (37.8 °C)] 98 °F (36.7 °C)  Heart Rate:  [107-117] 109  Resp:  [16-18] 18  BP: (146-159)/() 153/73    Physical Exam:   Alert and oriented ×3, no acute distress, grossly neurovascularly intact, vital signs stable, dressing clean dry and intact, moving all extremities without focal deficit    Discharge Disposition  Home or Self Care    Discharge Medications     Discharge Medications      New Medications      Instructions Start Date   ondansetron 4 MG tablet  Commonly known as:  ZOFRAN   4 mg, Oral, Every 6 Hours PRN         Changes to Medications      Instructions Start Date   oxyCODONE-acetaminophen  MG per tablet  Commonly known as:  PERCOCET  What changed:    · when to take this  · reasons to take this   1 tablet, Oral, Every 6 Hours PRN         Continue These Medications      Instructions Start Date   AMBIEN 10 MG tablet  Generic drug:  zolpidem   10 mg, Oral, Nightly PRN      amitriptyline 10 MG tablet  Commonly known as:  ELAVIL   10 mg, Oral, Nightly      aspirin 81 MG chewable tablet   81 mg, Oral, Daily      carisoprodol 350 MG tablet  Commonly known as:  SOMA   350 mg, Oral, 4 Times Daily PRN      clonazePAM 0.5 MG tablet  Commonly known as:  KlonoPIN   0.5 mg, Oral, 2 Times Daily PRN      escitalopram 10 MG tablet  Commonly known as:  LEXAPRO   10 mg, Oral, Daily      esomeprazole 20 MG capsule  Commonly known as:  nexIUM   20 mg, Oral, Every Morning Before Breakfast      gabapentin 100 MG capsule  Commonly known as:  NEURONTIN   100 mg, Oral, 3 Times Daily      glimepiride 2 MG tablet  Commonly known as:  AMARYL   2 mg, Oral, Every Morning Before Breakfast       lisinopril 10 MG tablet  Commonly known as:  PRINIVIL,ZESTRIL   20 mg, Oral, Daily With Breakfast      metFORMIN  MG 24 hr tablet  Commonly known as:  GLUCOPHAGE-XR   1,000 mg, Oral, 2 Times Daily      rOPINIRole 0.5 MG tablet  Commonly known as:  REQUIP   0.5 mg, Oral, 2 Times Daily, Take 1 hour before bedtime.         Stop These Medications    celecoxib 200 MG capsule  Commonly known as:  CeleBREX            Discharge Diet: Resume Home diet, advance as tolerated    Activity at Discharge: Resume home activity advace as tolerated, no lifting, no twisting, no bending, brace as directed, no driving until directed.     Follow-up Appointments  Followup with PCP within one week  Followup Marge Nicole at 2weeks post-op         Geronimo Hall PA-C  01/23/19  6:44 AM              Electronically signed by CARLA Bird MD at 1/23/2019  2:31 PM

## 2019-01-30 ENCOUNTER — OFFICE VISIT (OUTPATIENT)
Dept: PRIMARY CARE CLINIC | Age: 64
End: 2019-01-30
Payer: COMMERCIAL

## 2019-01-30 VITALS
WEIGHT: 152 LBS | HEIGHT: 65 IN | SYSTOLIC BLOOD PRESSURE: 178 MMHG | TEMPERATURE: 96.6 F | DIASTOLIC BLOOD PRESSURE: 88 MMHG | HEART RATE: 117 BPM | OXYGEN SATURATION: 99 % | BODY MASS INDEX: 25.33 KG/M2

## 2019-01-30 DIAGNOSIS — E11.9 TYPE 2 DIABETES MELLITUS WITHOUT COMPLICATION, WITHOUT LONG-TERM CURRENT USE OF INSULIN (HCC): ICD-10-CM

## 2019-01-30 DIAGNOSIS — Z98.1 S/P CERVICAL SPINAL FUSION: Primary | ICD-10-CM

## 2019-01-30 DIAGNOSIS — G95.20 CERVICAL CORD COMPRESSION WITH MYELOPATHY (HCC): ICD-10-CM

## 2019-01-30 DIAGNOSIS — I10 ESSENTIAL HYPERTENSION: ICD-10-CM

## 2019-01-30 DIAGNOSIS — T88.59XS: ICD-10-CM

## 2019-01-30 DIAGNOSIS — E11.42 DIABETIC POLYNEUROPATHY ASSOCIATED WITH TYPE 2 DIABETES MELLITUS (HCC): ICD-10-CM

## 2019-01-30 PROCEDURE — 99496 TRANSJ CARE MGMT HIGH F2F 7D: CPT | Performed by: FAMILY MEDICINE

## 2019-01-30 RX ORDER — GABAPENTIN 100 MG/1
100 CAPSULE ORAL 3 TIMES DAILY
Qty: 90 CAPSULE | Refills: 5 | Status: SHIPPED | OUTPATIENT
Start: 2019-01-30 | End: 2020-01-03 | Stop reason: DRUGHIGH

## 2019-01-30 RX ORDER — PROMETHAZINE HYDROCHLORIDE 25 MG/1
25 TABLET ORAL
COMMUNITY
Start: 2019-01-23 | End: 2019-05-01

## 2019-01-30 RX ORDER — METFORMIN HYDROCHLORIDE 500 MG/1
TABLET, EXTENDED RELEASE ORAL
Qty: 360 TABLET | Refills: 3 | Status: SHIPPED | OUTPATIENT
Start: 2019-01-30 | End: 2020-01-03 | Stop reason: DRUGHIGH

## 2019-01-30 RX ORDER — LISINOPRIL 20 MG/1
20 TABLET ORAL DAILY
Qty: 90 TABLET | Refills: 3 | Status: SHIPPED | OUTPATIENT
Start: 2019-01-30 | End: 2021-02-15 | Stop reason: SDUPTHER

## 2019-01-30 RX ORDER — MONTELUKAST SODIUM 4 MG/1
TABLET, CHEWABLE ORAL
Qty: 180 TABLET | Refills: 2 | Status: SHIPPED | OUTPATIENT
Start: 2019-01-30 | End: 2021-02-15

## 2019-01-30 ASSESSMENT — ENCOUNTER SYMPTOMS
SHORTNESS OF BREATH: 0
CHEST TIGHTNESS: 0
DIARRHEA: 0
CONSTIPATION: 0
NAUSEA: 0
WHEEZING: 0
VOMITING: 0
COUGH: 0
ABDOMINAL PAIN: 0

## 2019-03-29 RX ORDER — CELECOXIB 200 MG/1
CAPSULE ORAL
Qty: 30 CAPSULE | Refills: 0 | Status: SHIPPED | OUTPATIENT
Start: 2019-03-29 | End: 2019-07-15 | Stop reason: SDUPTHER

## 2019-03-29 RX ORDER — ROPINIROLE 0.5 MG/1
TABLET, FILM COATED ORAL
Qty: 60 TABLET | Refills: 0 | Status: SHIPPED | OUTPATIENT
Start: 2019-03-29 | End: 2019-07-08 | Stop reason: SDUPTHER

## 2019-04-26 ENCOUNTER — OFFICE VISIT (OUTPATIENT)
Dept: PRIMARY CARE CLINIC | Age: 64
End: 2019-04-26
Payer: COMMERCIAL

## 2019-04-26 VITALS
DIASTOLIC BLOOD PRESSURE: 86 MMHG | HEIGHT: 65 IN | BODY MASS INDEX: 25.49 KG/M2 | WEIGHT: 153 LBS | SYSTOLIC BLOOD PRESSURE: 132 MMHG | HEART RATE: 111 BPM | OXYGEN SATURATION: 99 % | TEMPERATURE: 98.2 F

## 2019-04-26 DIAGNOSIS — E11.9 TYPE 2 DIABETES MELLITUS WITHOUT COMPLICATION, WITHOUT LONG-TERM CURRENT USE OF INSULIN (HCC): ICD-10-CM

## 2019-04-26 DIAGNOSIS — E11.42 DIABETIC POLYNEUROPATHY ASSOCIATED WITH TYPE 2 DIABETES MELLITUS (HCC): ICD-10-CM

## 2019-04-26 DIAGNOSIS — G95.20 CERVICAL CORD COMPRESSION WITH MYELOPATHY (HCC): ICD-10-CM

## 2019-04-26 DIAGNOSIS — Z00.00 ROUTINE GENERAL MEDICAL EXAMINATION AT A HEALTH CARE FACILITY: Primary | ICD-10-CM

## 2019-04-26 PROCEDURE — 99396 PREV VISIT EST AGE 40-64: CPT | Performed by: FAMILY MEDICINE

## 2019-04-26 RX ORDER — ONDANSETRON 4 MG/1
4 TABLET, ORALLY DISINTEGRATING ORAL EVERY 8 HOURS PRN
Qty: 20 TABLET | Refills: 1 | Status: SHIPPED | OUTPATIENT
Start: 2019-04-26 | End: 2019-07-29 | Stop reason: SDUPTHER

## 2019-04-26 ASSESSMENT — PATIENT HEALTH QUESTIONNAIRE - PHQ9
2. FEELING DOWN, DEPRESSED OR HOPELESS: 0
SUM OF ALL RESPONSES TO PHQ9 QUESTIONS 1 & 2: 0
SUM OF ALL RESPONSES TO PHQ QUESTIONS 1-9: 0
SUM OF ALL RESPONSES TO PHQ QUESTIONS 1-9: 0
1. LITTLE INTEREST OR PLEASURE IN DOING THINGS: 0

## 2019-04-26 NOTE — PROGRESS NOTES
(GLUCOPHAGE-XR) 500 MG extended release tablet TAKE (2) TABLETS BY MOUTH TWICE DAILY. 360 tablet 3    colestipol (COLESTID) 1 g tablet TAKE (1) TABLET BY MOUTH TWICE A DAY. 180 tablet 2    lisinopril (PRINIVIL;ZESTRIL) 20 MG tablet Take 1 tablet by mouth daily 90 tablet 3    escitalopram (LEXAPRO) 10 MG tablet TAKE 1 TABLET BY MOUTH ONCE DAILY 30 tablet 5    ondansetron (ZOFRAN) 4 MG tablet Take 1 tablet by mouth daily as needed for Nausea or Vomiting 30 tablet 0    esomeprazole Magnesium (NEXIUM) 20 MG PACK Take 20 mg by mouth daily      amitriptyline (ELAVIL) 10 MG tablet Take 10 mg by mouth nightly      aspirin EC 81 MG EC tablet Take 1 tablet by mouth daily 90 tablet 5    simvastatin (ZOCOR) 20 MG tablet Take 1 tablet by mouth nightly 90 tablet 3    glimepiride (AMARYL) 2 MG tablet TAKE 1 TABLET BY MOUTH DAILY IN THE MORNING (BEFORE BREAKFAST) 30 tablet 11    Diabetic Shoe MISC by Does not apply route With insert Dx E11.9 1 each 0    oxyCODONE-acetaminophen (PERCOCET)  MG per tablet Take 1 tablet by mouth every 8 hours as needed       gabapentin (NEURONTIN) 100 MG capsule Take 1 capsule by mouth 3 times daily for 30 days. . 90 capsule 5    clonazePAM (KLONOPIN) 0.5 MG tablet TAKE 1/2 TO 1 TABLET BY MOUTH TWICE DAILY AS NEEDED FOR ANXIETY FOR UP TO 30 DAYS 60 tablet 5    zoster recombinant adjuvanted vaccine (SHINGRIX) 50 MCG SUSR injection 50 MCG IM then repeat 2-6 months. 0.5 mL 1    Insulin Pen Needle (B-D UF III MINI PEN NEEDLES) 31G X 5 MM MISC Inject 1 each as directed daily 100 each 0    carisoprodol (SOMA) 350 MG tablet Take 350 mg by mouth 2 times daily        No current facility-administered medications for this visit.         Allergies   Allergen Reactions    Codeine Shortness Of Breath    Morphine Shortness Of Breath    Sulfa Antibiotics        Past Surgical History:   Procedure Laterality Date    CARDIAC CATHETERIZATION  10/17/14  JDT    EF 50%    CHOLECYSTECTOMY      FOOT SURGERY      HARDWARE REMOVAL Right 5/3/2019    HARDWARE REMOVAL RIGHT ANKLE performed by Malvin Jha MD at [facility]      NECK SURGERY         Social History     Tobacco Use    Smoking status: Former Smoker     Packs/day: 1.00     Years: 30.00     Pack years: 30.00     Last attempt to quit: 3/1/1991     Years since quittin.2    Smokeless tobacco: Never Used   Substance Use Topics    Alcohol use: No    Drug use: No       Family History   Problem Relation Age of Onset    Heart Disease Mother     Cancer Father     Heart Disease Sister        /86   Pulse 111   Temp 98.2 °F (36.8 °C) (Temporal)   Ht 5' 5\" (1.651 m)   Wt 153 lb (69.4 kg)   LMP 1976 (LMP Unknown)   SpO2 99%   BMI 25.46 kg/m²     Physical Exam   Constitutional: She is oriented to person, place, and time. She appears well-developed and well-nourished. Non-toxic appearance. No distress. HENT:   Head: Normocephalic and atraumatic. Not macrocephalic and not microcephalic. Right Ear: External ear normal. No drainage. No decreased hearing is noted. Left Ear: External ear normal. No drainage. No decreased hearing is noted. Nose: Nose normal. No rhinorrhea or nasal deformity. Mouth/Throat: Uvula is midline, oropharynx is clear and moist and mucous membranes are normal. Mucous membranes are not pale and not dry. Eyes: Pupils are equal, round, and reactive to light. Conjunctivae, EOM and lids are normal. Right eye exhibits no discharge. Left eye exhibits no discharge. No scleral icterus. Neck: Normal range of motion and phonation normal. Neck supple. No tracheal deviation present. No thyromegaly present. Cardiovascular: Normal rate, regular rhythm, S1 normal, S2 normal and normal heart sounds. No extrasystoles are present. No murmur heard. Pulmonary/Chest: Effort normal and breath sounds normal. No respiratory distress. She has no wheezes. She has no rhonchi.  She has no GENERATION   4. Cervical cord compression with myelopathy (HCC) G95.20        Plan:   Follow-up below diabetes with the below laboratories. We will also check for proteinuria generally and microalbumin. Reassurance on mild venous stasis secondary to decreased activity from surgery. continue with PT exercises learned from the physical therapist.  Orders Placed This Encounter   Procedures    Urinalysis     Standing Status:   Standing     Number of Occurrences:   15     Standing Expiration Date:   4/23/2029    Microalbumin / Creatinine Urine Ratio     Standing Status:   Standing     Number of Occurrences:   15     Standing Expiration Date:   4/8/2030    Hemoglobin A1C     Standing Status:   Standing     Number of Occurrences:   40     Standing Expiration Date:   4/8/2030    TSH 3RD GENERATION     Standing Status:   Standing     Number of Occurrences:   15     Standing Expiration Date:   4/8/2030     Orders Placed This Encounter   Medications    ondansetron (ZOFRAN ODT) 4 MG disintegrating tablet     Sig: Take 1 tablet by mouth every 8 hours as needed for Nausea or Vomiting     Dispense:  20 tablet     Refill:  1      There are no discontinued medications. Patient Instructions   Get your labs checked in [address]. Get fasting lab work done (no food or drink besides water after midnight and medicines as prescribed) before the next follow up please. Patient given educational handouts and has had all questions answered. Patient voices understanding and agrees to plans along with risks and benefits of plan. Patient isinstructed to continue prior meds, diet, and exercise plans unless instructed otherwise. Patient agrees to follow up as instructed and sooner if needed. Patient agrees to go to ER if condition becomes emergent. Notesmay be completed with dictation device and spelling errors may occur. Return in about 6 months (around 10/26/2019) for f/u DM.

## 2019-04-30 ENCOUNTER — ANESTHESIA EVENT (OUTPATIENT)
Dept: OPERATING ROOM | Age: 64
End: 2019-04-30

## 2019-05-01 ENCOUNTER — HOSPITAL ENCOUNTER (OUTPATIENT)
Dept: NON INVASIVE DIAGNOSTICS | Age: 64
Discharge: HOME | End: 2019-05-01
Payer: COMMERCIAL

## 2019-05-01 ENCOUNTER — HOSPITAL ENCOUNTER (OUTPATIENT)
Dept: LAB | Age: 64
Discharge: HOME | End: 2019-05-01
Payer: COMMERCIAL

## 2019-05-01 ENCOUNTER — HOSPITAL ENCOUNTER (OUTPATIENT)
Dept: PREADMISSION TESTING | Age: 64
Setting detail: OUTPATIENT SURGERY
Discharge: HOME | End: 2019-05-05

## 2019-05-01 VITALS — BODY MASS INDEX: 25.21 KG/M2 | WEIGHT: 151.3 LBS | HEIGHT: 65 IN

## 2019-05-01 DIAGNOSIS — E11.42 DIABETIC POLYNEUROPATHY ASSOCIATED WITH TYPE 2 DIABETES MELLITUS (HCC): ICD-10-CM

## 2019-05-01 DIAGNOSIS — E11.9 TYPE 2 DIABETES MELLITUS WITHOUT COMPLICATION, WITHOUT LONG-TERM CURRENT USE OF INSULIN (HCC): ICD-10-CM

## 2019-05-01 LAB
ANION GAP SERPL CALCULATED.3IONS-SCNC: 16 MMOL/L (ref 7–19)
BASOPHILS ABSOLUTE: 0.1 K/UL (ref 0–0.2)
BASOPHILS RELATIVE PERCENT: 1 % (ref 0–1)
BILIRUBIN URINE: NEGATIVE
BLOOD, URINE: NEGATIVE
BUN BLDV-MCNC: 9 MG/DL (ref 8–23)
CALCIUM SERPL-MCNC: 9.9 MG/DL (ref 8.8–10.2)
CHLORIDE BLD-SCNC: 96 MMOL/L (ref 98–111)
CLARITY: ABNORMAL
CO2: 28 MMOL/L (ref 22–29)
COLOR: YELLOW
CREAT SERPL-MCNC: 0.7 MG/DL (ref 0.5–0.9)
CREATININE URINE: 44.3 MG/DL (ref 4.2–622)
EOSINOPHILS ABSOLUTE: 0.1 K/UL (ref 0–0.6)
EOSINOPHILS RELATIVE PERCENT: 1 % (ref 0–5)
GFR NON-AFRICAN AMERICAN: >60
GLUCOSE BLD-MCNC: 215 MG/DL (ref 74–109)
GLUCOSE URINE: 250 MG/DL
HBA1C MFR BLD: 7.3 % (ref 4–6)
HCT VFR BLD CALC: 36.6 % (ref 37–47)
HEMOGLOBIN: 11.2 G/DL (ref 12–16)
KETONES, URINE: NEGATIVE MG/DL
LEUKOCYTE ESTERASE, URINE: NEGATIVE
LYMPHOCYTES ABSOLUTE: 2.4 K/UL (ref 1.1–4.5)
LYMPHOCYTES RELATIVE PERCENT: 40.5 % (ref 20–40)
MCH RBC QN AUTO: 27 PG (ref 27–31)
MCHC RBC AUTO-ENTMCNC: 30.6 G/DL (ref 33–37)
MCV RBC AUTO: 88.2 FL (ref 81–99)
MICROALBUMIN UR-MCNC: <1.2 MG/DL (ref 0–19)
MICROALBUMIN/CREAT UR-RTO: NORMAL MG/G
MONOCYTES ABSOLUTE: 0.6 K/UL (ref 0–0.9)
MONOCYTES RELATIVE PERCENT: 10.5 % (ref 0–10)
NEUTROPHILS ABSOLUTE: 2.8 K/UL (ref 1.5–7.5)
NEUTROPHILS RELATIVE PERCENT: 46.7 % (ref 50–65)
NITRITE, URINE: NEGATIVE
PDW BLD-RTO: 14 % (ref 11.5–14.5)
PH UA: 6 (ref 5–8)
PLATELET # BLD: 347 K/UL (ref 130–400)
PMV BLD AUTO: 10.3 FL (ref 9.4–12.3)
POTASSIUM SERPL-SCNC: 4.7 MMOL/L (ref 3.5–5)
PROTEIN UA: NEGATIVE MG/DL
RBC # BLD: 4.15 M/UL (ref 4.2–5.4)
SODIUM BLD-SCNC: 140 MMOL/L (ref 136–145)
SPECIFIC GRAVITY UA: 1.01 (ref 1–1.03)
UROBILINOGEN, URINE: 0.2 E.U./DL
WBC # BLD: 6 K/UL (ref 4.8–10.8)

## 2019-05-01 PROCEDURE — 93005 ELECTROCARDIOGRAM TRACING: CPT

## 2019-05-03 ENCOUNTER — ANESTHESIA (OUTPATIENT)
Dept: OPERATING ROOM | Age: 64
End: 2019-05-03

## 2019-05-03 ENCOUNTER — HOSPITAL ENCOUNTER (OUTPATIENT)
Age: 64
Setting detail: OUTPATIENT SURGERY
Discharge: HOME | End: 2019-05-03
Attending: ORTHOPAEDIC SURGERY | Admitting: ORTHOPAEDIC SURGERY

## 2019-05-03 VITALS
WEIGHT: 151 LBS | OXYGEN SATURATION: 95 % | HEIGHT: 65 IN | HEART RATE: 122 BPM | DIASTOLIC BLOOD PRESSURE: 58 MMHG | SYSTOLIC BLOOD PRESSURE: 116 MMHG | RESPIRATION RATE: 16 BRPM | BODY MASS INDEX: 25.16 KG/M2

## 2019-05-03 VITALS
SYSTOLIC BLOOD PRESSURE: 127 MMHG | OXYGEN SATURATION: 99 % | RESPIRATION RATE: 6 BRPM | DIASTOLIC BLOOD PRESSURE: 64 MMHG

## 2019-05-03 LAB — TSH, 3RD GENERATION: 0.72 MU/L (ref 0.3–4)

## 2019-05-03 PROCEDURE — 20680 REMOVAL OF IMPLANT DEEP: CPT

## 2019-05-03 PROCEDURE — G8907 PT DOC NO EVENTS ON DISCHARG: HCPCS

## 2019-05-03 PROCEDURE — G8918 PT W/O PREOP ORDER IV AB PRO: HCPCS

## 2019-05-03 RX ORDER — FENTANYL CITRATE 50 UG/ML
50 INJECTION, SOLUTION INTRAMUSCULAR; INTRAVENOUS EVERY 5 MIN PRN
Status: DISCONTINUED | OUTPATIENT
Start: 2019-05-03 | End: 2019-05-03 | Stop reason: HOSPADM

## 2019-05-03 RX ORDER — OXYCODONE HYDROCHLORIDE AND ACETAMINOPHEN 5; 325 MG/1; MG/1
1 TABLET ORAL PRN
Status: COMPLETED | OUTPATIENT
Start: 2019-05-03 | End: 2019-05-03

## 2019-05-03 RX ORDER — FENTANYL CITRATE 50 UG/ML
INJECTION, SOLUTION INTRAMUSCULAR; INTRAVENOUS PRN
Status: DISCONTINUED | OUTPATIENT
Start: 2019-05-03 | End: 2019-05-03 | Stop reason: SDUPTHER

## 2019-05-03 RX ORDER — MEPERIDINE HYDROCHLORIDE 25 MG/ML
12.5 INJECTION INTRAMUSCULAR; INTRAVENOUS; SUBCUTANEOUS EVERY 5 MIN PRN
Status: DISCONTINUED | OUTPATIENT
Start: 2019-05-03 | End: 2019-05-03 | Stop reason: HOSPADM

## 2019-05-03 RX ORDER — HYDROMORPHONE HCL 110MG/55ML
0.5 PATIENT CONTROLLED ANALGESIA SYRINGE INTRAVENOUS EVERY 5 MIN PRN
Status: DISCONTINUED | OUTPATIENT
Start: 2019-05-03 | End: 2019-05-03 | Stop reason: HOSPADM

## 2019-05-03 RX ORDER — PROPOFOL 10 MG/ML
INJECTION, EMULSION INTRAVENOUS PRN
Status: DISCONTINUED | OUTPATIENT
Start: 2019-05-03 | End: 2019-05-03 | Stop reason: SDUPTHER

## 2019-05-03 RX ORDER — DIPHENHYDRAMINE HYDROCHLORIDE 50 MG/ML
12.5 INJECTION INTRAMUSCULAR; INTRAVENOUS
Status: DISCONTINUED | OUTPATIENT
Start: 2019-05-03 | End: 2019-05-03 | Stop reason: HOSPADM

## 2019-05-03 RX ORDER — PROMETHAZINE HYDROCHLORIDE 25 MG/ML
INJECTION, SOLUTION INTRAMUSCULAR; INTRAVENOUS PRN
Status: DISCONTINUED | OUTPATIENT
Start: 2019-05-03 | End: 2019-05-03 | Stop reason: SDUPTHER

## 2019-05-03 RX ORDER — KETOROLAC TROMETHAMINE 30 MG/ML
INJECTION, SOLUTION INTRAMUSCULAR; INTRAVENOUS PRN
Status: DISCONTINUED | OUTPATIENT
Start: 2019-05-03 | End: 2019-05-03 | Stop reason: SDUPTHER

## 2019-05-03 RX ORDER — ONDANSETRON 4 MG/1
8 TABLET, FILM COATED ORAL
Status: DISCONTINUED | OUTPATIENT
Start: 2019-05-03 | End: 2019-05-03 | Stop reason: HOSPADM

## 2019-05-03 RX ORDER — SCOLOPAMINE TRANSDERMAL SYSTEM 1 MG/1
1 PATCH, EXTENDED RELEASE TRANSDERMAL
Status: DISCONTINUED | OUTPATIENT
Start: 2019-05-03 | End: 2019-05-03 | Stop reason: HOSPADM

## 2019-05-03 RX ORDER — LIDOCAINE HYDROCHLORIDE 10 MG/ML
1 INJECTION, SOLUTION EPIDURAL; INFILTRATION; INTRACAUDAL; PERINEURAL
Status: DISCONTINUED | OUTPATIENT
Start: 2019-05-03 | End: 2019-05-03 | Stop reason: HOSPADM

## 2019-05-03 RX ORDER — LABETALOL HYDROCHLORIDE 5 MG/ML
5 INJECTION, SOLUTION INTRAVENOUS EVERY 10 MIN PRN
Status: DISCONTINUED | OUTPATIENT
Start: 2019-05-03 | End: 2019-05-03 | Stop reason: HOSPADM

## 2019-05-03 RX ORDER — PROMETHAZINE HYDROCHLORIDE 25 MG/ML
12.5 INJECTION, SOLUTION INTRAMUSCULAR; INTRAVENOUS
Status: DISCONTINUED | OUTPATIENT
Start: 2019-05-03 | End: 2019-05-03 | Stop reason: HOSPADM

## 2019-05-03 RX ORDER — OXYCODONE HYDROCHLORIDE AND ACETAMINOPHEN 5; 325 MG/1; MG/1
2 TABLET ORAL PRN
Status: COMPLETED | OUTPATIENT
Start: 2019-05-03 | End: 2019-05-03

## 2019-05-03 RX ORDER — ONDANSETRON 2 MG/ML
4 INJECTION INTRAMUSCULAR; INTRAVENOUS
Status: DISCONTINUED | OUTPATIENT
Start: 2019-05-03 | End: 2019-05-03 | Stop reason: HOSPADM

## 2019-05-03 RX ORDER — SODIUM CHLORIDE, SODIUM LACTATE, POTASSIUM CHLORIDE, CALCIUM CHLORIDE 600; 310; 30; 20 MG/100ML; MG/100ML; MG/100ML; MG/100ML
INJECTION, SOLUTION INTRAVENOUS CONTINUOUS
Status: DISCONTINUED | OUTPATIENT
Start: 2019-05-03 | End: 2019-05-03 | Stop reason: HOSPADM

## 2019-05-03 RX ORDER — LIDOCAINE HYDROCHLORIDE 10 MG/ML
INJECTION, SOLUTION INFILTRATION; PERINEURAL PRN
Status: DISCONTINUED | OUTPATIENT
Start: 2019-05-03 | End: 2019-05-03 | Stop reason: SDUPTHER

## 2019-05-03 RX ORDER — HYDROMORPHONE HCL 110MG/55ML
0.25 PATIENT CONTROLLED ANALGESIA SYRINGE INTRAVENOUS EVERY 5 MIN PRN
Status: DISCONTINUED | OUTPATIENT
Start: 2019-05-03 | End: 2019-05-03 | Stop reason: HOSPADM

## 2019-05-03 RX ORDER — MIDAZOLAM HYDROCHLORIDE 1 MG/ML
INJECTION INTRAMUSCULAR; INTRAVENOUS PRN
Status: DISCONTINUED | OUTPATIENT
Start: 2019-05-03 | End: 2019-05-03 | Stop reason: SDUPTHER

## 2019-05-03 RX ORDER — HYDRALAZINE HYDROCHLORIDE 20 MG/ML
5 INJECTION INTRAMUSCULAR; INTRAVENOUS EVERY 10 MIN PRN
Status: DISCONTINUED | OUTPATIENT
Start: 2019-05-03 | End: 2019-05-03 | Stop reason: HOSPADM

## 2019-05-03 RX ADMIN — Medication 0.5 MG: at 10:17

## 2019-05-03 RX ADMIN — OXYCODONE HYDROCHLORIDE AND ACETAMINOPHEN 2 TABLET: 5; 325 TABLET ORAL at 10:37

## 2019-05-03 RX ADMIN — LIDOCAINE HYDROCHLORIDE 30 MG: 10 INJECTION, SOLUTION INFILTRATION; PERINEURAL at 09:17

## 2019-05-03 RX ADMIN — FENTANYL CITRATE 50 MCG: 50 INJECTION, SOLUTION INTRAMUSCULAR; INTRAVENOUS at 09:16

## 2019-05-03 RX ADMIN — PROPOFOL 70 MG: 10 INJECTION, EMULSION INTRAVENOUS at 09:17

## 2019-05-03 RX ADMIN — FENTANYL CITRATE 50 MCG: 50 INJECTION, SOLUTION INTRAMUSCULAR; INTRAVENOUS at 09:32

## 2019-05-03 RX ADMIN — SODIUM CHLORIDE, SODIUM LACTATE, POTASSIUM CHLORIDE, CALCIUM CHLORIDE: 600; 310; 30; 20 INJECTION, SOLUTION INTRAVENOUS at 07:46

## 2019-05-03 RX ADMIN — MIDAZOLAM HYDROCHLORIDE 1 MG: 1 INJECTION INTRAMUSCULAR; INTRAVENOUS at 09:16

## 2019-05-03 RX ADMIN — PROPOFOL 100 MG: 10 INJECTION, EMULSION INTRAVENOUS at 09:18

## 2019-05-03 RX ADMIN — KETOROLAC TROMETHAMINE 30 MG: 30 INJECTION, SOLUTION INTRAMUSCULAR; INTRAVENOUS at 09:44

## 2019-05-03 RX ADMIN — PROMETHAZINE HYDROCHLORIDE 6.25 MG: 25 INJECTION, SOLUTION INTRAMUSCULAR; INTRAVENOUS at 09:38

## 2019-05-03 RX ADMIN — KETOROLAC TROMETHAMINE 30 MG: 30 INJECTION, SOLUTION INTRAMUSCULAR; INTRAVENOUS at 09:49

## 2019-05-03 RX ADMIN — Medication 0.5 MG: at 10:31

## 2019-05-03 RX ADMIN — SODIUM CHLORIDE, SODIUM LACTATE, POTASSIUM CHLORIDE, CALCIUM CHLORIDE: 600; 310; 30; 20 INJECTION, SOLUTION INTRAVENOUS at 09:45

## 2019-05-03 ASSESSMENT — PAIN SCALES - GENERAL
PAINLEVEL_OUTOF10: 8

## 2019-05-03 NOTE — ANESTHESIA PRE PROCEDURE
Department of Anesthesiology  Preprocedure Note       Name:  Eva Espino   Age:  58 y.o.  :  1955                                          MRN:  [id number]         Date:  5/3/2019      Surgeon: Senait Singh):  Malvin Jha MD    Procedure: HARDWARE REMOVAL RIGHT ANKLE (Right Arm Lower)    Medications prior to admission:   Prior to Admission medications    Medication Sig Start Date End Date Taking? Authorizing Provider   oxyCODONE-acetaminophen (PERCOCET)  MG per tablet Take 1 tablet by mouth every 8 hours as needed  16  Yes Historical Provider, MD   ondansetron (ZOFRAN ODT) 4 MG disintegrating tablet Take 1 tablet by mouth every 8 hours as needed for Nausea or Vomiting 19   Gavin Kilgore MD   rOPINIRole (REQUIP) 0.5 MG tablet TAKE (1) TABLET BY MOUTH TWICE A DAY. 3/29/19   Gavin Kilgore MD   celecoxib (CELEBREX) 200 MG capsule TAKE 1 CAPSULE BY MOUTH DAILY IN THE MORNING 3/29/19   Gavin Kilgore MD   metFORMIN (GLUCOPHAGE-XR) 500 MG extended release tablet TAKE (2) TABLETS BY MOUTH TWICE DAILY. 19   Gavin Kilgore MD   colestipol (COLESTID) 1 g tablet TAKE (1) TABLET BY MOUTH TWICE A DAY. 19   Gavin Kilgore MD   gabapentin (NEURONTIN) 100 MG capsule Take 1 capsule by mouth 3 times daily for 30 days. . 1/30/19 3/1/19  Gavin Kilgore MD   lisinopril (PRINIVIL;ZESTRIL) 20 MG tablet Take 1 tablet by mouth daily 19   Gavin Kilgore MD   clonazePAM (KLONOPIN) 0.5 MG tablet TAKE 1/2 TO 1 TABLET BY MOUTH TWICE DAILY AS NEEDED FOR ANXIETY FOR UP TO 30 DAYS 19  Gavin Kilgore MD   escitalopram (LEXAPRO) 10 MG tablet TAKE 1 TABLET BY MOUTH ONCE DAILY 18   Gavin Kilgore MD   ondansetron [facility]) 4 MG tablet Take 1 tablet by mouth daily as needed for Nausea or Vomiting 10/3/18   Gavin Kilgore MD   zoster recombinant adjuvanted vaccine [facility]) 50 MCG SUSR injection 50 MCG IM then repeat 2-6 months.  10/3/18 10/3/19  Gavin Kilgore MD   esomeprazole Magnesium (NEXIUM) 20 MG PACK Take 20 mg by mouth daily    Historical Provider, MD   amitriptyline (ELAVIL) 10 MG tablet Take 10 mg by mouth nightly    Historical Provider, MD   Insulin Pen Needle (B-D UF III MINI PEN NEEDLES) 31G X 5 MM MISC Inject 1 each as directed daily 7/18/18   Kvng Fine MD   aspirin EC 81 MG EC tablet Take 1 tablet by mouth daily 2/19/18   Kvng Fine MD   simvastatin (ZOCOR) 20 MG tablet Take 1 tablet by mouth nightly 2/19/18   Kvng Fine MD   glimepiride (AMARYL) 2 MG tablet TAKE 1 TABLET BY MOUTH DAILY IN THE MORNING (BEFORE BREAKFAST) 1/10/18   Howard Feldman MD   Diabetic Shoe MISC by Does not apply route With insert Dx E11.9 12/11/17   Sindy Hardy DO   carisoprodol (SOMA) 350 MG tablet Take 350 mg by mouth 2 times daily  1/19/16   Historical Provider, MD       Current medications:    Current Facility-Administered Medications   Medication Dose Route Frequency Provider Last Rate Last Dose    lactated ringers infusion   Intravenous Continuous Baldemar Malloy APRN - CRNA        lidocaine PF 1 % injection 1 mL  1 mL Intradermal Once PRN Baldemar Malloy APRN - CRNA           Allergies:     Allergies   Allergen Reactions    Codeine Shortness Of Breath    Morphine Shortness Of Breath    Sulfa Antibiotics        Problem List:    Patient Active Problem List   Diagnosis Code    Chest discomfort R07.89    Diabetes mellitus ([facility] [address] E11.9    Hypercholesteremia E78.00    Diabetic neuropathy (HCC) E11.40    Loss of balance R26.89    Gait instability R26.81    Bilateral carpal tunnel syndrome G56.03    Sandoval sign present R29.2    Numbness and tingling R20.0, R20.2    Cervical cord compression with myelopathy (HCC) G95.20    S/P cervical spinal fusion Z98.1       Past Medical History:        Diagnosis Date    Anxiety     Cervical disc disease     Chest discomfort 10/17/2014    H/o negative stress tests AUC indication 15, AUC score 4, [facility] 2012;[id number] 10/17/2014  Cath  Mild cad,normal LVFX      Diabetes mellitus ([facility] [address] 10/17/2014    Diabetic neuropathy ([facility]     Hyperlipidemia     Hypertension     Insomnia     Insomnia     Osteoarthritis     Restless leg syndrome        Past Surgical History:        Procedure Laterality Date    CARDIAC CATHETERIZATION  10/17/14  JDT    EF 50%    CHOLECYSTECTOMY      FOOT SURGERY      HYSTERECTOMY      KNEE SURGERY      NECK SURGERY         Social History:    Social History     Tobacco Use    Smoking status: Former Smoker     Packs/day: 1.00     Years: 30.00     Pack years: 30.00     Last attempt to quit: 3/1/1991     Years since quittin.1    Smokeless tobacco: Never Used   Substance Use Topics    Alcohol use: No                                Counseling given: Not Answered      Vital Signs (Current):   Vitals:    19 0719   BP: 111/74   Pulse: 113   Resp: 16   SpO2: 97%   Weight: 151 lb (68.5 kg)   Height: 5' 5\" (1.651 m)                                              BP Readings from Last 3 Encounters:   19 111/74   19 132/86   19 (!) 178/88       NPO Status: Time of last liquid consumption:                         Time of last solid consumption:                         Date of last liquid consumption: 19                        Date of last solid food consumption: 19    BMI:   Wt Readings from Last 3 Encounters:   19 151 lb (68.5 kg)   19 151 lb 4.8 oz (68.6 kg)   19 153 lb (69.4 kg)     Body mass index is 25.13 kg/m².     CBC:   Lab Results   Component Value Date    WBC 6.0 2019    RBC 4.15 2019    HGB 11.2 2019    HCT 36.6 2019    MCV 88.2 2019    RDW 14.0 2019     2019       CMP:   Lab Results   Component Value Date     2019    K 4.7 2019    CL 96 2019    CO2 28 2019    BUN 9 2019    CREATININE 0.7 2019    LABGLOM >60 2019    GLUCOSE 215 2019 PROT 7.2 06/29/2018    CALCIUM 9.9 05/01/2019    BILITOT 0.3 06/29/2018    ALKPHOS 87 06/29/2018    AST 19 06/29/2018    ALT 14 06/29/2018       POC Tests: No results for input(s): POCGLU, POCNA, POCK, POCCL, POCBUN, POCHEMO, POCHCT in the last 72 hours. Coags: No results found for: PROTIME, INR, APTT    HCG (If Applicable): No results found for: PREGTESTUR, PREGSERUM, HCG, HCGQUANT     ABGs: No results found for: PHART, PO2ART, KKR2OSO, GXC5ITB, BEART, H1FPXHBH     Type & Screen (If Applicable):  No results found for: [facility]    Anesthesia Evaluation  Patient summary reviewed and Nursing notes reviewed  Airway: Mallampati: II  TM distance: >3 FB   Neck ROM: full  Mouth opening: > = 3 FB Dental: normal exam         Pulmonary:Negative Pulmonary ROS and normal exam                               Cardiovascular:    (+) hypertension:,       ECG reviewed               Beta Blocker:  Not on Beta Blocker         Neuro/Psych:   (+) neuromuscular disease:,             GI/Hepatic/Renal: Neg GI/Hepatic/Renal ROS            Endo/Other:    (+) Diabetes, . Abdominal:           Vascular: negative vascular ROS. Anesthesia Plan      general     ASA 2       Induction: intravenous. MIPS: Postoperative opioids intended and Prophylactic antiemetics administered. Anesthetic plan and risks discussed with patient. Plan discussed with CRNA.                   FLOR Winter - CRNA   5/3/2019

## 2019-05-03 NOTE — PROCEDURES
[address]  . [place] , [address]                               [phone]                                 PROCEDURE NOTE    PATIENT NAME: Gal Gipson                      :             1955  MED REC NO:   [id number]                               LOCATION:        [facility]  ACCOUNT NO:   [de-identified]                            ADMISSION DATE:  2019  PROVIDER:     Megan Sierra MD    PREOPERATIVE DIAGNOSIS:  Post open reduction and internal fixation,  right ankle. POSTOPERATIVE DIAGNOSIS:  Post open reduction and internal fixation,  right ankle. PROCEDURE:  Removal of hardware, right ankle. INDICATION AND FINDINGS:  This patient is post internal fixation of a  bimalleolar fracture of the right ankle. She is having some discomfort  over the hardware and it was felt that removal was indicated. The  patient understands the risk and benefits of the procedure and asked me  to proceed. OPERATIVE PROCEDURE:  After an adequate level of general anesthesia, the  patient's right lower extremity was prepped and draped in the usual  fashion. The extremity was then exsanguinated with an Esmarch bandage  and tourniquet was inflated to 250 mmHg. A lateral incision was then  made and sharp dissection was carried down to the lateral plate. All  screws were removed and the plate was removed without difficulty. The  skin was then closed with nylon suture. Attention was then placed  medially. An incision was then made over the medial malleolus down to  the medial malleolar screw and it was removed and the skin likewise was  closed with nylon suture. Dressing was then applied. The patient  tolerated the procedure well and is to follow up in the office.         Beryle Kirk, MD    D: 2019 10:52:54      T: 2019 11:27:29     [id number]  Job#: [id number] Doc#: [id number]    CC:

## 2019-05-03 NOTE — ANESTHESIA POSTPROCEDURE EVALUATION
Department of Anesthesiology  Postprocedure Note    Patient: Rosibel Drummond  MRN: [id number]  YOB: 1955  Date of evaluation: 5/3/2019  Time:  9:44 AM     Procedure Summary     Date:  05/03/19 Room / Location:  [facility] OR  / [facility] OR    Anesthesia Start:  0913 Anesthesia Stop:      Procedure:  HARDWARE REMOVAL RIGHT ANKLE (Right Arm Lower) Diagnosis:  (H58.5TGL)    Surgeon:  Consuello Buerger, MD Responsible Provider: FLOR Robles CRNA    Anesthesia Type:  general ASA Status:  2          Anesthesia Type: general    Rei Phase I:      Rei Phase II:      Last vitals: Reviewed and per EMR flowsheets.        Anesthesia Post Evaluation    Patient location during evaluation: bedside  Patient participation: complete - patient participated  Level of consciousness: sleepy but conscious  Airway patency: patent  Nausea & Vomiting: no nausea  Complications: no  Cardiovascular status: blood pressure returned to baseline  Respiratory status: room air, spontaneous ventilation and acceptable  Hydration status: euvolemic

## 2019-05-08 ASSESSMENT — ENCOUNTER SYMPTOMS
NAUSEA: 0
WHEEZING: 0
DIARRHEA: 0
COUGH: 0
CHEST TIGHTNESS: 0
ABDOMINAL PAIN: 0
SHORTNESS OF BREATH: 0
VOMITING: 0
CONSTIPATION: 0

## 2019-05-20 ENCOUNTER — TELEPHONE (OUTPATIENT)
Dept: PRIMARY CARE CLINIC | Age: 64
End: 2019-05-20

## 2019-05-20 DIAGNOSIS — E11.42 TYPE 2 DIABETES MELLITUS WITH DIABETIC POLYNEUROPATHY, WITHOUT LONG-TERM CURRENT USE OF INSULIN (HCC): Primary | ICD-10-CM

## 2019-05-20 NOTE — TELEPHONE ENCOUNTER
----- Message from Janae Asif MD sent at 5/17/2019  6:45 AM CDT -----  [employer] message sent to patient about results and plan. Can we also please call the patient to let them know? Thank you! Diabetes remained stable but is slowly creeping up. Let's go ahead and increase her to 1000 mg metformin twice a day.   It does not need to be the extended release

## 2019-06-07 RX ORDER — GLIMEPIRIDE 2 MG/1
TABLET ORAL
Qty: 30 TABLET | Refills: 0 | Status: SHIPPED | OUTPATIENT
Start: 2019-06-07 | End: 2019-07-08 | Stop reason: SDUPTHER

## 2019-07-08 RX ORDER — ROPINIROLE 0.5 MG/1
TABLET, FILM COATED ORAL
Qty: 60 TABLET | Refills: 0 | Status: SHIPPED | OUTPATIENT
Start: 2019-07-08 | End: 2019-08-12 | Stop reason: SDUPTHER

## 2019-07-08 RX ORDER — GLIMEPIRIDE 2 MG/1
TABLET ORAL
Qty: 30 TABLET | Refills: 0 | Status: SHIPPED | OUTPATIENT
Start: 2019-07-08 | End: 2019-08-12 | Stop reason: SDUPTHER

## 2019-07-15 RX ORDER — CELECOXIB 200 MG/1
CAPSULE ORAL
Qty: 30 CAPSULE | Refills: 0 | Status: SHIPPED | OUTPATIENT
Start: 2019-07-15 | End: 2019-08-26 | Stop reason: SDUPTHER

## 2019-07-29 RX ORDER — ONDANSETRON 4 MG/1
4 TABLET, ORALLY DISINTEGRATING ORAL EVERY 8 HOURS PRN
Qty: 10 TABLET | Refills: 0 | Status: SHIPPED | OUTPATIENT
Start: 2019-07-29 | End: 2019-11-19 | Stop reason: SDUPTHER

## 2019-08-12 DIAGNOSIS — F41.9 ANXIETY: ICD-10-CM

## 2019-08-12 DIAGNOSIS — G47.00 INSOMNIA, UNSPECIFIED TYPE: ICD-10-CM

## 2019-08-12 RX ORDER — GLIMEPIRIDE 2 MG/1
TABLET ORAL
Qty: 30 TABLET | Refills: 3 | Status: SHIPPED | OUTPATIENT
Start: 2019-08-12 | End: 2019-12-19

## 2019-08-12 RX ORDER — AMITRIPTYLINE HYDROCHLORIDE 10 MG/1
10 TABLET, FILM COATED ORAL NIGHTLY
Qty: 30 TABLET | Refills: 2 | Status: SHIPPED | OUTPATIENT
Start: 2019-08-12 | End: 2019-11-14 | Stop reason: SDUPTHER

## 2019-08-12 RX ORDER — ZOLPIDEM TARTRATE 10 MG/1
TABLET ORAL
Qty: 30 TABLET | Refills: 5 | OUTPATIENT
Start: 2019-08-12 | End: 2019-09-11

## 2019-08-12 RX ORDER — ROPINIROLE 0.5 MG/1
TABLET, FILM COATED ORAL
Qty: 60 TABLET | Refills: 0 | Status: SHIPPED | OUTPATIENT
Start: 2019-08-12 | End: 2019-09-12 | Stop reason: SDUPTHER

## 2019-08-12 RX ORDER — CLONAZEPAM 0.5 MG/1
TABLET ORAL
Qty: 60 TABLET | Refills: 5 | OUTPATIENT
Start: 2019-08-12 | End: 2020-03-27

## 2019-08-26 RX ORDER — CELECOXIB 200 MG/1
CAPSULE ORAL
Qty: 30 CAPSULE | Refills: 0 | Status: SHIPPED | OUTPATIENT
Start: 2019-08-26 | End: 2019-10-14 | Stop reason: SDUPTHER

## 2019-09-16 ENCOUNTER — OFFICE VISIT (OUTPATIENT)
Dept: PRIMARY CARE CLINIC | Age: 64
End: 2019-09-16
Payer: MEDICARE

## 2019-09-16 VITALS
RESPIRATION RATE: 20 BRPM | OXYGEN SATURATION: 98 % | SYSTOLIC BLOOD PRESSURE: 110 MMHG | WEIGHT: 160 LBS | HEART RATE: 91 BPM | DIASTOLIC BLOOD PRESSURE: 86 MMHG | BODY MASS INDEX: 26.66 KG/M2 | HEIGHT: 65 IN

## 2019-09-16 DIAGNOSIS — Z13.220 SCREENING FOR HYPERLIPIDEMIA: ICD-10-CM

## 2019-09-16 DIAGNOSIS — E11.43 DIABETIC GASTROPARALYSIS (HCC): ICD-10-CM

## 2019-09-16 DIAGNOSIS — Z12.31 ENCOUNTER FOR SCREENING MAMMOGRAM FOR BREAST CANCER: ICD-10-CM

## 2019-09-16 DIAGNOSIS — K31.84 DIABETIC GASTROPARALYSIS (HCC): ICD-10-CM

## 2019-09-16 DIAGNOSIS — E11.42 DIABETIC POLYNEUROPATHY ASSOCIATED WITH TYPE 2 DIABETES MELLITUS (HCC): Primary | ICD-10-CM

## 2019-09-16 DIAGNOSIS — Z78.0 POST-MENOPAUSAL: ICD-10-CM

## 2019-09-16 PROCEDURE — 4040F PNEUMOC VAC/ADMIN/RCVD: CPT | Performed by: FAMILY MEDICINE

## 2019-09-16 PROCEDURE — 3017F COLORECTAL CA SCREEN DOC REV: CPT | Performed by: FAMILY MEDICINE

## 2019-09-16 PROCEDURE — 1036F TOBACCO NON-USER: CPT | Performed by: FAMILY MEDICINE

## 2019-09-16 PROCEDURE — G8400 PT W/DXA NO RESULTS DOC: HCPCS | Performed by: FAMILY MEDICINE

## 2019-09-16 PROCEDURE — G0008 ADMIN INFLUENZA VIRUS VAC: HCPCS | Performed by: FAMILY MEDICINE

## 2019-09-16 PROCEDURE — 99214 OFFICE O/P EST MOD 30 MIN: CPT | Performed by: FAMILY MEDICINE

## 2019-09-16 PROCEDURE — 90686 IIV4 VACC NO PRSV 0.5 ML IM: CPT | Performed by: FAMILY MEDICINE

## 2019-09-16 PROCEDURE — 1090F PRES/ABSN URINE INCON ASSESS: CPT | Performed by: FAMILY MEDICINE

## 2019-09-16 PROCEDURE — 1123F ACP DISCUSS/DSCN MKR DOCD: CPT | Performed by: FAMILY MEDICINE

## 2019-09-16 PROCEDURE — 3045F PR MOST RECENT HEMOGLOBIN A1C LEVEL 7.0-9.0%: CPT | Performed by: FAMILY MEDICINE

## 2019-09-16 PROCEDURE — G8427 DOCREV CUR MEDS BY ELIG CLIN: HCPCS | Performed by: FAMILY MEDICINE

## 2019-09-16 PROCEDURE — G8419 CALC BMI OUT NRM PARAM NOF/U: HCPCS | Performed by: FAMILY MEDICINE

## 2019-09-16 PROCEDURE — 2022F DILAT RTA XM EVC RTNOPTHY: CPT | Performed by: FAMILY MEDICINE

## 2019-09-16 RX ORDER — METOCLOPRAMIDE 5 MG/1
5 TABLET ORAL 4 TIMES DAILY
Qty: 120 TABLET | Refills: 1 | Status: SHIPPED | OUTPATIENT
Start: 2019-09-16 | End: 2020-01-03

## 2019-09-16 RX ORDER — GABAPENTIN 300 MG/1
300 CAPSULE ORAL 3 TIMES DAILY
Qty: 90 CAPSULE | Refills: 5 | Status: SHIPPED | OUTPATIENT
Start: 2019-09-16 | End: 2021-02-15

## 2019-09-16 ASSESSMENT — ENCOUNTER SYMPTOMS
BACK PAIN: 1
DIARRHEA: 0
CHEST TIGHTNESS: 0
CONSTIPATION: 0
COUGH: 0
SHORTNESS OF BREATH: 0
VOMITING: 0
NAUSEA: 1
WHEEZING: 0
ABDOMINAL PAIN: 0

## 2019-09-16 NOTE — PATIENT INSTRUCTIONS
Information box to learn more about \"Gastroparesis: Care Instructions. \"     If you do not have an account, please click on the \"Sign Up Now\" link. Current as of: November 7, 2018  Content Version: 12.1  © 4606-1406 [employer]. Care instructions adapted under license by [facility] ([facility]). If you have questions about a medical condition or this instruction, always ask your healthcare professional. [address] any warranty or liability for your use of this information.

## 2019-09-18 DIAGNOSIS — E11.42 DIABETIC POLYNEUROPATHY ASSOCIATED WITH TYPE 2 DIABETES MELLITUS (HCC): ICD-10-CM

## 2019-09-18 DIAGNOSIS — E11.9 TYPE 2 DIABETES MELLITUS WITHOUT COMPLICATION, WITHOUT LONG-TERM CURRENT USE OF INSULIN (HCC): ICD-10-CM

## 2019-09-18 DIAGNOSIS — Z13.220 SCREENING FOR HYPERLIPIDEMIA: ICD-10-CM

## 2019-09-18 LAB
CHOLESTEROL, FASTING: 279 MG/DL (ref 160–199)
HBA1C MFR BLD: 7.2 % (ref 4–6)
HDLC SERPL-MCNC: 69 MG/DL (ref 65–121)
LDL CHOLESTEROL CALCULATED: 172 MG/DL
TRIGLYCERIDE, FASTING: 190 MG/DL (ref 0–149)

## 2019-09-23 ENCOUNTER — TELEPHONE (OUTPATIENT)
Dept: PRIMARY CARE CLINIC | Age: 64
End: 2019-09-23

## 2019-09-23 RX ORDER — SIMVASTATIN 40 MG
40 TABLET ORAL NIGHTLY
Qty: 30 TABLET | Refills: 5 | Status: SHIPPED | OUTPATIENT
Start: 2019-09-23 | End: 2021-02-15 | Stop reason: SDUPTHER

## 2019-10-14 RX ORDER — CELECOXIB 200 MG/1
CAPSULE ORAL
Qty: 30 CAPSULE | Refills: 0 | Status: SHIPPED | OUTPATIENT
Start: 2019-10-14 | End: 2019-11-14 | Stop reason: SDUPTHER

## 2019-10-14 RX ORDER — ESCITALOPRAM OXALATE 10 MG/1
TABLET ORAL
Qty: 30 TABLET | Refills: 0 | Status: SHIPPED | OUTPATIENT
Start: 2019-10-14 | End: 2019-11-14 | Stop reason: SDUPTHER

## 2019-10-14 RX ORDER — ROPINIROLE 0.5 MG/1
TABLET, FILM COATED ORAL
Qty: 60 TABLET | Refills: 0 | Status: SHIPPED | OUTPATIENT
Start: 2019-10-14 | End: 2019-11-14 | Stop reason: SDUPTHER

## 2019-11-14 RX ORDER — ROPINIROLE 0.5 MG/1
TABLET, FILM COATED ORAL
Qty: 60 TABLET | Refills: 0 | Status: SHIPPED | OUTPATIENT
Start: 2019-11-14 | End: 2019-12-19

## 2019-11-14 RX ORDER — ESCITALOPRAM OXALATE 10 MG/1
TABLET ORAL
Qty: 30 TABLET | Refills: 0 | Status: SHIPPED | OUTPATIENT
Start: 2019-11-14 | End: 2019-12-19

## 2019-11-14 RX ORDER — AMITRIPTYLINE HYDROCHLORIDE 10 MG/1
TABLET, FILM COATED ORAL
Qty: 30 TABLET | Refills: 0 | Status: SHIPPED | OUTPATIENT
Start: 2019-11-14 | End: 2019-12-23

## 2019-11-14 RX ORDER — CELECOXIB 200 MG/1
CAPSULE ORAL
Qty: 30 CAPSULE | Refills: 0 | Status: SHIPPED | OUTPATIENT
Start: 2019-11-14 | End: 2019-12-19

## 2019-11-19 ENCOUNTER — TELEPHONE (OUTPATIENT)
Dept: PRIMARY CARE CLINIC | Age: 64
End: 2019-11-19

## 2019-11-19 RX ORDER — ONDANSETRON 4 MG/1
TABLET, FILM COATED ORAL
Qty: 30 TABLET | Refills: 0 | Status: SHIPPED | OUTPATIENT
Start: 2019-11-19 | End: 2020-01-03

## 2020-01-03 ENCOUNTER — OFFICE VISIT (OUTPATIENT)
Dept: PRIMARY CARE CLINIC | Age: 65
End: 2020-01-03
Payer: MEDICARE

## 2020-01-03 VITALS
BODY MASS INDEX: 26.63 KG/M2 | SYSTOLIC BLOOD PRESSURE: 144 MMHG | DIASTOLIC BLOOD PRESSURE: 80 MMHG | HEART RATE: 72 BPM | TEMPERATURE: 97.2 F | HEIGHT: 65 IN | OXYGEN SATURATION: 98 %

## 2020-01-03 PROCEDURE — G8482 FLU IMMUNIZE ORDER/ADMIN: HCPCS | Performed by: NURSE PRACTITIONER

## 2020-01-03 PROCEDURE — 96372 THER/PROPH/DIAG INJ SC/IM: CPT | Performed by: NURSE PRACTITIONER

## 2020-01-03 PROCEDURE — 1090F PRES/ABSN URINE INCON ASSESS: CPT | Performed by: NURSE PRACTITIONER

## 2020-01-03 PROCEDURE — G8427 DOCREV CUR MEDS BY ELIG CLIN: HCPCS | Performed by: NURSE PRACTITIONER

## 2020-01-03 PROCEDURE — 1036F TOBACCO NON-USER: CPT | Performed by: NURSE PRACTITIONER

## 2020-01-03 PROCEDURE — 1123F ACP DISCUSS/DSCN MKR DOCD: CPT | Performed by: NURSE PRACTITIONER

## 2020-01-03 PROCEDURE — G8417 CALC BMI ABV UP PARAM F/U: HCPCS | Performed by: NURSE PRACTITIONER

## 2020-01-03 PROCEDURE — 99214 OFFICE O/P EST MOD 30 MIN: CPT | Performed by: NURSE PRACTITIONER

## 2020-01-03 PROCEDURE — 3017F COLORECTAL CA SCREEN DOC REV: CPT | Performed by: NURSE PRACTITIONER

## 2020-01-03 PROCEDURE — 4040F PNEUMOC VAC/ADMIN/RCVD: CPT | Performed by: NURSE PRACTITIONER

## 2020-01-03 PROCEDURE — G8400 PT W/DXA NO RESULTS DOC: HCPCS | Performed by: NURSE PRACTITIONER

## 2020-01-03 RX ORDER — METHYLPREDNISOLONE ACETATE 80 MG/ML
80 INJECTION, SUSPENSION INTRA-ARTICULAR; INTRALESIONAL; INTRAMUSCULAR; SOFT TISSUE ONCE
Status: COMPLETED | OUTPATIENT
Start: 2020-01-03 | End: 2020-01-03

## 2020-01-03 RX ORDER — AZITHROMYCIN 250 MG/1
250 TABLET, FILM COATED ORAL SEE ADMIN INSTRUCTIONS
Qty: 6 TABLET | Refills: 0 | Status: SHIPPED | OUTPATIENT
Start: 2020-01-03 | End: 2020-01-08

## 2020-01-03 RX ORDER — ALPRAZOLAM 0.5 MG/1
0.5 TABLET ORAL 3 TIMES DAILY PRN
Qty: 10 TABLET | Refills: 0 | Status: SHIPPED | OUTPATIENT
Start: 2020-01-03 | End: 2020-02-02

## 2020-01-03 RX ORDER — ZOLPIDEM TARTRATE 10 MG/1
TABLET ORAL NIGHTLY PRN
COMMUNITY
End: 2020-02-19

## 2020-01-03 RX ORDER — TIZANIDINE 4 MG/1
4 TABLET ORAL EVERY 8 HOURS PRN
COMMUNITY

## 2020-01-03 RX ORDER — ESCITALOPRAM OXALATE 20 MG/1
20 TABLET ORAL DAILY
Qty: 30 TABLET | Refills: 5 | Status: SHIPPED | OUTPATIENT
Start: 2020-01-03 | End: 2021-02-15

## 2020-01-03 RX ADMIN — METHYLPREDNISOLONE ACETATE 80 MG: 80 INJECTION, SUSPENSION INTRA-ARTICULAR; INTRALESIONAL; INTRAMUSCULAR; SOFT TISSUE at 12:34

## 2020-01-03 ASSESSMENT — ENCOUNTER SYMPTOMS
COUGH: 1
VOMITING: 0
RHINORRHEA: 0
BACK PAIN: 0
NAUSEA: 0
VOICE CHANGE: 0
PHOTOPHOBIA: 0
SHORTNESS OF BREATH: 0
COLOR CHANGE: 0

## 2020-01-03 NOTE — PROGRESS NOTES
After obtaining consent, and per orders of FLOR Cruz, an injection of methylPREDNISolone acetate (DEPO-MEDROL) injection 80 mg  was given in Right upper quad. gluteus by Paul Live. Patient tolerated well with no immediate adverse reaction. Patient was advised to remain in the exam room for 20 minutes and report any adverse reactions to me immediately.

## 2020-01-03 NOTE — PATIENT INSTRUCTIONS
1. Steroid injection today. 2. Begin zpak as directed. 3. Increase Lexapro to 20 mg daily. 4. May use xanax very sparingly as needed for panic attack. 5. Follow-up in one month for medication check.

## 2020-01-03 NOTE — PROGRESS NOTES
CARDIAC CATHETERIZATION  10/17/14  JDT    EF 50%    CHOLECYSTECTOMY      FOOT SURGERY      HARDWARE REMOVAL Right 5/3/2019    HARDWARE REMOVAL RIGHT ANKLE performed by Iain Hardin MD at [facility] NECK SURGERY         Vitals 1/3/2020 2019 5/3/2019 5/3/2019 5/3/2019 7033   SYSTOLIC 848 086 - - - 511   DIASTOLIC 80 86 - - - 64   Site Left Upper Arm - - - - -   Position Sitting - - - - -   Pulse 72 91 - - - -   Temp 97.2 - - - - -   Resp - 20 6 8 7 8   SpO2 98 98 99 98 98 97   Weight - 160 lb - - - -   Height 5' 5\" 5' 5\" - - - -   BMI (wt*703/ht~2) - 26.62 kg/m2 - - - -   Some recent data might be hidden       Family History   Problem Relation Age of Onset    Heart Disease Mother     Cancer Father     Heart Disease Sister        Social History     Tobacco Use    Smoking status: Former Smoker     Packs/day: 1.00     Years: 30.00     Pack years: 30.00     Last attempt to quit: 3/1/1991     Years since quittin.8    Smokeless tobacco: Never Used   Substance Use Topics    Alcohol use: No      Current Outpatient Medications   Medication Sig Dispense Refill    tiZANidine (ZANAFLEX) 4 MG tablet Take 4 mg by mouth every 8 hours as needed      zolpidem (AMBIEN) 10 MG tablet Take by mouth nightly as needed for Sleep.  azithromycin (ZITHROMAX) 250 MG tablet Take 1 tablet by mouth See Admin Instructions for 5 days 500mg on day 1 followed by 250mg on days 2 - 5 6 tablet 0    ALPRAZolam (XANAX) 0.5 MG tablet Take 1 tablet by mouth 3 times daily as needed for Sleep or Anxiety for up to 30 days. 10 tablet 0    escitalopram (LEXAPRO) 20 MG tablet Take 1 tablet by mouth daily 30 tablet 5    amitriptyline (ELAVIL) 10 MG tablet TAKE 1 TABLET BY MOUTH AT BEDTIME 30 tablet 3    rOPINIRole (REQUIP) 0.5 MG tablet TAKE (1) TABLET BY MOUTH TWICE A DAY.  60 tablet 2    celecoxib (CELEBREX) 200 MG capsule TAKE 1 CAPSULE BY MOUTH DAILY IN THE MORNING 30 capsule 2    motion. Skin:     General: Skin is warm and dry. Neurological:      Mental Status: She is alert and oriented to person, place, and time. Psychiatric:         Behavior: Behavior normal.       BP (!) 144/80 (Site: Left Upper Arm, Position: Sitting)   Pulse 72   Temp 97.2 °F (36.2 °C)   Ht 5' 5\" (1.651 m)   LMP 03/01/1976 (LMP Unknown)   SpO2 98%   BMI 26.63 kg/m²     Assessment:       Diagnosis Orders   1. Acute upper respiratory infection     2. Anxiety  ALPRAZolam (XANAX) 0.5 MG tablet       Plan:     1. Steroid injection today. 2. Begin zpak as directed. 3. Increase Lexapro to 20 mg daily. 4. May use xanax very sparingly as needed for panic attack. 5. Follow-up in one month for medication check. Patient given educational materials -see patient instructions. Discussed use, benefit, and side effects of prescribed medications. All patient questions answered. Pt voiced understanding. Reviewed health maintenance. Instructed to continue currentmedications, diet and exercise. Patient agreed with treatment plan. Follow up as directed. MEDICATIONS:  Orders Placed This Encounter   Medications    methylPREDNISolone acetate (DEPO-MEDROL) injection 80 mg    azithromycin (ZITHROMAX) 250 MG tablet     Sig: Take 1 tablet by mouth See Admin Instructions for 5 days 500mg on day 1 followed by 250mg on days 2 - 5     Dispense:  6 tablet     Refill:  0    ALPRAZolam (XANAX) 0.5 MG tablet     Sig: Take 1 tablet by mouth 3 times daily as needed for Sleep or Anxiety for up to 30 days. Dispense:  10 tablet     Refill:  0    escitalopram (LEXAPRO) 20 MG tablet     Sig: Take 1 tablet by mouth daily     Dispense:  30 tablet     Refill:  5         ORDERS:  No orders of the defined types were placed in this encounter. Follow-up:  Return in about 1 month (around 2/3/2020) for anxiety/depression check. PATIENT INSTRUCTIONS:  Patient Instructions   1. Steroid injection today.   2. Begin zpak as directed. 3. Increase Lexapro to 20 mg daily. 4. May use xanax very sparingly as needed for panic attack. 5. Follow-up in one month for medication check. Electronically signed by LFOR Bruce on 1/3/2020 at 2:33 PM    EMR Dragon/transcription disclaimer:  Much of thisencounter note is electronic transcription/translation of spoken language to printed texts. The electronic translation of spoken language may be erroneous, or at times, nonsensical words or phrases may be inadvertentlytranscribed.   Although I have reviewed the note for such errors, some may still exist.

## 2020-01-30 RX ORDER — ONDANSETRON 4 MG/1
TABLET, FILM COATED ORAL
Qty: 30 TABLET | Refills: 0 | Status: SHIPPED | OUTPATIENT
Start: 2020-01-30 | End: 2020-09-18 | Stop reason: SDUPTHER

## 2020-02-03 ENCOUNTER — OFFICE VISIT (OUTPATIENT)
Dept: PRIMARY CARE CLINIC | Age: 65
End: 2020-02-03
Payer: MEDICARE

## 2020-02-03 VITALS
HEIGHT: 65 IN | TEMPERATURE: 97.4 F | WEIGHT: 159 LBS | BODY MASS INDEX: 26.49 KG/M2 | DIASTOLIC BLOOD PRESSURE: 60 MMHG | OXYGEN SATURATION: 97 % | HEART RATE: 61 BPM | SYSTOLIC BLOOD PRESSURE: 118 MMHG

## 2020-02-03 DIAGNOSIS — R52 GENERALIZED BODY ACHES: ICD-10-CM

## 2020-02-03 LAB
ALBUMIN SERPL-MCNC: 4.8 G/DL (ref 3.5–5.2)
ALP BLD-CCNC: 139 U/L (ref 35–104)
ALT SERPL-CCNC: 11 U/L (ref 5–33)
ANION GAP SERPL CALCULATED.3IONS-SCNC: 21 MMOL/L (ref 7–19)
AST SERPL-CCNC: 13 U/L (ref 5–32)
BASOPHILS ABSOLUTE: 0.1 K/UL (ref 0–0.2)
BASOPHILS RELATIVE PERCENT: 0.8 % (ref 0–1)
BILIRUB SERPL-MCNC: 0.4 MG/DL (ref 0.2–1.2)
BUN BLDV-MCNC: 14 MG/DL (ref 8–23)
CALCIUM SERPL-MCNC: 10 MG/DL (ref 8.8–10.2)
CHLORIDE BLD-SCNC: 93 MMOL/L (ref 98–111)
CO2: 22 MMOL/L (ref 22–29)
CREAT SERPL-MCNC: 1.2 MG/DL (ref 0.5–0.9)
EOSINOPHILS ABSOLUTE: 0.1 K/UL (ref 0–0.6)
EOSINOPHILS RELATIVE PERCENT: 0.8 % (ref 0–5)
GFR NON-AFRICAN AMERICAN: 45
GLUCOSE BLD-MCNC: 327 MG/DL (ref 74–109)
HBA1C MFR BLD: 9.4 % (ref 4–6)
HCT VFR BLD CALC: 43.7 % (ref 37–47)
HEMOGLOBIN: 13.3 G/DL (ref 12–16)
IMMATURE GRANULOCYTES #: 0.1 K/UL
INFLUENZA A ANTIBODY: NORMAL
INFLUENZA B ANTIBODY: NORMAL
LYMPHOCYTES ABSOLUTE: 2.6 K/UL (ref 1.1–4.5)
LYMPHOCYTES RELATIVE PERCENT: 18.4 % (ref 20–40)
MCH RBC QN AUTO: 26.8 PG (ref 27–31)
MCHC RBC AUTO-ENTMCNC: 30.4 G/DL (ref 33–37)
MCV RBC AUTO: 87.9 FL (ref 81–99)
MONOCYTES ABSOLUTE: 1.5 K/UL (ref 0–0.9)
MONOCYTES RELATIVE PERCENT: 10.3 % (ref 0–10)
NEUTROPHILS ABSOLUTE: 9.9 K/UL (ref 1.5–7.5)
NEUTROPHILS RELATIVE PERCENT: 69.3 % (ref 50–65)
PDW BLD-RTO: 14.7 % (ref 11.5–14.5)
PLATELET # BLD: 360 K/UL (ref 130–400)
PMV BLD AUTO: 10.2 FL (ref 9.4–12.3)
POTASSIUM SERPL-SCNC: 4.5 MMOL/L (ref 3.5–5)
RBC # BLD: 4.97 M/UL (ref 4.2–5.4)
SODIUM BLD-SCNC: 136 MMOL/L (ref 136–145)
TOTAL PROTEIN: 8.4 G/DL (ref 6.6–8.7)
TSH SERPL DL<=0.05 MIU/L-ACNC: 1.54 UIU/ML (ref 0.27–4.2)
VITAMIN B-12: 259 PG/ML (ref 211–946)
WBC # BLD: 14.2 K/UL (ref 4.8–10.8)

## 2020-02-03 PROCEDURE — 4040F PNEUMOC VAC/ADMIN/RCVD: CPT | Performed by: NURSE PRACTITIONER

## 2020-02-03 PROCEDURE — 3017F COLORECTAL CA SCREEN DOC REV: CPT | Performed by: NURSE PRACTITIONER

## 2020-02-03 PROCEDURE — G8427 DOCREV CUR MEDS BY ELIG CLIN: HCPCS | Performed by: NURSE PRACTITIONER

## 2020-02-03 PROCEDURE — G8400 PT W/DXA NO RESULTS DOC: HCPCS | Performed by: NURSE PRACTITIONER

## 2020-02-03 PROCEDURE — 1123F ACP DISCUSS/DSCN MKR DOCD: CPT | Performed by: NURSE PRACTITIONER

## 2020-02-03 PROCEDURE — 1090F PRES/ABSN URINE INCON ASSESS: CPT | Performed by: NURSE PRACTITIONER

## 2020-02-03 PROCEDURE — 1036F TOBACCO NON-USER: CPT | Performed by: NURSE PRACTITIONER

## 2020-02-03 PROCEDURE — 87804 INFLUENZA ASSAY W/OPTIC: CPT | Performed by: NURSE PRACTITIONER

## 2020-02-03 PROCEDURE — 99214 OFFICE O/P EST MOD 30 MIN: CPT | Performed by: NURSE PRACTITIONER

## 2020-02-03 PROCEDURE — G8482 FLU IMMUNIZE ORDER/ADMIN: HCPCS | Performed by: NURSE PRACTITIONER

## 2020-02-03 PROCEDURE — G8417 CALC BMI ABV UP PARAM F/U: HCPCS | Performed by: NURSE PRACTITIONER

## 2020-02-03 ASSESSMENT — ENCOUNTER SYMPTOMS
COLOR CHANGE: 0
VOICE CHANGE: 0
NAUSEA: 0
RHINORRHEA: 0
SHORTNESS OF BREATH: 0
VOMITING: 0
COUGH: 0
PHOTOPHOBIA: 0
BACK PAIN: 0

## 2020-02-03 NOTE — PROGRESS NOTES
[facility]  [address] [address]  [address] [address]  Dept: [phone]  Dept Fax: [phone]  Loc: [phone]    Veronica Larkin is a 71 y.o. female who presents today for her medical conditions/complaints as noted below. Veronica Larkin is c/o of Anxiety (1 month follow up after starting Lexapro, not noticing a difference yet) and Shortness of Breath (Breathing issues continue, not feeling)        HPI:     HPI   Chief Complaint   Patient presents with    Anxiety     1 month follow up after starting Lexapro, not noticing a difference yet    Shortness of Breath     Breathing issues continue, not feeling     Patient presents today for follow-up anxiety; she was increased to 20 mg Lexapro one month ago. Her  passed away in November of last year. She was also given small amount of xanax to take as needed. Patient has klonopin she takes regularly as well as percocet, gabapentin, tizanidine. Patient also complains of fatigue, dizziness, shortness of breath. She states her symptoms have worsened over the last 2-3 days. She has become diaphoretic multiple times. She states blood sugar has been in normal range as well as blood pressure. She states she had 2 separate events where she was \"seeing double or triple and had to lie down\". I questioned her use of her medication; she states she is taking this all appropriately. She no longer has any xanax and states she did not ever take with her klonopin. She denies fever or cough.      Past Medical History:   Diagnosis Date    Anxiety     Cervical disc disease     Chest discomfort 10/17/2014    H/o negative stress tests AUC indication 15, AUC score 4, [facility] 2012;[id number] 10/17/2014  Cath  Mild cad,normal LVFX      Diabetes mellitus ([address] 10/17/2014    Diabetic neuropathy (HCC)     Hyperlipidemia     Hypertension     Insomnia     Insomnia     Osteoarthritis     Restless leg syndrome       Past MORNING (BEFORE BREAKFAST) 30 tablet 2    simvastatin (ZOCOR) 40 MG tablet Take 1 tablet by mouth nightly 30 tablet 5    gabapentin (NEURONTIN) 300 MG capsule Take 1 capsule by mouth 3 times daily for 180 days. Intended supply: 30 days 90 capsule 5    clonazePAM (KLONOPIN) 0.5 MG tablet TAKE 1/2 TO 1 TABLET BY MOUTH TWICE DAILY AS NEEDED FOR ANXIETY FOR UP TO 30 DAYS 60 tablet 5    metFORMIN (GLUCOPHAGE) 1000 MG tablet Take 1 tablet by mouth 2 times daily (with meals) 60 tablet 5    colestipol (COLESTID) 1 g tablet TAKE (1) TABLET BY MOUTH TWICE A DAY. 180 tablet 2    lisinopril (PRINIVIL;ZESTRIL) 20 MG tablet Take 1 tablet by mouth daily 90 tablet 3    Insulin Pen Needle (B-D UF III MINI PEN NEEDLES) 31G X 5 MM MISC Inject 1 each as directed daily 100 each 0    aspirin EC 81 MG EC tablet Take 1 tablet by mouth daily 90 tablet 5    Diabetic Shoe MISC by Does not apply route With insert Dx E11.9 1 each 0    oxyCODONE-acetaminophen (PERCOCET)  MG per tablet Take 1 tablet by mouth every 8 hours as needed        No current facility-administered medications for this visit.       Allergies   Allergen Reactions    Codeine Shortness Of Breath    Morphine Shortness Of Breath    Sulfa Antibiotics        Health Maintenance   Topic Date Due    Hepatitis C screen  1955    HIV screen  08/22/1969    Colon cancer screen colonoscopy  05/04/2015    Breast cancer screen  01/31/2019    DEXA (modify frequency per FRAX score)  08/22/2019    Annual Wellness Visit (AWV)  09/01/2019    Diabetic foot exam  10/03/2019    Cervical cancer screen  04/26/2020 (Originally 8/22/1975)    Shingles Vaccine (1 of 2) 04/26/2020 (Originally 8/22/2004)    Diabetic retinal exam  05/05/2020 (Originally 8/22/1964)    Diabetic microalbuminuria test  05/01/2020    Potassium monitoring  05/01/2020    Creatinine monitoring  05/01/2020    A1C test (Diabetic or Prediabetic)  09/18/2020    Lipid screen  09/18/2020    Pneumococcal 65+ years Vaccine (1 of 1 - PPSV23) 03/01/2021    DTaP/Tdap/Td vaccine (2 - Td) 01/03/2023    Flu vaccine  Completed       Subjective:      Review of Systems   Constitutional: Negative for chills and fever. HENT: Negative for ear pain, hearing loss, rhinorrhea and voice change. Eyes: Negative for photophobia and visual disturbance. Respiratory: Negative for cough and shortness of breath. Cardiovascular: Negative for chest pain and palpitations. Gastrointestinal: Negative for nausea and vomiting. Endocrine: Negative. Negative for cold intolerance and heat intolerance. Genitourinary: Negative for difficulty urinating and flank pain. Musculoskeletal: Negative for back pain and neck pain. Skin: Negative for color change and rash. Allergic/Immunologic: Negative for environmental allergies and food allergies. Neurological: Negative for dizziness, speech difficulty and headaches. Hematological: Does not bruise/bleed easily. Psychiatric/Behavioral: Negative for sleep disturbance and suicidal ideas. Objective:     Physical Exam  Vitals signs and nursing note reviewed. Constitutional:       Appearance: She is well-developed. HENT:      Head: Atraumatic. Right Ear: External ear normal.      Left Ear: External ear normal.      Nose: Nose normal.   Eyes:      Conjunctiva/sclera: Conjunctivae normal.      Pupils: Pupils are equal, round, and reactive to light. Neck:      Musculoskeletal: Normal range of motion and neck supple. Cardiovascular:      Rate and Rhythm: Normal rate and regular rhythm. Heart sounds: Normal heart sounds, S1 normal and S2 normal.   Pulmonary:      Effort: Pulmonary effort is normal.      Breath sounds: Normal breath sounds. Abdominal:      General: Bowel sounds are normal.      Palpations: Abdomen is soft. Musculoskeletal: Normal range of motion. Skin:     General: Skin is warm and dry.    Neurological:      Mental Status: She is Much of thisencounter note is electronic transcription/translation of spoken language to printed texts. The electronic translation of spoken language may be erroneous, or at times, nonsensical words or phrases may be inadvertentlytranscribed.   Although I have reviewed the note for such errors, some may still exist.

## 2020-02-11 ENCOUNTER — TELEPHONE (OUTPATIENT)
Dept: PRIMARY CARE CLINIC | Age: 65
End: 2020-02-11

## 2020-02-12 NOTE — TELEPHONE ENCOUNTER
Pt states he BS was 404 and states that when she had the near syncope episode yesterday she fell down because she was not close enough to grab a hold of something, states she got herself to the couch and laid down and immediately feel asleep and slept for several hours, upon waking up she feels loopy for a few minutes and then feels okay. She said with every episode she has she lays down afterwards and falls asleep almost instantly. Pt has appt to see you on 2/14/2020 and was instructed to sx continue or worsen to go to the ED to be seen and VU.

## 2020-02-14 ENCOUNTER — OFFICE VISIT (OUTPATIENT)
Dept: PRIMARY CARE CLINIC | Age: 65
End: 2020-02-14
Payer: MEDICARE

## 2020-02-14 VITALS
TEMPERATURE: 97.8 F | DIASTOLIC BLOOD PRESSURE: 64 MMHG | WEIGHT: 161 LBS | OXYGEN SATURATION: 97 % | HEIGHT: 65 IN | BODY MASS INDEX: 26.82 KG/M2 | SYSTOLIC BLOOD PRESSURE: 118 MMHG | HEART RATE: 93 BPM

## 2020-02-14 PROCEDURE — G8427 DOCREV CUR MEDS BY ELIG CLIN: HCPCS | Performed by: NURSE PRACTITIONER

## 2020-02-14 PROCEDURE — 1090F PRES/ABSN URINE INCON ASSESS: CPT | Performed by: NURSE PRACTITIONER

## 2020-02-14 PROCEDURE — G8417 CALC BMI ABV UP PARAM F/U: HCPCS | Performed by: NURSE PRACTITIONER

## 2020-02-14 PROCEDURE — 96372 THER/PROPH/DIAG INJ SC/IM: CPT | Performed by: NURSE PRACTITIONER

## 2020-02-14 PROCEDURE — 99214 OFFICE O/P EST MOD 30 MIN: CPT | Performed by: NURSE PRACTITIONER

## 2020-02-14 PROCEDURE — 4040F PNEUMOC VAC/ADMIN/RCVD: CPT | Performed by: NURSE PRACTITIONER

## 2020-02-14 PROCEDURE — G8482 FLU IMMUNIZE ORDER/ADMIN: HCPCS | Performed by: NURSE PRACTITIONER

## 2020-02-14 PROCEDURE — 1036F TOBACCO NON-USER: CPT | Performed by: NURSE PRACTITIONER

## 2020-02-14 PROCEDURE — 1123F ACP DISCUSS/DSCN MKR DOCD: CPT | Performed by: NURSE PRACTITIONER

## 2020-02-14 PROCEDURE — G8400 PT W/DXA NO RESULTS DOC: HCPCS | Performed by: NURSE PRACTITIONER

## 2020-02-14 PROCEDURE — 2022F DILAT RTA XM EVC RTNOPTHY: CPT | Performed by: NURSE PRACTITIONER

## 2020-02-14 PROCEDURE — 3017F COLORECTAL CA SCREEN DOC REV: CPT | Performed by: NURSE PRACTITIONER

## 2020-02-14 PROCEDURE — 3046F HEMOGLOBIN A1C LEVEL >9.0%: CPT | Performed by: NURSE PRACTITIONER

## 2020-02-14 RX ORDER — CYANOCOBALAMIN 1000 UG/ML
1000 INJECTION INTRAMUSCULAR; SUBCUTANEOUS ONCE
Status: COMPLETED | OUTPATIENT
Start: 2020-02-14 | End: 2020-02-14

## 2020-02-14 RX ADMIN — CYANOCOBALAMIN 1000 MCG: 1000 INJECTION INTRAMUSCULAR; SUBCUTANEOUS at 11:49

## 2020-02-14 ASSESSMENT — ENCOUNTER SYMPTOMS
SHORTNESS OF BREATH: 0
NAUSEA: 0
RHINORRHEA: 0
VOMITING: 0
COLOR CHANGE: 0
COUGH: 0
VOICE CHANGE: 0
BACK PAIN: 0
PHOTOPHOBIA: 0

## 2020-02-14 NOTE — PROGRESS NOTES
[facility]  [address] [id number] [address] [address]  Dept: [phone]  Dept Fax: [phone]  Loc: [phone]    Terese Runner is a 71 y.o. female who presents today for her medical conditions/complaints as noted below. Terese Runner is c/o of Discuss Labs (Last appt was cancelled, discuss labs)        HPI:     HPI   Chief Complaint   Patient presents with    Discuss Labs     Last appt was cancelled, discuss labs     Patient presents today to discuss lab results. Patient recently was seen for fatigue and depression. She recently lost her . She is on lexapro and this continues to work well for her. She did have low normal B12 and would like injection today. Also patient has A1C of 9.4. She is taking metformin 1000 mg BID and amaryl 2 mg. She states she has poor eating habits and inconsistent. She states blood sugar has been high in the 400 and 600s; when this happens she feels dizzy, light headed and has to lay down. She denies vomiting or diarrhea.      Lab Results   Component Value Date    LABA1C 9.4 (H) 02/03/2020     No results found for: EAG  Lab Results   Component Value Date     02/03/2020    K 4.5 02/03/2020    CL 93 (L) 02/03/2020    CO2 22 02/03/2020    BUN 14 02/03/2020    CREATININE 1.2 (H) 02/03/2020    GLUCOSE 327 (H) 02/03/2020    CALCIUM 10.0 02/03/2020    PROT 8.4 02/03/2020    LABALBU 4.8 02/03/2020    BILITOT 0.4 02/03/2020    ALKPHOS 139 (H) 02/03/2020    AST 13 02/03/2020    ALT 11 02/03/2020    LABGLOM 45 (A) 02/03/2020    GLOB 3.1 05/26/2016       Lab Results   Component Value Date    WBC 14.2 (H) 02/03/2020    HGB 13.3 02/03/2020    HCT 43.7 02/03/2020    MCV 87.9 02/03/2020     02/03/2020     Lab Results   Component Value Date    TSH 1.540 02/03/2020     Lab Results   Component Value Date    RFRTIRSV92 259 02/03/2020         Past Medical History:   Diagnosis Date    Anxiety     Cervical disc disease     Chest discomfort 10/17/2014    H/o negative stress tests AUC indication 15, AUC score 4, [facility] 2012;[id number] 10/17/2014  Cath  Mild cad,normal LVFX      Diabetes mellitus [address] [address] 10/17/2014    Diabetic neuropathy ([facility]     Hyperlipidemia     Hypertension     Insomnia     Insomnia     Osteoarthritis     Restless leg syndrome       Past Surgical History:   Procedure Laterality Date    CARDIAC CATHETERIZATION  10/17/14  JDT    EF 50%    CHOLECYSTECTOMY      FOOT SURGERY      HARDWARE REMOVAL Right 5/3/2019    HARDWARE REMOVAL RIGHT ANKLE performed by Carl Urrutia MD at [facility] 21 NECK SURGERY         Vitals 2020 2/3/2020 1/3/2020 2019 5/3/2019 9602   SYSTOLIC 589 443 226 071 - -   DIASTOLIC 64 60 80 86 - -   Site Right Upper Arm Right Upper Arm Left Upper Arm - - -   Position Sitting Sitting Sitting - - -   Pulse 93 61 72 91 - -   Temp 97.8 97.4 97.2 - - -   Resp - - - 20 6 8   SpO2 97 97 98 98 99 98   Weight 161 lb 159 lb - 160 lb - -   Height 5' 5\" 5' 5\" 5' 5\" 5' 5\" - -   BMI (wt*703/ht~2) 26.79 kg/m2 26.46 kg/m2 - 26.62 kg/m2 - -   Some recent data might be hidden       Family History   Problem Relation Age of Onset    Heart Disease Mother     Cancer Father     Heart Disease Sister        Social History     Tobacco Use    Smoking status: Former Smoker     Packs/day: 1.00     Years: 30.00     Pack years: 30.00     Last attempt to quit: 3/1/1991     Years since quittin.9    Smokeless tobacco: Never Used   Substance Use Topics    Alcohol use: No      Current Outpatient Medications   Medication Sig Dispense Refill    Semaglutide,0.25 or 0.5MG/DOS, (OZEMPIC, 0.25 OR 0.5 MG/DOSE,) 2 MG/1.5ML SOPN Inject 0.5 mg into the skin once a week 4 pen 0    Insulin Pen Needle 32G X 4 MM MISC 1 each by Does not apply route daily 30 each 5    tiZANidine (ZANAFLEX) 4 MG tablet Take 4 mg by mouth every 8 hours as needed      zolpidem (AMBIEN) 10 MG tablet Take by mouth nightly as needed for Sleep.  escitalopram (LEXAPRO) 20 MG tablet Take 1 tablet by mouth daily 30 tablet 5    amitriptyline (ELAVIL) 10 MG tablet TAKE 1 TABLET BY MOUTH AT BEDTIME 30 tablet 3    rOPINIRole (REQUIP) 0.5 MG tablet TAKE (1) TABLET BY MOUTH TWICE A DAY. 60 tablet 2    celecoxib (CELEBREX) 200 MG capsule TAKE 1 CAPSULE BY MOUTH DAILY IN THE MORNING 30 capsule 2    glimepiride (AMARYL) 2 MG tablet TAKE 1 TABLET BY MOUTH DAILY IN THE MORNING (BEFORE BREAKFAST) 30 tablet 2    simvastatin (ZOCOR) 40 MG tablet Take 1 tablet by mouth nightly 30 tablet 5    gabapentin (NEURONTIN) 300 MG capsule Take 1 capsule by mouth 3 times daily for 180 days. Intended supply: 30 days 90 capsule 5    clonazePAM (KLONOPIN) 0.5 MG tablet TAKE 1/2 TO 1 TABLET BY MOUTH TWICE DAILY AS NEEDED FOR ANXIETY FOR UP TO 30 DAYS 60 tablet 5    metFORMIN (GLUCOPHAGE) 1000 MG tablet Take 1 tablet by mouth 2 times daily (with meals) 60 tablet 5    colestipol (COLESTID) 1 g tablet TAKE (1) TABLET BY MOUTH TWICE A DAY. 180 tablet 2    lisinopril (PRINIVIL;ZESTRIL) 20 MG tablet Take 1 tablet by mouth daily 90 tablet 3    aspirin EC 81 MG EC tablet Take 1 tablet by mouth daily 90 tablet 5    oxyCODONE-acetaminophen (PERCOCET)  MG per tablet Take 1 tablet by mouth every 8 hours as needed       ondansetron (ZOFRAN) 4 MG tablet TAKE 1 OR 2 TABLETS BY MOUTH EVERY 8 HOURS AS NEEDED FOR NAUSEA AND VOMITING 30 tablet 0    Insulin Pen Needle (B-D UF III MINI PEN NEEDLES) 31G X 5 MM MISC Inject 1 each as directed daily 100 each 0    Diabetic Shoe MISC by Does not apply route With insert Dx E11.9 1 each 0     No current facility-administered medications for this visit.       Allergies   Allergen Reactions    Codeine Shortness Of Breath    Morphine Shortness Of Breath    Sulfa Antibiotics        Health Maintenance   Topic Date Due    Hepatitis C screen  1955    HIV screen  08/22/1969    Hepatitis B vaccine (1 of 3 - Risk 3-dose series) 08/22/1973    Colon cancer screen colonoscopy  05/04/2015    Breast cancer screen  01/31/2019    DEXA (modify frequency per FRAX score)  08/22/2019    Annual Wellness Visit (AWV)  09/01/2019    Diabetic foot exam  10/03/2019    Cervical cancer screen  04/26/2020 (Originally 8/22/1975)    Shingles Vaccine (1 of 2) 04/26/2020 (Originally 8/22/2004)    Diabetic retinal exam  05/05/2020 (Originally 8/22/1964)    Diabetic microalbuminuria test  05/01/2020    A1C test (Diabetic or Prediabetic)  05/03/2020    Lipid screen  09/18/2020    Potassium monitoring  02/03/2021    Creatinine monitoring  02/03/2021    Pneumococcal 65+ years Vaccine (1 of 1 - PPSV23) 03/01/2021    DTaP/Tdap/Td vaccine (2 - Td) 01/03/2023    Flu vaccine  Completed    Hepatitis A vaccine  Aged Out    Hib vaccine  Aged Out    Meningococcal (ACWY) vaccine  Aged Out       Subjective:      Review of Systems   Constitutional: Negative for chills and fever. HENT: Negative for ear pain, hearing loss, rhinorrhea and voice change. Eyes: Negative for photophobia and visual disturbance. Respiratory: Negative for cough and shortness of breath. Cardiovascular: Negative for chest pain and palpitations. Gastrointestinal: Negative for nausea and vomiting. Endocrine: Negative. Negative for cold intolerance and heat intolerance. Genitourinary: Negative for difficulty urinating and flank pain. Musculoskeletal: Negative for back pain and neck pain. Skin: Negative for color change and rash. Allergic/Immunologic: Negative for environmental allergies and food allergies. Neurological: Negative for dizziness, speech difficulty and headaches. Hematological: Does not bruise/bleed easily. Psychiatric/Behavioral: Negative for sleep disturbance and suicidal ideas. Objective:     Physical Exam  Vitals signs and nursing note reviewed. Constitutional:       Appearance: She is well-developed. HENT:      Head: Atraumatic. Right Ear: External ear normal.      Left Ear: External ear normal.      Nose: Nose normal.   Eyes:      Conjunctiva/sclera: Conjunctivae normal.      Pupils: Pupils are equal, round, and reactive to light. Neck:      Musculoskeletal: Normal range of motion and neck supple. Cardiovascular:      Rate and Rhythm: Normal rate and regular rhythm. Heart sounds: Normal heart sounds, S1 normal and S2 normal.   Pulmonary:      Effort: Pulmonary effort is normal.      Breath sounds: Normal breath sounds. Abdominal:      General: Bowel sounds are normal.      Palpations: Abdomen is soft. Musculoskeletal: Normal range of motion. Skin:     General: Skin is warm and dry. Neurological:      Mental Status: She is alert and oriented to person, place, and time. Psychiatric:         Behavior: Behavior normal.       /64 (Site: Right Upper Arm, Position: Sitting)   Pulse 93   Temp 97.8 °F (36.6 °C)   Ht 5' 5\" (1.651 m)   Wt 161 lb (73 kg)   LMP 03/01/1976 (LMP Unknown)   SpO2 97%   BMI 26.79 kg/m²     Assessment:       Diagnosis Orders   1. Type 2 diabetes mellitus without complication, without long-term current use of insulin ([address]     2. Vitamin B12 deficiency  cyanocobalamin injection 1,000 mcg   3. Moderate episode of recurrent major depressive disorder ([address]           Plan:     Patient is going to receive B12 injection in clinic today. I am going to start her on ozempic; sample given in office and she was shown how to use this. She states she knows what she needs to be eating and is going to work on diet. I have asked that she document blood sugar readings and food diary and return in 2 weeks. Patient given educational materials -see patient instructions. Discussed use, benefit, and side effects of prescribed medications. All patient questions answered. Pt voiced understanding. Reviewed health maintenance.   Instructed to continue currentmedications, diet

## 2020-02-14 NOTE — PROGRESS NOTES
After obtaining consent, and per orders of FLOR Scott, an injection of cyanocobalamin injection 1,000 mcg  was given in Left deltoid by Ronette Severs. Patient tolerated well with no immediate adverse reaction. Patient was advised to remain in the exam room for 20 minutes and report any adverse reactions to me immediately.

## 2020-02-19 RX ORDER — ZOLPIDEM TARTRATE 10 MG/1
TABLET ORAL
Qty: 30 TABLET | Refills: 5 | OUTPATIENT
Start: 2020-02-19 | End: 2020-07-22 | Stop reason: SDUPTHER

## 2020-02-19 NOTE — TELEPHONE ENCOUNTER
[facility] called to request a refill on her medication. Last office visit : 2/14/2020   Next office visit : 2/24/2020     Last UDS:   Amphetamine Screen, Urine   Date Value Ref Range Status   06/25/2018 -  Final     Barbiturate Screen, Urine   Date Value Ref Range Status   06/25/2018 -  Final     Benzodiazepine Screen, Urine   Date Value Ref Range Status   06/25/2018 positive  Final     Cocaine Metabolite Screen, Urine   Date Value Ref Range Status   06/25/2018 -  Final     MDMA, Urine   Date Value Ref Range Status   06/25/2018 -  Final     Methamphetamine, Urine   Date Value Ref Range Status   06/25/2018 -  Final     Opiate Scrn, Ur   Date Value Ref Range Status   06/25/2018 -  Final     Oxycodone Screen, Ur   Date Value Ref Range Status   06/25/2018 positive  Final     PCP Screen, Urine   Date Value Ref Range Status   06/25/2018 -  Final     Propoxyphene Screen, Urine   Date Value Ref Range Status   06/25/2018 -  Final     Tricyclic Antidepressants, Urine   Date Value Ref Range Status   06/25/2018 positive  Final       Last [place]: OBI today, appropriate  Medication Contract: none on file  Last Fill: 1/20/2020    Requested Prescriptions     Pending Prescriptions Disp Refills    zolpidem (AMBIEN) 10 MG tablet [Pharmacy Med Name: ZOLPIDEM TARTRATE 10 MG TABLET] 30 tablet 0     Sig: TAKE 1 TABLET BY MOUTH AT BEDTIME AS NEEDED FOR SLEEP         Please approve or refuse this medication.    Feliciano Squires

## 2020-02-24 ENCOUNTER — OFFICE VISIT (OUTPATIENT)
Dept: PRIMARY CARE CLINIC | Age: 65
End: 2020-02-24
Payer: MEDICARE

## 2020-02-24 VITALS
TEMPERATURE: 98 F | DIASTOLIC BLOOD PRESSURE: 88 MMHG | HEART RATE: 114 BPM | RESPIRATION RATE: 18 BRPM | SYSTOLIC BLOOD PRESSURE: 128 MMHG | OXYGEN SATURATION: 97 % | HEIGHT: 65 IN | BODY MASS INDEX: 26.82 KG/M2 | WEIGHT: 161 LBS

## 2020-02-24 PROCEDURE — 96372 THER/PROPH/DIAG INJ SC/IM: CPT | Performed by: FAMILY MEDICINE

## 2020-02-24 PROCEDURE — G8400 PT W/DXA NO RESULTS DOC: HCPCS | Performed by: FAMILY MEDICINE

## 2020-02-24 PROCEDURE — 4040F PNEUMOC VAC/ADMIN/RCVD: CPT | Performed by: FAMILY MEDICINE

## 2020-02-24 PROCEDURE — G8482 FLU IMMUNIZE ORDER/ADMIN: HCPCS | Performed by: FAMILY MEDICINE

## 2020-02-24 PROCEDURE — G8417 CALC BMI ABV UP PARAM F/U: HCPCS | Performed by: FAMILY MEDICINE

## 2020-02-24 PROCEDURE — 1123F ACP DISCUSS/DSCN MKR DOCD: CPT | Performed by: FAMILY MEDICINE

## 2020-02-24 PROCEDURE — 3017F COLORECTAL CA SCREEN DOC REV: CPT | Performed by: FAMILY MEDICINE

## 2020-02-24 PROCEDURE — 1036F TOBACCO NON-USER: CPT | Performed by: FAMILY MEDICINE

## 2020-02-24 PROCEDURE — G8427 DOCREV CUR MEDS BY ELIG CLIN: HCPCS | Performed by: FAMILY MEDICINE

## 2020-02-24 PROCEDURE — 99214 OFFICE O/P EST MOD 30 MIN: CPT | Performed by: FAMILY MEDICINE

## 2020-02-24 PROCEDURE — 1090F PRES/ABSN URINE INCON ASSESS: CPT | Performed by: FAMILY MEDICINE

## 2020-02-24 RX ORDER — CYANOCOBALAMIN 1000 UG/ML
1000 INJECTION INTRAMUSCULAR; SUBCUTANEOUS ONCE
Status: COMPLETED | OUTPATIENT
Start: 2020-02-24 | End: 2020-02-24

## 2020-02-24 RX ADMIN — CYANOCOBALAMIN 1000 MCG: 1000 INJECTION INTRAMUSCULAR; SUBCUTANEOUS at 13:55

## 2020-02-24 ASSESSMENT — ENCOUNTER SYMPTOMS
CONSTIPATION: 0
NAUSEA: 0
VOMITING: 0
CHEST TIGHTNESS: 0
SHORTNESS OF BREATH: 0
DIARRHEA: 0
COUGH: 0
WHEEZING: 0
ABDOMINAL PAIN: 0

## 2020-02-24 NOTE — PROGRESS NOTES
Floyd Jackson is a 71 y.o. female who presents today for   Chief Complaint   Patient presents with    3 Month Follow-Up       HPI  Patient is here for f/u DM. Pt reports previously receiving B12 injection while in office. She states her depression is terrible and that she was told to double her Lexapro. She denies feeling any changes since changing the dose. She notes ongoing fatigue. She states she is in the process of moving. Pt's  passed in November 2019. No change in PMH, family, social, or surgical history unless mentioned above. Review of Systems   Constitutional: Positive for fatigue. Negative for chills and fever. Respiratory: Negative for cough, chest tightness, shortness of breath and wheezing. Cardiovascular: Negative for chest pain, palpitations and leg swelling. Gastrointestinal: Negative for abdominal pain, constipation, diarrhea, nausea and vomiting. Genitourinary: Negative for difficulty urinating, dysuria and frequency. Psychiatric/Behavioral: Positive for dysphoric mood. Negative for agitation, self-injury, sleep disturbance and suicidal ideas. The patient is not nervous/anxious.         Past Medical History:   Diagnosis Date    Anxiety     Cervical disc disease     Chest discomfort 10/17/2014    H/o negative stress tests AUC indication 15, AUC score 4, [facility] 2012;[id number] 10/17/2014  Cath  Mild cad,normal LVFX      Diabetes mellitus ([address] 10/17/2014    Diabetic neuropathy (HCC)     Hyperlipidemia     Hypertension     Insomnia     Insomnia     Osteoarthritis     Restless leg syndrome        Current Outpatient Medications   Medication Sig Dispense Refill    zolpidem (AMBIEN) 10 MG tablet TAKE 1 TABLET BY MOUTH AT BEDTIME AS NEEDED FOR SLEEP 30 tablet 5    Semaglutide,0.25 or 0.5MG/DOS, (OZEMPIC, 0.25 OR 0.5 MG/DOSE,) 2 MG/1.5ML SOPN Inject 0.5 mg into the skin once a week 4 pen 0    Insulin Pen Needle 32G X 4 MM MISC 1 each by Does not apply route daily 30 CATHETERIZATION  10/17/14  JDT    EF 50%    CHOLECYSTECTOMY      FOOT SURGERY      HARDWARE REMOVAL Right 5/3/2019    HARDWARE REMOVAL RIGHT ANKLE performed by Trisha Ramires MD at [address] [place] [address],[address]      NECK SURGERY         Social History     Tobacco Use    Smoking status: Former Smoker     Packs/day: 1.00     Years: 30.00     Pack years: 30.00     Last attempt to quit: 3/1/1991     Years since quittin.0    Smokeless tobacco: Never Used   Substance Use Topics    Alcohol use: No    Drug use: No       Family History   Problem Relation Age of Onset    Heart Disease Mother     Cancer Father     Heart Disease Sister        /88   Pulse 114   Temp 98 °F (36.7 °C)   Resp 18   Ht 5' 5\" (1.651 m)   Wt 161 lb (73 kg)   LMP 1976 (LMP Unknown)   SpO2 97%   BMI 26.79 kg/m²     Physical Exam  Vitals signs and nursing note reviewed. Constitutional:       General: She is not in acute distress. Appearance: She is well-developed. She is not diaphoretic. HENT:      Head: Normocephalic and atraumatic. Cardiovascular:      Rate and Rhythm: Normal rate and regular rhythm. Heart sounds: Normal heart sounds. No murmur. Pulmonary:      Effort: Pulmonary effort is normal. No respiratory distress. Breath sounds: Normal breath sounds. No wheezing or rales. Abdominal:      General: Bowel sounds are normal. There is no distension. Palpations: Abdomen is soft. Tenderness: There is no abdominal tenderness. Musculoskeletal:      Comments: No pretibial edema b/l. Skin:     General: Skin is warm and dry. Neurological:      Mental Status: She is alert and oriented to person, place, and time. Psychiatric:         Mood and Affect: Mood is depressed. Mood is not anxious. Speech: Speech normal.         Behavior: Behavior normal.         Thought Content: Thought content does not include homicidal or suicidal ideation.          Judgment: Judgment normal.         Assessment:    ICD-10-CM    1. Moderate episode of recurrent major depressive disorder [facility]) [address], [place], [address], Behavioral [facility], [place] ([address])   2. Grief reaction with prolonged bereavement [address] - [place] Hammer[place]ad[place], Behavioral [facility], [place] ([address])   3. B12 deficiency E53.8        Plan:   Recommended therapy for grief reaction with some extended depression at this time. Hold of medication changes. Orders Placed This Encounter   Procedures   [facility], [address], Behavioral Medicine, [place] ([facility])     Referral Priority:   Routine     Referral Type:   Eval and Treat     Referral Reason:   Specialty Services Required     Requested Specialty:   Psychology     Number of Visits Requested:   1     Orders Placed This Encounter   Medications    cyanocobalamin injection 1,000 mcg     There are no discontinued medications. Patient Instructions   Please arrive 15 minutes early to next follow up appointment in 5 weeks or schedule an appointment sooner if needed. Continue Lexapro. Patient given educational handouts and has had all questions answered. Patient voices understanding and agrees to plans along with risks and benefits of plan. Patient isinstructed to continue prior meds, diet, and exercise plans unless instructed otherwise. Patient agrees to follow up as instructed and sooner if needed. Patient agrees to go to ER if condition becomes emergent. Notesmay be completed with dictation device and spelling errors may occur. Fam Somers, tomeka scribing for and in the presence of Dr. Heladio Mehta. 2/27/2020   I, Dr. Melani Hardin, the medical provider for the encounter with patient on 2/27/2020 at 6:46 AM have reviewed my scribe's documentation in earnest and take sole ownership of the intellectual property represented in documentation by my medical scribe and myself within this document. Some errors may occur in proofreading.

## 2020-02-27 PROBLEM — F33.1 MODERATE EPISODE OF RECURRENT MAJOR DEPRESSIVE DISORDER (HCC): Status: ACTIVE | Noted: 2020-02-27

## 2020-02-27 PROBLEM — E53.8 B12 DEFICIENCY: Status: ACTIVE | Noted: 2020-02-27

## 2020-03-05 ENCOUNTER — TELEPHONE (OUTPATIENT)
Dept: PRIMARY CARE CLINIC | Age: 65
End: 2020-03-05

## 2020-03-05 RX ORDER — ONDANSETRON 4 MG/1
4 TABLET, FILM COATED ORAL DAILY PRN
Qty: 30 TABLET | Refills: 0 | Status: SHIPPED
Start: 2020-03-05 | End: 2020-09-18 | Stop reason: SDUPTHER

## 2020-03-05 NOTE — TELEPHONE ENCOUNTER
Pt called and requested something for Nausea,  Pt was just seen in the office on 02/24/2020     Matilda Acevedo called to request a refill on her medication.       Last office visit : 2/24/2020   Next office visit : 4/1/2020     Requested Prescriptions     Signed Prescriptions Disp Refills    ondansetron (ZOFRAN) 4 MG tablet 30 tablet 0     Sig: Take 1 tablet by mouth daily as needed for Nausea or Vomiting     Authorizing Provider: Adamaris Whitaker     Ordering User: Erica Bravo

## 2020-03-05 NOTE — TELEPHONE ENCOUNTER
This patient had an appt yesterdasy, 3/4/2020 and called wanting to cancel/reschedule it. It was a new patient appt, so I wasn't sure what to do. I told her I would let Marielos Diaz know and we will give her a call back.

## 2020-03-17 ENCOUNTER — TELEPHONE (OUTPATIENT)
Dept: PRIMARY CARE CLINIC | Age: 65
End: 2020-03-17

## 2020-03-18 RX ORDER — BENZONATATE 100 MG/1
100 CAPSULE ORAL 3 TIMES DAILY PRN
Qty: 20 CAPSULE | Refills: 0 | Status: SHIPPED | OUTPATIENT
Start: 2020-03-18 | End: 2020-03-25

## 2020-03-27 RX ORDER — CLONAZEPAM 0.5 MG/1
TABLET ORAL
Qty: 60 TABLET | Refills: 3 | OUTPATIENT
Start: 2020-03-27 | End: 2020-06-30

## 2020-04-01 ENCOUNTER — VIRTUAL VISIT (OUTPATIENT)
Dept: PRIMARY CARE CLINIC | Age: 65
End: 2020-04-01
Payer: MEDICARE

## 2020-04-01 PROBLEM — R26.89 LOSS OF BALANCE: Status: RESOLVED | Noted: 2018-09-28 | Resolved: 2020-04-01

## 2020-04-01 PROBLEM — R29.2 HOFFMAN SIGN PRESENT: Status: RESOLVED | Noted: 2018-09-28 | Resolved: 2020-04-01

## 2020-04-01 PROBLEM — R20.2 NUMBNESS AND TINGLING: Status: RESOLVED | Noted: 2018-09-28 | Resolved: 2020-04-01

## 2020-04-01 PROBLEM — R20.0 NUMBNESS AND TINGLING: Status: RESOLVED | Noted: 2018-09-28 | Resolved: 2020-04-01

## 2020-04-01 PROCEDURE — G8400 PT W/DXA NO RESULTS DOC: HCPCS | Performed by: FAMILY MEDICINE

## 2020-04-01 PROCEDURE — 3017F COLORECTAL CA SCREEN DOC REV: CPT | Performed by: FAMILY MEDICINE

## 2020-04-01 PROCEDURE — 1090F PRES/ABSN URINE INCON ASSESS: CPT | Performed by: FAMILY MEDICINE

## 2020-04-01 PROCEDURE — G8427 DOCREV CUR MEDS BY ELIG CLIN: HCPCS | Performed by: FAMILY MEDICINE

## 2020-04-01 PROCEDURE — 2022F DILAT RTA XM EVC RTNOPTHY: CPT | Performed by: FAMILY MEDICINE

## 2020-04-01 PROCEDURE — 4040F PNEUMOC VAC/ADMIN/RCVD: CPT | Performed by: FAMILY MEDICINE

## 2020-04-01 PROCEDURE — 1036F TOBACCO NON-USER: CPT | Performed by: FAMILY MEDICINE

## 2020-04-01 PROCEDURE — 3046F HEMOGLOBIN A1C LEVEL >9.0%: CPT | Performed by: FAMILY MEDICINE

## 2020-04-01 PROCEDURE — 1123F ACP DISCUSS/DSCN MKR DOCD: CPT | Performed by: FAMILY MEDICINE

## 2020-04-01 PROCEDURE — G8417 CALC BMI ABV UP PARAM F/U: HCPCS | Performed by: FAMILY MEDICINE

## 2020-04-01 PROCEDURE — 99214 OFFICE O/P EST MOD 30 MIN: CPT | Performed by: FAMILY MEDICINE

## 2020-04-01 RX ORDER — CYANOCOBALAMIN 1000 UG/ML
1000 INJECTION INTRAMUSCULAR; SUBCUTANEOUS
Qty: 10 ML | Refills: 0 | Status: SHIPPED | OUTPATIENT
Start: 2020-04-01

## 2020-04-01 ASSESSMENT — ENCOUNTER SYMPTOMS
WHEEZING: 0
CONSTIPATION: 0
CHEST TIGHTNESS: 0
SHORTNESS OF BREATH: 0
COUGH: 0
ABDOMINAL PAIN: 0
VOMITING: 0
NAUSEA: 0
BACK PAIN: 1
DIARRHEA: 0

## 2020-04-01 NOTE — PROGRESS NOTES
2020    TELEHEALTH EVALUATION -- Audio/Visual (During PTFRY-33 public health emergency)    HPI:    Easton Amezcua (:  1955) has requested an audio/video evaluation for the following concern(s):    Patient presents today via video visit for f/u for B 12 and depression. B12 injection helped. No SE. She is doing better on her accucheck she notes w/o SE since last visit and a1c. Pt notes she still has chronic pain and depression but no worse. Notes as well she would still liek to have counseling if possible    Review of Systems   Constitutional: Negative for chills and fever. Respiratory: Negative for cough, chest tightness, shortness of breath and wheezing. Cardiovascular: Negative for chest pain, palpitations and leg swelling. Gastrointestinal: Negative for abdominal pain, constipation, diarrhea, nausea and vomiting. Genitourinary: Negative for difficulty urinating, dysuria and frequency. Musculoskeletal: Positive for arthralgias and back pain. Neurological: Positive for numbness (w/ burning and tingling). Psychiatric/Behavioral: Positive for dysphoric mood. Negative for agitation, self-injury, sleep disturbance and suicidal ideas. The patient is not nervous/anxious. Prior to Visit Medications    Medication Sig Taking?  Authorizing Provider   cyanocobalamin 1000 MCG/ML injection Inject 1 mL into the muscle every 30 days Yes Tyler Zhang MD   Cyanocobalamin (B-12) 1000 MCG/ML KIT Needles and syringe size 25 gauge or smaller, substitute as needed 1\" - 12 needles Yes Tyler Zhang MD   clonazePAM (KLONOPIN) 0.5 MG tablet TAKE 1/2 TO 1 TABLET BY MOUTH TWICE DAILY AS NEEDED FOR ANXIETY FOR UP TO 30 DAYS Yes Tyler Zhang MD   Semaglutide,0.25 or 0.5MG/DOS, (OZEMPIC, 0.25 OR 0.5 MG/DOSE,) 2 MG/1.5ML SOPN Inject 0.5 mg into the skin once a week Yes FLOR Lieberman   Insulin Pen Needle 32G X 4 MM MISC 1 each by Does not apply route daily Yes FLOR Lieberman   ondansetron (ZOFRAN) 4 MG tablet TAKE 1 OR 2 TABLETS BY MOUTH EVERY 8 HOURS AS NEEDED FOR NAUSEA AND VOMITING Yes Bello Wang MD   tiZANidine (ZANAFLEX) 4 MG tablet Take 4 mg by mouth every 8 hours as needed Yes Historical Provider, MD   escitalopram (LEXAPRO) 20 MG tablet Take 1 tablet by mouth daily Yes FLOR Pederson   amitriptyline (ELAVIL) 10 MG tablet TAKE 1 TABLET BY MOUTH AT BEDTIME Yes Bello Wang MD   rOPINIRole (REQUIP) 0.5 MG tablet TAKE (1) TABLET BY MOUTH TWICE A DAY. Yes Bello Wang MD   celecoxib (CELEBREX) 200 MG capsule TAKE 1 CAPSULE BY MOUTH DAILY IN THE MORNING Yes Bello Wang MD   glimepiride (AMARYL) 2 MG tablet TAKE 1 TABLET BY MOUTH DAILY IN THE MORNING (BEFORE BREAKFAST) Yes Bello Wang MD   simvastatin (ZOCOR) 40 MG tablet Take 1 tablet by mouth nightly Yes Bello Wang MD   metFORMIN (GLUCOPHAGE) 1000 MG tablet Take 1 tablet by mouth 2 times daily (with meals) Yes Bello Wang MD   colestipol (COLESTID) 1 g tablet TAKE (1) TABLET BY MOUTH TWICE A DAY. Yes Bello Wang MD   lisinopril (PRINIVIL;ZESTRIL) 20 MG tablet Take 1 tablet by mouth daily Yes Bello Wang MD   Insulin Pen Needle (B-D UF III MINI PEN NEEDLES) 31G X 5 MM MISC Inject 1 each as directed daily Yes Bello Wang MD   aspirin EC 81 MG EC tablet Take 1 tablet by mouth daily Yes Bello Wang MD   Diabetic Shoe MISC by Does not apply route With insert Dx E11.9 Yes Lázaro Munoz DO   oxyCODONE-acetaminophen (PERCOCET)  MG per tablet Take 1 tablet by mouth every 8 hours as needed  Yes Historical Provider, MD   ondansetron (ZOFRAN) 4 MG tablet Take 1 tablet by mouth daily as needed for Nausea or Vomiting  Bello Wang MD   gabapentin (NEURONTIN) 300 MG capsule Take 1 capsule by mouth 3 times daily for 180 days.  Intended supply: 30 days  Bello Wang MD       Social History     Tobacco Use    Smoking status: Former Smoker     Packs/day: 1.00     Years: 30.00     Pack years: 30.00     Last (modify frequency per FRAX score)  08/22/2009    Colon cancer screen colonoscopy  05/04/2015    Breast cancer screen  01/31/2019    Annual Wellness Visit (AWV)  09/01/2019    Diabetic foot exam  10/03/2019    Cervical cancer screen  04/26/2020 (Originally 8/22/1975)    Shingles Vaccine (1 of 2) 04/26/2020 (Originally 8/22/2004)    Diabetic retinal exam  05/05/2020 (Originally [birth year]/22/1965)    Diabetic microalbuminuria test  05/01/2020    A1C test (Diabetic or Prediabetic)  05/03/2020    Lipid screen  09/18/2020    Potassium monitoring  02/03/2021    Creatinine monitoring  02/03/2021    Pneumococcal 65+ years Vaccine (1 of 1 - PPSV23) 03/01/2021    DTaP/Tdap/Td vaccine (2 - Td) 01/03/2023    Flu vaccine  Completed    Hepatitis A vaccine  Aged Out    Hib vaccine  Aged Out    Meningococcal (ACWY) vaccine  Aged Out       PHYSICAL EXAMINATION:  [ INSTRUCTIONS:  \"[x]\" Indicates a positive item  \"[]\" Indicates a negative item  -- DELETE ALL ITEMS NOT EXAMINED]  Vital Signs: (As obtained by patient/caregiver or practitioner observation)    Blood pressure-  Heart rate-    Respiratory rate-    Temperature-  Pulse oximetry-     Constitutional: [x] Appears well-developed and well-nourished [] No apparent distress      [] Abnormal-   Mental status  [x] Alert and awake  [x] Oriented to person/place/time [x]Able to follow commands      Eyes:  EOM    [x]  Normal  [] Abnormal-  Sclera  [x]  Normal  [] Abnormal -         Discharge []  None visible  [] Abnormal -    HENT:   [x] Normocephalic, atraumatic.   [] Abnormal   [x] Mouth/Throat: Mucous membranes are moist.     External Ears [x] Normal  [] Abnormal-     Neck: [x] No visualized mass     Pulmonary/Chest: [x] Respiratory effort normal.  [x] No visualized signs of difficulty breathing or respiratory distress        [] Abnormal-      Musculoskeletal:   [x] Normal gait with no signs of ataxia         [x] Normal range of motion of neck        [] Abnormal-

## 2020-04-15 ENCOUNTER — TELEPHONE (OUTPATIENT)
Dept: PSYCHOLOGY | Age: 65
End: 2020-04-15

## 2020-04-27 RX ORDER — AMITRIPTYLINE HYDROCHLORIDE 10 MG/1
TABLET, FILM COATED ORAL
Qty: 30 TABLET | Refills: 0 | Status: SHIPPED | OUTPATIENT
Start: 2020-04-27 | End: 2020-06-30

## 2020-04-27 RX ORDER — ROPINIROLE 0.5 MG/1
TABLET, FILM COATED ORAL
Qty: 60 TABLET | Refills: 0 | Status: SHIPPED | OUTPATIENT
Start: 2020-04-27 | End: 2020-07-22 | Stop reason: SDUPTHER

## 2020-06-05 ENCOUNTER — TRANSCRIBE ORDERS (OUTPATIENT)
Dept: ADMINISTRATIVE | Facility: HOSPITAL | Age: 65
End: 2020-06-05

## 2020-06-05 ENCOUNTER — HOSPITAL ENCOUNTER (OUTPATIENT)
Dept: GENERAL RADIOLOGY | Facility: HOSPITAL | Age: 65
Discharge: HOME | End: 2020-06-05
Admitting: NURSE PRACTITIONER

## 2020-06-05 DIAGNOSIS — M79.671 RIGHT FOOT PAIN: ICD-10-CM

## 2020-06-05 DIAGNOSIS — M79.671 RIGHT FOOT PAIN: Primary | ICD-10-CM

## 2020-06-05 PROCEDURE — 73630 X-RAY EXAM OF FOOT: CPT

## 2020-06-30 RX ORDER — CLONAZEPAM 0.5 MG/1
TABLET ORAL
Qty: 60 TABLET | Refills: 0 | OUTPATIENT
Start: 2020-06-30 | End: 2020-07-22 | Stop reason: SDUPTHER

## 2020-06-30 RX ORDER — AMITRIPTYLINE HYDROCHLORIDE 10 MG/1
TABLET, FILM COATED ORAL
Qty: 30 TABLET | Refills: 0 | Status: SHIPPED | OUTPATIENT
Start: 2020-06-30 | End: 2020-07-22 | Stop reason: SDUPTHER

## 2020-06-30 NOTE — TELEPHONE ENCOUNTER
Called and LM for patient to inform her the medication will be refilled for a 30 day supply but she will need an appointment for refills going forward. Bibi Cuevas called to request a refill on her medication. Last office visit : 04/01/2020   Next office visit : Visit date not found     Last Keith Ege: 06/30/2020  Medication Contract:[id number]    Last Fill: 05/28/2020    Last UDS:   Amphetamine Screen, Urine   Date Value Ref Range Status   06/25/2018 -  Final     Barbiturate Screen, Urine   Date Value Ref Range Status   06/25/2018 -  Final     Benzodiazepine Screen, Urine   Date Value Ref Range Status   06/25/2018 positive  Final     Cocaine Metabolite Screen, Urine   Date Value Ref Range Status   06/25/2018 -  Final     MDMA, Urine   Date Value Ref Range Status   06/25/2018 -  Final     Methamphetamine, Urine   Date Value Ref Range Status   06/25/2018 -  Final     Opiate Scrn, Ur   Date Value Ref Range Status   06/25/2018 -  Final     Oxycodone Screen, Ur   Date Value Ref Range Status   06/25/2018 positive  Final     PCP Screen, Urine   Date Value Ref Range Status   06/25/2018 -  Final     Propoxyphene Screen, Urine   Date Value Ref Range Status   06/25/2018 -  Final     Tricyclic Antidepressants, Urine   Date Value Ref Range Status   06/25/2018 positive  Final                   Requested Prescriptions     Signed Prescriptions Disp Refills    amitriptyline (ELAVIL) 10 MG tablet 30 tablet 0     Sig: TAKE ONE TABLET BY MOUTH AT BEDTIME. Authorizing Provider: Jossy Whatley     Ordering User: Malu Mike    clonazePAM (KLONOPIN) 0.5 MG tablet 60 tablet 0     Sig: TAKE 1 TABLET BY MOUTH TWICE DAILY AS NEEDED FOR ANXIETY FOR UP TO 30DAYS     Authorizing Provider: Jossy Whatley     Ordering User: Malu Mike         Please approve or refuse this medication.    Bonita Perez MA

## 2020-07-22 ENCOUNTER — VIRTUAL VISIT (OUTPATIENT)
Dept: PRIMARY CARE CLINIC | Age: 65
End: 2020-07-22
Payer: MEDICARE

## 2020-07-22 PROCEDURE — 4040F PNEUMOC VAC/ADMIN/RCVD: CPT | Performed by: FAMILY MEDICINE

## 2020-07-22 PROCEDURE — G0402 INITIAL PREVENTIVE EXAM: HCPCS | Performed by: FAMILY MEDICINE

## 2020-07-22 PROCEDURE — G8417 CALC BMI ABV UP PARAM F/U: HCPCS | Performed by: FAMILY MEDICINE

## 2020-07-22 PROCEDURE — 1123F ACP DISCUSS/DSCN MKR DOCD: CPT | Performed by: FAMILY MEDICINE

## 2020-07-22 PROCEDURE — 99213 OFFICE O/P EST LOW 20 MIN: CPT | Performed by: FAMILY MEDICINE

## 2020-07-22 PROCEDURE — G8400 PT W/DXA NO RESULTS DOC: HCPCS | Performed by: FAMILY MEDICINE

## 2020-07-22 PROCEDURE — 3046F HEMOGLOBIN A1C LEVEL >9.0%: CPT | Performed by: FAMILY MEDICINE

## 2020-07-22 PROCEDURE — 1090F PRES/ABSN URINE INCON ASSESS: CPT | Performed by: FAMILY MEDICINE

## 2020-07-22 PROCEDURE — 2022F DILAT RTA XM EVC RTNOPTHY: CPT | Performed by: FAMILY MEDICINE

## 2020-07-22 PROCEDURE — G8428 CUR MEDS NOT DOCUMENT: HCPCS | Performed by: FAMILY MEDICINE

## 2020-07-22 PROCEDURE — 3017F COLORECTAL CA SCREEN DOC REV: CPT | Performed by: FAMILY MEDICINE

## 2020-07-22 PROCEDURE — 1036F TOBACCO NON-USER: CPT | Performed by: FAMILY MEDICINE

## 2020-07-22 RX ORDER — ROPINIROLE 0.5 MG/1
TABLET, FILM COATED ORAL
Qty: 180 TABLET | Refills: 3 | Status: SHIPPED | OUTPATIENT
Start: 2020-07-22 | End: 2021-02-15

## 2020-07-22 RX ORDER — AMITRIPTYLINE HYDROCHLORIDE 25 MG/1
25 TABLET, FILM COATED ORAL NIGHTLY
Qty: 90 TABLET | Refills: 3 | Status: SHIPPED | OUTPATIENT
Start: 2020-07-22 | End: 2021-02-15 | Stop reason: SDUPTHER

## 2020-07-22 RX ORDER — CLONAZEPAM 0.5 MG/1
TABLET ORAL
Qty: 60 TABLET | Refills: 5 | Status: SHIPPED | OUTPATIENT
Start: 2020-07-22 | End: 2021-01-18

## 2020-07-22 RX ORDER — ZOLPIDEM TARTRATE 10 MG/1
10 TABLET ORAL NIGHTLY PRN
Qty: 30 TABLET | Refills: 5 | Status: SHIPPED | OUTPATIENT
Start: 2020-07-22 | End: 2021-01-18

## 2020-07-22 ASSESSMENT — PATIENT HEALTH QUESTIONNAIRE - PHQ9
SUM OF ALL RESPONSES TO PHQ QUESTIONS 1-9: 2
SUM OF ALL RESPONSES TO PHQ QUESTIONS 1-9: 2

## 2020-07-22 NOTE — PROGRESS NOTES
2020    TELEHEALTH EVALUATION -- Audio/Visual (During XCTMF-48 public health emergency)    HPI:    Colleen Leahy (:  1955) has requested an audio/video evaluation for the following concern(s):    Patient presents today via video visit for follow-up for anxiety and depression as well as annual wellness visit. Patient had laboratories back in February and had an elevated hemoglobin A1c. Patient did also have acute kidney injury or some superimposed kidney injury on top of what is suspected be chronic kidney disease stage III. Patient notes that she has not had any issues with any of her medications or any issues with her glycemia since then    Review of Systems   Constitutional: Negative for chills and fever. Respiratory: Negative for cough, chest tightness, shortness of breath and wheezing. Cardiovascular: Negative for chest pain, palpitations and leg swelling. Gastrointestinal: Negative for abdominal pain, constipation, diarrhea, nausea and vomiting. Genitourinary: Negative for difficulty urinating, dysuria and frequency. Musculoskeletal: Positive for arthralgias and back pain. Negative for gait problem. Psychiatric/Behavioral: Positive for sleep disturbance. Negative for agitation, self-injury and suicidal ideas. The patient is nervous/anxious. Prior to Visit Medications    Medication Sig Taking? Authorizing Provider   metFORMIN (GLUCOPHAGE) 1000 MG tablet Take 1 tablet by mouth 2 times daily (with meals) Yes Regan Taylor MD   rOPINIRole (REQUIP) 0.5 MG tablet TAKE (1) TABLET BY MOUTH TWICE A DAY. Yes Regan Taylor MD   amitriptyline (ELAVIL) 25 MG tablet Take 1 tablet by mouth nightly TAKE ONE TABLET BY MOUTH AT BEDTIME.  Yes Regan Taylor MD   clonazePAM (KLONOPIN) 0.5 MG tablet TAKE 1 TABLET BY MOUTH TWICE DAILY AS NEEDED FOR ANXIETY FOR UP TO 30DAYS Yes Regan Taylor MD   zolpidem (AMBIEN) 10 MG tablet Take 1 tablet by mouth nightly as needed for Sleep for up to 30 doses. Yes Chino Chapman MD   cyanocobalamin 1000 MCG/ML injection Inject 1 mL into the muscle every 30 days  Chino Chapman MD   Cyanocobalamin (B-12) 1000 MCG/ML KIT Needles and syringe size 25 gauge or smaller, substitute as needed 1\" - 12 needles  Chino Chapman MD   ondansetron (ZOFRAN) 4 MG tablet Take 1 tablet by mouth daily as needed for Nausea or Vomiting  Chino Chapman MD   Semaglutide,0.25 or 0.5MG/DOS, (OZEMPIC, 0.25 OR 0.5 MG/DOSE,) 2 MG/1.5ML SOPN Inject 0.5 mg into the skin once a week  FLOR Seth   Insulin Pen Needle 32G X 4 MM MISC 1 each by Does not apply route daily  FLOR Seth   ondansetron (ZOFRAN) 4 MG tablet TAKE 1 OR 2 TABLETS BY MOUTH EVERY 8 HOURS AS NEEDED FOR NAUSEA AND VOMITING  Chino Chapman MD   tiZANidine (ZANAFLEX) 4 MG tablet Take 4 mg by mouth every 8 hours as needed  Historical Provider, MD   escitalopram (LEXAPRO) 20 MG tablet Take 1 tablet by mouth daily  FLOR Seth   celecoxib (CELEBREX) 200 MG capsule TAKE 1 CAPSULE BY MOUTH DAILY IN THE MORNING  Chino Chapman MD   glimepiride (AMARYL) 2 MG tablet TAKE 1 TABLET BY MOUTH DAILY IN THE MORNING (BEFORE BREAKFAST)  Chino Chapman MD   simvastatin (ZOCOR) 40 MG tablet Take 1 tablet by mouth nightly  Chino Chapman MD   gabapentin (NEURONTIN) 300 MG capsule Take 1 capsule by mouth 3 times daily for 180 days. Intended supply: 30 days  Chino Chapman MD   colestipol (COLESTID) 1 g tablet TAKE (1) TABLET BY MOUTH TWICE A DAY.   Chino Chapman MD   lisinopril (PRINIVIL;ZESTRIL) 20 MG tablet Take 1 tablet by mouth daily  Chino Chapman MD   Insulin Pen Needle (B-D UF III MINI PEN NEEDLES) 31G X 5 MM MISC Inject 1 each as directed daily  Chino Chapman MD   aspirin EC 81 MG EC tablet Take 1 tablet by mouth daily  Chino Chapman MD   Diabetic Shoe MISC by Does not apply route With insert Dx E11.9  Silver Lennox, DO   oxyCODONE-acetaminophen (PERCOCET)  MG per tablet Take 1 tablet by mouth Polysaccharide ([id number]) 03/01/2016    Tdap (Boostrix, Adacel) 01/03/2013   ,   Health Maintenance   Topic Date Due    Hepatitis C screen  1955    Diabetic retinal exam  08/22/1964    HIV screen  08/22/1969    Cervical cancer screen  08/22/1975    Shingles Vaccine (1 of 2) 08/22/2004    DEXA (modify frequency per FRAX score)  08/22/2009    Colon cancer screen colonoscopy  05/04/2015    Breast cancer screen  01/31/2019    Annual Wellness Visit (AWV)  09/01/2019    Diabetic foot exam  10/03/2019    Diabetic microalbuminuria test  05/01/2020    A1C test (Diabetic or Prediabetic)  05/03/2020    Flu vaccine (1) 09/01/2020    Lipid screen  09/18/2020    Potassium monitoring  02/03/2021    Creatinine monitoring  02/03/2021    Pneumococcal 65+ years Vaccine (1 of 1 - PPSV23) 03/01/2021    DTaP/Tdap/Td vaccine (2 - Td) 01/03/2023    Hepatitis A vaccine  Aged Out    Hib vaccine  Aged Out    Meningococcal (ACWY) vaccine  Aged Out       PHYSICAL EXAMINATION:  [ INSTRUCTIONS:  \"[x]\" Indicates a positive item  \"[]\" Indicates a negative item  -- DELETE ALL ITEMS NOT EXAMINED]  Vital Signs: (As obtained by patient/caregiver or practitioner observation)    Blood pressure-  Heart rate-    Respiratory rate-    Temperature-  Pulse oximetry-     Constitutional: [x] Appears well-developed and well-nourished [] No apparent distress      [] Abnormal-   Mental status  [x] Alert and awake  [x] Oriented to person/place/time [x]Able to follow commands      Eyes:  EOM    [x]  Normal  [] Abnormal-  Sclera  [x]  Normal  [] Abnormal -         Discharge []  None visible  [] Abnormal -    HENT:   [x] Normocephalic, atraumatic.   [] Abnormal   [x] Mouth/Throat: Mucous membranes are moist.     External Ears [x] Normal  [] Abnormal-     Neck: [x] No visualized mass     Pulmonary/Chest: [x] Respiratory effort normal.  [x] No visualized signs of difficulty breathing or respiratory distress        [] Abnormal- Musculoskeletal:   [x] Normal gait with no signs of ataxia         [x] Normal range of motion of neck        [] Abnormal-       Neurological:        [x] No Facial Asymmetry (Cranial nerve 7 motor function) (limited exam to video visit)          [x] No gaze palsy        [] Abnormal-         Skin:        [x] No significant exanthematous lesions or discoloration noted on facial skin         [] Abnormal-            Psychiatric:       [x] Normal Affect [x] No Hallucinations        [] Abnormal-     Other pertinent observable physical exam findings-     ASSESSMENT/PLAN:    ICD-10-CM    1. Routine general medical examination at a health care facility  Z00.00    2. Type 2 diabetes mellitus with diabetic polyneuropathy, without long-term current use of insulin ([facility])  E11.42 metFORMIN (GLUCOPHAGE) 1000 MG tablet     Comprehensive Metabolic Panel   3. Insomnia, unspecified type  G47.00 amitriptyline (ELAVIL) 25 MG tablet     zolpidem (AMBIEN) 10 MG tablet   4. Anxiety  F41.9 clonazePAM (KLONOPIN) 0.5 MG tablet   5. Chronic kidney disease, stage III (moderate) ([facility])  N18.3 Comprehensive Metabolic Panel   6. At high risk for falls  Z91.81        Continue current regimen but patient needs to have laboratory follow-up for her diabetes. Reviewed and discussed the concomitant use of clonazepam and Ambien. Goal will be to start increasing amitriptyline with time to try to get off of Ambien. Patient does benefit from clonazepam.There are no signs of any abuse, misuse, or usage of the controlled substance in a way that is not directed. Orders Placed This Encounter   Medications    metFORMIN (GLUCOPHAGE) 1000 MG tablet     Sig: Take 1 tablet by mouth 2 times daily (with meals)     Dispense:  180 tablet     Refill:  3    rOPINIRole (REQUIP) 0.5 MG tablet     Sig: TAKE (1) TABLET BY MOUTH TWICE A DAY.      Dispense:  180 tablet     Refill:  3     NO FURTHER REFILLS UNTIL SCHEDULED    amitriptyline (ELAVIL) 25 MG tablet     Sig: Take 1 tablet by mouth nightly TAKE ONE TABLET BY MOUTH AT BEDTIME. Dispense:  90 tablet     Refill:  3     NO FURTHER REFILLS UNTIL SEEN    clonazePAM (KLONOPIN) 0.5 MG tablet     Sig: TAKE 1 TABLET BY MOUTH TWICE DAILY AS NEEDED FOR ANXIETY FOR UP TO 30DAYS     Dispense:  60 tablet     Refill:  5     NO FURTHER REFILLS UNTIL SEEN    zolpidem (AMBIEN) 10 MG tablet     Sig: Take 1 tablet by mouth nightly as needed for Sleep for up to 30 doses. Dispense:  30 tablet     Refill:  5       Orders Placed This Encounter   Procedures    Comprehensive Metabolic Panel     Standing Status:   Standing     Number of Occurrences:   20     Standing Expiration Date:   7/20/2030       Return in about 3 months (around 10/22/2020) for POCT A1C, OV, POCT UDS. Angi Moura is a 71 y.o. female being evaluated by a Virtual Visit (video visit) encounter to address concerns as mentioned above. A caregiver was present when appropriate. Due to this being a TeleHealth encounter (During CEDUA-32 public health emergency), evaluation of the following organ systems was limited: Vitals/Constitutional/EENT/Resp/CV/GI//MS/Neuro/Skin/Heme-Lymph-Imm. Pursuant to the emergency declaration under the [address] [place] [address], [address] [place] [address] authority and the [employer] Act, this Virtual Visit was conducted with patient's (and/or legal guardian's) consent, to reduce the patient's risk of exposure to COVID-19 and provide necessary medical care. The patient (and/or legal guardian) has also been advised to contact this office for worsening conditions or problems, and seek emergency medical treatment and/or call 911 if deemed necessary. Services were provided through a video synchronous discussion virtually to substitute for in-person clinic visit. Patient and provider were located at their individual homes or provider at secure site for business.     --Ginger Neil Herminia Akins MD on 8/3/2020 at 7:05 AM    An electronic signature was used to authenticate this note.

## 2020-08-03 ASSESSMENT — ENCOUNTER SYMPTOMS
WHEEZING: 0
DIARRHEA: 0
VOMITING: 0
COUGH: 0
CHEST TIGHTNESS: 0
ABDOMINAL PAIN: 0
SHORTNESS OF BREATH: 0
NAUSEA: 0
CONSTIPATION: 0
BACK PAIN: 1

## 2020-08-03 NOTE — PROGRESS NOTES
Welcome to [facility] Annual Wellness Visit  Name: Ha Tamayo Date: 20   MRN: [id number] Sex: Female   Age: 71 y.o. Ethnicity: Non-/Non    : 1955 Race: Kin Ricks is here for Medicare AWV (welcome to medicare) and Diabetes    Screenings for behavioral, psychosocial and functional/safety risks, and cognitive dysfunction are all negative except as indicated below. These results, as well as other patient data from the [address] form, are documented in Flowsheets linked to this Encounter. Allergies   Allergen Reactions    Codeine Shortness Of Breath    Morphine Shortness Of Breath    Sulfa Antibiotics          Prior to Visit Medications    Medication Sig Taking? Authorizing Provider   metFORMIN (GLUCOPHAGE) 1000 MG tablet Take 1 tablet by mouth 2 times daily (with meals) Yes Florinda Downey MD   rOPINIRole (REQUIP) 0.5 MG tablet TAKE (1) TABLET BY MOUTH TWICE A DAY. Yes Florinda Downey MD   amitriptyline (ELAVIL) 25 MG tablet Take 1 tablet by mouth nightly TAKE ONE TABLET BY MOUTH AT BEDTIME. Yes Florinda Downey MD   clonazePAM (KLONOPIN) 0.5 MG tablet TAKE 1 TABLET BY MOUTH TWICE DAILY AS NEEDED FOR ANXIETY FOR UP TO 30DAYS Yes Florinda Downey MD   zolpidem (AMBIEN) 10 MG tablet Take 1 tablet by mouth nightly as needed for Sleep for up to 30 doses.  Yes Florinda Downey MD   cyanocobalamin 1000 MCG/ML injection Inject 1 mL into the muscle every 30 days  Florinda Downey MD   Cyanocobalamin (B-12) 1000 MCG/ML KIT Needles and syringe size 25 gauge or smaller, substitute as needed 1\" - 12 needles  Florinda Downey MD   ondansetron (ZOFRAN) 4 MG tablet Take 1 tablet by mouth daily as needed for Nausea or Vomiting  Florinda Downey MD   Semaglutide,0.25 or 0.5MG/DOS, (OZEMPIC, 0.25 OR 0.5 MG/DOSE,) 2 MG/1.5ML SOPN Inject 0.5 mg into the skin once a week  FLOR Ayers   Insulin Pen Needle 32G X 4 MM MISC 1 each by Does not apply route daily  Bevelyn Crooked, FLOR   ondansetron (ZOFRAN) 4 MG tablet TAKE 1 OR 2 TABLETS BY MOUTH EVERY 8 HOURS AS NEEDED FOR NAUSEA AND VOMITING  Chayito Mcbride MD   tiZANidine (ZANAFLEX) 4 MG tablet Take 4 mg by mouth every 8 hours as needed  Historical Provider, MD   escitalopram (LEXAPRO) 20 MG tablet Take 1 tablet by mouth daily  FLOR Wheeler   celecoxib (CELEBREX) 200 MG capsule TAKE 1 CAPSULE BY MOUTH DAILY IN THE MORNING  Chayito Mcbride MD   glimepiride (AMARYL) 2 MG tablet TAKE 1 TABLET BY MOUTH DAILY IN THE MORNING (BEFORE BREAKFAST)  Chayito Mcbride MD   simvastatin (ZOCOR) 40 MG tablet Take 1 tablet by mouth nightly  Chayito Mcbride MD   gabapentin (NEURONTIN) 300 MG capsule Take 1 capsule by mouth 3 times daily for 180 days. Intended supply: 30 days  Chayito Mcbride MD   colestipol (COLESTID) 1 g tablet TAKE (1) TABLET BY MOUTH TWICE A DAY.   Chayito Mcbride MD   lisinopril (PRINIVIL;ZESTRIL) 20 MG tablet Take 1 tablet by mouth daily  Chayito Mcbride MD   Insulin Pen Needle (B-D UF III MINI PEN NEEDLES) 31G X 5 MM MISC Inject 1 each as directed daily  Chayito Mcbride MD   aspirin EC 81 MG EC tablet Take 1 tablet by mouth daily  Chayito Mcbride MD   Diabetic Shoe MISC by Does not apply route With insert Dx E11.9  Bright Pickering DO   oxyCODONE-acetaminophen (PERCOCET)  MG per tablet Take 1 tablet by mouth every 8 hours as needed   Historical Provider, MD         Past Medical History:   Diagnosis Date    Anxiety     Cervical disc disease     Chest discomfort 10/17/2014    H/o negative stress tests AUC indication 15, AUC score 4, [facility] 2012;[id number] 10/17/2014  Cath  Mild cad,normal LVFX      Diabetes mellitus ([facility] 10/17/2014    Diabetic neuropathy ([facility]     Hyperlipidemia     Hypertension     Insomnia     Insomnia     Osteoarthritis     Restless leg syndrome        Past Surgical History:   Procedure Laterality Date    CARDIAC CATHETERIZATION  10/17/14  JDT    EF 50%    CHOLECYSTECTOMY      FOOT SURGERY 08/22/1969    Cervical cancer screen  08/22/1975    Shingles Vaccine (1 of 2) 08/22/2004    DEXA (modify frequency per FRAX score)  08/22/2009    Colon cancer screen colonoscopy  05/04/2015    Breast cancer screen  01/31/2019    Annual Wellness Visit (AWV)  09/01/2019    Diabetic foot exam  10/03/2019    Diabetic microalbuminuria test  05/01/2020    A1C test (Diabetic or Prediabetic)  05/03/2020    Flu vaccine (1) 09/01/2020    Lipid screen  09/18/2020    Potassium monitoring  02/03/2021    Creatinine monitoring  02/03/2021    Pneumococcal 65+ years Vaccine (1 of 1 - PPSV23) 03/01/2021    DTaP/Tdap/Td vaccine (2 - Td) 01/03/2023    Hepatitis A vaccine  Aged Out    Hib vaccine  Aged Out    Meningococcal (ACWY) vaccine  Aged Out     Recommendations for [place] Due: see orders and patient instructions/AVS.  . Recommended screening schedule for the next 5-10 years is provided to the patient in written form: see Patient Clara Todd was seen today for medicare awv and diabetes. Diagnoses and all orders for this visit:    Routine general medical examination at a health care facility    Type 2 diabetes mellitus with diabetic polyneuropathy, without long-term current use of insulin (HCC)  -     metFORMIN (GLUCOPHAGE) 1000 MG tablet; Take 1 tablet by mouth 2 times daily (with meals)  -     Comprehensive Metabolic Panel; Standing    Insomnia, unspecified type  -     amitriptyline (ELAVIL) 25 MG tablet; Take 1 tablet by mouth nightly TAKE ONE TABLET BY MOUTH AT BEDTIME.  -     zolpidem (AMBIEN) 10 MG tablet; Take 1 tablet by mouth nightly as needed for Sleep for up to 30 doses.     Anxiety  -     clonazePAM (KLONOPIN) 0.5 MG tablet; TAKE 1 TABLET BY MOUTH TWICE DAILY AS NEEDED FOR ANXIETY FOR UP TO 30DAYS    Chronic kidney disease, stage III (moderate) (HCC)  -     Comprehensive Metabolic Panel; Standing    At high risk for falls    Other orders  -     rOPINIRole (REQUIP) 0.5 MG tablet; TAKE (1) TABLET BY MOUTH TWICE A DAY. Jass Love is a 71 y.o. female being evaluated by a Virtual Visit (video and audio) encounter to address concerns as mentioned above. A caregiver was present when appropriate. Due to this being a TeleHealth encounter (During [place] [address] public health emergency), evaluation of the following organ systems was limited: Vitals/Constitutional/EENT/Resp/CV/GI//MS/Neuro/Skin/Heme-Lymph-Imm. Pursuant to the emergency declaration under the [facility], [address] [place] [address] authority and the [employer] Act, this Virtual Visit was conducted with patient's (and/or legal guardian's) consent, to reduce the patient's risk of exposure to COVID-19 and provide necessary medical care. The patient (and/or legal guardian) has also been advised to contact this office for worsening conditions or problems, and seek emergency medical treatment and/or call 911 if deemed necessary. Patient identification was verified at the start of the visit: Yes    Services were provided through a video synchronous discussion virtually to substitute for in-person clinic visit. Patient and provider were located at their individual homes. --Jessica Velasquez MD on 8/3/2020 at 7:03 AM    An electronic signature was used to authenticate this note. On the basis of positive falls risk screening, assessment and plan is as follows: home safety tips provided.

## 2020-08-03 NOTE — PATIENT INSTRUCTIONS
Personalized Preventive Plan for Maria A Serrano - 7/22/2020  Medicare offers a range of preventive health benefits. Some of the tests and screenings are paid in full while other may be subject to a deductible, co-insurance, and/or copay. Some of these benefits include a comprehensive review of your medical history including lifestyle, illnesses that may run in your family, and various assessments and screenings as appropriate. After reviewing your medical record and screening and assessments performed today your provider may have ordered immunizations, labs, imaging, and/or referrals for you. A list of these orders (if applicable) as well as your Preventive Care list are included within your After Visit Summary for your review. Other Preventive Recommendations:    · A preventive eye exam performed by an eye specialist is recommended every 1-2 years to screen for glaucoma; cataracts, macular degeneration, and other eye disorders. · A preventive dental visit is recommended every 6 months. · Try to get at least 150 minutes of exercise per week or 10,000 steps per day on a pedometer . · Order or download the FREE \"Exercise & Physical Activity: Your Everyday Guide\" from [employer]. Call 4-746.408.9823 or search [employer] online. · You need 8385-3795 mg of calcium and 8767-1125 IU of vitamin D per day. It is possible to meet your calcium requirement with diet alone, but a vitamin D supplement is usually necessary to meet this goal.  · When exposed to the sun, use a sunscreen that protects against both UVA and UVB radiation with an SPF of 30 or greater. Reapply every 2 to 3 hours or after sweating, drying off with a towel, or swimming. · Always wear a seat belt when traveling in a car. Always wear a helmet when riding a bicycle or motorcycle. Personalized Preventive Plan for Maria A Serrano - 7/22/2020  Medicare offers a range of preventive health benefits.  Some of the tests and screenings are paid in full while other may be subject to a deductible, co-insurance, and/or copay. Some of these benefits include a comprehensive review of your medical history including lifestyle, illnesses that may run in your family, and various assessments and screenings as appropriate. After reviewing your medical record and screening and assessments performed today your provider may have ordered immunizations, labs, imaging, and/or referrals for you. A list of these orders (if applicable) as well as your Preventive Care list are included within your After Visit Summary for your review. Other Preventive Recommendations:    A preventive eye exam performed by an eye specialist is recommended every 1-2 years to screen for glaucoma; cataracts, macular degeneration, and other eye disorders. A preventive dental visit is recommended every 6 months. Try to get at least 150 minutes of exercise per week or 10,000 steps per day on a pedometer . Order or download the FREE \"Exercise & Physical Activity: Your Everyday Guide\" from [employer]. Call 8-762.523.2822 or search [employer] online. You need 6508-2388 mg of calcium and 1762-3664 IU of vitamin D per day. It is possible to meet your calcium requirement with diet alone, but a vitamin D supplement is usually necessary to meet this goal.  When exposed to the sun, use a sunscreen that protects against both UVA and UVB radiation with an SPF of 30 or greater. Reapply every 2 to 3 hours or after sweating, drying off with a towel, or swimming. Always wear a seat belt when traveling in a car. Always wear a helmet when riding a bicycle or motorcycle.

## 2020-08-19 ENCOUNTER — TELEPHONE (OUTPATIENT)
Dept: PRIMARY CARE CLINIC | Age: 65
End: 2020-08-19

## 2020-08-19 NOTE — TELEPHONE ENCOUNTER
Cory Arias called this afternoon requesting an appt for in office with Dr. Sanjeev Hernández. She advises that she is getting pains in her legs which is causing her to fall. Please contact patient to discuss. Thank you.

## 2020-09-07 ENCOUNTER — APPOINTMENT (OUTPATIENT)
Dept: GENERAL RADIOLOGY | Facility: HOSPITAL | Age: 65
End: 2020-09-07

## 2020-09-07 ENCOUNTER — HOSPITAL ENCOUNTER (EMERGENCY)
Facility: HOSPITAL | Age: 65
Discharge: HOME | End: 2020-09-07
Attending: EMERGENCY MEDICINE | Admitting: EMERGENCY MEDICINE

## 2020-09-07 ENCOUNTER — APPOINTMENT (OUTPATIENT)
Dept: CT IMAGING | Facility: HOSPITAL | Age: 65
End: 2020-09-07

## 2020-09-07 VITALS
OXYGEN SATURATION: 97 % | HEART RATE: 79 BPM | TEMPERATURE: 97.8 F | DIASTOLIC BLOOD PRESSURE: 81 MMHG | WEIGHT: 155 LBS | HEIGHT: 65 IN | SYSTOLIC BLOOD PRESSURE: 145 MMHG | RESPIRATION RATE: 16 BRPM | BODY MASS INDEX: 25.83 KG/M2

## 2020-09-07 DIAGNOSIS — R73.9 HYPERGLYCEMIA: Primary | ICD-10-CM

## 2020-09-07 LAB
ACETONE BLD QL: NEGATIVE
ANION GAP SERPL CALCULATED.3IONS-SCNC: 14 MMOL/L (ref 5–15)
ATMOSPHERIC PRESS: 750 MMHG
BASE EXCESS BLDV CALC-SCNC: 0 MMOL/L (ref 0–2)
BASOPHILS # BLD AUTO: 0.06 10*3/MM3 (ref 0–0.2)
BASOPHILS NFR BLD AUTO: 0.8 % (ref 0–1.5)
BDY SITE: ABNORMAL
BODY TEMPERATURE: 37 C
BUN SERPL-MCNC: 8 MG/DL (ref 8–23)
BUN/CREAT SERPL: 10.8 (ref 7–25)
CALCIUM SPEC-SCNC: 9.3 MG/DL (ref 8.6–10.5)
CHLORIDE SERPL-SCNC: 93 MMOL/L (ref 98–107)
CO2 SERPL-SCNC: 25 MMOL/L (ref 22–29)
CREAT SERPL-MCNC: 0.74 MG/DL (ref 0.57–1)
DEPRECATED RDW RBC AUTO: 40.1 FL (ref 37–54)
EOSINOPHIL # BLD AUTO: 0.08 10*3/MM3 (ref 0–0.4)
EOSINOPHIL NFR BLD AUTO: 1.1 % (ref 0.3–6.2)
ERYTHROCYTE [DISTWIDTH] IN BLOOD BY AUTOMATED COUNT: 13.2 % (ref 12.3–15.4)
GFR SERPL CREATININE-BSD FRML MDRD: 79 ML/MIN/1.73
GLUCOSE BLDC GLUCOMTR-MCNC: 334 MG/DL (ref 70–130)
GLUCOSE BLDC GLUCOMTR-MCNC: 412 MG/DL (ref 70–130)
GLUCOSE BLDC GLUCOMTR-MCNC: 483 MG/DL (ref 70–130)
GLUCOSE SERPL-MCNC: 518 MG/DL (ref 65–99)
HCO3 BLDV-SCNC: 25.2 MMOL/L (ref 22–28)
HCT VFR BLD AUTO: 34.4 % (ref 34–46.6)
HGB BLD-MCNC: 11.1 G/DL (ref 12–15.9)
HOLD SPECIMEN: NORMAL
HOLD SPECIMEN: NORMAL
IMM GRANULOCYTES # BLD AUTO: 0.02 10*3/MM3 (ref 0–0.05)
IMM GRANULOCYTES NFR BLD AUTO: 0.3 % (ref 0–0.5)
LYMPHOCYTES # BLD AUTO: 3.05 10*3/MM3 (ref 0.7–3.1)
LYMPHOCYTES NFR BLD AUTO: 41.3 % (ref 19.6–45.3)
Lab: ABNORMAL
MAGNESIUM SERPL-MCNC: 1.6 MG/DL (ref 1.6–2.4)
MCH RBC QN AUTO: 26.9 PG (ref 26.6–33)
MCHC RBC AUTO-ENTMCNC: 32.3 G/DL (ref 31.5–35.7)
MCV RBC AUTO: 83.5 FL (ref 79–97)
MODALITY: ABNORMAL
MONOCYTES # BLD AUTO: 0.8 10*3/MM3 (ref 0.1–0.9)
MONOCYTES NFR BLD AUTO: 10.8 % (ref 5–12)
NEUTROPHILS NFR BLD AUTO: 3.38 10*3/MM3 (ref 1.7–7)
NEUTROPHILS NFR BLD AUTO: 45.7 % (ref 42.7–76)
NRBC BLD AUTO-RTO: 0 /100 WBC (ref 0–0.2)
OSMOLALITY SERPL: 303 MOSM/KG (ref 289–308)
PCO2 BLDV: 42 MM HG (ref 41–51)
PH BLDV: 7.39 PH UNITS (ref 7.32–7.42)
PHOSPHATE SERPL-MCNC: 3.3 MG/DL (ref 2.5–4.5)
PLATELET # BLD AUTO: 307 10*3/MM3 (ref 140–450)
PMV BLD AUTO: 10.3 FL (ref 6–12)
PO2 BLDV: 41.1 MM HG (ref 27–53)
POTASSIUM SERPL-SCNC: 3.2 MMOL/L (ref 3.5–5.2)
RBC # BLD AUTO: 4.12 10*6/MM3 (ref 3.77–5.28)
SAO2 % BLDCOV: 75.4 % (ref 45–75)
SARS-COV-2 RDRP RESP QL NAA+PROBE: NOT DETECTED
SODIUM SERPL-SCNC: 132 MMOL/L (ref 136–145)
TROPONIN T SERPL-MCNC: <0.01 NG/ML (ref 0–0.03)
VENTILATOR MODE: ABNORMAL
WBC # BLD AUTO: 7.39 10*3/MM3 (ref 3.4–10.8)
WHOLE BLOOD HOLD SPECIMEN: NORMAL

## 2020-09-07 PROCEDURE — 70450 CT HEAD/BRAIN W/O DYE: CPT

## 2020-09-07 PROCEDURE — 82962 GLUCOSE BLOOD TEST: CPT

## 2020-09-07 PROCEDURE — 84484 ASSAY OF TROPONIN QUANT: CPT | Performed by: EMERGENCY MEDICINE

## 2020-09-07 PROCEDURE — 84100 ASSAY OF PHOSPHORUS: CPT | Performed by: EMERGENCY MEDICINE

## 2020-09-07 PROCEDURE — 96365 THER/PROPH/DIAG IV INF INIT: CPT

## 2020-09-07 PROCEDURE — 83930 ASSAY OF BLOOD OSMOLALITY: CPT | Performed by: EMERGENCY MEDICINE

## 2020-09-07 PROCEDURE — 82803 BLOOD GASES ANY COMBINATION: CPT

## 2020-09-07 PROCEDURE — 80048 BASIC METABOLIC PNL TOTAL CA: CPT | Performed by: EMERGENCY MEDICINE

## 2020-09-07 PROCEDURE — 87040 BLOOD CULTURE FOR BACTERIA: CPT | Performed by: EMERGENCY MEDICINE

## 2020-09-07 PROCEDURE — 85025 COMPLETE CBC W/AUTO DIFF WBC: CPT | Performed by: EMERGENCY MEDICINE

## 2020-09-07 PROCEDURE — 63710000001 INSULIN REGULAR HUMAN PER 5 UNITS: Performed by: EMERGENCY MEDICINE

## 2020-09-07 PROCEDURE — 99284 EMERGENCY DEPT VISIT MOD MDM: CPT

## 2020-09-07 PROCEDURE — 87635 SARS-COV-2 COVID-19 AMP PRB: CPT | Performed by: EMERGENCY MEDICINE

## 2020-09-07 PROCEDURE — 71045 X-RAY EXAM CHEST 1 VIEW: CPT

## 2020-09-07 PROCEDURE — 25010000003 POTASSIUM CHLORIDE 10 MEQ/100ML SOLUTION: Performed by: EMERGENCY MEDICINE

## 2020-09-07 PROCEDURE — 83735 ASSAY OF MAGNESIUM: CPT | Performed by: EMERGENCY MEDICINE

## 2020-09-07 PROCEDURE — 82009 KETONE BODYS QUAL: CPT | Performed by: EMERGENCY MEDICINE

## 2020-09-07 RX ORDER — POTASSIUM CHLORIDE 7.45 MG/ML
10 INJECTION INTRAVENOUS ONCE
Status: COMPLETED | OUTPATIENT
Start: 2020-09-07 | End: 2020-09-07

## 2020-09-07 RX ADMIN — INSULIN HUMAN 10 UNITS: 100 INJECTION, SOLUTION PARENTERAL at 04:55

## 2020-09-07 RX ADMIN — POTASSIUM CHLORIDE 10 MEQ: 7.46 INJECTION, SOLUTION INTRAVENOUS at 04:56

## 2020-09-07 RX ADMIN — SODIUM CHLORIDE 1000 ML: 9 INJECTION, SOLUTION INTRAVENOUS at 04:55

## 2020-09-07 RX ADMIN — SODIUM CHLORIDE 1000 ML: 9 INJECTION, SOLUTION INTRAVENOUS at 03:55

## 2020-09-07 NOTE — DISCHARGE INSTRUCTIONS
[place],    Your blood sugars were over 500 today! Please talk to your family doctor about possible medication adjustment or even inclusion of insulin should they feel that is necessary.

## 2020-09-07 NOTE — ED PROVIDER NOTES
Subjective   66 y/o female with history of NIDDM (only on metformin) arrives for evaluation of generalized weakness, ataxia, vomiting, diffuse head to toe pain and hyperglycemia for the last two days. She notes her BS will go anywhere from 400 to HIGH on her meter. She notes vomiting but denies any diarrhea. She notes her ataxia is worse when gets up to ambulate noting she has fallen several times as she feels unsteady on her feet. She denies any LOC or head trauma, CP, SOB, fevers, chills or medication changes, abdominal pain or other issues. She arrives in [facility].         Family, social and past history reviewed as below, prior documentation of H and Ps and other documentation are reviewed:    Past Medical History:  No date: Acid reflux  No date: Anxiety  No date: Diabetes mellitus (CMS/HCC)  No date: Hyperlipidemia  No date: Hypertension  No date: Insomnia  No date: Migraines  No date: Muscle, jerky movements (uncontrolled)  No date: Panic attack  No date: PONV (postoperative nausea and vomiting)    Past Surgical History:  10/10/2018: ANKLE OPEN REDUCTION INTERNAL FIXATION; Right      Comment:  Procedure: ANKLE OPEN REDUCTION INTERNAL FIXATION -                RIGHT;  Surgeon: Bart Oglesby MD;  Location: [facility] OR;  Service: Orthopedics  1/21/2019: ANTERIOR CERVICAL DISCECTOMY W/ FUSION; N/A      Comment:  Procedure: ANTERIOR CERVICAL DISCECTOMY FUSION C3-4                POSSIBLE REMOVAL OF INSTRUMENTATION C4-7;  Surgeon:                CARLA Bird MD;  Location:  [facility] OR;  Service:                Orthopedic Spine  No date: APPENDECTOMY  No date: CHOLECYSTECTOMY  No date: HYSTERECTOMY  No date: NECK SURGERY      Comment:  x3    Social History    Socioeconomic History      Marital status:       Spouse name: Not on file      Number of children: Not on file      Years of education: Not on file      Highest education level: Not on file    Tobacco Use      Smoking status: Never  Smoker      Smokeless tobacco: Never Used    Substance and Sexual Activity      Alcohol use: No      Drug use: No      Sexual activity: Defer      Family history: reviewed and noncontributory           Review of Systems   All other systems reviewed and are negative.      Past Medical History:   Diagnosis Date   • Acid reflux    • Anxiety    • Diabetes mellitus (CMS/HCC)    • Hyperlipidemia    • Hypertension    • Insomnia    • Migraines    • Muscle, jerky movements (uncontrolled)    • Panic attack    • PONV (postoperative nausea and vomiting)        Allergies   Allergen Reactions   • Codeine Shortness Of Breath   • Morphine Shortness Of Breath       Past Surgical History:   Procedure Laterality Date   • ANKLE OPEN REDUCTION INTERNAL FIXATION Right 10/10/2018    Procedure: ANKLE OPEN REDUCTION INTERNAL FIXATION - RIGHT;  Surgeon: Bart Oglesby MD;  Location: [facility] OR;  Service: Orthopedics   • ANTERIOR CERVICAL DISCECTOMY W/ FUSION N/A 1/21/2019    Procedure: ANTERIOR CERVICAL DISCECTOMY FUSION C3-4 POSSIBLE REMOVAL OF INSTRUMENTATION C4-7;  Surgeon: CARLA Bird MD;  Location: [facility] OR;  Service: Orthopedic Spine   • APPENDECTOMY     • CHOLECYSTECTOMY     • HYSTERECTOMY     • NECK SURGERY      x3       Family History   Problem Relation Age of Onset   • Heart disease Mother    • Heart disease Father    • Leukemia Father        Social History     Socioeconomic History   • Marital status:      Spouse name: Not on file   • Number of children: Not on file   • Years of education: Not on file   • Highest education level: Not on file   Tobacco Use   • Smoking status: Never Smoker   • Smokeless tobacco: Never Used   Substance and Sexual Activity   • Alcohol use: No   • Drug use: No   • Sexual activity: Defer           Objective   Physical Exam   Constitutional: She is oriented to person, place, and time. She appears well-developed and well-nourished.   HENT:   Head: Normocephalic and atraumatic.   Eyes:  Pupils are equal, round, and reactive to light. Conjunctivae and EOM are normal.   Neck: Normal range of motion. Neck supple.   Cardiovascular: Regular rhythm, normal heart sounds and intact distal pulses. Tachycardia present.   Pulmonary/Chest: Effort normal and breath sounds normal. No stridor. No respiratory distress. She has no wheezes.   Musculoskeletal: Normal range of motion. She exhibits no edema or tenderness.   Neurological: She is alert and oriented to person, place, and time. No cranial nerve deficit or sensory deficit. She exhibits normal muscle tone. Coordination normal.   Skin: Skin is warm. Capillary refill takes less than 2 seconds.   Psychiatric: She has a normal mood and affect. Her behavior is normal.   Vitals reviewed.      Procedures           ED Course  ED Course as of Sep 07 0551   Mon Sep 07, 2020   0549 No signs of DKA or HHS. She states all of her symptoms have improved since arrival. I did have a very long talk with her about need to talk to her PMD as she may need to go on insulin. She expresses understanding and asks for discharge at this time.     [JH]      ED Course User Index  [JH] Hong Castro MD            XR Chest 1 View   ED Interpretation   Right hemidiaphragm elevation, no acute cardiopulmonary process.       CT Head Without Contrast    (Results Pending)     Labs Reviewed   BASIC METABOLIC PANEL - Abnormal; Notable for the following components:       Result Value    Glucose 518 (*)     Sodium 132 (*)     Potassium 3.2 (*)     Chloride 93 (*)     All other components within normal limits    Narrative:     GFR Normal >60  Chronic Kidney Disease <60  Kidney Failure <15     CBC WITH AUTO DIFFERENTIAL - Abnormal; Notable for the following components:    Hemoglobin 11.1 (*)     All other components within normal limits   BLOOD GAS, VENOUS - Abnormal; Notable for the following components:    O2 Saturation, Venous 75.4 (*)     All other components within normal limits   POCT  GLUCOSE FINGERSTICK - Abnormal; Notable for the following components:    Glucose 412 (*)     All other components within normal limits   POCT GLUCOSE FINGERSTICK - Abnormal; Notable for the following components:    Glucose 483 (*)     All other components within normal limits   POCT GLUCOSE FINGERSTICK - Abnormal; Notable for the following components:    Glucose 334 (*)     All other components within normal limits   COVID-19,ABBOTT IN-HOUSE,NP SWAB (NO TRANSPORT MEDIA) 2 HR TAT - Normal    Narrative:     Fact sheet for providers: https://www.fda.gov/media/660889/download     Fact sheet for patients: https://www.fda.gov/media/638534/download   OSMOLALITY, SERUM - Normal   TROPONIN (IN-HOUSE) - Normal    Narrative:     Troponin T Reference Range:  <= 0.03 ng/mL-   Negative for AMI  >0.03 ng/mL-     Abnormal for myocardial necrosis.  Clinicians would have to utilize clinical acumen, EKG, Troponin and serial changes to determine if it is an Acute Myocardial Infarction or myocardial injury due to an underlying chronic condition.       Results may be falsely decreased if patient taking Biotin.     MAGNESIUM - Normal   PHOSPHORUS - Normal   ACETONE - Normal   COVID PRE-OP / PRE-PROCEDURE SCREENING ORDER (NO ISOLATION)    Narrative:     The following orders were created for panel order COVID PRE-OP / PRE-PROCEDURE SCREENING ORDER (NO ISOLATION) - Swab, Nasal Cavity.  Procedure                               Abnormality         Status                     ---------                               -----------         ------                     COVID-19, ABBOTT IN-HOUS...[[id number]]  Normal              Final result                 Please view results for these tests on the individual orders.   BLOOD CULTURE   BLOOD CULTURE   BLOOD GAS, VENOUS   RAINBOW DRAW    Narrative:     The following orders were created for panel order [place] Draw.  Procedure                               Abnormality         Status                      ---------                               -----------         ------                     Light Blue Top[[id number]]                                   Final result               Red Top[[id number]]                                          Final result               Gray Top - Ice[[id number]]                                   Final result                 Please view results for these tests on the individual orders.   GRAY TOP - ICE   URINALYSIS W/ CULTURE IF INDICATED   POCT GLUCOSE FINGERSTICK   POCT GLUCOSE FINGERSTICK   CBC AND DIFFERENTIAL    Narrative:     The following orders were created for panel order CBC & Differential.  Procedure                               Abnormality         Status                     ---------                               -----------         ------                     CBC Auto Differential[[id number]]        Abnormal            Final result                 Please view results for these tests on the individual orders.   KETONE BODIES SERUM    Narrative:     The following orders were created for panel order Ketone Bodies, Serum (Not performed at [place]).  Procedure                               Abnormality         Status                     ---------                               -----------         ------                     Acetone[[id number]]                      Normal              Final result                 Please view results for these tests on the individual orders.   LIGHT BLUE TOP   RED TOP                                         [facility]    Final diagnoses:   Hyperglycemia            Hong Castro MD  09/07/20 0552

## 2020-09-09 RX ORDER — CLONAZEPAM 0.5 MG/1
TABLET ORAL
Qty: 60 TABLET | Refills: 5 | Status: CANCELLED | OUTPATIENT
Start: 2020-09-09 | End: 2020-10-09

## 2020-09-09 NOTE — ED NOTES
"ED Call Back Questions    1. How are you doing since leaving the Emergency Department? Doing ok     2. Do you have any questions about your discharge instructions? No     3. Have you filled your new prescriptions yet? N/A  a. Do you have any questions about those medications? N/A    4. Were you able to make a follow-up appointment with the physician? No     5. Do you have a primary care physician? Yes   a. If No, would you like for me to set you up with one? No   i. If Yes, “I will have our ED  give you a call right back at this number to work with you on the best time for an appointment.”    6. We are always looking to get better at what we do. Do you have any suggestions for what we can do to be even better? N/A  a. If Yes, \"Thank you for sharing your concerns. I apologize. I will follow up with our manager and patient . Would you like someone to call you back?\" N/A    7. Is there anything else I can do for you? No thank you for the call, good visit       Noah Stuart  09/09/20 3453    "

## 2020-09-12 LAB
BACTERIA SPEC AEROBE CULT: NORMAL
BACTERIA SPEC AEROBE CULT: NORMAL

## 2020-09-18 ENCOUNTER — OFFICE VISIT (OUTPATIENT)
Dept: PRIMARY CARE CLINIC | Age: 65
End: 2020-09-18
Payer: MEDICARE

## 2020-09-18 VITALS
DIASTOLIC BLOOD PRESSURE: 80 MMHG | SYSTOLIC BLOOD PRESSURE: 128 MMHG | WEIGHT: 150.2 LBS | HEIGHT: 65 IN | TEMPERATURE: 97.8 F | HEART RATE: 104 BPM | BODY MASS INDEX: 25.02 KG/M2 | OXYGEN SATURATION: 96 %

## 2020-09-18 DIAGNOSIS — E87.6 HYPOKALEMIA: ICD-10-CM

## 2020-09-18 LAB
ALBUMIN SERPL-MCNC: 4.3 G/DL (ref 3.5–5.2)
ALP BLD-CCNC: 120 U/L (ref 35–104)
ALT SERPL-CCNC: 11 U/L (ref 5–33)
ANION GAP SERPL CALCULATED.3IONS-SCNC: 13 MMOL/L (ref 7–19)
AST SERPL-CCNC: 14 U/L (ref 5–32)
BILIRUB SERPL-MCNC: 0.4 MG/DL (ref 0.2–1.2)
BUN BLDV-MCNC: 12 MG/DL (ref 8–23)
CALCIUM SERPL-MCNC: 9.4 MG/DL (ref 8.8–10.2)
CHLORIDE BLD-SCNC: 98 MMOL/L (ref 98–111)
CO2: 30 MMOL/L (ref 22–29)
CREAT SERPL-MCNC: 0.6 MG/DL (ref 0.5–0.9)
GFR AFRICAN AMERICAN: >59
GFR NON-AFRICAN AMERICAN: >60
GLUCOSE BLD-MCNC: 235 MG/DL (ref 74–109)
HBA1C MFR BLD: 11.2 %
POTASSIUM SERPL-SCNC: 4.1 MMOL/L (ref 3.5–5)
SODIUM BLD-SCNC: 141 MMOL/L (ref 136–145)
TOTAL PROTEIN: 7.4 G/DL (ref 6.6–8.7)

## 2020-09-18 PROCEDURE — 2022F DILAT RTA XM EVC RTNOPTHY: CPT | Performed by: NURSE PRACTITIONER

## 2020-09-18 PROCEDURE — 3017F COLORECTAL CA SCREEN DOC REV: CPT | Performed by: NURSE PRACTITIONER

## 2020-09-18 PROCEDURE — 1036F TOBACCO NON-USER: CPT | Performed by: NURSE PRACTITIONER

## 2020-09-18 PROCEDURE — 1090F PRES/ABSN URINE INCON ASSESS: CPT | Performed by: NURSE PRACTITIONER

## 2020-09-18 PROCEDURE — 90694 VACC AIIV4 NO PRSRV 0.5ML IM: CPT | Performed by: NURSE PRACTITIONER

## 2020-09-18 PROCEDURE — G0008 ADMIN INFLUENZA VIRUS VAC: HCPCS | Performed by: NURSE PRACTITIONER

## 2020-09-18 PROCEDURE — 1123F ACP DISCUSS/DSCN MKR DOCD: CPT | Performed by: NURSE PRACTITIONER

## 2020-09-18 PROCEDURE — G8420 CALC BMI NORM PARAMETERS: HCPCS | Performed by: NURSE PRACTITIONER

## 2020-09-18 PROCEDURE — G8400 PT W/DXA NO RESULTS DOC: HCPCS | Performed by: NURSE PRACTITIONER

## 2020-09-18 PROCEDURE — 99215 OFFICE O/P EST HI 40 MIN: CPT | Performed by: NURSE PRACTITIONER

## 2020-09-18 PROCEDURE — G8427 DOCREV CUR MEDS BY ELIG CLIN: HCPCS | Performed by: NURSE PRACTITIONER

## 2020-09-18 PROCEDURE — 4040F PNEUMOC VAC/ADMIN/RCVD: CPT | Performed by: NURSE PRACTITIONER

## 2020-09-18 PROCEDURE — 83036 HEMOGLOBIN GLYCOSYLATED A1C: CPT | Performed by: NURSE PRACTITIONER

## 2020-09-18 PROCEDURE — 3046F HEMOGLOBIN A1C LEVEL >9.0%: CPT | Performed by: NURSE PRACTITIONER

## 2020-09-18 RX ORDER — ONDANSETRON 4 MG/1
4 TABLET, FILM COATED ORAL EVERY 8 HOURS PRN
Qty: 30 TABLET | Refills: 0 | Status: SHIPPED | OUTPATIENT
Start: 2020-09-18 | End: 2021-02-15

## 2020-09-18 RX ORDER — PEN NEEDLE, DIABETIC 31 GX5/16"
1 NEEDLE, DISPOSABLE MISCELLANEOUS DAILY
Qty: 100 EACH | Refills: 0 | Status: SHIPPED | OUTPATIENT
Start: 2020-09-18 | End: 2021-02-15

## 2020-09-18 RX ORDER — GLUCOSAMINE HCL/CHONDROITIN SU 500-400 MG
CAPSULE ORAL
Qty: 200 STRIP | Refills: 2 | Status: SHIPPED | OUTPATIENT
Start: 2020-09-18

## 2020-09-18 RX ORDER — INSULIN GLARGINE 100 [IU]/ML
10 INJECTION, SOLUTION SUBCUTANEOUS NIGHTLY
Qty: 5 PEN | Refills: 2 | Status: SHIPPED | OUTPATIENT
Start: 2020-09-18 | End: 2020-09-24

## 2020-09-18 RX ORDER — INSULIN ASPART 100 [IU]/ML
INJECTION, SOLUTION INTRAVENOUS; SUBCUTANEOUS
Qty: 5 PEN | Refills: 3 | Status: SHIPPED | OUTPATIENT
Start: 2020-09-18 | End: 2020-09-24

## 2020-09-18 RX ORDER — LANCETS 30 GAUGE
4 EACH MISCELLANEOUS 4 TIMES DAILY
Qty: 100 EACH | Refills: 3 | Status: SHIPPED | OUTPATIENT
Start: 2020-09-18

## 2020-09-18 ASSESSMENT — ENCOUNTER SYMPTOMS
VOMITING: 0
RESPIRATORY NEGATIVE: 1
ABDOMINAL PAIN: 0
COUGH: 0
VISUAL CHANGE: 0
GASTROINTESTINAL NEGATIVE: 1
NAUSEA: 0
CHANGE IN BOWEL HABIT: 0
EYES NEGATIVE: 1
SORE THROAT: 0
SWOLLEN GLANDS: 0

## 2020-09-18 NOTE — PATIENT INSTRUCTIONS
Novolog:     Check your fasting blood sugar before breakfast, lunch, and dinner. <150: 0  151-200:  1  201-250: 2  251-300: 3  301-350: 4  351-400: 5  >400: 6     Keep a log of your sugars. Basaglar:     Start with 10 units nightly. Goal is to have morning fasting glucose around 130. Plan to check glucose every morning. If glucose is greater than 130 for two nights, then increase nightly dose by 2 units. Continue to do this until you reach a nightly dose of 30 units.

## 2020-09-18 NOTE — PROGRESS NOTES
Jass Love is a 76 y.o. female who presents today for   Chief Complaint   Patient presents with    Leg Pain     both legs hurt and give out       Patient is here for     Extremity Weakness   This is a recurrent problem. The current episode started 1 to 4 weeks ago. The problem occurs constantly. The problem has been waxing and waning. Associated symptoms include arthralgias, fatigue and weakness. Pertinent negatives include no abdominal pain, anorexia, change in bowel habit, chest pain, chills, congestion, coughing, diaphoresis, fever, headaches, joint swelling, myalgias, nausea, neck pain, numbness, rash, sore throat, swollen glands, urinary symptoms, vertigo, visual change or vomiting. The symptoms are aggravated by exertion. She has tried rest for the symptoms. The treatment provided no relief. Diabetes   She presents for her follow-up diabetic visit. She has type 2 diabetes mellitus. Hypoglycemia symptoms include dizziness. Pertinent negatives for hypoglycemia include no headaches, mood changes, nervousness/anxiousness, pallor, seizures, sleepiness, speech difficulty, sweats or tremors. Associated symptoms include fatigue, polydipsia, polyphagia and weakness. Pertinent negatives for diabetes include no chest pain and no visual change. There are no hypoglycemic complications. Symptoms are worsening. Diabetic complications include peripheral neuropathy. Risk factors for coronary artery disease include diabetes mellitus, post-menopausal, sedentary lifestyle and stress. Current diabetic treatment includes oral agent (dual therapy). She is compliant with treatment none of the time. Her weight is stable. She is following a generally unhealthy diet. She never participates in exercise. Her home blood glucose trend is increasing rapidly. Her breakfast blood glucose range is generally >200 mg/dl. Her overall blood glucose range is >200 mg/dl. Patient currently on metformin and glimepiride.  Patient reports she <150: 0  If 151-200:  1; if 201-250: 2; if 251-300: 3; if 301-350: 4; if 351-400: 5; if >400: 6 5 pen 3    blood glucose monitor strips Test 4 times a day & PRN for symptoms of irregular blood glucose. Dispense amount for testing frequency and additional for PRN testing needs 200 strip 2    Lancets MISC 4 each by Does not apply route 4 times daily 100 each 3    Insulin Pen Needle (B-D UF III MINI PEN NEEDLES) 31G X 5 MM MISC Inject 1 each as directed daily 100 each 0    metFORMIN (GLUCOPHAGE) 1000 MG tablet Take 1 tablet by mouth 2 times daily (with meals) 180 tablet 3    rOPINIRole (REQUIP) 0.5 MG tablet TAKE (1) TABLET BY MOUTH TWICE A DAY. 180 tablet 3    amitriptyline (ELAVIL) 25 MG tablet Take 1 tablet by mouth nightly TAKE ONE TABLET BY MOUTH AT BEDTIME. 90 tablet 3    zolpidem (AMBIEN) 10 MG tablet Take 1 tablet by mouth nightly as needed for Sleep for up to 30 doses. 30 tablet 5    cyanocobalamin 1000 MCG/ML injection Inject 1 mL into the muscle every 30 days 10 mL 0    Cyanocobalamin (B-12) 1000 MCG/ML KIT Needles and syringe size 25 gauge or smaller, substitute as needed 1\" - 12 needles 1 kit 0    Insulin Pen Needle 32G X 4 MM MISC 1 each by Does not apply route daily 30 each 5    escitalopram (LEXAPRO) 20 MG tablet Take 1 tablet by mouth daily 30 tablet 5    celecoxib (CELEBREX) 200 MG capsule TAKE 1 CAPSULE BY MOUTH DAILY IN THE MORNING 30 capsule 2    simvastatin (ZOCOR) 40 MG tablet Take 1 tablet by mouth nightly 30 tablet 5    colestipol (COLESTID) 1 g tablet TAKE (1) TABLET BY MOUTH TWICE A DAY.  180 tablet 2    lisinopril (PRINIVIL;ZESTRIL) 20 MG tablet Take 1 tablet by mouth daily 90 tablet 3    aspirin EC 81 MG EC tablet Take 1 tablet by mouth daily 90 tablet 5    Diabetic Shoe MISC by Does not apply route With insert Dx E11.9 1 each 0    oxyCODONE-acetaminophen (PERCOCET)  MG per tablet Take 1 tablet by mouth every 8 hours as needed       clonazePAM (KLONOPIN) 0.5 MG tablet TAKE 1 TABLET BY MOUTH TWICE DAILY AS NEEDED FOR ANXIETY FOR UP TO 30DAYS 60 tablet 5    tiZANidine (ZANAFLEX) 4 MG tablet Take 4 mg by mouth every 8 hours as needed      gabapentin (NEURONTIN) 300 MG capsule Take 1 capsule by mouth 3 times daily for 180 days. Intended supply: 30 days 90 capsule 5     No current facility-administered medications for this visit. Allergies   Allergen Reactions    Codeine Shortness Of Breath    Morphine Shortness Of Breath    Sulfa Antibiotics        Past Surgical History:   Procedure Laterality Date    CARDIAC CATHETERIZATION  10/17/14  JDT    EF 50%    CHOLECYSTECTOMY      FOOT SURGERY      HARDWARE REMOVAL Right 5/3/2019    HARDWARE REMOVAL RIGHT ANKLE performed by Steven Adam MD at [address] [place] [address],[address]      NECK SURGERY         Social History     Tobacco Use    Smoking status: Former Smoker     Packs/day: 1.00     Years: 30.00     Pack years: 30.00     Last attempt to quit: 3/1/1991     Years since quittin.5    Smokeless tobacco: Never Used   Substance Use Topics    Alcohol use: No    Drug use: No       Family History   Problem Relation Age of Onset    Heart Disease Mother     Cancer Father     Heart Disease Sister        /80   Pulse 104   Temp 97.8 °F (36.6 °C) (Temporal)   Ht 5' 5\" (1.651 m)   Wt 150 lb 3.2 oz (68.1 kg)   LMP 1976 (LMP Unknown)   SpO2 96%   BMI 24.99 kg/m²     Physical Exam  Vitals signs and nursing note reviewed. Constitutional:       General: She is not in acute distress. Appearance: She is well-developed. She is not diaphoretic. HENT:      Head: Normocephalic. Right Ear: External ear normal.      Left Ear: External ear normal.      Nose: Nose normal. No congestion. Mouth/Throat:      Mouth: Mucous membranes are moist.      Pharynx: Oropharynx is clear. No oropharyngeal exudate. Eyes:      General:         Right eye: No discharge.          Left eye: No discharge. Conjunctiva/sclera: Conjunctivae normal.      Pupils: Pupils are equal, round, and reactive to light. Neck:      Musculoskeletal: Normal range of motion. No muscular tenderness. Trachea: No tracheal deviation. Cardiovascular:      Rate and Rhythm: Normal rate and regular rhythm. Pulses: Normal pulses. Heart sounds: Normal heart sounds. No murmur. Pulmonary:      Effort: Pulmonary effort is normal. No respiratory distress. Breath sounds: Normal breath sounds. No stridor. Musculoskeletal: Normal range of motion. General: No tenderness or deformity. Skin:     General: Skin is warm and dry. Capillary Refill: Capillary refill takes less than 2 seconds. Findings: Abrasion (scattered BLE) and bruising (scattered BLE) present. No rash. Neurological:      Mental Status: She is alert and oriented to person, place, and time. Cranial Nerves: No cranial nerve deficit. Psychiatric:         Behavior: Behavior normal.         Thought Content: Thought content normal.         Judgment: Judgment normal.       Monofilament Exam Reveals:  Pulses: normal  Edema:normal  Skin Lesions:normal  Right Foot:    Left Foot:  Normal sensation at 1,2,3,4,5,6,10   Normal sensation at 1,2,3,4,5,6,10    Diminished sensation at 7,8,9   Diminished sensation at 7,8,9   No sensation at 0    No sensation at 0         Results for orders placed or performed in visit on 09/18/20   POCT glycosylated hemoglobin (Hb A1C)   Result Value Ref Range    Hemoglobin A1C 11.2 %       Assessment:    ICD-10-CM    1.  Type 2 diabetes mellitus with diabetic polyneuropathy, without long-term current use of insulin ([facility])  E11.42 POCT glycosylated hemoglobin (Hb A1C)     insulin glargine (BASAGLAR KWIKPEN) 100 UNIT/ML injection pen     insulin aspart (NOVOLOG FLEXPEN) 100 UNIT/ML injection pen     blood glucose monitor strips     Lancets MISC     HM DIABETES FOOT EXAM     Insulin Pen Needle (B-D UF III MINI PEN NEEDLES) 31G X 5 MM MISC   2. Hypokalemia  E87.6 Comprehensive Metabolic Panel   3. Encounter for screening mammogram for malignant neoplasm of breast   Z12.31 TIFF DIGITAL SCREEN W OR WO CAD BILATERAL   4. Nausea  R11.0 ondansetron (ZOFRAN) 4 MG tablet   5. Need for influenza vaccination  Z23 INFLUENZA, QUADV, ADJUVANTED, 65 YRS =, IM, PF, PREFILL SYR, 0.5ML (FLUAD)       Plan:   1. Type 2 diabetes mellitus with diabetic polyneuropathy, without long-term current use of insulin (HCC)  - POCT glycosylated hemoglobin (Hb A1C)  - insulin glargine (BASAGLAR KWIKPEN) 100 UNIT/ML injection pen; Inject 10 Units into the skin nightly  Dispense: 5 pen; Refill: 2  - insulin aspart (NOVOLOG FLEXPEN) 100 UNIT/ML injection pen; <150: 0  If 151-200:  1; if 201-250: 2; if 251-300: 3; if 301-350: 4; if 351-400: 5; if >400: 6  Dispense: 5 pen; Refill: 3  - blood glucose monitor strips; Test 4 times a day & PRN for symptoms of irregular blood glucose. Dispense amount for testing frequency and additional for PRN testing needs  Dispense: 200 strip; Refill: 2  - Lancets MISC; 4 each by Does not apply route 4 times daily  Dispense: 100 each; Refill: 3  - HM DIABETES FOOT EXAM  - Insulin Pen Needle (B-D UF III MINI PEN NEEDLES) 31G X 5 MM MISC; Inject 1 each as directed daily  Dispense: 100 each; Refill: 0    2. Hypokalemia  - Comprehensive Metabolic Panel; Future    3. Encounter for screening mammogram for malignant neoplasm of breast   - TIFF DIGITAL SCREEN W OR WO CAD BILATERAL; Future    4. Nausea  - ondansetron (ZOFRAN) 4 MG tablet; Take 1 tablet by mouth every 8 hours as needed for Nausea or Vomiting  Dispense: 30 tablet; Refill: 0    5.  Need for influenza vaccination  - INFLUENZA, QUADV, ADJUVANTED, 72 YRS =, IM, PF, PREFILL SYR, 0.5ML (FLUAD)    Over 50% of the total visit time of 40 minutes was spent on counseling and or coordination of care of diabetes, hypokalemia, mammogram, nausea, influenza vaccine, medications, and follow-up. Orders Placed This Encounter   Procedures    TIFF DIGITAL SCREEN W OR WO CAD BILATERAL     Standing Status:   Future     Standing Expiration Date:   2021     Order Specific Question:   Reason for exam:     Answer:   yearly screening     Order Specific Question:   Does this patient have implants? Answer:   No     Order Specific Question:   Does this patient have a personal history of breast cancer? Answer:   No    INFLUENZA, QUADV, ADJUVANTED, 65 YRS =, IM, PF, PREFILL SYR, 0.5ML (FLUAD)    Comprehensive Metabolic Panel     Standing Status:   Future     Number of Occurrences:   1     Standing Expiration Date:   2021    POCT glycosylated hemoglobin (Hb A1C)    HM DIABETES FOOT EXAM     Orders Placed This Encounter   Medications    ondansetron (ZOFRAN) 4 MG tablet     Sig: Take 1 tablet by mouth every 8 hours as needed for Nausea or Vomiting     Dispense:  30 tablet     Refill:  0    insulin glargine (BASAGLAR KWIKPEN) 100 UNIT/ML injection pen     Sig: Inject 10 Units into the skin nightly     Dispense:  5 pen     Refill:  2    insulin aspart (NOVOLOG FLEXPEN) 100 UNIT/ML injection pen     Sig: <150: 0  If 151-200:  1; if 201-250: 2; if 251-300: 3; if 301-350: 4; if 351-400: 5; if >400: 6     Dispense:  5 pen     Refill:  3    blood glucose monitor strips     Sig: Test 4 times a day & PRN for symptoms of irregular blood glucose. Dispense amount for testing frequency and additional for PRN testing needs     Dispense:  200 strip     Refill:  2     Brand per patient preference. May round up to next available package size.     Lancets MISC     Si each by Does not apply route 4 times daily     Dispense:  100 each     Refill:  3    Insulin Pen Needle (B-D UF III MINI PEN NEEDLES) 31G X 5 MM MISC     Sig: Inject 1 each as directed daily     Dispense:  100 each     Refill:  0     Medications Discontinued During This Encounter   Medication Reason    Semaglutide,0.25 or 0.5MG/DOS, (OZEMPIC, 0.25 OR 0.5 MG/DOSE,) 2 MG/1.5ML SOPN Side effects    ondansetron (ZOFRAN) 4 MG tablet DUPLICATE    glimepiride (AMARYL) 2 MG tablet LIST CLEANUP    ondansetron (ZOFRAN) 4 MG tablet REORDER    Insulin Pen Needle (B-D UF III MINI PEN NEEDLES) 31G X 5 MM MISC REORDER     Patient Instructions   Novolog:     Check your fasting blood sugar before breakfast, lunch, and dinner. <150: 0  151-200:  1  201-250: 2  251-300: 3  301-350: 4  351-400: 5  >400: 6     Keep a log of your sugars. Basaglar:     Start with 10 units nightly. Goal is to have morning fasting glucose around 130. Plan to check glucose every morning. If glucose is greater than 130 for two nights, then increase nightly dose by 2 units. Continue to do this until you reach a nightly dose of 30 units. Patient given educational handouts and has had all questions answered. Patient voices understanding and agrees to plans along with risks and benefits of plan. Patient isinstructed to continue prior meds, diet, and exercise plans unless instructed otherwise. Patient agrees to follow up as instructed and sooner if needed. Patient agrees to go to ER if condition becomes emergent. Notesmay be completed with dictation device and spelling errors may occur. Return in about 2 weeks (around 10/2/2020) for DM.

## 2020-09-19 ENCOUNTER — TELEPHONE (OUTPATIENT)
Dept: PRIMARY CARE CLINIC | Age: 65
End: 2020-09-19

## 2020-09-19 NOTE — TELEPHONE ENCOUNTER
----- Message from Sanju Ervin, [address] - NP sent at 9/18/2020  2:58 PM CDT -----  Potassium has improved since [facility]

## 2020-09-24 RX ORDER — GLIPIZIDE 5 MG/1
TABLET, FILM COATED, EXTENDED RELEASE ORAL
Qty: 60 TABLET | Refills: 3 | Status: SHIPPED | OUTPATIENT
Start: 2020-09-24 | End: 2021-02-15

## 2020-09-24 RX ORDER — INSULIN GLARGINE 100 [IU]/ML
INJECTION, SOLUTION SUBCUTANEOUS NIGHTLY
Qty: 5 PEN | Refills: 3 | Status: CANCELLED | OUTPATIENT
Start: 2020-09-24

## 2020-09-24 NOTE — TELEPHONE ENCOUNTER
Cassandra saw Peyton Joe last week and was prescribed Basaglar. She went to pick it up and it was over $1,000. Needs a new Rx for a covered med. I called [facility] and found out she does not have any prescription coverage. I called [facility], their longest acting insuline is Novalin N, 8-10 hours. That is about $27 a vial. Is there something else you can Rx?    Merlinda Garre CCMA

## 2020-09-24 NOTE — TELEPHONE ENCOUNTER
Will discontinue insulin at this time and stress importance of compliance (diet/ exercise/ medications). Plan to start glipizide daily x 7 days then increase to BID.  Plan to check glucose BID and write down in glucose journal.

## 2021-01-11 ENCOUNTER — NURSE TRIAGE (OUTPATIENT)
Dept: OTHER | Facility: CLINIC | Age: 66
End: 2021-01-11

## 2021-01-11 NOTE — TELEPHONE ENCOUNTER
Reason for Disposition   SEVERE pain (e.g., excruciating, unable to do any normal activities)    Answer Assessment - Initial Assessment Questions  1. ONSET: \"When did the pain start? \"       3 days     2. LOCATION: \"Where is the pain located? \"       B/l legs    3. PAIN: \"How bad is the pain? \"    (Scale 1-10; or mild, moderate, severe)    -  MILD (1-3): doesn't interfere with normal activities     -  MODERATE (4-7): interferes with normal activities (e.g., work or school) or awakens from sleep, limping     -  SEVERE (8-10): excruciating pain, unable to do any normal activities, unable to walk      It is getting progressively worse each day. Pt takes medication for leg pain (neurontin) and equip (sp?). Currently the pain is so severe she is having difficulty standing without support. Pt reports frequent falls. 4. WORK OR EXERCISE: \"Has there been any recent work or exercise that involved this part of the body? \"       Denies    5. CAUSE: \"What do you think is causing the leg pain? \"      Unsure, pt reports that this does not feel like her neuropathy pain. 6. OTHER SYMPTOMS: \"Do you have any other symptoms? \" (e.g., chest pain, back pain, breathing difficulty, swelling, rash, fever, numbness, weakness)      Pt reports she has had diarrhea x3 days, but is able to stay hydrated. 7. PREGNANCY: \"Is there any chance you are pregnant? \" \"When was your last menstrual period? \"      N/A    Pt reports she has tried multiple medications to help with the pain and has been unable to find relief. Protocols used: LEG PAIN-ADULT-[place]    Patient called pre-service center [facility] with red flag complaint. Brief description of triage: Pt calls in reporting b/l severe leg pain that has been getting worse x3 days. Pt states she has been falling frequently. Triage indicates for patient to go to office now. Pt advised if unable to be seen in office today should proceed to [address], and pt agreeable with plan.      Care advice provided, patient verbalizes understanding; denies any other questions or concerns; instructed to call back for any new or worsening symptoms. Writer provided warm transfer to [facility] for appointment scheduling. Attention Provider: Thank you for allowing me to participate in the care of your patient. The patient was connected to triage in response to information provided to the ECC. Please do not respond through this encounter as the response is not directed to a shared pool.

## 2021-01-12 ENCOUNTER — OFFICE VISIT (OUTPATIENT)
Dept: URGENT CARE | Age: 66
End: 2021-01-12
Payer: MEDICARE

## 2021-01-12 VITALS
OXYGEN SATURATION: 96 % | HEIGHT: 65 IN | SYSTOLIC BLOOD PRESSURE: 123 MMHG | HEART RATE: 113 BPM | BODY MASS INDEX: 25.96 KG/M2 | RESPIRATION RATE: 20 BRPM | WEIGHT: 155.8 LBS | DIASTOLIC BLOOD PRESSURE: 79 MMHG | TEMPERATURE: 98.6 F

## 2021-01-12 DIAGNOSIS — M79.2 NEUROPATHIC PAIN: ICD-10-CM

## 2021-01-12 DIAGNOSIS — R29.6 FALLING: Primary | ICD-10-CM

## 2021-01-12 PROCEDURE — 1123F ACP DISCUSS/DSCN MKR DOCD: CPT | Performed by: NURSE PRACTITIONER

## 2021-01-12 PROCEDURE — 99213 OFFICE O/P EST LOW 20 MIN: CPT | Performed by: NURSE PRACTITIONER

## 2021-01-12 PROCEDURE — 3017F COLORECTAL CA SCREEN DOC REV: CPT | Performed by: NURSE PRACTITIONER

## 2021-01-12 PROCEDURE — G8484 FLU IMMUNIZE NO ADMIN: HCPCS | Performed by: NURSE PRACTITIONER

## 2021-01-12 PROCEDURE — 1090F PRES/ABSN URINE INCON ASSESS: CPT | Performed by: NURSE PRACTITIONER

## 2021-01-12 PROCEDURE — 4040F PNEUMOC VAC/ADMIN/RCVD: CPT | Performed by: NURSE PRACTITIONER

## 2021-01-12 PROCEDURE — G8400 PT W/DXA NO RESULTS DOC: HCPCS | Performed by: NURSE PRACTITIONER

## 2021-01-12 PROCEDURE — 1036F TOBACCO NON-USER: CPT | Performed by: NURSE PRACTITIONER

## 2021-01-12 PROCEDURE — G8427 DOCREV CUR MEDS BY ELIG CLIN: HCPCS | Performed by: NURSE PRACTITIONER

## 2021-01-12 PROCEDURE — G8417 CALC BMI ABV UP PARAM F/U: HCPCS | Performed by: NURSE PRACTITIONER

## 2021-01-12 RX ORDER — CAPSAICIN 0.07 G/100G
CREAM TOPICAL
Qty: 57 G | Refills: 1 | Status: SHIPPED | OUTPATIENT
Start: 2021-01-12 | End: 2021-02-11

## 2021-01-12 ASSESSMENT — ENCOUNTER SYMPTOMS
VOMITING: 0
DIARRHEA: 0
WHEEZING: 0
VISUAL CHANGE: 0
EYES NEGATIVE: 1
SORE THROAT: 0
CONSTIPATION: 0
CHEST TIGHTNESS: 0
NAUSEA: 0
SHORTNESS OF BREATH: 0

## 2021-01-12 NOTE — PROGRESS NOTES
[address] [facility]  [address]  [address] [address]  Dept: [phone]  Dept Fax: [phone]  Loc: [phone]    Cherry Das is a 76 y.o. female who presents today for her medical conditions/complaintsas noted below. Cherry Das is c/o of Leg Pain (both, patient states she has been falling a lot because of her legs hurting)        HPI:     Leg Pain   Incident onset: worsening over the last two weeks. The incident occurred at home. There was no injury mechanism. Pain location: bilateral legs from knees down. The quality of the pain is described as aching. The pain is severe. Associated symptoms include muscle weakness and tingling (to bilateral feet). Pertinent negatives include no inability to bear weight, loss of motion, loss of sensation or numbness. Nothing aggravates the symptoms. Treatments tried: Neurontin, Percocet. The treatment provided no relief. Fall  Incident onset: has fallen three times last week due to [employer]" The fall occurred while standing and while walking. There was no blood loss. The point of impact was the buttocks. Pain location: denies pain. Associated symptoms include tingling (to bilateral feet). Pertinent negatives include no fever, headaches, loss of consciousness, nausea, numbness, visual change or vomiting. Treatments tried: cane.        Past Medical History:   Diagnosis Date    Anxiety     Cervical disc disease     Chest discomfort 10/17/2014    H/o negative stress tests AUC indication 15, AUC score 4, [facility] 2012;[id number] 10/17/2014  Cath  Mild cad,normal LVFX      Diabetes mellitus ([facility] [address] 10/17/2014    Diabetic neuropathy ([facility] [address]     Hyperlipidemia     Hypertension     Insomnia     Insomnia     Osteoarthritis     Restless leg syndrome      Past Surgical History:   Procedure Laterality Date    CARDIAC CATHETERIZATION  10/17/14  JDT    EF 50%    CHOLECYSTECTOMY      FOOT SURGERY      HARDWARE REMOVAL Right 5/3/2019    HARDWARE REMOVAL RIGHT ANKLE performed by Vale Hernandez MD at [address] [facility] OR    HYSTERECTOMY      KNEE SURGERY      NECK SURGERY         Family History   Problem Relation Age of Onset    Heart Disease Mother     Cancer Father     Heart Disease Sister        Social History     Tobacco Use    Smoking status: Former Smoker     Packs/day: 1.00     Years: 30.00     Pack years: 30.00     Quit date: 3/1/1991     Years since quittin.8    Smokeless tobacco: Never Used   Substance Use Topics    Alcohol use: No      Current Outpatient Medications   Medication Sig Dispense Refill    capsicum (ZOSTRIX) 0.075 % topical cream Apply topically 3 times daily. 57 g 1    glipiZIDE (GLUCOTROL XL) 5 MG extended release tablet Take 1 tablet with breakfast x 7 days, then increase to BID with meals. 60 tablet 3    ondansetron (ZOFRAN) 4 MG tablet Take 1 tablet by mouth every 8 hours as needed for Nausea or Vomiting 30 tablet 0    blood glucose monitor strips Test 4 times a day & PRN for symptoms of irregular blood glucose. Dispense amount for testing frequency and additional for PRN testing needs 200 strip 2    Lancets MISC 4 each by Does not apply route 4 times daily 100 each 3    Insulin Pen Needle (B-D UF III MINI PEN NEEDLES) 31G X 5 MM MISC Inject 1 each as directed daily 100 each 0    metFORMIN (GLUCOPHAGE) 1000 MG tablet Take 1 tablet by mouth 2 times daily (with meals) 180 tablet 3    rOPINIRole (REQUIP) 0.5 MG tablet TAKE (1) TABLET BY MOUTH TWICE A DAY. 180 tablet 3    amitriptyline (ELAVIL) 25 MG tablet Take 1 tablet by mouth nightly TAKE ONE TABLET BY MOUTH AT BEDTIME. 90 tablet 3    clonazePAM (KLONOPIN) 0.5 MG tablet TAKE 1 TABLET BY MOUTH TWICE DAILY AS NEEDED FOR ANXIETY FOR UP TO 30DAYS 60 tablet 5    zolpidem (AMBIEN) 10 MG tablet Take 1 tablet by mouth nightly as needed for Sleep for up to 30 doses.  30 tablet 5    cyanocobalamin 1000 MCG/ML injection Inject 1 mL into the muscle Creatinine monitoring  09/18/2021    DTaP/Tdap/Td vaccine (2 - Td) 01/03/2023    Flu vaccine  Completed    Hepatitis A vaccine  Aged Out    Hib vaccine  Aged Out    Meningococcal (ACWY) vaccine  Aged Out       Subjective:     Review of Systems   Constitutional: Negative for fever. HENT: Negative for congestion and sore throat. Eyes: Negative. Respiratory: Negative for chest tightness, shortness of breath and wheezing. Cardiovascular: Negative for chest pain and palpitations. Gastrointestinal: Negative for constipation, diarrhea, nausea and vomiting. Endocrine: Negative. Genitourinary: Negative. Musculoskeletal: Negative. Skin: Negative. Negative for wound. Neurological: Positive for tingling (to bilateral feet). Negative for dizziness, loss of consciousness, speech difficulty, weakness, numbness and headaches. Psychiatric/Behavioral: Negative for confusion. All other systems reviewed and are negative. Objective:     Physical Exam  Vitals signs and nursing note reviewed. Constitutional:       General: She is not in acute distress. Appearance: Normal appearance. She is not ill-appearing or toxic-appearing. HENT:      Head: Normocephalic and atraumatic. Right Ear: External ear normal.      Left Ear: External ear normal.   Eyes:      Extraocular Movements: Extraocular movements intact. Conjunctiva/sclera: Conjunctivae normal.      Pupils: Pupils are equal, round, and reactive to light. Neck:      Musculoskeletal: No muscular tenderness. Cardiovascular:      Rate and Rhythm: Normal rate and regular rhythm. Heart sounds: Normal heart sounds. No murmur. Pulmonary:      Effort: Pulmonary effort is normal. No respiratory distress. Breath sounds: Normal breath sounds. No wheezing. Musculoskeletal:         General: No swelling or signs of injury. Comments:  Had patient stand and walk, she had to push up from chair to stand but ambulated without difficulty without cane   Skin:     General: Skin is warm and dry. Findings: No rash. Neurological:      General: No focal deficit present. Mental Status: She is alert and oriented to person, place, and time. Motor: No weakness. Psychiatric:         Mood and Affect: Mood normal.       /79   Pulse 113   Temp 98.6 °F (37 °C)   Resp 20   Ht 5' 5\" (1.651 m)   Wt 155 lb 12.8 oz (70.7 kg)   LMP 03/01/1976 (LMP Unknown)   SpO2 96%   BMI 25.93 kg/m²     Assessment:       Diagnosis Orders   1. Falling     2. Neuropathic pain  capsicum (ZOSTRIX) 0.075 % topical cream       Plan:    No orders of the defined types were placed in this encounter. Patient is type 2 diabetic with last A1C of 11. She states sugars have been in the \"200's\" which is normal for her. Will get patient in to see Dr. Sánchez Cam office as this appears to be a worsening chronic problem from diabetic neuropathy. No follow-ups on file. Orders Placed This Encounter   Medications    capsicum (ZOSTRIX) 0.075 % topical cream     Sig: Apply topically 3 times daily. Dispense:  57 g     Refill:  1       Patient given educational materials- see patient instructions. Discussed use, benefit, and side effects of prescribedmedications. All patient questions answered. Pt voiced understanding. Reviewedhealth maintenance. Instructed to continue current medications, diet and exercise. Patient agreed with treatment plan. Follow up as directed. Patient Instructions     1. Follow up with Dr. Pretty Jimenes on the 21st.   2. Start Capsaicin as prescribed. 3. Continue to use cane as needed. Patient Education        Diabetes and Preventing Falls: Care Instructions  Your Care Instructions     If you are an older adult who has diabetes, you may have a higher risk of falling. Complications of diabetes--such as nerve damage, foot problems, and reduced vision--may increase your risk of a fall.  Some of your medicines also may add to your risk.  By making your home safer, you can lower your risk of falling. Doing things to prevent diabetes complications may also help to lower your risk. You can make your home safer with a few simple measures. Follow-up care is a key part of your treatment and safety. Be sure to make and go to all appointments, and call your doctor if you are having problems. It's also a good idea to know your test results and keep a list of the medicines you take. How can you care for yourself at home? Taking care of yourself  · Keep your blood sugar at a target level (which you set with your doctor). · Exercise regularly to improve your strength, muscle tone, and balance. Walk if you can. Swimming may be a good choice if you cannot walk easily. · Have your vision checked as often as your doctor recommends. It is usually once a year or more often if you have eye problems. · Know the side effects of the medicines you take. Ask your doctor or pharmacist whether the medicines you take can affect your balance. Sleeping pills or sedatives can affect your balance. · Limit the amount of alcohol you drink. Alcohol can impair your balance and other senses. · Have your doctor check your feet during each visit. If you have a foot problem, see your doctor. Preventing falls at home  · Remove raised doorway thresholds, throw rugs, and clutter. Repair loose carpet or raised areas in the floor. · Move furniture and electrical cords to keep them out of walking paths. · Use nonskid floor wax, and wipe up spills right away, especially on ceramic tile floors. · If you use a walker or cane, put rubber tips on it. If you use crutches, clean the bottoms of them regularly with an abrasive pad, such as steel wool. · Keep your house well lit, especially [address], and outside walkways. Use night-lights in areas such as hallways and bathrooms.  Add extra light switches or use remote switches (such as switches that go on or off when you clap your hands) to make it easier to turn lights on if you have to get up during the night. · Install sturdy handrails on stairways. Put grab bars near your shower, bathtub, and toilet. · Store household items on low shelves so that you do not have to climb or reach high. Or use a reaching device that you can get at a medical supply store. If you have to climb for something, use a step stool with handrails, or ask someone to get it for you. · Keep a cordless phone and a flashlight with new batteries by your bed. If possible, put a phone in each of the main rooms of your house, or carry a cell phone in case you fall and cannot reach a phone. Or you can wear a device around your neck or wrist. You push a button that sends a signal for help. · Wear low-heeled shoes that fit well and give your feet good support. Use footwear with nonskid soles. Check the heels and soles of your shoes for wear. Repair or replace worn heels or soles. · Do not wear socks without shoes on wood floors. · Walk on the grass when the sidewalks are slippery. If you live in an area that gets snow and ice in the winter, sprinkle salt on slippery steps and sidewalks. Where can you learn more? Go to https://AceablepeMilk Mantra.Meggatel. org and sign in to your WatchParty account. Enter A533 in the [facility] box to learn more about \"Diabetes and Preventing Falls: Care Instructions. \"     If you do not have an account, please click on the \"Sign Up Now\" link. Current as of: April 15, 2020               Content Version: 12.6  © 5802-0376 [employer]. Care instructions adapted under license by [facility] ([facility]). If you have questions about a medical condition or this instruction, always ask your healthcare professional. [address] any warranty or liability for your use of this information.          Patient Education        Diabetic Neuropathy: Care Instructions  Your Care Instructions     When you have diabetes, your blood sugar level may get too high. Over time, high blood sugar levels can damage nerves. This is called diabetic neuropathy. Nerve damage can cause pain, burning, tingling, and numbness and may leave you feeling weak. The feet are often affected. When you have nerve damage in your feet, you cannot feel your feet and toes as well as normal and may not notice cuts or sores. Even a small injury can lead to a serious infection. It is very important that you follow your doctor's advice on foot care. Sometimes diabetes damages nerves that help the body function. If this happens, your blood pressure, sweating, digestion, and urination might be affected. Your doctor may give you a target blood sugar level that is higher or lower than you are used to. Try to keep your blood sugar very close to this target level to prevent more damage. Follow-up care is a key part of your treatment and safety. Be sure to make and go to all appointments, and call your doctor if you are having problems. It's also a good idea to know your test results and keep a list of the medicines you take. How can you care for yourself at home? · Take your medicines exactly as prescribed. Call your doctor if you think you are having a problem with your medicine. It is very important that you take your insulin or diabetes pills as your doctor tells you. · Try to keep blood sugar at your target level. ? Eat a variety of healthy foods, with carbohydrate spread out in your meals. A dietitian can help you plan meals. ? Try to get at least 30 minutes of exercise on most days. ? Check your blood sugar as many times each day as your doctor recommends. · Take and record your blood pressure at home if your doctor tells you to. Learn the importance of the two measures of blood pressure (such as 130 over 80, or 130/80). To take your blood pressure at home:  ?  Ask your doctor to check your blood pressure monitor to be sure it is accurate and the cuff fits you. Also ask your doctor to watch you to make sure that you are using it right. ? Do not use medicine known to raise blood pressure (such as some nasal decongestant sprays) before taking your blood pressure. ? Avoid taking your blood pressure if you have just exercised or are nervous or upset. Rest at least 15 minutes before you take a reading. · Take pain medicines exactly as directed. ? If the doctor gave you a prescription medicine for pain, take it as prescribed. ? If you are not taking a prescription pain medicine, ask your doctor if you can take an over-the-counter medicine. · Do not smoke. Smoking can increase your chance for a heart attack or stroke. If you need help quitting, talk to your doctor about stop-smoking programs and medicines. These can increase your chances of quitting for good. · Limit alcohol to 2 drinks a day for men and 1 drink a day for women. Too much alcohol can cause health problems. · Eat small meals often, rather than 2 or 3 large meals a day. To care for your feet  · Prevent injury by wearing shoes at all times, even when you are indoors. · Do foot care as part of your daily routine. Wash your feet and then rub lotion on your feet, but not between your toes. Use a handheld mirror or magnifying mirror to inspect your feet for blisters, cuts, cracks, or sores. · Have your toenails trimmed and filed straight across. · Wear shoes and socks that fit well. Soft shoes that have good support and that fit well (such as tennis shoes) are best for your feet. · Check your shoes for any loose objects or rough edges before you put them on. · Ask your doctor to check your feet during each visit. Your doctor may notice a foot problem you have missed. · Get early treatment for any foot problem, even a minor one. When should you call for help?    Call your doctor now or seek immediate medical care if:    · You have symptoms of infection, such as:  ? Increased pain, swelling, warmth, or redness. ? Red streaks leading from the area. ? Pus draining from the area. ? A fever.     · You have new or worse numbness, pain, or tingling in any part of your body. Watch closely for changes in your health, and be sure to contact your doctor if:    · You have a new problem with your feet, such as:  ? A new sore or ulcer. ? A break in the skin that is not healing after several days. ? Bleeding corns or calluses. ? An ingrown toenail.     · You do not get better as expected. Where can you learn more? Go to https://Kingsbridge Risk Solutionspepiceweb.Optini. org and sign in to your Devicescape account. Enter E933 in the [employer] box to learn more about \"Diabetic Neuropathy: Care Instructions. \"     If you do not have an account, please click on the \"Sign Up Now\" link. Current as of: December 20, 2019               Content Version: 12.6  © 9932-3829 [employer]. Care instructions adapted under license by [facility] ([facility]). If you have questions about a medical condition or this instruction, always ask your healthcare professional. [address] any warranty or liability for your use of this information.                Electronically signed by FLOR White CNP on 1/12/2021 at 3:18 PM

## 2021-01-12 NOTE — PATIENT INSTRUCTIONS
1. Follow up with Dr. Duran Aj on the 21st.   2. Start Capsaicin as prescribed. 3. Continue to use cane as needed. Patient Education        Diabetes and Preventing Falls: Care Instructions  Your Care Instructions     If you are an older adult who has diabetes, you may have a higher risk of falling. Complications of diabetessuch as nerve damage, foot problems, and reduced visionmay increase your risk of a fall. Some of your medicines also may add to your risk. By making your home safer, you can lower your risk of falling. Doing things to prevent diabetes complications may also help to lower your risk. You can make your home safer with a few simple measures. Follow-up care is a key part of your treatment and safety. Be sure to make and go to all appointments, and call your doctor if you are having problems. It's also a good idea to know your test results and keep a list of the medicines you take. How can you care for yourself at home? Taking care of yourself  · Keep your blood sugar at a target level (which you set with your doctor). · Exercise regularly to improve your strength, muscle tone, and balance. Walk if you can. Swimming may be a good choice if you cannot walk easily. · Have your vision checked as often as your doctor recommends. It is usually once a year or more often if you have eye problems. · Know the side effects of the medicines you take. Ask your doctor or pharmacist whether the medicines you take can affect your balance. Sleeping pills or sedatives can affect your balance. · Limit the amount of alcohol you drink. Alcohol can impair your balance and other senses. · Have your doctor check your feet during each visit. If you have a foot problem, see your doctor. Preventing falls at home  · Remove raised doorway thresholds, throw rugs, and clutter. Repair loose carpet or raised areas in the floor. · Move furniture and electrical cords to keep them out of walking paths. · Use nonskid floor wax, and wipe up spills right away, especially on ceramic tile floors. · If you use a walker or cane, put rubber tips on it. If you use crutches, clean the bottoms of them regularly with an abrasive pad, such as steel wool. · Keep your house well lit, especially [facility], and outside walkways. Use night-lights in areas such as hallways and bathrooms. Add extra light switches or use remote switches (such as switches that go on or off when you clap your hands) to make it easier to turn lights on if you have to get up during the night. · Install sturdy handrails on stairways. Put grab bars near your shower, bathtub, and toilet. · Store household items on low shelves so that you do not have to climb or reach high. Or use a reaching device that you can get at a medical supply store. If you have to climb for something, use a step stool with handrails, or ask someone to get it for you. · Keep a cordless phone and a flashlight with new batteries by your bed. If possible, put a phone in each of the main rooms of your house, or carry a cell phone in case you fall and cannot reach a phone. Or you can wear a device around your neck or wrist. You push a button that sends a signal for help. · Wear low-heeled shoes that fit well and give your feet good support. Use footwear with nonskid soles. Check the heels and soles of your shoes for wear. Repair or replace worn heels or soles. · Do not wear socks without shoes on wood floors. · Walk on the grass when the sidewalks are slippery. If you live in an area that gets snow and ice in the winter, sprinkle salt on slippery steps and sidewalks. Where can you learn more? Go to https://odette.[employer]. org and sign in to your PhotoTLC account. Enter M065 in the [facility] box to learn more about \"Diabetes and Preventing Falls: Care Instructions. \"     If you do not have an account, please click on the \"Sign Up Now\" link. ? Eat a variety of healthy foods, with carbohydrate spread out in your meals. A dietitian can help you plan meals. ? Try to get at least 30 minutes of exercise on most days. ? Check your blood sugar as many times each day as your doctor recommends. · Take and record your blood pressure at home if your doctor tells you to. Learn the importance of the two measures of blood pressure (such as 130 over 80, or 130/80). To take your blood pressure at home:  ? Ask your doctor to check your blood pressure monitor to be sure it is accurate and the cuff fits you. Also ask your doctor to watch you to make sure that you are using it right. ? Do not use medicine known to raise blood pressure (such as some nasal decongestant sprays) before taking your blood pressure. ? Avoid taking your blood pressure if you have just exercised or are nervous or upset. Rest at least 15 minutes before you take a reading. · Take pain medicines exactly as directed. ? If the doctor gave you a prescription medicine for pain, take it as prescribed. ? If you are not taking a prescription pain medicine, ask your doctor if you can take an over-the-counter medicine. · Do not smoke. Smoking can increase your chance for a heart attack or stroke. If you need help quitting, talk to your doctor about stop-smoking programs and medicines. These can increase your chances of quitting for good. · Limit alcohol to 2 drinks a day for men and 1 drink a day for women. Too much alcohol can cause health problems. · Eat small meals often, rather than 2 or 3 large meals a day. To care for your feet  · Prevent injury by wearing shoes at all times, even when you are indoors. · Do foot care as part of your daily routine. Wash your feet and then rub lotion on your feet, but not between your toes. Use a handheld mirror or magnifying mirror to inspect your feet for blisters, cuts, cracks, or sores. · Have your toenails trimmed and filed straight across. · Wear shoes and socks that fit well. Soft shoes that have good support and that fit well (such as tennis shoes) are best for your feet. · Check your shoes for any loose objects or rough edges before you put them on. · Ask your doctor to check your feet during each visit. Your doctor may notice a foot problem you have missed. · Get early treatment for any foot problem, even a minor one. When should you call for help? Call your doctor now or seek immediate medical care if:    · You have symptoms of infection, such as:  ? Increased pain, swelling, warmth, or redness. ? Red streaks leading from the area. ? Pus draining from the area. ? A fever.     · You have new or worse numbness, pain, or tingling in any part of your body. Watch closely for changes in your health, and be sure to contact your doctor if:    · You have a new problem with your feet, such as:  ? A new sore or ulcer. ? A break in the skin that is not healing after several days. ? Bleeding corns or calluses. ? An ingrown toenail.     · You do not get better as expected. Where can you learn more? Go to https://Ecosia.[employer]. org and sign in to your [employer] P[employer] account. Enter S056 in the [facility] box to learn more about \"Diabetic Neuropathy: Care Instructions. \"     If you do not have an account, please click on the \"Sign Up Now\" link. Current as of: December 20, 2019               Content Version: 12.6  © 1037-3624 [employer]. Care instructions adapted under license by [facility] ([facility]). If you have questions about a medical condition or this instruction, always ask your healthcare professional. [place] [address] any warranty or liability for your use of this information.

## 2021-02-15 ENCOUNTER — OFFICE VISIT (OUTPATIENT)
Dept: PRIMARY CARE CLINIC | Age: 66
End: 2021-02-15
Payer: MEDICARE

## 2021-02-15 DIAGNOSIS — G47.00 INSOMNIA, UNSPECIFIED TYPE: ICD-10-CM

## 2021-02-15 DIAGNOSIS — F41.9 ANXIETY: ICD-10-CM

## 2021-02-15 DIAGNOSIS — G25.81 RESTLESS LEGS SYNDROME (RLS): ICD-10-CM

## 2021-02-15 DIAGNOSIS — E11.42 TYPE 2 DIABETES MELLITUS WITH DIABETIC POLYNEUROPATHY, WITHOUT LONG-TERM CURRENT USE OF INSULIN (HCC): Primary | ICD-10-CM

## 2021-02-15 DIAGNOSIS — E11.9 TYPE 2 DIABETES MELLITUS WITHOUT COMPLICATION, WITHOUT LONG-TERM CURRENT USE OF INSULIN (HCC): ICD-10-CM

## 2021-02-15 DIAGNOSIS — E78.2 MIXED HYPERLIPIDEMIA: ICD-10-CM

## 2021-02-15 PROCEDURE — 99423 OL DIG E/M SVC 21+ MIN: CPT | Performed by: NURSE PRACTITIONER

## 2021-02-15 RX ORDER — LISINOPRIL 20 MG/1
20 TABLET ORAL DAILY
Qty: 90 TABLET | Refills: 1 | Status: SHIPPED | OUTPATIENT
Start: 2021-02-15

## 2021-02-15 RX ORDER — GLIPIZIDE 10 MG/1
TABLET, FILM COATED, EXTENDED RELEASE ORAL
Qty: 60 TABLET | Refills: 2 | Status: SHIPPED | OUTPATIENT
Start: 2021-02-15 | End: 2021-02-19 | Stop reason: CLARIF

## 2021-02-15 RX ORDER — AMITRIPTYLINE HYDROCHLORIDE 50 MG/1
50 TABLET, FILM COATED ORAL NIGHTLY
Qty: 90 TABLET | Refills: 1 | Status: SHIPPED | OUTPATIENT
Start: 2021-02-15 | End: 2021-05-16

## 2021-02-15 RX ORDER — ZOLPIDEM TARTRATE 10 MG/1
TABLET ORAL
Qty: 30 TABLET | Refills: 0 | Status: CANCELLED | OUTPATIENT
Start: 2021-02-15 | End: 2021-03-17

## 2021-02-15 RX ORDER — SIMVASTATIN 40 MG
40 TABLET ORAL NIGHTLY
Qty: 90 TABLET | Refills: 1 | Status: SHIPPED | OUTPATIENT
Start: 2021-02-15

## 2021-02-15 RX ORDER — CLONAZEPAM 0.5 MG/1
TABLET ORAL
Qty: 60 TABLET | Refills: 0 | Status: SHIPPED | OUTPATIENT
Start: 2021-02-15 | End: 2021-03-17

## 2021-02-15 RX ORDER — ROPINIROLE 1 MG/1
1 TABLET, FILM COATED ORAL 2 TIMES DAILY
Qty: 180 TABLET | Refills: 1 | Status: SHIPPED | OUTPATIENT
Start: 2021-02-15

## 2021-02-15 NOTE — PROGRESS NOTES
Lazaro Coelho is a 76 y.o. female evaluated via telephone on 2/15/2021. Consent:  She and/or health care decision maker is aware that that she may receive a bill for this telephone service, depending on her insurance coverage, and has provided verbal consent to proceed: Yes      Documentation:  I communicated with the patient and/or health care decision maker about diabetes and extremity weakness. Details of this discussion including any medical advice provided: see below    Diabetes  She presents for her follow-up diabetic visit. She has type 2 diabetes mellitus. Her disease course has been worsening. There are no hypoglycemic associated symptoms. Pertinent negatives for hypoglycemia include no headaches. Associated symptoms include fatigue, foot paresthesias, polydipsia and polyuria. Pertinent negatives for diabetes include no blurred vision, no chest pain, no visual change, no weakness and no weight loss. There are no hypoglycemic complications. Symptoms are stable. Diabetic complications include heart disease and peripheral neuropathy. Risk factors for coronary artery disease include diabetes mellitus, stress, sedentary lifestyle and post-menopausal. Current diabetic treatment includes oral agent (monotherapy) and diet. She is compliant with treatment some of the time. She is following a generally healthy diet. Meal planning includes avoidance of concentrated sweets.    Hypertension This is a chronic problem. The current episode started more than 1 year ago. The problem is unchanged. The problem is controlled. Associated symptoms include malaise/fatigue. Pertinent negatives include no anxiety, blurred vision, chest pain, headaches, palpitations, peripheral edema or shortness of breath. There are no associated agents to hypertension. Risk factors for coronary artery disease include post-menopausal state, sedentary lifestyle, stress, family history and diabetes mellitus. Past treatments include ACE inhibitors. The current treatment provides moderate improvement. Compliance problems include exercise. No change in PMH, family, social, or surgical history unless mentioned above. Review of Systems   Constitutional: Positive for fatigue and malaise/fatigue. Negative for weight loss. HENT: Negative. Eyes: Negative. Negative for blurred vision. Respiratory: Negative. Negative for shortness of breath. Cardiovascular: Negative. Negative for chest pain and palpitations. Gastrointestinal: Negative. Endocrine: Positive for polydipsia and polyuria. Genitourinary: Negative. Musculoskeletal: Positive for arthralgias. Neurological: Negative. Negative for weakness and headaches. Psychiatric/Behavioral: Negative.         Past Medical History:   Diagnosis Date    Anxiety     Cervical disc disease     Chest discomfort 10/17/2014    H/o negative stress tests AUC indication 15, AUC score 4, [facility] 2012;[id number] 10/17/2014  Cath  Mild cad,normal LVFX      Diabetes mellitus ([facility] [address] 10/17/2014    Diabetic neuropathy (HCC)     Hyperlipidemia     Hypertension     Insomnia     Insomnia     Osteoarthritis     Restless leg syndrome        Current Outpatient Medications   Medication Sig Dispense Refill    glipiZIDE (GLUCOTROL) 10 MG tablet Take 1 tablet by mouth 2 times daily 60 tablet 3  amitriptyline (ELAVIL) 50 MG tablet Take 1 tablet by mouth nightly TAKE ONE TABLET BY MOUTH AT BEDTIME. 90 tablet 1    clonazePAM (KLONOPIN) 0.5 MG tablet TAKE (1) TABLET BY MOUTH TWICE A DAY AS NEEDED FOR ANXIETY 60 tablet 0    metFORMIN (GLUCOPHAGE) 1000 MG tablet Take 1 tablet by mouth 2 times daily (with meals) 180 tablet 1    lisinopril (PRINIVIL;ZESTRIL) 20 MG tablet Take 1 tablet by mouth daily 90 tablet 1    rOPINIRole (REQUIP) 1 MG tablet Take 1 tablet by mouth 2 times daily TAKE (1) TABLET BY MOUTH TWICE A DAY. 180 tablet 1    simvastatin (ZOCOR) 40 MG tablet Take 1 tablet by mouth nightly 90 tablet 1    blood glucose monitor strips Test 4 times a day & PRN for symptoms of irregular blood glucose. Dispense amount for testing frequency and additional for PRN testing needs 200 strip 2    Lancets MISC 4 each by Does not apply route 4 times daily 100 each 3    cyanocobalamin 1000 MCG/ML injection Inject 1 mL into the muscle every 30 days 10 mL 0    Cyanocobalamin (B-12) 1000 MCG/ML KIT Needles and syringe size 25 gauge or smaller, substitute as needed 1\" - 12 needles 1 kit 0    aspirin EC 81 MG EC tablet Take 1 tablet by mouth daily 90 tablet 5    Diabetic Shoe MISC by Does not apply route With insert Dx E11.9 1 each 0    tiZANidine (ZANAFLEX) 4 MG tablet Take 4 mg by mouth every 8 hours as needed      oxyCODONE-acetaminophen (PERCOCET)  MG per tablet Take 1 tablet by mouth every 8 hours as needed        No current facility-administered medications for this visit.         Allergies   Allergen Reactions    Codeine Shortness Of Breath    Morphine Shortness Of Breath    Sulfa Antibiotics        Past Surgical History:   Procedure Laterality Date    CARDIAC CATHETERIZATION  10/17/14  JDT    EF 50%    CHOLECYSTECTOMY      FOOT SURGERY      HARDWARE REMOVAL Right 5/3/2019    HARDWARE REMOVAL RIGHT ANKLE performed by Bobby Zamora MD at [address] [place] [address],[address]  NECK SURGERY         Social History     Tobacco Use    Smoking status: Former Smoker     Packs/day: 1.00     Years: 30.00     Pack years: 30.00     Quit date: 3/1/1991     Years since quittin.9    Smokeless tobacco: Never Used   Substance Use Topics    Alcohol use: No    Drug use: No       Family History   Problem Relation Age of Onset    Heart Disease Mother     Cancer Father     Heart Disease Sister        Assessment:    ICD-10-CM    1. Type 2 diabetes mellitus with diabetic polyneuropathy, without long-term current use of insulin ([facility])  E11.42 metFORMIN (GLUCOPHAGE) 1000 MG tablet     glipiZIDE (GLUCOTROL) 10 MG tablet     DISCONTINUED: Insulin Glargine, 2 Unit Dial, 300 UNIT/ML SOPN     DISCONTINUED: Insulin Glargine, 2 Unit Dial, 300 UNIT/ML SOPN     DISCONTINUED: glipiZIDE (GLUCOTROL XL) 10 MG extended release tablet   2. Insomnia, unspecified type  G47.00 amitriptyline (ELAVIL) 50 MG tablet   3. Anxiety  F41.9 clonazePAM (KLONOPIN) 0.5 MG tablet   4. Type 2 diabetes mellitus without complication, without long-term current use of insulin ([facility])  E11.9 lisinopril (PRINIVIL;ZESTRIL) 20 MG tablet   5. Restless legs syndrome (RLS)  G25.81 rOPINIRole (REQUIP) 1 MG tablet   6. Mixed hyperlipidemia  E78.2 simvastatin (ZOCOR) 40 MG tablet       Plan:   1. Type 2 diabetes mellitus with diabetic polyneuropathy, without long-term current use of insulin ([facility])  - glipiZIDE (GLUCOTROL) 10 MG tablet; Take 1 tablet 30 minutes before breakfast and 30 minutes dinner. Dispense: 60 tablet; Refill: 2  - metFORMIN (GLUCOPHAGE) 1000 MG tablet; Take 1 tablet by mouth 2 times daily (with meals)  Dispense: 180 tablet; Refill: 1  - patient reports she would like to stay away from insuling or injectables at long at possible     2. Insomnia, unspecified type  - amitriptyline (ELAVIL) 50 MG tablet; Take 1 tablet by mouth nightly TAKE ONE TABLET BY MOUTH AT BEDTIME. Dispense: 90 tablet; Refill: 1    3.  Anxiety - clonazePAM (KLONOPIN) 0.5 MG tablet; TAKE (1) TABLET BY MOUTH TWICE A DAY AS NEEDED FOR ANXIETY  Dispense: 60 tablet; Refill: 0    4. Type 2 diabetes mellitus without complication, without long-term current use of insulin (HCC)  - lisinopril (PRINIVIL;ZESTRIL) 20 MG tablet; Take 1 tablet by mouth daily  Dispense: 90 tablet; Refill: 1    5. Restless legs syndrome (RLS)  - rOPINIRole (REQUIP) 1 MG tablet; Take 1 tablet by mouth 2 times daily TAKE (1) TABLET BY MOUTH TWICE A DAY. Dispense: 180 tablet; Refill: 1    6. Mixed hyperlipidemia  - simvastatin (ZOCOR) 40 MG tablet; Take 1 tablet by mouth nightly  Dispense: 90 tablet; Refill: 1        No orders of the defined types were placed in this encounter. Orders Placed This Encounter   Medications    DISCONTD: Insulin Glargine, 2 Unit Dial, 300 UNIT/ML SOPN     Sig: Inject 10 Units into the skin nightly     Dispense:  2 pen     Refill:  2    DISCONTD: Insulin Glargine, 2 Unit Dial, 300 UNIT/ML SOPN     Sig: Inject 10 Units into the skin nightly     Dispense:  2 pen     Refill:  2    DISCONTD: glipiZIDE (GLUCOTROL XL) 10 MG extended release tablet     Sig: Take 1 tablet 30 minutes before breakfast and 30 minutes dinner. Dispense:  60 tablet     Refill:  2    amitriptyline (ELAVIL) 50 MG tablet     Sig: Take 1 tablet by mouth nightly TAKE ONE TABLET BY MOUTH AT BEDTIME. Dispense:  90 tablet     Refill:  1    clonazePAM (KLONOPIN) 0.5 MG tablet     Sig: TAKE (1) TABLET BY MOUTH TWICE A DAY AS NEEDED FOR ANXIETY     Dispense:  60 tablet     Refill:  0    metFORMIN (GLUCOPHAGE) 1000 MG tablet     Sig: Take 1 tablet by mouth 2 times daily (with meals)     Dispense:  180 tablet     Refill:  1    lisinopril (PRINIVIL;ZESTRIL) 20 MG tablet     Sig: Take 1 tablet by mouth daily     Dispense:  90 tablet     Refill:  1    rOPINIRole (REQUIP) 1 MG tablet     Sig: Take 1 tablet by mouth 2 times daily TAKE (1) TABLET BY MOUTH TWICE A DAY. Dispense:  180 tablet     Refill:  1    simvastatin (ZOCOR) 40 MG tablet     Sig: Take 1 tablet by mouth nightly     Dispense:  90 tablet     Refill:  1    glipiZIDE (GLUCOTROL) 10 MG tablet     Sig: Take 1 tablet by mouth 2 times daily     Dispense:  60 tablet     Refill:  3     DC glipizide ER and start glipizide 10 mg BID. Medications Discontinued During This Encounter   Medication Reason    Insulin Glargine, 2 Unit Dial, 300 UNIT/ML SOPN     Insulin Glargine, 2 Unit Dial, 300 UNIT/ML SOPN ERROR    celecoxib (CELEBREX) 200 MG capsule LIST CLEANUP    colestipol (COLESTID) 1 g tablet Patient Choice    Insulin Pen Needle (B-D UF III MINI PEN NEEDLES) 31G X 5 MM MISC     Insulin Pen Needle 32G X 4 MM MISC LIST CLEANUP    ondansetron (ZOFRAN) 4 MG tablet LIST CLEANUP    gabapentin (NEURONTIN) 300 MG capsule LIST CLEANUP    escitalopram (LEXAPRO) 20 MG tablet LIST CLEANUP    lisinopril (PRINIVIL;ZESTRIL) 20 MG tablet REORDER    simvastatin (ZOCOR) 40 MG tablet REORDER    metFORMIN (GLUCOPHAGE) 1000 MG tablet REORDER    rOPINIRole (REQUIP) 0.5 MG tablet     amitriptyline (ELAVIL) 25 MG tablet REORDER    glipiZIDE (GLUCOTROL XL) 5 MG extended release tablet     clonazePAM (KLONOPIN) 0.5 MG tablet REORDER    glipiZIDE (GLUCOTROL XL) 10 MG extended release tablet ERROR     There are no Patient Instructions on file for this visit. Patient given educational handouts and has had all questions answered. Patient voices understanding and agrees to plans along with risks and benefits of plan. Patient isinstructed to continue prior meds, diet, and exercise plans unless instructed otherwise. Patient agrees to follow up as instructed and sooner if needed. Patient agrees to go to ER if condition becomes emergent. Notesmay be completed with dictation device and spelling errors may occur. Return in about 3 weeks (around 3/8/2021) for Chronic conditions, POC A1C. I affirm this is a Patient Initiated Episode with an Established Patient who has not had a related appointment within my department in the past 7 days or scheduled within the next 24 hours. Total Time: minutes: 21-30 minutes    Note: not billable if this call serves to triage the patient into an appointment for the relevant concern      [address] were provided through a telephone visit to substitute for in-person clinic visit. Patient and provider were located at their individual homes. Pursuant to the emergency declaration under the [facility], [facility] waiver authority and the [employer] Act, this Virtual  Visit was conducted, with patient's consent, to reduce the patient's risk of exposure to COVID-19 and provide continuity of care for an established patient.

## 2021-02-17 ASSESSMENT — ENCOUNTER SYMPTOMS
EYES NEGATIVE: 1
SHORTNESS OF BREATH: 0
BLURRED VISION: 0
VISUAL CHANGE: 0
RESPIRATORY NEGATIVE: 1
GASTROINTESTINAL NEGATIVE: 1

## 2021-02-19 ENCOUNTER — TELEPHONE (OUTPATIENT)
Dept: PRIMARY CARE CLINIC | Age: 66
End: 2021-02-19

## 2021-02-19 RX ORDER — GLIPIZIDE 10 MG/1
10 TABLET ORAL 2 TIMES DAILY
Qty: 60 TABLET | Refills: 3 | Status: SHIPPED | OUTPATIENT
Start: 2021-02-19 | End: 2021-03-12

## 2021-03-12 ENCOUNTER — OFFICE VISIT (OUTPATIENT)
Dept: PRIMARY CARE CLINIC | Age: 66
End: 2021-03-12
Payer: MEDICARE

## 2021-03-12 VITALS
BODY MASS INDEX: 26.82 KG/M2 | SYSTOLIC BLOOD PRESSURE: 122 MMHG | HEIGHT: 65 IN | TEMPERATURE: 98 F | DIASTOLIC BLOOD PRESSURE: 68 MMHG | HEART RATE: 115 BPM | WEIGHT: 161 LBS | OXYGEN SATURATION: 97 %

## 2021-03-12 DIAGNOSIS — E11.42 DIABETIC POLYNEUROPATHY ASSOCIATED WITH TYPE 2 DIABETES MELLITUS (HCC): Primary | ICD-10-CM

## 2021-03-12 DIAGNOSIS — E11.43 DIABETIC GASTROPARALYSIS (HCC): ICD-10-CM

## 2021-03-12 DIAGNOSIS — K31.84 DIABETIC GASTROPARALYSIS (HCC): ICD-10-CM

## 2021-03-12 DIAGNOSIS — R11.0 NAUSEA: ICD-10-CM

## 2021-03-12 LAB — HBA1C MFR BLD: 9.7 %

## 2021-03-12 PROCEDURE — 83036 HEMOGLOBIN GLYCOSYLATED A1C: CPT | Performed by: NURSE PRACTITIONER

## 2021-03-12 PROCEDURE — 99214 OFFICE O/P EST MOD 30 MIN: CPT | Performed by: NURSE PRACTITIONER

## 2021-03-12 RX ORDER — INSULIN GLARGINE 100 [IU]/ML
INJECTION, SOLUTION SUBCUTANEOUS
Qty: 5 PEN | Refills: 3 | Status: SHIPPED | OUTPATIENT
Start: 2021-03-12

## 2021-03-12 RX ORDER — ONDANSETRON 4 MG/1
4 TABLET, FILM COATED ORAL 3 TIMES DAILY PRN
Qty: 30 TABLET | Refills: 0 | Status: SHIPPED | OUTPATIENT
Start: 2021-03-12 | End: 2021-04-16 | Stop reason: SDUPTHER

## 2021-03-12 ASSESSMENT — ENCOUNTER SYMPTOMS
VISUAL CHANGE: 1
BLURRED VISION: 0

## 2021-03-12 ASSESSMENT — PATIENT HEALTH QUESTIONNAIRE - PHQ9
SUM OF ALL RESPONSES TO PHQ QUESTIONS 1-9: 0
2. FEELING DOWN, DEPRESSED OR HOPELESS: 0

## 2021-03-12 NOTE — PATIENT INSTRUCTIONS
Check glucose every morning before breakfast. If greater than 130 for two mornings in a row, increase nightly insulin (lantus) by two units. Do this until max of 20 units nightly. Follow-up with glucose readings in 3 weeks with FLOR Flores.

## 2021-03-13 ASSESSMENT — ENCOUNTER SYMPTOMS
EYES NEGATIVE: 1
RESPIRATORY NEGATIVE: 1
NAUSEA: 1

## 2021-04-16 DIAGNOSIS — R11.0 NAUSEA: ICD-10-CM

## 2021-04-16 RX ORDER — ONDANSETRON 4 MG/1
4 TABLET, FILM COATED ORAL 3 TIMES DAILY PRN
Qty: 30 TABLET | Refills: 0 | Status: SHIPPED | OUTPATIENT
Start: 2021-04-16

## 2021-04-16 NOTE — TELEPHONE ENCOUNTER
Rosalia Carranza called to request a refill on her medication.       Last office visit : 4/2/2021   Next office visit : Visit date not found     Requested Prescriptions     Signed Prescriptions Disp Refills    ondansetron (ZOFRAN) 4 MG tablet 30 tablet 0     Sig: Take 1 tablet by mouth 3 times daily as needed for Nausea or Vomiting     Authorizing Provider: [facility]     Ordering User: Mila Carbone MA

## 2021-04-22 ENCOUNTER — TELEPHONE (OUTPATIENT)
Dept: PRIMARY CARE CLINIC | Age: 66
End: 2021-04-22

## 2021-04-22 NOTE — TELEPHONE ENCOUNTER
Erica Roy has several No Show appointments 4/2/21,1/21/21,10/26/20,10/2/20,and 6/1/20. Due to office policies patient needs to be dismissed from this practice.

## 2021-04-22 NOTE — TELEPHONE ENCOUNTER
It is in my opinion that this patient should be considered for dismissal from practice due to amount of no-shows and nonadherence.

## 2021-04-27 ENCOUNTER — TELEPHONE (OUTPATIENT)
Dept: PRIMARY CARE CLINIC | Age: 66
End: 2021-04-27

## 2021-04-27 NOTE — TELEPHONE ENCOUNTER
Patient called stating she has been having vaginal itching and burning for 3-4 days. She said she was not able to come in to the office until Thursday or Friday  due to a  so I recommended her to go to urgent care.  She stated \"that's probably what she will do\"

## 2021-05-11 ENCOUNTER — TELEPHONE (OUTPATIENT)
Dept: PRIMARY CARE CLINIC | Age: 66
End: 2021-05-11

## 2021-05-11 NOTE — TELEPHONE ENCOUNTER
Pt called wanted a refill on Klonopin. I told her she should have received a letter that she had been dismissed from this office. She said she did receive it but didn't know what she had done wrong. I explained to her she had 5 no shows in a year. I also told her we couldn't do a refill on the Klonopin she understood.

## 2022-02-14 ENCOUNTER — TRANSCRIBE ORDERS (OUTPATIENT)
Dept: ADMINISTRATIVE | Facility: HOSPITAL | Age: 67
End: 2022-02-14

## 2022-02-14 ENCOUNTER — HOSPITAL ENCOUNTER (OUTPATIENT)
Dept: GENERAL RADIOLOGY | Facility: HOSPITAL | Age: 67
Discharge: HOME | End: 2022-02-14
Admitting: NURSE PRACTITIONER

## 2022-02-14 DIAGNOSIS — M47.816 LUMBAR SPONDYLOSIS: Primary | ICD-10-CM

## 2022-02-14 PROCEDURE — 72110 X-RAY EXAM L-2 SPINE 4/>VWS: CPT

## 2022-03-08 ENCOUNTER — TRANSCRIBE ORDERS (OUTPATIENT)
Dept: PHYSICAL THERAPY | Facility: CLINIC | Age: 67
End: 2022-03-08

## 2022-03-08 DIAGNOSIS — M25.511 RIGHT SHOULDER PAIN, UNSPECIFIED CHRONICITY: Primary | ICD-10-CM

## 2022-03-30 ENCOUNTER — HOSPITAL ENCOUNTER (OUTPATIENT)
Dept: GENERAL RADIOLOGY | Age: 67
Discharge: HOME | End: 2022-03-30
Payer: MEDICARE

## 2022-03-30 ENCOUNTER — HOSPITAL ENCOUNTER (OUTPATIENT)
Dept: WOMENS IMAGING | Age: 67
Discharge: HOME | End: 2022-03-30
Payer: MEDICARE

## 2022-03-30 DIAGNOSIS — M25.511 RIGHT SHOULDER PAIN, UNSPECIFIED CHRONICITY: ICD-10-CM

## 2022-03-30 DIAGNOSIS — Z12.31 ENCOUNTER FOR SCREENING MAMMOGRAM FOR MALIGNANT NEOPLASM OF BREAST: ICD-10-CM

## 2022-03-30 PROCEDURE — 73030 X-RAY EXAM OF SHOULDER: CPT

## 2022-03-30 PROCEDURE — 77063 BREAST TOMOSYNTHESIS BI: CPT

## 2022-04-13 ENCOUNTER — HOSPITAL ENCOUNTER (EMERGENCY)
Facility: HOSPITAL | Age: 67
Discharge: HOME | End: 2022-04-13
Attending: EMERGENCY MEDICINE | Admitting: EMERGENCY MEDICINE

## 2022-04-13 VITALS
HEART RATE: 98 BPM | BODY MASS INDEX: 24.66 KG/M2 | RESPIRATION RATE: 18 BRPM | OXYGEN SATURATION: 96 % | SYSTOLIC BLOOD PRESSURE: 148 MMHG | HEIGHT: 65 IN | DIASTOLIC BLOOD PRESSURE: 72 MMHG | WEIGHT: 148 LBS | TEMPERATURE: 98 F

## 2022-04-13 DIAGNOSIS — M25.511 ACUTE PAIN OF RIGHT SHOULDER: ICD-10-CM

## 2022-04-13 DIAGNOSIS — R25.2 MUSCLE CRAMPS: Primary | ICD-10-CM

## 2022-04-13 PROCEDURE — 99282 EMERGENCY DEPT VISIT SF MDM: CPT

## 2022-04-13 PROCEDURE — 96372 THER/PROPH/DIAG INJ SC/IM: CPT

## 2022-04-13 PROCEDURE — 25010000002 DROPERIDOL PER 5 MG: Performed by: EMERGENCY MEDICINE

## 2022-04-13 RX ORDER — DROPERIDOL 2.5 MG/ML
2.5 INJECTION, SOLUTION INTRAMUSCULAR; INTRAVENOUS ONCE
Status: COMPLETED | OUTPATIENT
Start: 2022-04-13 | End: 2022-04-13

## 2022-04-13 RX ADMIN — DROPERIDOL 2.5 MG: 2.5 INJECTION, SOLUTION INTRAMUSCULAR; INTRAVENOUS at 04:35

## 2022-04-13 NOTE — ED PROVIDER NOTES
Subjective   Patient presents to the ER with complaints of lower calf leg cramping for the past 3 months and pain in the right shoulder where she cannot lift her right arm for also several weeks.  Patient denies injury.  She denies chest pain, shortness of breath, nausea, vomiting, other constitutional complaints or symptoms and review of systems other than noted above is noncontributory.          Review of Systems   All other systems reviewed and are negative.      Past Medical History:   Diagnosis Date   • Acid reflux    • Anxiety    • Diabetes mellitus (HCC)    • Hyperlipidemia    • Hypertension    • Insomnia    • Migraines    • Muscle, jerky movements (uncontrolled)    • Panic attack    • PONV (postoperative nausea and vomiting)        Allergies   Allergen Reactions   • Codeine Shortness Of Breath   • Morphine Shortness Of Breath       Past Surgical History:   Procedure Laterality Date   • ANKLE OPEN REDUCTION INTERNAL FIXATION Right 10/10/2018    Procedure: ANKLE OPEN REDUCTION INTERNAL FIXATION - RIGHT;  Surgeon: Bart Oglesby MD;  Location: [facility] OR;  Service: Orthopedics   • ANTERIOR CERVICAL DISCECTOMY W/ FUSION N/A 1/21/2019    Procedure: ANTERIOR CERVICAL DISCECTOMY FUSION C3-4 POSSIBLE REMOVAL OF INSTRUMENTATION C4-7;  Surgeon: CARLA Bird MD;  Location: [facility] OR;  Service: Orthopedic Spine   • APPENDECTOMY     • CHOLECYSTECTOMY     • HYSTERECTOMY     • NECK SURGERY      x3       Family History   Problem Relation Age of Onset   • Heart disease Mother    • Heart disease Father    • Leukemia Father        Social History     Socioeconomic History   • Marital status:    Tobacco Use   • Smoking status: Never Smoker   • Smokeless tobacco: Never Used   Vaping Use   • Vaping Use: Never used   Substance and Sexual Activity   • Alcohol use: No   • Drug use: No   • Sexual activity: Defer           Objective   Physical Exam  Vitals and nursing note reviewed.   Constitutional:       General:  She is not in acute distress.     Appearance: Normal appearance. She is normal weight. She is not ill-appearing, toxic-appearing or diaphoretic.   HENT:      Head: Normocephalic and atraumatic.      Right Ear: External ear normal.      Left Ear: External ear normal.      Nose: Nose normal.   Eyes:      General:         Right eye: No discharge.         Left eye: No discharge.      Conjunctiva/sclera: Conjunctivae normal.   Cardiovascular:      Rate and Rhythm: Normal rate and regular rhythm.      Pulses: Normal pulses.      Heart sounds: Normal heart sounds. No murmur heard.    No friction rub. No gallop.   Pulmonary:      Effort: Pulmonary effort is normal. No respiratory distress.      Breath sounds: Normal breath sounds. No stridor. No wheezing, rhonchi or rales.   Chest:      Chest wall: No tenderness.   Abdominal:      General: Abdomen is flat.   Musculoskeletal:         General: Tenderness present. Normal range of motion.      Cervical back: Neck supple. No rigidity.      Right lower leg: No edema.      Left lower leg: No edema.      Comments: Patient is unable to AB duct the arm at the shoulder above 90 degrees or externally rotate the arm at 90 degrees consistent with either rotator cuff or adhesive capsulitis.  Pulses are equal and symmetric bilaterally.   Skin:     General: Skin is warm and dry.   Neurological:      General: No focal deficit present.      Mental Status: She is alert and oriented to person, place, and time. Mental status is at baseline.   Psychiatric:         Mood and Affect: Mood normal.         Behavior: Behavior normal.         Thought Content: Thought content normal.         Judgment: Judgment normal.         Procedures           ED Course           Patient has chronic issues of which she needs primary care follow-up not emergency department work-up.  Patient and I had discussion about need for primary care follow-up versus emergency department work-up and she is in agreement  understands.  Told her we would give her something to help her sleep this evening and otherwise she should call her primary care doctor in the morning.                      Patient given discharge instructions.  Told of test results and findings.  Given warnings and return to emergency department instructions.  Patient understood agree with all instructions agreed to follow-up as directed.                  MDM  Number of Diagnoses or Management Options  Acute pain of right shoulder: established and worsening  Muscle cramps: established and worsening  Risk of Complications, Morbidity, and/or Mortality  Presenting problems: minimal  Diagnostic procedures: minimal  Management options: minimal    Patient Progress  Patient progress: improved      Final diagnoses:   Muscle cramps   Acute pain of right shoulder       ED Disposition  ED Disposition     ED Disposition   Discharge    Condition   Good    Comment   --             Carlos Franks DO  [address] [id number] [phone]    Schedule an appointment as soon as possible for a visit today           Medication List      No changes were made to your prescriptions during this visit.          Michael Marquez DO  04/13/22 0434

## 2022-05-29 ENCOUNTER — APPOINTMENT (OUTPATIENT)
Dept: CT IMAGING | Facility: HOSPITAL | Age: 67
End: 2022-05-29

## 2022-05-29 ENCOUNTER — APPOINTMENT (OUTPATIENT)
Dept: GENERAL RADIOLOGY | Facility: HOSPITAL | Age: 67
End: 2022-05-29

## 2022-05-29 ENCOUNTER — HOSPITAL ENCOUNTER (EMERGENCY)
Facility: HOSPITAL | Age: 67
Discharge: HOME | End: 2022-05-29
Attending: STUDENT IN AN ORGANIZED HEALTH CARE EDUCATION/TRAINING PROGRAM | Admitting: EMERGENCY MEDICINE

## 2022-05-29 VITALS
BODY MASS INDEX: 23.99 KG/M2 | WEIGHT: 144 LBS | OXYGEN SATURATION: 99 % | SYSTOLIC BLOOD PRESSURE: 147 MMHG | HEART RATE: 97 BPM | RESPIRATION RATE: 16 BRPM | TEMPERATURE: 99 F | HEIGHT: 65 IN | DIASTOLIC BLOOD PRESSURE: 92 MMHG

## 2022-05-29 DIAGNOSIS — S60.222A CONTUSION OF LEFT HAND, INITIAL ENCOUNTER: ICD-10-CM

## 2022-05-29 DIAGNOSIS — M54.2 NECK PAIN: ICD-10-CM
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
## 2022-05-29 DIAGNOSIS — M54.50 ACUTE MIDLINE LOW BACK PAIN WITHOUT SCIATICA: ICD-10-CM

## 2022-05-29 DIAGNOSIS — S40.012A CONTUSION OF LEFT SHOULDER, INITIAL ENCOUNTER: ICD-10-CM

## 2022-05-29 LAB
ALBUMIN SERPL-MCNC: 3.6 G/DL (ref 3.5–5.2)
ALBUMIN/GLOB SERPL: 1.3 G/DL
ALP SERPL-CCNC: 93 U/L (ref 39–117)
ALT SERPL W P-5'-P-CCNC: 9 U/L (ref 1–33)
ANION GAP SERPL CALCULATED.3IONS-SCNC: 13 MMOL/L (ref 5–15)
APTT PPP: 22.7 SECONDS (ref 24.1–35)
AST SERPL-CCNC: 15 U/L (ref 1–32)
BASOPHILS # BLD AUTO: 0.05 10*3/MM3 (ref 0–0.2)
BASOPHILS NFR BLD AUTO: 0.7 % (ref 0–1.5)
BILIRUB SERPL-MCNC: 0.4 MG/DL (ref 0–1.2)
BILIRUB UR QL STRIP: NEGATIVE
BUN SERPL-MCNC: 10 MG/DL (ref 8–23)
BUN/CREAT SERPL: 14.3 (ref 7–25)
CALCIUM SPEC-SCNC: 9.1 MG/DL (ref 8.6–10.5)
CHLORIDE SERPL-SCNC: 98 MMOL/L (ref 98–107)
CLARITY UR: CLEAR
CO2 SERPL-SCNC: 25 MMOL/L (ref 22–29)
COLOR UR: YELLOW
CREAT SERPL-MCNC: 0.7 MG/DL (ref 0.57–1)
DEPRECATED RDW RBC AUTO: 42.8 FL (ref 37–54)
EGFRCR SERPLBLD CKD-EPI 2021: 94.9 ML/MIN/1.73
EOSINOPHIL # BLD AUTO: 0.11 10*3/MM3 (ref 0–0.4)
EOSINOPHIL NFR BLD AUTO: 1.6 % (ref 0.3–6.2)
ERYTHROCYTE [DISTWIDTH] IN BLOOD BY AUTOMATED COUNT: 13.9 % (ref 12.3–15.4)
GLOBULIN UR ELPH-MCNC: 2.7 GM/DL
GLUCOSE SERPL-MCNC: 364 MG/DL (ref 65–99)
GLUCOSE UR STRIP-MCNC: ABNORMAL MG/DL
HCT VFR BLD AUTO: 34.9 % (ref 34–46.6)
HGB BLD-MCNC: 11.2 G/DL (ref 12–15.9)
HGB UR QL STRIP.AUTO: NEGATIVE
IMM GRANULOCYTES # BLD AUTO: 0.03 10*3/MM3 (ref 0–0.05)
IMM GRANULOCYTES NFR BLD AUTO: 0.4 % (ref 0–0.5)
INR PPP: 0.95 (ref 0.91–1.09)
KETONES UR QL STRIP: NEGATIVE
LEUKOCYTE ESTERASE UR QL STRIP.AUTO: NEGATIVE
LIPASE SERPL-CCNC: 23 U/L (ref 13–60)
LYMPHOCYTES # BLD AUTO: 1.63 10*3/MM3 (ref 0.7–3.1)
LYMPHOCYTES NFR BLD AUTO: 24.3 % (ref 19.6–45.3)
MCH RBC QN AUTO: 27.3 PG (ref 26.6–33)
MCHC RBC AUTO-ENTMCNC: 32.1 G/DL (ref 31.5–35.7)
MCV RBC AUTO: 84.9 FL (ref 79–97)
MONOCYTES # BLD AUTO: 0.68 10*3/MM3 (ref 0.1–0.9)
MONOCYTES NFR BLD AUTO: 10.1 % (ref 5–12)
NEUTROPHILS NFR BLD AUTO: 4.21 10*3/MM3 (ref 1.7–7)
NEUTROPHILS NFR BLD AUTO: 62.9 % (ref 42.7–76)
NITRITE UR QL STRIP: NEGATIVE
NRBC BLD AUTO-RTO: 0 /100 WBC (ref 0–0.2)
PH UR STRIP.AUTO: 8 [PH] (ref 5–8)
PLATELET # BLD AUTO: 277 10*3/MM3 (ref 140–450)
PMV BLD AUTO: 9.8 FL (ref 6–12)
POTASSIUM SERPL-SCNC: 4.3 MMOL/L (ref 3.5–5.2)
PROT SERPL-MCNC: 6.3 G/DL (ref 6–8.5)
PROT UR QL STRIP: NEGATIVE
PROTHROMBIN TIME: 12.3 SECONDS (ref 11.9–14.6)
RBC # BLD AUTO: 4.11 10*6/MM3 (ref 3.77–5.28)
SARS-COV-2 RNA PNL SPEC NAA+PROBE: NOT DETECTED
SODIUM SERPL-SCNC: 136 MMOL/L (ref 136–145)
SP GR UR STRIP: 1.03 (ref 1–1.03)
TROPONIN T SERPL-MCNC: <0.01 NG/ML (ref 0–0.03)
UROBILINOGEN UR QL STRIP: ABNORMAL
WBC NRBC COR # BLD: 6.71 10*3/MM3 (ref 3.4–10.8)

## 2022-05-29 PROCEDURE — 83690 ASSAY OF LIPASE: CPT | Performed by: STUDENT IN AN ORGANIZED HEALTH CARE EDUCATION/TRAINING PROGRAM

## 2022-05-29 PROCEDURE — 73030 X-RAY EXAM OF SHOULDER: CPT

## 2022-05-29 PROCEDURE — 72131 CT LUMBAR SPINE W/O DYE: CPT

## 2022-05-29 PROCEDURE — 87635 SARS-COV-2 COVID-19 AMP PRB: CPT | Performed by: STUDENT IN AN ORGANIZED HEALTH CARE EDUCATION/TRAINING PROGRAM

## 2022-05-29 PROCEDURE — 72125 CT NECK SPINE W/O DYE: CPT

## 2022-05-29 PROCEDURE — 96374 THER/PROPH/DIAG INJ IV PUSH: CPT

## 2022-05-29 PROCEDURE — 85025 COMPLETE CBC W/AUTO DIFF WBC: CPT | Performed by: STUDENT IN AN ORGANIZED HEALTH CARE EDUCATION/TRAINING PROGRAM

## 2022-05-29 PROCEDURE — 99284 EMERGENCY DEPT VISIT MOD MDM: CPT

## 2022-05-29 PROCEDURE — 81003 URINALYSIS AUTO W/O SCOPE: CPT | Performed by: STUDENT IN AN ORGANIZED HEALTH CARE EDUCATION/TRAINING PROGRAM

## 2022-05-29 PROCEDURE — 80053 COMPREHEN METABOLIC PANEL: CPT | Performed by: STUDENT IN AN ORGANIZED HEALTH CARE EDUCATION/TRAINING PROGRAM

## 2022-05-29 PROCEDURE — 72128 CT CHEST SPINE W/O DYE: CPT

## 2022-05-29 PROCEDURE — 70450 CT HEAD/BRAIN W/O DYE: CPT

## 2022-05-29 PROCEDURE — 71275 CT ANGIOGRAPHY CHEST: CPT

## 2022-05-29 PROCEDURE — 85730 THROMBOPLASTIN TIME PARTIAL: CPT | Performed by: STUDENT IN AN ORGANIZED HEALTH CARE EDUCATION/TRAINING PROGRAM

## 2022-05-29 PROCEDURE — 25010000002 FENTANYL CITRATE (PF) 50 MCG/ML SOLUTION: Performed by: STUDENT IN AN ORGANIZED HEALTH CARE EDUCATION/TRAINING PROGRAM

## 2022-05-29 PROCEDURE — 74177 CT ABD & PELVIS W/CONTRAST: CPT

## 2022-05-29 PROCEDURE — 71045 X-RAY EXAM CHEST 1 VIEW: CPT

## 2022-05-29 PROCEDURE — 36415 COLL VENOUS BLD VENIPUNCTURE: CPT

## 2022-05-29 PROCEDURE — 73130 X-RAY EXAM OF HAND: CPT

## 2022-05-29 PROCEDURE — 0 IOPAMIDOL PER 1 ML: Performed by: STUDENT IN AN ORGANIZED HEALTH CARE EDUCATION/TRAINING PROGRAM

## 2022-05-29 PROCEDURE — 84484 ASSAY OF TROPONIN QUANT: CPT | Performed by: STUDENT IN AN ORGANIZED HEALTH CARE EDUCATION/TRAINING PROGRAM

## 2022-05-29 PROCEDURE — 85610 PROTHROMBIN TIME: CPT | Performed by: STUDENT IN AN ORGANIZED HEALTH CARE EDUCATION/TRAINING PROGRAM

## 2022-05-29 RX ORDER — FENTANYL CITRATE 50 UG/ML
25 INJECTION, SOLUTION INTRAMUSCULAR; INTRAVENOUS
Status: DISCONTINUED | OUTPATIENT
Start: 2022-05-29 | End: 2022-05-29 | Stop reason: HOSPADM

## 2022-05-29 RX ADMIN — FENTANYL CITRATE 25 MCG: 50 INJECTION INTRAMUSCULAR; INTRAVENOUS at 15:56

## 2022-05-29 RX ADMIN — IOPAMIDOL 100 ML: 755 INJECTION, SOLUTION INTRAVENOUS at 17:20

## 2022-08-09 ENCOUNTER — TRANSCRIBE ORDERS (OUTPATIENT)
Dept: PHYSICAL THERAPY | Facility: CLINIC | Age: 67
End: 2022-08-09

## 2022-08-09 DIAGNOSIS — R26.81 UNSTEADINESS ON FEET: Primary | ICD-10-CM

## 2022-09-02 ENCOUNTER — APPOINTMENT (OUTPATIENT)
Dept: CT IMAGING | Facility: HOSPITAL | Age: 67
End: 2022-09-02

## 2022-09-02 ENCOUNTER — APPOINTMENT (OUTPATIENT)
Dept: GENERAL RADIOLOGY | Facility: HOSPITAL | Age: 67
End: 2022-09-02

## 2022-09-02 ENCOUNTER — HOSPITAL ENCOUNTER (EMERGENCY)
Facility: HOSPITAL | Age: 67
Discharge: HOME | End: 2022-09-03
Attending: STUDENT IN AN ORGANIZED HEALTH CARE EDUCATION/TRAINING PROGRAM | Admitting: STUDENT IN AN ORGANIZED HEALTH CARE EDUCATION/TRAINING PROGRAM

## 2022-09-02 DIAGNOSIS — I25.84 CORONARY ARTERY CALCIFICATION: ICD-10-CM

## 2022-09-02 DIAGNOSIS — I25.10 CORONARY ARTERY CALCIFICATION: ICD-10-CM

## 2022-09-02 DIAGNOSIS — E04.2 MULTIPLE THYROID NODULES: ICD-10-CM

## 2022-09-02 DIAGNOSIS — R42 DIZZINESS: Primary | ICD-10-CM

## 2022-09-02 LAB
ALBUMIN SERPL-MCNC: 4.3 G/DL (ref 3.5–5.2)
ALBUMIN/GLOB SERPL: 1.8 G/DL
ALP SERPL-CCNC: 98 U/L (ref 39–117)
ALT SERPL W P-5'-P-CCNC: 8 U/L (ref 1–33)
ANION GAP SERPL CALCULATED.3IONS-SCNC: 14 MMOL/L (ref 5–15)
AST SERPL-CCNC: 12 U/L (ref 1–32)
BASOPHILS # BLD AUTO: 0.06 10*3/MM3 (ref 0–0.2)
BASOPHILS NFR BLD AUTO: 0.7 % (ref 0–1.5)
BILIRUB SERPL-MCNC: 0.3 MG/DL (ref 0–1.2)
BUN SERPL-MCNC: 13 MG/DL (ref 8–23)
BUN/CREAT SERPL: 13.3 (ref 7–25)
CALCIUM SPEC-SCNC: 9.1 MG/DL (ref 8.6–10.5)
CHLORIDE SERPL-SCNC: 95 MMOL/L (ref 98–107)
CO2 SERPL-SCNC: 24 MMOL/L (ref 22–29)
CREAT SERPL-MCNC: 0.98 MG/DL (ref 0.57–1)
D DIMER PPP FEU-MCNC: 0.38 MCGFEU/ML (ref 0–0.5)
DEPRECATED RDW RBC AUTO: 41.7 FL (ref 37–54)
EGFRCR SERPLBLD CKD-EPI 2021: 63 ML/MIN/1.73
EOSINOPHIL # BLD AUTO: 0.11 10*3/MM3 (ref 0–0.4)
EOSINOPHIL NFR BLD AUTO: 1.3 % (ref 0.3–6.2)
ERYTHROCYTE [DISTWIDTH] IN BLOOD BY AUTOMATED COUNT: 13.3 % (ref 12.3–15.4)
GLOBULIN UR ELPH-MCNC: 2.4 GM/DL
GLUCOSE SERPL-MCNC: 364 MG/DL (ref 65–99)
HCT VFR BLD AUTO: 36.2 % (ref 34–46.6)
HGB BLD-MCNC: 11.8 G/DL (ref 12–15.9)
IMM GRANULOCYTES # BLD AUTO: 0.02 10*3/MM3 (ref 0–0.05)
IMM GRANULOCYTES NFR BLD AUTO: 0.2 % (ref 0–0.5)
LYMPHOCYTES # BLD AUTO: 2.22 10*3/MM3 (ref 0.7–3.1)
LYMPHOCYTES NFR BLD AUTO: 25.3 % (ref 19.6–45.3)
MCH RBC QN AUTO: 28 PG (ref 26.6–33)
MCHC RBC AUTO-ENTMCNC: 32.6 G/DL (ref 31.5–35.7)
MCV RBC AUTO: 86 FL (ref 79–97)
MONOCYTES # BLD AUTO: 0.96 10*3/MM3 (ref 0.1–0.9)
MONOCYTES NFR BLD AUTO: 10.9 % (ref 5–12)
NEUTROPHILS NFR BLD AUTO: 5.41 10*3/MM3 (ref 1.7–7)
NEUTROPHILS NFR BLD AUTO: 61.6 % (ref 42.7–76)
NRBC BLD AUTO-RTO: 0 /100 WBC (ref 0–0.2)
PLATELET # BLD AUTO: 315 10*3/MM3 (ref 140–450)
PMV BLD AUTO: 9.6 FL (ref 6–12)
POTASSIUM SERPL-SCNC: 3.9 MMOL/L (ref 3.5–5.2)
PROT SERPL-MCNC: 6.7 G/DL (ref 6–8.5)
RBC # BLD AUTO: 4.21 10*6/MM3 (ref 3.77–5.28)
SARS-COV-2 RNA PNL SPEC NAA+PROBE: NOT DETECTED
SODIUM SERPL-SCNC: 133 MMOL/L (ref 136–145)
TROPONIN T SERPL-MCNC: <0.01 NG/ML (ref 0–0.03)
WBC NRBC COR # BLD: 8.78 10*3/MM3 (ref 3.4–10.8)

## 2022-09-02 PROCEDURE — 87635 SARS-COV-2 COVID-19 AMP PRB: CPT | Performed by: STUDENT IN AN ORGANIZED HEALTH CARE EDUCATION/TRAINING PROGRAM

## 2022-09-02 PROCEDURE — 93010 ELECTROCARDIOGRAM REPORT: CPT | Performed by: INTERNAL MEDICINE

## 2022-09-02 PROCEDURE — 80053 COMPREHEN METABOLIC PANEL: CPT | Performed by: STUDENT IN AN ORGANIZED HEALTH CARE EDUCATION/TRAINING PROGRAM

## 2022-09-02 PROCEDURE — 70498 CT ANGIOGRAPHY NECK: CPT

## 2022-09-02 PROCEDURE — 93005 ELECTROCARDIOGRAM TRACING: CPT | Performed by: STUDENT IN AN ORGANIZED HEALTH CARE EDUCATION/TRAINING PROGRAM

## 2022-09-02 PROCEDURE — 85025 COMPLETE CBC W/AUTO DIFF WBC: CPT | Performed by: STUDENT IN AN ORGANIZED HEALTH CARE EDUCATION/TRAINING PROGRAM

## 2022-09-02 PROCEDURE — 70496 CT ANGIOGRAPHY HEAD: CPT

## 2022-09-02 PROCEDURE — 71045 X-RAY EXAM CHEST 1 VIEW: CPT

## 2022-09-02 PROCEDURE — 0 IOPAMIDOL PER 1 ML: Performed by: STUDENT IN AN ORGANIZED HEALTH CARE EDUCATION/TRAINING PROGRAM

## 2022-09-02 PROCEDURE — 84484 ASSAY OF TROPONIN QUANT: CPT | Performed by: STUDENT IN AN ORGANIZED HEALTH CARE EDUCATION/TRAINING PROGRAM

## 2022-09-02 PROCEDURE — 36415 COLL VENOUS BLD VENIPUNCTURE: CPT

## 2022-09-02 PROCEDURE — 85379 FIBRIN DEGRADATION QUANT: CPT | Performed by: STUDENT IN AN ORGANIZED HEALTH CARE EDUCATION/TRAINING PROGRAM

## 2022-09-02 PROCEDURE — 71275 CT ANGIOGRAPHY CHEST: CPT

## 2022-09-02 PROCEDURE — 99284 EMERGENCY DEPT VISIT MOD MDM: CPT

## 2022-09-02 PROCEDURE — 70450 CT HEAD/BRAIN W/O DYE: CPT

## 2022-09-02 RX ORDER — ASPIRIN 325 MG
325 TABLET ORAL ONCE
Status: COMPLETED | OUTPATIENT
Start: 2022-09-02 | End: 2022-09-02

## 2022-09-02 RX ADMIN — IOPAMIDOL 150 ML: 755 INJECTION, SOLUTION INTRAVENOUS at 23:46

## 2022-09-02 RX ADMIN — SODIUM CHLORIDE, POTASSIUM CHLORIDE, SODIUM LACTATE AND CALCIUM CHLORIDE 1000 ML: 600; 310; 30; 20 INJECTION, SOLUTION INTRAVENOUS at 22:30

## 2022-09-02 RX ADMIN — ASPIRIN 325 MG: 325 TABLET ORAL at 22:14

## 2022-09-03 VITALS
TEMPERATURE: 99.1 F | OXYGEN SATURATION: 99 % | RESPIRATION RATE: 19 BRPM | SYSTOLIC BLOOD PRESSURE: 105 MMHG | DIASTOLIC BLOOD PRESSURE: 60 MMHG | WEIGHT: 148 LBS | HEIGHT: 65 IN | BODY MASS INDEX: 24.66 KG/M2 | HEART RATE: 102 BPM

## 2022-09-03 LAB — TROPONIN T SERPL-MCNC: <0.01 NG/ML (ref 0–0.03)

## 2022-09-03 PROCEDURE — 36415 COLL VENOUS BLD VENIPUNCTURE: CPT

## 2022-09-03 PROCEDURE — 84484 ASSAY OF TROPONIN QUANT: CPT | Performed by: STUDENT IN AN ORGANIZED HEALTH CARE EDUCATION/TRAINING PROGRAM

## 2022-09-03 NOTE — DISCHARGE INSTRUCTIONS
It was very nice to meet you, [place]. Thank you for allowing us to take care of you today at [facility].    Your work-up today did not show any emergent findings or emergent indications for admission to the hospital. It is important that you follow up with your PCP to set up for an outpatient cardiology evaluation or if you have chest pain, please return to the ER. Please understand that an ER evaluation is considered to be just the start of your evaluation. We will do what we can in one visit, but we are often unable to fully figure out what is causing your symptoms from one evaluation. Thus our primary goal is to determine whether you need to be evaluated in the hospital or if it is safe for you to go home and see other doctors such as a primary care physician or a specialist on an outpatient basis. A copy of your results should be included in your paperwork.     It is VERY IMPORTANT that you follow up (call them to set up an appointment) with your primary care doctor* within the next few days or as soon as possible so that you can be re-evaluated for improvement in your symptoms or for any other questions. If you were prescribed any medications, please take them as directed or call us back with any questions.     Please return to the emergency room within 12-48 hours if you experience fever, chills, chest pain or shortness of breath, pain with inspiration/expiration, pain that travels to your arms, neck or back, nausea, vomiting, severe headache, tearing pain in your chest, dizziness, feel as though you are about to pass out, have any worsening symptoms, or any other concerns.    *IMPORTANT INFORMATION REGARDING XRAYS AND CT SCANS*  Please keep in mind that at night time we do not have an in-house radiologist and use a Tele-radiology service. This means that while they provide us with information on emergent findings or acute findings, they may not report to us all of the other small findings that may  be there on your imaging studies. While we will try our best to inform you about any incidental findings or abnormal findings that require follow up, please follow up with your primary care provider to have your imaging studies reviewed formally and have any abnormal findings addressed.

## 2022-09-04 LAB
QT INTERVAL: 438 MS
QTC INTERVAL: 578 MS

## 2022-09-05 NOTE — ED PROVIDER NOTES
Subjective   Patient is that she has been feeling slightly dizzy today.  States that a few days ago she had some facial droop that went away on its own.  She states that today she does not have any facial droop.  States that she is not having any numbness or tingling that is new.  States that she did not want to come in then because the facial droop went away.  States that since today the dizziness came back and she had some chest pain she came in for further evaluation.          Review of Systems   All other systems reviewed and are negative.      Past Medical History:   Diagnosis Date   • Acid reflux    • Anxiety    • Diabetes mellitus (HCC)    • Hyperlipidemia    • Hypertension    • Insomnia    • Migraines    • Muscle, jerky movements (uncontrolled)    • Panic attack    • PONV (postoperative nausea and vomiting)        Allergies   Allergen Reactions   • Codeine Shortness Of Breath   • Morphine Shortness Of Breath       Past Surgical History:   Procedure Laterality Date   • ANKLE OPEN REDUCTION INTERNAL FIXATION Right 10/10/2018    Procedure: ANKLE OPEN REDUCTION INTERNAL FIXATION - RIGHT;  Surgeon: Bart Oglesby MD;  Location:  [facility] OR;  Service: Orthopedics   • ANTERIOR CERVICAL DISCECTOMY W/ FUSION N/A 1/21/2019    Procedure: ANTERIOR CERVICAL DISCECTOMY FUSION C3-4 POSSIBLE REMOVAL OF INSTRUMENTATION C4-7;  Surgeon: CARLA Bird MD;  Location:  [facility] OR;  Service: Orthopedic Spine   • APPENDECTOMY     • CHOLECYSTECTOMY     • HYSTERECTOMY     • NECK SURGERY      x3       Family History   Problem Relation Age of Onset   • Heart disease Mother    • Heart disease Father    • Leukemia Father        Social History     Socioeconomic History   • Marital status:    Tobacco Use   • Smoking status: Never Smoker   • Smokeless tobacco: Never Used   Vaping Use   • Vaping Use: Never used   Substance and Sexual Activity   • Alcohol use: No   • Drug use: No   • Sexual activity: Defer           Objective    Physical Exam  Vitals and nursing note reviewed.   Constitutional:       General: She is not in acute distress.     Appearance: Normal appearance. She is not toxic-appearing or diaphoretic.   HENT:      Head: Normocephalic and atraumatic.      Nose: Nose normal.   Eyes:      General:         Right eye: No discharge.         Left eye: No discharge.      Extraocular Movements: Extraocular movements intact.      Conjunctiva/sclera: Conjunctivae normal.      Pupils: Pupils are equal, round, and reactive to light.   Cardiovascular:      Rate and Rhythm: Normal rate and regular rhythm.      Pulses: Normal pulses.   Pulmonary:      Effort: Pulmonary effort is normal. No respiratory distress.      Breath sounds: No rhonchi.   Abdominal:      General: Abdomen is flat.   Musculoskeletal:         General: Normal range of motion.      Cervical back: Normal range of motion.   Skin:     General: Skin is warm.   Neurological:      General: No focal deficit present.      Mental Status: She is alert and oriented to person, place, and time. Mental status is at baseline.   Psychiatric:         Mood and Affect: Mood normal.         Behavior: Behavior normal.         Thought Content: Thought content normal.         Judgment: Judgment normal.         Procedures           ED Course                                           MDM  Number of Diagnoses or Management Options  Coronary artery calcification  Dizziness  Multiple thyroid nodules  Diagnosis management comments: This is a 67yoF presenting with dizziness. Patient arrived hemodynamically stable and was afebrile. No red flags for a central cause of dizziness as it was gradual in onset, has no vertical/bidirectional or nonfatigable nystagmus, and has no focal deficits on exam. Patient was placed on the monitor and IV access was established. Glucose within normal limits. EKG was obtained and did not reveal any malignant/unstable dysrhythmia or any acute ST elevations. Differentials  include, but are not limited to migraine, ICH, CVA, tension headache, BPPV. Plan to obtain cbc, cmp, ekg, troponin, CT head, CTA head, CTA neck, CTA chest to evaluate for any dissection or other intrathoracic pathology, control pain, and reassess.    Presentation not consistent with other acute, emergent causes of dizziness at this time. No red flags for SAH (based on history) and headache is not the worst headache of the patient's life. No evidence of meningismus on exam and no photophobia. No neck stiffness or overlying skin changes. Low suspicion for acute angle closure glaucoma at this time given lack of pupillary findings. Low suspicion for temporal arteritis as there is no bulging temporal artery, pain is not localized to temporal area, and patient does not have any vision loss or history of vasculitis. Low suspicion for CRAO/CRVO as the patient did not have painless vision loss. Low suspicion for ACS as the patient does not have any associated chest pain or shortness of breath and has a non-suspicious history of presenting illness. No gait disturbance. No diplopia, dysarthria, or dysphagia on exam.    The workup was reviewed and found to have evidence of multiple thyroid nodules and coronary calcifications. She has no more dizziness and wants to go home. I offered admission for further workup but she declined and would rather follow up as an outpatient.  She was given commonsense return precautions which she verbalized understanding of and agreed with.  She was discharged in stable condition.         Amount and/or Complexity of Data Reviewed  Clinical lab tests: reviewed and ordered  Tests in the radiology section of CPT®: ordered and reviewed  Tests in the medicine section of CPT®: reviewed        Final diagnoses:   Dizziness   Coronary artery calcification   Multiple thyroid nodules       ED Disposition  ED Disposition     ED Disposition   Discharge    Condition   Stable    Comment   --             Golden  Carlos Sim DO  [address] [address]  [phone]    Call in 1 day  As needed, If symptoms worsen         Medication List      No changes were made to your prescriptions during this visit.          Layla Sims MD  09/05/22 0351

## 2022-11-14 ENCOUNTER — TRANSCRIBE ORDERS (OUTPATIENT)
Dept: ADMINISTRATIVE | Facility: HOSPITAL | Age: 67
End: 2022-11-14

## 2022-11-14 DIAGNOSIS — E04.1 NONTOXIC SINGLE THYROID NODULE: Primary | ICD-10-CM

## 2022-11-18 ENCOUNTER — HOSPITAL ENCOUNTER (OUTPATIENT)
Dept: ULTRASOUND IMAGING | Facility: HOSPITAL | Age: 67
Discharge: HOME | End: 2022-11-18

## 2022-11-18 DIAGNOSIS — E04.1 NONTOXIC SINGLE THYROID NODULE: ICD-10-CM

## 2022-11-18 PROCEDURE — 76536 US EXAM OF HEAD AND NECK: CPT

## 2022-12-06 ENCOUNTER — HOSPITAL ENCOUNTER (OUTPATIENT)
Dept: ULTRASOUND IMAGING | Facility: HOSPITAL | Age: 67
Discharge: HOME | End: 2022-12-06

## 2022-12-06 DIAGNOSIS — E04.1 NONTOXIC SINGLE THYROID NODULE: ICD-10-CM

## 2022-12-06 PROCEDURE — 76942 ECHO GUIDE FOR BIOPSY: CPT

## 2022-12-06 PROCEDURE — 88172 CYTP DX EVAL FNA 1ST EA SITE: CPT | Performed by: INTERNAL MEDICINE

## 2022-12-06 PROCEDURE — 88112 CYTOPATH CELL ENHANCE TECH: CPT | Performed by: INTERNAL MEDICINE

## 2022-12-06 RX ORDER — LIDOCAINE HYDROCHLORIDE 10 MG/ML
10 INJECTION, SOLUTION INFILTRATION; PERINEURAL ONCE
Status: DISPENSED | OUTPATIENT
Start: 2022-12-06

## 2022-12-20 LAB
BEAKER LAB AP INTRAOPERATIVE CONSULTATION: NORMAL
CYTO UR: NORMAL
LAB AP CASE REPORT: NORMAL
LAB AP CLINICAL INFORMATION: NORMAL
Lab: NORMAL
PATH REPORT.FINAL DX SPEC: NORMAL
PATH REPORT.GROSS SPEC: NORMAL

## 2022-12-22 ENCOUNTER — TRANSCRIBE ORDERS (OUTPATIENT)
Dept: ADMINISTRATIVE | Facility: HOSPITAL | Age: 67
End: 2022-12-22

## 2022-12-22 DIAGNOSIS — E04.1 NONTOXIC AUTONOMOUS THYROID NODULE: Primary | ICD-10-CM

## 2023-01-03 ENCOUNTER — TELEPHONE (OUTPATIENT)
Dept: OTOLARYNGOLOGY | Facility: CLINIC | Age: 68
End: 2023-01-03
Payer: MEDICARE

## 2023-01-03 NOTE — TELEPHONE ENCOUNTER
Called to notify the pt of their appt with Otolaryngology (Pascual Jhaveri MD) 01/31/2023 at 1:45 PM. The pt answered and was given the information.

## 2023-06-05 ENCOUNTER — TRANSCRIBE ORDERS (OUTPATIENT)
Dept: PHYSICAL THERAPY | Facility: CLINIC | Age: 68
End: 2023-06-05
Payer: MEDICARE

## 2023-06-05 DIAGNOSIS — R53.1 WEAKNESS: Primary | ICD-10-CM

## 2024-04-04 ENCOUNTER — APPOINTMENT (OUTPATIENT)
Dept: CT IMAGING | Age: 69
End: 2024-04-04
Payer: MEDICARE

## 2024-04-04 ENCOUNTER — HOSPITAL ENCOUNTER (EMERGENCY)
Age: 69
Discharge: HOME | End: 2024-04-04
Attending: EMERGENCY MEDICINE
Payer: MEDICARE

## 2024-04-04 ENCOUNTER — APPOINTMENT (OUTPATIENT)
Dept: GENERAL RADIOLOGY | Age: 69
End: 2024-04-04
Payer: MEDICARE

## 2024-04-04 VITALS
TEMPERATURE: 98 F | OXYGEN SATURATION: 96 % | DIASTOLIC BLOOD PRESSURE: 64 MMHG | RESPIRATION RATE: 18 BRPM | SYSTOLIC BLOOD PRESSURE: 111 MMHG | HEART RATE: 96 BPM | WEIGHT: 161 LBS | HEIGHT: 65 IN | BODY MASS INDEX: 26.82 KG/M2

## 2024-04-04 DIAGNOSIS — J10.1 INFLUENZA A: Primary | ICD-10-CM

## 2024-04-04 LAB
ALBUMIN SERPL-MCNC: 4.2 G/DL (ref 3.5–5.2)
ALP SERPL-CCNC: 108 U/L (ref 35–104)
ALT SERPL-CCNC: 9 U/L (ref 5–33)
ANION GAP SERPL CALCULATED.3IONS-SCNC: 16 MMOL/L (ref 7–19)
AST SERPL-CCNC: 22 U/L (ref 5–32)
B PARAP IS1001 DNA NPH QL NAA+NON-PROBE: NOT DETECTED
B PERT.PT PRMT NPH QL NAA+NON-PROBE: NOT DETECTED
BASE EXCESS VENOUS: 3 MMOL/L
BASOPHILS # BLD: 0 K/UL (ref 0–0.2)
BASOPHILS NFR BLD: 0.3 % (ref 0–1)
BILIRUB SERPL-MCNC: <0.2 MG/DL (ref 0.2–1.2)
BILIRUB UR QL STRIP: NEGATIVE
BUN SERPL-MCNC: 11 MG/DL (ref 8–23)
C PNEUM DNA NPH QL NAA+NON-PROBE: NOT DETECTED
CALCIUM SERPL-MCNC: 9 MG/DL (ref 8.8–10.2)
CARBOXYHEMOGLOBIN: 2.1 %
CHLORIDE SERPL-SCNC: 89 MMOL/L (ref 98–111)
CLARITY UR: CLEAR
CO2 SERPL-SCNC: 27 MMOL/L (ref 22–29)
COLOR UR: YELLOW
CREAT SERPL-MCNC: 0.8 MG/DL (ref 0.5–0.9)
EOSINOPHIL # BLD: 0 K/UL (ref 0–0.6)
EOSINOPHIL NFR BLD: 0.1 % (ref 0–5)
ERYTHROCYTE [DISTWIDTH] IN BLOOD BY AUTOMATED COUNT: 14.8 % (ref 11.5–14.5)
FLUAV H1 2009 PAN RNA NPH NAA+NON-PROBE: DETECTED
FLUBV RNA NPH QL NAA+NON-PROBE: NOT DETECTED
GLUCOSE BLD-MCNC: 295 MG/DL (ref 70–99)
GLUCOSE SERPL-MCNC: 250 MG/DL (ref 74–109)
GLUCOSE UR STRIP.AUTO-MCNC: =>1000 MG/DL
HADV DNA NPH QL NAA+NON-PROBE: NOT DETECTED
HCO3 VENOUS: 29 MMOL/L (ref 23–29)
HCOV 229E RNA NPH QL NAA+NON-PROBE: NOT DETECTED
HCOV HKU1 RNA NPH QL NAA+NON-PROBE: NOT DETECTED
HCOV NL63 RNA NPH QL NAA+NON-PROBE: NOT DETECTED
HCOV OC43 RNA NPH QL NAA+NON-PROBE: NOT DETECTED
HCT VFR BLD AUTO: 36.9 % (ref 37–47)
HGB BLD-MCNC: 11.5 G/DL (ref 12–16)
HGB UR STRIP.AUTO-MCNC: NEGATIVE MG/L
HMPV RNA NPH QL NAA+NON-PROBE: NOT DETECTED
HPIV1 RNA NPH QL NAA+NON-PROBE: NOT DETECTED
HPIV2 RNA NPH QL NAA+NON-PROBE: NOT DETECTED
HPIV3 RNA NPH QL NAA+NON-PROBE: NOT DETECTED
HPIV4 RNA NPH QL NAA+NON-PROBE: NOT DETECTED
IMM GRANULOCYTES # BLD: 0 K/UL
KETONES UR STRIP.AUTO-MCNC: NEGATIVE MG/DL
LEUKOCYTE ESTERASE UR QL STRIP.AUTO: NEGATIVE
LYMPHOCYTES # BLD: 1.8 K/UL (ref 1.1–4.5)
LYMPHOCYTES NFR BLD: 23 % (ref 20–40)
M PNEUMO DNA NPH QL NAA+NON-PROBE: NOT DETECTED
MCH RBC QN AUTO: 25.2 PG (ref 27–31)
MCHC RBC AUTO-ENTMCNC: 31.2 G/DL (ref 33–37)
MCV RBC AUTO: 80.7 FL (ref 81–99)
METHEMOGLOBIN VENOUS: 1.8 %
MONOCYTES # BLD: 0.7 K/UL (ref 0–0.9)
MONOCYTES NFR BLD: 9.1 % (ref 0–10)
NEUTROPHILS # BLD: 5.2 K/UL (ref 1.5–7.5)
NEUTS SEG NFR BLD: 67.1 % (ref 50–65)
NITRITE UR QL STRIP.AUTO: NEGATIVE
O2 CONTENT, VEN: 7 ML/DL
O2 SAT, VEN: 45 %
PCO2, VEN: 50 MMHG (ref 40–50)
PERFORMED ON: ABNORMAL
PH UR STRIP.AUTO: 5.5 [PH] (ref 5–8)
PH VENOUS: 7.37 (ref 7.35–7.45)
PLATELET # BLD AUTO: 236 K/UL (ref 130–400)
PMV BLD AUTO: 9.3 FL (ref 9.4–12.3)
PO2, VEN: 24 MMHG
POTASSIUM SERPL-SCNC: 3.8 MMOL/L (ref 3.5–5)
PROT SERPL-MCNC: 7.1 G/DL (ref 6.6–8.7)
PROT UR STRIP.AUTO-MCNC: NEGATIVE MG/DL
RBC # BLD AUTO: 4.57 M/UL (ref 4.2–5.4)
RSV RNA NPH QL NAA+NON-PROBE: NOT DETECTED
RV+EV RNA NPH QL NAA+NON-PROBE: NOT DETECTED
SARS-COV-2 RNA NPH QL NAA+NON-PROBE: NOT DETECTED
SODIUM SERPL-SCNC: 132 MMOL/L (ref 136–145)
SP GR UR STRIP.AUTO: 1.02 (ref 1–1.03)
UROBILINOGEN UR STRIP.AUTO-MCNC: 0.2 E.U./DL
WBC # BLD AUTO: 7.8 K/UL (ref 4.8–10.8)

## 2024-04-04 PROCEDURE — 85025 COMPLETE CBC W/AUTO DIFF WBC: CPT

## 2024-04-04 PROCEDURE — 81003 URINALYSIS AUTO W/O SCOPE: CPT

## 2024-04-04 PROCEDURE — 80053 COMPREHEN METABOLIC PANEL: CPT

## 2024-04-04 PROCEDURE — 71045 X-RAY EXAM CHEST 1 VIEW: CPT

## 2024-04-04 PROCEDURE — 99284 EMERGENCY DEPT VISIT MOD MDM: CPT

## 2024-04-04 PROCEDURE — 0202U NFCT DS 22 TRGT SARS-COV-2: CPT

## 2024-04-04 PROCEDURE — 70450 CT HEAD/BRAIN W/O DYE: CPT

## 2024-04-04 PROCEDURE — 36415 COLL VENOUS BLD VENIPUNCTURE: CPT

## 2024-04-04 PROCEDURE — 82962 GLUCOSE BLOOD TEST: CPT

## 2024-04-04 PROCEDURE — 82800 BLOOD PH: CPT

## 2024-04-04 PROCEDURE — 82803 BLOOD GASES ANY COMBINATION: CPT

## 2024-04-04 RX ORDER — LANOLIN ALCOHOL/MO/W.PET/CERES
1000 CREAM (GRAM) TOPICAL DAILY
COMMUNITY

## 2024-04-04 RX ORDER — BENZONATATE 100 MG/1
100 CAPSULE ORAL 3 TIMES DAILY PRN
Qty: 30 CAPSULE | Refills: 0 | Status: SHIPPED | OUTPATIENT
Start: 2024-04-04 | End: 2024-04-14

## 2024-04-05 ENCOUNTER — TRANSCRIBE ORDERS (OUTPATIENT)
Dept: ADMINISTRATIVE | Facility: HOSPITAL | Age: 69
End: 2024-04-05
Payer: MEDICARE

## 2024-04-05 DIAGNOSIS — I42.2 PRIMARY HYPERTROPHIC CARDIOMYOPATHY: Primary | ICD-10-CM

## 2024-04-05 DIAGNOSIS — R00.0 TACHYCARDIA, UNSPECIFIED: ICD-10-CM

## 2024-04-05 NOTE — ED PROVIDER NOTES
[facility] EMERGENCY DEPT  eMERGENCY dEPARTMENT eNCOUnter      Pt Name: Cassandra Bach  MRN: [id number]  Birthdate 1955  Date of evaluation: 4/4/2024  Provider: FLOR Odonnell    CHIEF COMPLAINT       Chief Complaint   Patient presents with    Illness     Cough, body aches x5 days with HI sugars at home 500+ and niece reports was altered last night, she couldn't find her bathroom         HISTORY OF PRESENT ILLNESS   (Location/Symptom, Timing/Onset,Context/Setting, Quality, Duration, Modifying Factors, Severity)  Note limiting factors.   Cassandra Bach is a 68 y.o. female who presents to the emergency department with fever and cough x 3 days.  Patient's had high blood sugars for about 5 days.  Her daughter tells me this is not unusual.  Patient has been confused.  She told me her temperature was 118 which was better. she apparently could not find her bathroom.  No vomiting or diarrhea. patient denies any abdominal pain.    Patient has a history of diabetes, hypertension, hyperlipidemia, cervical disc disease    The history is provided by the patient and a relative.   Illness   The current episode started 3 to 5 days ago. Associated symptoms include a fever, congestion and cough.       NursingNotes were reviewed.    REVIEW OF SYSTEMS    (2-9 systems for level 4, 10 or more for level 5)     Review of Systems   Constitutional:  Positive for fever.   HENT:  Positive for congestion.    Respiratory:  Positive for cough.        Except as noted above the remainder of the review of systems was reviewed and negative.       PAST MEDICAL HISTORY     Past Medical History:   Diagnosis Date    Anxiety     Cervical disc disease     Chest discomfort 10/17/2014    H/o negative stress tests AUC indication 15, AUC score 4, [facility] 2012;[id number] 10/17/2014  Cath  Mild cad,normal LVFX      Diabetes mellitus (HCC) 10/17/2014    Diabetic neuropathy (HCC)     Hyperlipidemia     Hypertension     Insomnia     Insomnia     Osteoarthritis

## 2024-04-16 ENCOUNTER — HOSPITAL ENCOUNTER (OUTPATIENT)
Dept: CARDIOLOGY | Facility: HOSPITAL | Age: 69
Discharge: HOME | End: 2024-04-16
Payer: MEDICARE

## 2024-04-30 ENCOUNTER — TRANSCRIBE ORDERS (OUTPATIENT)
Dept: PHYSICAL THERAPY | Facility: CLINIC | Age: 69
End: 2024-04-30
Payer: MEDICARE

## 2024-04-30 ENCOUNTER — TRANSCRIBE ORDERS (OUTPATIENT)
Dept: ADMINISTRATIVE | Facility: HOSPITAL | Age: 69
End: 2024-04-30
Payer: MEDICARE

## 2024-04-30 DIAGNOSIS — R26.81 UNSTEADINESS ON FEET: Primary | ICD-10-CM

## 2024-04-30 DIAGNOSIS — Z78.0 ASYMPTOMATIC MENOPAUSAL STATE: Primary | ICD-10-CM

## 2024-06-06 ENCOUNTER — TRANSCRIBE ORDERS (OUTPATIENT)
Dept: ADMINISTRATIVE | Facility: HOSPITAL | Age: 69
End: 2024-06-06
Payer: MEDICARE

## 2024-06-10 ENCOUNTER — HOSPITAL ENCOUNTER (OUTPATIENT)
Dept: CARDIOLOGY | Facility: HOSPITAL | Age: 69
Discharge: HOME | End: 2024-06-10
Payer: MEDICARE

## 2024-12-19 ENCOUNTER — TRANSCRIBE ORDERS (OUTPATIENT)
Dept: ADMINISTRATIVE | Age: 69
End: 2024-12-19

## 2024-12-19 DIAGNOSIS — R01.1 HEART MURMUR: ICD-10-CM

## 2024-12-19 DIAGNOSIS — R09.89 OTHER SPECIFIED SYMPTOMS AND SIGNS INVOLVING THE CIRCULATORY AND RESPIRATORY SYSTEMS: Primary | ICD-10-CM

## 2024-12-19 DIAGNOSIS — R01.1 CARDIAC MURMUR, UNSPECIFIED: Primary | ICD-10-CM

## 2025-01-03 ENCOUNTER — HOSPITAL ENCOUNTER (OUTPATIENT)
Dept: VASCULAR LAB | Age: 70
Discharge: HOME | End: 2025-01-03
Attending: INTERNAL MEDICINE
Payer: MEDICARE

## 2025-01-03 ENCOUNTER — HOSPITAL ENCOUNTER (OUTPATIENT)
Age: 70
End: 2025-01-03
Attending: INTERNAL MEDICINE
Payer: MEDICARE

## 2025-01-03 ENCOUNTER — APPOINTMENT (OUTPATIENT)
Dept: WOMENS IMAGING | Age: 70
End: 2025-01-03
Attending: INTERNAL MEDICINE
Payer: MEDICARE

## 2025-01-03 VITALS
DIASTOLIC BLOOD PRESSURE: 64 MMHG | WEIGHT: 161 LBS | HEIGHT: 65 IN | SYSTOLIC BLOOD PRESSURE: 111 MMHG | BODY MASS INDEX: 26.82 KG/M2

## 2025-01-03 DIAGNOSIS — R01.1 HEART MURMUR: ICD-10-CM

## 2025-01-03 DIAGNOSIS — R09.89 OTHER SPECIFIED SYMPTOMS AND SIGNS INVOLVING THE CIRCULATORY AND RESPIRATORY SYSTEMS: ICD-10-CM

## 2025-01-03 LAB
ECHO AO ASC DIAM: 2.6 CM
ECHO AO ASCENDING AORTA INDEX: 1.44 CM/M2
ECHO AO ROOT DIAM: 1.5 CM
ECHO AO ROOT INDEX: 0.83 CM/M2
ECHO AO SINUS VALSALVA DIAM: 2.2 CM
ECHO AO SINUS VALSALVA INDEX: 1.22 CM/M2
ECHO AO ST JNCT DIAM: 1.9 CM
ECHO AV AREA PEAK VELOCITY: 2.9 CM2
ECHO AV AREA VTI: 3.6 CM2
ECHO AV AREA/BSA PEAK VELOCITY: 1.6 CM2/M2
ECHO AV AREA/BSA VTI: 2 CM2/M2
ECHO AV MEAN GRADIENT: 6 MMHG
ECHO AV MEAN VELOCITY: 1.1 M/S
ECHO AV PEAK GRADIENT: 11 MMHG
ECHO AV PEAK VELOCITY: 1.6 M/S
ECHO AV VELOCITY RATIO: 0.94
ECHO AV VTI: 26.5 CM
ECHO BSA: 1.83 M2
ECHO EST RA PRESSURE: 3 MMHG
ECHO IVC PROX: 1.4 CM
ECHO LA AREA 2C: 12.7 CM2
ECHO LA AREA 4C: 14 CM2
ECHO LA DIAMETER INDEX: 1.83 CM/M2
ECHO LA DIAMETER: 3.3 CM
ECHO LA MAJOR AXIS: 4.8 CM
ECHO LA MINOR AXIS: 3.9 CM
ECHO LA TO AORTIC ROOT RATIO: 2.2
ECHO LA VOL BP: 36 ML (ref 22–52)
ECHO LA VOL MOD A2C: 33 ML (ref 22–52)
ECHO LA VOL MOD A4C: 33 ML (ref 22–52)
ECHO LA VOL/BSA BIPLANE: 20 ML/M2 (ref 16–34)
ECHO LA VOLUME INDEX MOD A2C: 18 ML/M2 (ref 16–34)
ECHO LA VOLUME INDEX MOD A4C: 18 ML/M2 (ref 16–34)
ECHO LV E' LATERAL VELOCITY: 5.11 CM/S
ECHO LV E' SEPTAL VELOCITY: 4.57 CM/S
ECHO LV EDV A2C: 57 ML
ECHO LV EDV A4C: 59 ML
ECHO LV EDV INDEX A4C: 33 ML/M2
ECHO LV EDV NDEX A2C: 32 ML/M2
ECHO LV EJECTION FRACTION A2C: 67 %
ECHO LV EJECTION FRACTION A4C: 62 %
ECHO LV EJECTION FRACTION BIPLANE: 65 % (ref 55–100)
ECHO LV ESV A2C: 19 ML
ECHO LV ESV A4C: 22 ML
ECHO LV ESV INDEX A2C: 11 ML/M2
ECHO LV ESV INDEX A4C: 12 ML/M2
ECHO LV FRACTIONAL SHORTENING: 26 % (ref 28–44)
ECHO LV INTERNAL DIMENSION DIASTOLE INDEX: 1.89 CM/M2
ECHO LV INTERNAL DIMENSION DIASTOLIC: 3.4 CM (ref 3.9–5.3)
ECHO LV INTERNAL DIMENSION SYSTOLIC INDEX: 1.39 CM/M2
ECHO LV INTERNAL DIMENSION SYSTOLIC: 2.5 CM
ECHO LV IVSD: 1 CM (ref 0.6–0.9)
ECHO LV MASS 2D: 98.9 G (ref 67–162)
ECHO LV MASS INDEX 2D: 54.9 G/M2 (ref 43–95)
ECHO LV POSTERIOR WALL DIASTOLIC: 1 CM (ref 0.6–0.9)
ECHO LV RELATIVE WALL THICKNESS RATIO: 0.59
ECHO LVOT AREA: 3.1 CM2
ECHO LVOT AV VTI INDEX: 1.15
ECHO LVOT DIAM: 2 CM
ECHO LVOT MEAN GRADIENT: 6 MMHG
ECHO LVOT PEAK GRADIENT: 9 MMHG
ECHO LVOT PEAK VELOCITY: 1.5 M/S
ECHO LVOT STROKE VOLUME INDEX: 53 ML/M2
ECHO LVOT SV: 95.5 ML
ECHO LVOT VTI: 30.4 CM
ECHO MV A VELOCITY: 1.29 M/S
ECHO MV AREA VTI: 3.5 CM2
ECHO MV E DECELERATION TIME (DT): 79 MS
ECHO MV E VELOCITY: 0.52 M/S
ECHO MV E/A RATIO: 0.4
ECHO MV E/E' LATERAL: 10.18
ECHO MV E/E' RATIO (AVERAGED): 10.78
ECHO MV E/E' SEPTAL: 11.38
ECHO MV LVOT VTI INDEX: 0.88
ECHO MV MAX VELOCITY: 1.6 M/S
ECHO MV MEAN GRADIENT: 3 MMHG
ECHO MV MEAN VELOCITY: 0.8 M/S
ECHO MV PEAK GRADIENT: 10 MMHG
ECHO MV VTI: 26.9 CM
ECHO PULMONARY ARTERY END DIASTOLIC PRESSURE: 4 MMHG
ECHO PV REGURGITANT MAX VELOCITY: 1 M/S
ECHO RA AREA 4C: 13.1 CM2
ECHO RA END SYSTOLIC VOLUME APICAL 4 CHAMBER INDEX BSA: 16 ML/M2
ECHO RA VOLUME: 29 ML
ECHO RIGHT VENTRICULAR SYSTOLIC PRESSURE (RVSP): 31 MMHG
ECHO RV BASAL DIMENSION: 2.4 CM
ECHO RV INTERNAL DIMENSION: 2.5 CM
ECHO RV LONGITUDINAL DIMENSION: 5.7 CM
ECHO RV MID DIMENSION: 1.9 CM
ECHO RV TAPSE: 1.5 CM (ref 1.7–?)
ECHO TV E WAVE: 2.2 M/S
ECHO TV REGURGITANT MAX VELOCITY: 2.64 M/S
ECHO TV REGURGITANT PEAK GRADIENT: 28 MMHG

## 2025-01-03 PROCEDURE — 93880 EXTRACRANIAL BILAT STUDY: CPT

## 2025-01-03 PROCEDURE — C8929 TTE W OR WO FOL WCON,DOPPLER: HCPCS

## 2025-01-03 PROCEDURE — 6360000004 HC RX CONTRAST MEDICATION: Performed by: INTERNAL MEDICINE

## 2025-01-03 RX ADMIN — SULFUR HEXAFLUORIDE 2 ML: 60.7; .19; .19 INJECTION, POWDER, LYOPHILIZED, FOR SUSPENSION INTRAVENOUS; INTRAVESICAL at 15:36

## 2025-01-05 LAB
ECHO BSA: 1.83 M2
VAS LEFT ARM BP DIA: 88 MMHG
VAS LEFT ARM BP: 160 MMHG
VAS LEFT CCA MID EDV: 23.5 CM/S
VAS LEFT CCA MID PSV: 106 CM/S
VAS LEFT CCA PROX EDV: 23.5 CM/S
VAS LEFT CCA PROX PSV: 101 CM/S
VAS LEFT ECA EDV: 20.4 CM/S
VAS LEFT ECA PSV: 102 CM/S
VAS LEFT ICA DIST EDV: 18.4 CM/S
VAS LEFT ICA DIST PSV: 61.9 CM/S
VAS LEFT ICA MID EDV: 32.1 CM/S
VAS LEFT ICA MID PSV: 102 CM/S
VAS LEFT ICA PROX EDV: 20.4 CM/S
VAS LEFT ICA PROX PSV: 73.7 CM/S
VAS LEFT VERTEBRAL EDV: 18.8 CM/S
VAS LEFT VERTEBRAL PSV: 73.7 CM/S
VAS RIGHT ARM BP DIA: 90 MMHG
VAS RIGHT ARM BP: 158 MMHG
VAS RIGHT CCA MID EDV: 26.7 CM/S
VAS RIGHT CCA MID PSV: 107 CM/S
VAS RIGHT CCA PROX EDV: 20.4 CM/S
VAS RIGHT CCA PROX PSV: 94.1 CM/S
VAS RIGHT ECA EDV: 17.2 CM/S
VAS RIGHT ECA PSV: 129 CM/S
VAS RIGHT ICA DIST EDV: 22 CM/S
VAS RIGHT ICA DIST PSV: 83.1 CM/S
VAS RIGHT ICA MID EDV: 18.8 CM/S
VAS RIGHT ICA MID PSV: 71.3 CM/S
VAS RIGHT ICA PROX EDV: 19.6 CM/S
VAS RIGHT ICA PROX PSV: 132 CM/S
VAS RIGHT VERTEBRAL PSV: 30.7 CM/S

## 2025-01-05 PROCEDURE — 93880 EXTRACRANIAL BILAT STUDY: CPT | Performed by: SURGERY

## (undated) DEVICE — GLV SURG TRIUMPH GREEN W/ALOE PF LTX 7 STRL

## (undated) DEVICE — SPNG DISSCT CHRRY 3/8IN STRL PK/5

## (undated) DEVICE — CVR UNIV C/ARM

## (undated) DEVICE — PACK,UNIVERSAL,NO GOWNS: Brand: MEDLINE

## (undated) DEVICE — TP SILK DURAPORE 3IN

## (undated) DEVICE — GLV SURG BIOGEL LTX PF 7 1/2

## (undated) DEVICE — GLV SURG SENSICARE W/ALOE PF LF 7.5 STRL

## (undated) DEVICE — AMBU AURAONCE U SIZE 3: Brand: AURAONCE

## (undated) DEVICE — DISPOSABLE TOURNIQUET CUFF SINGLE BLADDER, SINGLE PORT AND QUICK CONNECT CONNECTOR: Brand: COLOR CUFF

## (undated) DEVICE — 3.0MM PRECISION NEURO (MATCH HEAD)

## (undated) DEVICE — AIRWAY CIRCUIT: Brand: DEROYAL

## (undated) DEVICE — GLV SURG SENSICARE W/ALOE PF LF 8 STRL

## (undated) DEVICE — DRSNG WND GZ CURAD OIL EMULSION 3X3IN STRL

## (undated) DEVICE — SUT ETHLN 3/0 FS1 30IN 669H

## (undated) DEVICE — TRY PREP SCRB VAG PVP

## (undated) DEVICE — COTTON UNDERCAST PADDING,REGULAR FINISH: Brand: WEBRIL

## (undated) DEVICE — BANDAGE ESMARK 4IN ST

## (undated) DEVICE — 1010 S-DRAPE TOWEL DRAPE 10/BX: Brand: STERI-DRAPE™

## (undated) DEVICE — GAUZE SPONGES,12 PLY: Brand: CURITY

## (undated) DEVICE — CVR BRD ARM 13X30

## (undated) DEVICE — GOWN,NON-REINFORCED,SIRUS,SET IN SLV,XXL: Brand: MEDLINE

## (undated) DEVICE — 4-PORT MANIFOLD: Brand: NEPTUNE 2

## (undated) DEVICE — GLV SURG BIOGEL LTX PF 6 1/2

## (undated) DEVICE — UNDERCAST PADDING: Brand: DEROYAL

## (undated) DEVICE — PK EXTRM 30

## (undated) DEVICE — STERILE LATEX POWDER FREE SURGICAL GLOVES WITH HYDROGEL COATING: Brand: PROTEXIS

## (undated) DEVICE — SUTURE ETHLN SZ 4-0 L18IN NONABSORBABLE BLK L19MM PS-2 3/8 1667H

## (undated) DEVICE — APPL CHLORAPREP W/TINT 26ML ORNG

## (undated) DEVICE — MAJOR BSIN SETUP PK

## (undated) DEVICE — ANTIBACTERIAL UNDYED BRAIDED (POLYGLACTIN 910), SYNTHETIC ABSORBABLE SUTURE: Brand: COATED VICRYL

## (undated) DEVICE — PK TURNOVER RM ADV

## (undated) DEVICE — CURITY NON-ADHERENT STRIPS: Brand: CURITY

## (undated) DEVICE — DRAPE,UTILITY,TAPE,15X26,STERILE: Brand: MEDLINE

## (undated) DEVICE — SUT GUT CHRM 2/0 CT1 36IN 923H

## (undated) DEVICE — CHLORAPREP 26ML ORANGE

## (undated) DEVICE — PK SPINE CERV ANT 30

## (undated) DEVICE — BIT DRL QC DIA 2.5X110MM

## (undated) DEVICE — Device

## (undated) DEVICE — BIT DRL QC W DEPTH MARK 2X140MM

## (undated) DEVICE — GLV SURG TRIUMPH GREEN W/ALOE PF LTX 8 STRL

## (undated) DEVICE — GLOVE SURG SZ 65 CRM LTX FREE POLYISOPRENE POLYMER BEAD ANTI

## (undated) DEVICE — 3M™ WARMING BLANKET, UPPER BODY, 10 PER CASE, 42268: Brand: BAIR HUGGER™

## (undated) DEVICE — PIN DISTRACT TI 14MM STRL

## (undated) DEVICE — GLV SURG TRIUMPH PF LTX 7.5 STRL

## (undated) DEVICE — HALTR TRACT HD CERV STD UNIV

## (undated) DEVICE — ELECTRD NDL EDGE/INSUL/PFTE.787MM 2.84IN

## (undated) DEVICE — GLOVE SURG SZ 7 CRM LTX FREE POLYISOPRENE POLYMER BEAD ANTI